# Patient Record
Sex: MALE | Employment: UNEMPLOYED | URBAN - METROPOLITAN AREA
[De-identification: names, ages, dates, MRNs, and addresses within clinical notes are randomized per-mention and may not be internally consistent; named-entity substitution may affect disease eponyms.]

---

## 2022-05-16 ENCOUNTER — OFFICE VISIT (OUTPATIENT)
Dept: WOUND CARE | Facility: CLINIC | Age: 67
End: 2022-05-16
Payer: COMMERCIAL

## 2022-05-16 VITALS
RESPIRATION RATE: 18 BRPM | HEART RATE: 132 BPM | DIASTOLIC BLOOD PRESSURE: 68 MMHG | SYSTOLIC BLOOD PRESSURE: 136 MMHG | TEMPERATURE: 99.7 F

## 2022-05-16 DIAGNOSIS — L89.114 PRESSURE INJURY OF RIGHT UPPER BACK, STAGE 4 (HCC): ICD-10-CM

## 2022-05-16 DIAGNOSIS — L89.154 PRESSURE INJURY OF SACRAL REGION, STAGE 4 (HCC): Primary | ICD-10-CM

## 2022-05-16 DIAGNOSIS — L89.133 PRESSURE ULCER OF RIGHT LOWER BACK, STAGE 3 (HCC): ICD-10-CM

## 2022-05-16 DIAGNOSIS — E46 PROTEIN MALNUTRITION (HCC): ICD-10-CM

## 2022-05-16 DIAGNOSIS — L89.314 PRESSURE ULCER OF RIGHT BUTTOCK, STAGE 4 (HCC): ICD-10-CM

## 2022-05-16 DIAGNOSIS — R26.2 AMBULATORY DYSFUNCTION: ICD-10-CM

## 2022-05-16 DIAGNOSIS — L89.892 PRESSURE INJURY OF LEFT FOOT, STAGE 2 (HCC): ICD-10-CM

## 2022-05-16 DIAGNOSIS — L89.893 PRESSURE ULCER OF LEFT FOOT, STAGE 3 (HCC): ICD-10-CM

## 2022-05-16 PROCEDURE — 99204 OFFICE O/P NEW MOD 45 MIN: CPT | Performed by: FAMILY MEDICINE

## 2022-05-16 PROCEDURE — 11045 DBRDMT SUBQ TISS EACH ADDL: CPT | Performed by: FAMILY MEDICINE

## 2022-05-16 PROCEDURE — 99215 OFFICE O/P EST HI 40 MIN: CPT | Performed by: FAMILY MEDICINE

## 2022-05-16 PROCEDURE — 11042 DBRDMT SUBQ TIS 1ST 20SQCM/<: CPT | Performed by: FAMILY MEDICINE

## 2022-05-16 RX ORDER — ERGOCALCIFEROL (VITAMIN D2) 1250 MCG
50000 CAPSULE ORAL WEEKLY
COMMUNITY

## 2022-05-16 RX ORDER — LIDOCAINE 40 MG/G
CREAM TOPICAL ONCE
Status: COMPLETED | OUTPATIENT
Start: 2022-05-16 | End: 2022-05-16

## 2022-05-16 RX ORDER — LATANOPROST 50 UG/ML
1 SOLUTION/ DROPS OPHTHALMIC
COMMUNITY

## 2022-05-16 RX ORDER — FLUTICASONE PROPIONATE 50 MCG
1 SPRAY, SUSPENSION (ML) NASAL 2 TIMES DAILY
COMMUNITY

## 2022-05-16 RX ORDER — LORATADINE 10 MG/1
10 TABLET ORAL DAILY
COMMUNITY

## 2022-05-16 RX ORDER — DENOSUMAB 60 MG/ML
60 INJECTION SUBCUTANEOUS ONCE
COMMUNITY

## 2022-05-16 RX ORDER — B-COMPLEX WITH VITAMIN C
1 TABLET ORAL
COMMUNITY

## 2022-05-16 RX ORDER — ECHINACEA PURPUREA EXTRACT 125 MG
1 TABLET ORAL AS NEEDED
COMMUNITY

## 2022-05-16 RX ORDER — DORZOLAMIDE HYDROCHLORIDE AND TIMOLOL MALEATE 20; 5 MG/ML; MG/ML
1 SOLUTION/ DROPS OPHTHALMIC 2 TIMES DAILY
COMMUNITY

## 2022-05-16 RX ORDER — HYDROMORPHONE HYDROCHLORIDE 1 MG/ML
5 SOLUTION ORAL
COMMUNITY

## 2022-05-16 RX ORDER — POLYETHYLENE GLYCOL 400 AND PROPYLENE GLYCOL 4; 3 MG/ML; MG/ML
0.25 GEL OPHTHALMIC
COMMUNITY

## 2022-05-16 RX ORDER — BISACODYL 10 MG
10 SUPPOSITORY, RECTAL RECTAL AS NEEDED
COMMUNITY

## 2022-05-16 RX ADMIN — LIDOCAINE: 40 CREAM TOPICAL at 15:03

## 2022-05-16 NOTE — PATIENT INSTRUCTIONS
Orders Placed This Encounter   Procedures    Wound cleansing and dressings     Wash your hands with soap and water  Remove old dressing, discard into plastic bag and place in trash  Cleanse the wound with normal saline prior to applying a clean dressing  Do not use tissue or cotton balls  Do not scrub the wound  Pat dry using gauze  Shower yes do not get dressing wet though  Plan for showers around the dressing changes      Back wounds    Wash your hands with soap and water  Remove old dressing, discard into plastic bag and place in trash  Cleanse the wound with normal saline prior to applying a clean dressing  Do not use tissue or cotton balls  Do not scrub the wound  Pat dry using gauze  Shower yes do not get dressing wet  Apply skin prep to skin surrounding wound  Apply santyl to the wound base to both back wounds, cover with 4x4 and secure with abd  IN the office use medihoney in place of santyl  Secure with paper tape  Change dressing daily    Continue to protect any at risk areas with bordered foam   Continue to turn and reposition like you have every hour  Continue with the offloading boots to both feet like you have been    Left Hip and Sacral Wounds    Negative Pressure Wound Therapy Instructions  Apply black granufoam to wound bed, Set negative pressure on continuous at 140 mmHG, VAC dressing to be changed three times per week as ordered  and VAC dressing bridged to relieve disc pressure  Today at the [address] [facility],[address] pack wounds with prophase soaked gauze, cover with foam and secure with med fix tape  This was done today  Left foot Wounds   Apply Dermagran to wound bed, cover with Allevyn gently foam  This was done today  Change dressing three   times per week      May substitute with hydrocolloid  If facility unable to obtain Pamela Pollard    Follow up at [address] [facility],[address] will call to schedule     Standing Status:   Future     Standing Expiration Date:   5/16/2023

## 2022-05-16 NOTE — PROGRESS NOTES
Patient ID: Leighton Zimmerman is a 61 y o  male Date of Birth 1955       Chief Complaint   Patient presents with   [facility] Patient Visit     New visit for numerous wounds  Staff reports that only four wounds are bad and that they feel need to be assessed  Acquired in the hospital about 6 weeks ago       Allergies: Albumen, egg - food allergy and Amoxicillin-pot clavulanate    Diagnosis:      Diagnosis ICD-10-CM Associated Orders   1  Pressure injury of sacral region, stage 4 ([facility])  L89 154 lidocaine (LMX) 4 % cream     Wound cleansing and dressings     Debridement   2  Pressure ulcer of right buttock, stage 4 ([facility])  L89 314 lidocaine (LMX) 4 % cream     Wound cleansing and dressings     Debridement   3  Pressure ulcer of right lower back, stage 3 ([facility])  L89 133 lidocaine (LMX) 4 % cream     Wound cleansing and dressings     Debridement   4  Pressure ulcer of left foot, stage 3 ([facility])  L89 893 lidocaine (LMX) 4 % cream     Wound cleansing and dressings     Debridement   5  Pressure injury of right upper back, stage 4 ([facility])  L89 114 Debridement   6  Pressure injury of left foot, stage 2 ([facility] [address] )  L89 892    7  Protein malnutrition ([facility] )  E46    8  Ambulatory dysfunction  R26 2            Assessment & Plan:  · Stage 4 pressure injury sacrum:   · Surgical debridement to remove devitalized tissue  · Continue with wound VAC  · Stage 4 pressure injury of R buttock:   · Surgical debridement to remove devitalized tissue  · Continue with wound VAC  · Stage 3 pressure injury of L foot lateral:   · Surgical debridement to remove slough  · Apply Dermagran to wound bed and change dressing three times weekly  · Stage 2 pressure injury of L foot medial:   · Apply Dermagran to wound bed and change dressing three times weekly  · Stage 4 pressure injury of R upper back:   · Surgical debridement to remove devitalized tissue  · Continue with Santyl and change daily     · Stage 3 pressure injury of R lower back: · Surgical debridement to remove devitalized tissue  · Continue with Santyl and change daily  · Continue to offload by turning patient consistently  · Continue with Prevalon type boots for offloading  · Continue to monitor for signs of acute infection such as hypotension, fever, chills  · Will f/u in 4 weeks for reassessment  Subjective:     5/16/22: Pt from skilled facility presents today with aids for evaluation of multiple pressure injuries obtained while in hospital  He was hospitalized twice recently  Per hospital admission note on 04/02/22; "Brenna Maurer is a 61 y o  -American male from [facility] with a PMHx significant for profound intellectual disability, renal colic with chronic indwelling Marrufo catheter and recent stent placement constipation, anemia of chronic disease, moderate protein calorie malnutrition, epilepsy, glaucoma  Who presents to [facility] with a chief complaint of fever and tachycardia  All information was obtained through emergency medical record and chart review as patient is unable to provide any information  The patient arrived via EMS from [facility] for fever and tachycardia    He does have a wound VAC to the right hip and sacrum  Staff that was at the bedside reported that he was just released from [facility]  The patient is nonverbal     Patient is a resident at [facility] and has a history of profound intellectual disabilities  Patient brought in severely malnourished and with multiple, extensive decubitus wounds  Patient with multiple pressure ulcers, sacral area, ear and coccyx  Sacral area with infection at admission  General surgery consulted and followed throughout admission with recommendations for an diverting ostomy to help with decreasing bowel function and healing of wounds  Suspected wounds were source of infection   During admission patient did go to the OR with general surgery where a diverting ostomy was completed  Brownish yellow soft stool present in ostomy bag  Wound VAC in place  Wound care and vac to continue upon d/c at [facility]  Furthermore, CT of chest during admission with moderate airspace opacity in the right base with air bronchograms likely secondary to pneumonia  Blood cultures on 4/7 negative and pro lizz 0 05  Two of the 13 wound cultures, the ones marked thigh, may have some depth and the CT of ab mentions possible myositis right buttock  Pt was treated with IV Meropenem for this issue as ordered by ID and course will be complete prior to discharge  PICC removed this am     Pt has colostomy and PEG tube  Resume tube feeds as prior to admission  [facility] doc to manage  Pt will be discharged with freire d/t wounds  Pt to follow up with surgery, pcp or urology for further determination of removal      Pt has not been in the [facility] since 3/13/22 (extensive hospital stays at [address] [place] [address] (3/14/22) for multi organ failure and sepsis then [facility] (4/2/22- current) for sepsis and wound treatment  Pt has received extensive IV antibiotic therapy and treatment at both facilities "    Per aids today, pt has been with the [facility] for over 40 years and was recently hospitalized and subsequently developed mutiple pressure injuries  Currently he is receiving feedings via PEG tube and receiving additional protein supplements; aids note he has gained about 5 lbs since returning from the hospital  In addition, he has a diverting colostomy and indwelling Freire catheter  He is being turned approximately hourly to relieve the pressure and has Prevalon -type boots in order to assist with offloading of the feet  They have been placing silvidene on the foot pressure injuries (stage 3 injury of L foot and stage 2 of R foot)  Wound VAC is currently being utilized on the pressure injuries of the R buttock and sacrum   Currently Santyl is being used on the two pressure injuries of the R back  The following portions of the patient's history were reviewed and updated as appropriate: There is no problem list on file for this patient  No past medical history on file  No past surgical history on file  No family history on file  Social History     Socioeconomic History    Marital status: Single     Spouse name: None    Number of children: None    Years of education: None    Highest education level: None   Occupational History    None   Tobacco Use    Smoking status: Never Smoker    Smokeless tobacco: Never Used   Vaping Use    Vaping Use: Never used   Substance and Sexual Activity    Alcohol use: Never    Drug use: Never    Sexual activity: Never   Other Topics Concern    None   Social History Narrative    None     Social Determinants of Health     Financial Resource Strain: Not on file   Food Insecurity: Not on file   Transportation Needs: Not on file   Physical Activity: Not on file   Stress: Not on file   Social Connections: Not on file   Intimate Partner Violence: Not on file   Housing Stability: Not on file        Current Outpatient Medications:     bisacodyl (Dulcolax) 10 mg suppository, Insert 10 mg into the rectum as needed in the morning for constipation  Every three days as needed  , Disp: , Rfl:     calcium carbonate-vitamin D (OSCAL-D) 500 mg-200 units per tablet, Take 1 tablet by mouth daily with breakfast, Disp: , Rfl:     denosumab (Prolia) 60 mg/mL, Inject 60 mg under the skin once Every 6 months, Disp: , Rfl:     dorzolamide-timolol (COSOPT) 22 3-6 8 MG/ML ophthalmic solution, Administer 1 drop to both eyes in the morning and 1 drop in the evening , Disp: , Rfl:     ergocalciferol (ERGOCALCIFEROL) 1 25 MG (01349 UT) capsule, Take 50,000 Units by mouth once a week, Disp: , Rfl:     fluticasone (FLONASE) 50 mcg/act nasal spray, 1 spray into each nostril 2 (two) times a day, Disp: , Rfl:     Hydromorphone HCl (Dilaudid) 1 MG/ML LIQD, Take 5 mg by mouth 6 (six) times a day, Disp: , Rfl:     latanoprost (XALATAN) 0 005 % ophthalmic solution, 1 drop daily at bedtime, Disp: , Rfl:     loratadine (CLARITIN) 10 mg tablet, Take 10 mg by mouth in the morning , Disp: , Rfl:     magnesium hydroxide (MILK OF MAGNESIA) 400 mg/5 mL oral suspension, Take by mouth daily as needed for constipation, Disp: , Rfl:     Polyethyl Glycol-Propyl Glycol (Systane) 0 4-0 3 % GEL, Apply 0 25 inches to eye Each eye, Disp: , Rfl:     sodium chloride (OCEAN) 0 65 % nasal spray, 1 spray into each nostril as needed in the morning for congestion  , Disp: , Rfl:   No current facility-administered medications for this visit  Review of Systems   Reason unable to perform ROS: pt is nonverbal        Objective:  /68   Pulse (!) 132   Temp 99 7 °F (37 6 °C)   Resp 18   Pain Score:  (unable to assess due to being non verbal)     Physical Exam  Vitals and nursing note reviewed  Constitutional:       Appearance: He is well-developed  HENT:      Head: Atraumatic  Right Ear: External ear normal       Left Ear: External ear normal    Eyes:      General: Lids are normal          Right eye: No discharge  Left eye: No discharge  Conjunctiva/sclera: Conjunctivae normal    Cardiovascular:      Rate and Rhythm: Tachycardia present  Pulmonary:      Effort: Pulmonary effort is normal  No respiratory distress  Breath sounds: Normal air entry  No stridor  Abdominal:      General: Abdomen is flat  Genitourinary:      Musculoskeletal:         General: Deformity (contracutres of RLE and LLE with significant muscle atrophy bilaterally) present  No swelling  Right lower leg: No edema  Left lower leg: No edema  Skin:     General: Skin is warm and dry  Findings: Wound present  Comments:  + Malodor  No significant periwound maceration visualized around any PI  No lymphangitic streaking from any PI  Multiple pressure injuries  See full wound description  Neurological:      Mental Status: He is alert  Gait: Gait abnormal (bed bound with contracutre)  Psychiatric:         Cognition and Memory: Cognition is impaired (profound intelectual disability)  Comments: Unable to assess as the pt is nonverbal with intellectual disability  Wound 05/16/22 Pressure Injury Back Right;Upper (Active)   Wound Image       05/16/22 1444   Wound Description Slough;Granulation tissue;Pink 05/16/22 1338   Pressure Injury Stage 3 05/16/22 1338   Sybil-wound Assessment Dry 05/16/22 1338   Wound Length (cm) 3 7 cm 05/16/22 1338   Wound Width (cm) 4 cm 05/16/22 1338   Wound Depth (cm) 0 2 cm 05/16/22 1338   Wound Surface Area (cm^2) 14 8 cm^2 05/16/22 1338   Wound Volume (cm^3) 2 96 cm^3 05/16/22 1338   Calculated Wound Volume (cm^3) 2 96 cm^3 05/16/22 1338   Drainage Amount Moderate 05/16/22 1338   Drainage Description Serous 05/16/22 1338   Non-staged Wound Description Full thickness 05/16/22 1338       Wound 05/16/22 Pressure Injury Back Lateral;Right; Lower (Active)   Wound Image       05/16/22 1444   Wound Description Granulation tissue;Slough; Epithelialization 05/16/22 1339   Pressure Injury Stage 3 05/16/22 1339   Sybil-wound Assessment Dry 05/16/22 1339   Wound Length (cm) 2 3 cm 05/16/22 1339   Wound Width (cm) 1 8 cm 05/16/22 1339   Wound Depth (cm) 0 9 cm 05/16/22 1339   Wound Surface Area (cm^2) 4 14 cm^2 05/16/22 1339   Wound Volume (cm^3) 3 726 cm^3 05/16/22 1339   Calculated Wound Volume (cm^3) 3 73 cm^3 05/16/22 1339   Undermining 1 05/16/22 1339   Undermining is depth extending from 12-3 05/16/22 1339   Drainage Amount Moderate 05/16/22 1339   Drainage Description Tan;Foul smelling 05/16/22 1339   Non-staged Wound Description Full thickness 05/16/22 1339   Treatments Cleansed 05/16/22 1339       Wound 05/16/22 Pressure Injury Sacrum (Active)   Wound Image           05/16/22 1443   Wound Description Granulation tissue;Slough;Eschar;Epithelialization; Necrotic;Probes to bone 05/16/22 1341   Pressure Injury Stage 4 05/16/22 1341   Sybil-wound Assessment Dry;Scar Tissue; Intact 05/16/22 1341   Wound Length (cm) 7 5 cm 05/16/22 1341   Wound Width (cm) 3 5 cm 05/16/22 1341   Wound Depth (cm) 2 6 cm 05/16/22 1341   Wound Surface Area (cm^2) 26 25 cm^2 05/16/22 1341   Wound Volume (cm^3) 68 25 cm^3 05/16/22 1341   Calculated Wound Volume (cm^3) 68 25 cm^3 05/16/22 1341   Undermining 1 2 05/16/22 1341   Undermining is depth extending from 9-3 05/16/22 1341   Drainage Amount Copious 05/16/22 1341   Drainage Description Foul smelling; Tan 05/16/22 1341   Non-staged Wound Description Full thickness 05/16/22 1341   Treatments Cleansed 05/16/22 1341       Wound 05/16/22 Pressure Injury Buttocks Right (Active)   Wound Image   05/16/22 1342   Wound Description Slough;Granulation tissue; Epithelialization 05/16/22 1345   Sybil-wound Assessment Scar Tissue;Dry; Intact 05/16/22 1345   Wound Length (cm) 7 2 cm 05/16/22 1345   Wound Width (cm) 7 cm 05/16/22 1345   Wound Depth (cm) 1 2 cm 05/16/22 1345   Wound Surface Area (cm^2) 50 4 cm^2 05/16/22 1345   Wound Volume (cm^3) 60 48 cm^3 05/16/22 1345   Calculated Wound Volume (cm^3) 60 48 cm^3 05/16/22 1345   Drainage Amount Copious 05/16/22 1345   Drainage Description Serosanguineous; Foul smelling; Tan 05/16/22 1345   Non-staged Wound Description Full thickness 05/16/22 1345   Treatments Cleansed 05/16/22 1345       Wound 05/16/22 Pressure Injury Foot Left;Medial (Active)   Wound Image   05/16/22 1353   Wound Description Granulation tissue;Slough 05/16/22 1345   Pressure Injury Stage 2 05/16/22 1345   Sybil-wound Assessment Scar Tissue 05/16/22 1345   Wound Length (cm) 1 cm 05/16/22 1345   Wound Width (cm) 1 cm 05/16/22 1345   Wound Depth (cm) 0 1 cm 05/16/22 1345   Wound Surface Area (cm^2) 1 cm^2 05/16/22 1345   Wound Volume (cm^3) 0 1 cm^3 05/16/22 1345   Calculated Wound Volume (cm^3) 0 1 cm^3 05/16/22 1345   Drainage Amount Small 05/16/22 1345   Drainage Description Serous 05/16/22 1345       Wound 05/16/22 Pressure Injury Foot Left;Lateral (Active)   Wound Image       05/16/22 1452   Wound Description Epithelialization;Granulation tissue;Slough 05/16/22 1352   Pressure Injury Stage 2 05/16/22 1352   Sybil-wound Assessment Dry; Intact;Scar Tissue 05/16/22 1352   Wound Length (cm) 1 1 cm 05/16/22 1352   Wound Width (cm) 1 3 cm 05/16/22 1352   Wound Depth (cm) 0 2 cm 05/16/22 1352   Wound Surface Area (cm^2) 1 43 cm^2 05/16/22 1352   Wound Volume (cm^3) 0 286 cm^3 05/16/22 1352   Calculated Wound Volume (cm^3) 0 29 cm^3 05/16/22 1352   Drainage Amount Small 05/16/22 1352   Drainage Description Serous 05/16/22 1352   Non-staged Wound Description Full thickness 05/16/22 1352                     Debridement   Wound 05/16/22 Pressure Injury Sacrum    Universal Protocol:  Consent: Verbal consent obtained  Written consent obtained  Consent given by: consent signed by   Time out: Immediately prior to procedure a "time out" was called to verify the correct patient, procedure, equipment, support staff and site/side marked as required  Patient understanding: patient states understanding of the procedure being performed  Patient identity confirmation method: pt is nonverbal and consent was signed by  from facility        Performed by: physician  Debridement type: surgical  Level of debridement: subcutaneous tissue  Pain control: lidocaine 4%  Post-debridement measurements  Length (cm): 7 5  Width (cm): 3 5  Depth (cm): 3  Percent debrided: 80%  Surface Area (cm^2): 26 25  Area debrided (cm^2): 21  Volume (cm^3): 78 75  Tissue and other material debrided: adipose, dermis and subcutaneous tissue  Devitalized tissue debrided: fibrin, necrotic debris and slough  Instrument(s) utilized: blade and forceps  Bleeding: medium  Hemostasis obtained with: pressure  Procedural pain (0-10): 0  Post-procedural pain: 0   Response to treatment: procedure was tolerated well  Debridement Comments: Large amount of necrotic subcutaneous tissue was removed  Debridement   Wound 05/16/22 Pressure Injury Buttocks Right    Universal Protocol:  Consent: Verbal consent obtained  Written consent obtained  Consent given by: consent signed by    Time out: Immediately prior to procedure a "time out" was called to verify the correct patient, procedure, equipment, support staff and site/side marked as required  Patient understanding: patient states understanding of the procedure being performed  Patient identity confirmation method: consent was signed from  from facility       Performed by: physician  Debridement type: surgical  Level of debridement: subcutaneous tissue  Pain control: lidocaine 4%  Post-debridement measurements  Length (cm): 7 2  Width (cm): 7  Depth (cm): 1 6  Percent debrided: 35%  Surface Area (cm^2): 50 4  Area debrided (cm^2): 17 64  Volume (cm^3): 80 64  Tissue and other material debrided: adipose, dermis and subcutaneous tissue  Devitalized tissue debrided: fibrin, necrotic debris and slough  Instrument(s) utilized: blade and forceps  Bleeding: medium  Hemostasis obtained with: pressure  Procedural pain (0-10): 0  Post-procedural pain: 0   Response to treatment: procedure was tolerated well  Debridement Comments: Significant amount of devitalized tissue removed from wound  Debridement   Wound 05/16/22 Pressure Injury Back Right;Upper    Universal Protocol:  Consent: Verbal consent obtained  Written consent obtained  Consent given by: consent signed by   Time out: Immediately prior to procedure a "time out" was called to verify the correct patient, procedure, equipment, support staff and site/side marked as required    Patient understanding: patient states understanding of the procedure being performed  Patient identity confirmation method: consent signed by        Performed by: physician  Debridement type: surgical  Level of debridement: subcutaneous tissue  Pain control: lidocaine 4%  Post-debridement measurements  Length (cm): 3 7  Width (cm): 4  Depth (cm): 0 4  Percent debrided: 90%  Surface Area (cm^2): 14 8  Area debrided (cm^2): 13 32  Volume (cm^3): 5 92  Tissue and other material debrided: adipose, dermis and subcutaneous tissue  Devitalized tissue debrided: fibrin, necrotic debris and slough  Instrument(s) utilized: curette  Bleeding: medium  Hemostasis obtained with: pressure  Procedural pain (0-10): 0  Post-procedural pain: 0   Response to treatment: procedure was tolerated well    Debridement   Wound 05/16/22 Pressure Injury Back Lateral;Right; Lower    Universal Protocol:  Consent: Verbal consent obtained  Written consent obtained  Consent given by: consent signed by    Time out: Immediately prior to procedure a "time out" was called to verify the correct patient, procedure, equipment, support staff and site/side marked as required  Patient understanding: patient states understanding of the procedure being performed  Patient identity confirmation method: consent signed by  of facility        Performed by: physician  Debridement type: surgical  Level of debridement: subcutaneous tissue  Pain control: lidocaine 4%  Post-debridement measurements  Length (cm): 2 3  Width (cm): 1 8  Depth (cm): 1 1  Percent debrided: 100%  Surface Area (cm^2): 4 14  Area debrided (cm^2): 4 14  Volume (cm^3): 4 55  Tissue and other material debrided: adipose, dermis and subcutaneous tissue  Devitalized tissue debrided: fibrin, necrotic debris and slough  Instrument(s) utilized: curette  Bleeding: small  Hemostasis obtained with: pressure  Procedural pain (0-10): 0  Post-procedural pain: 0   Response to treatment: procedure was tolerated well    Debridement   Wound 05/16/22 Pressure Injury Foot Left;Lateral    Universal Protocol:  Consent: Verbal consent obtained  Written consent obtained  Consent given by: consent signed by  of [facility]  Time out: Immediately prior to procedure a "time out" was called to verify the correct patient, procedure, equipment, support staff and site/side marked as required  Patient understanding: patient states understanding of the procedure being performed  Patient identity confirmation method: consent signed by   Performed by: physician  Debridement type: surgical  Level of debridement: subcutaneous tissue  Pain control: lidocaine 4%  Post-debridement measurements  Length (cm): 1 1  Width (cm): 1 3  Depth (cm): 0 3  Percent debrided: 100%  Surface Area (cm^2): 1 43  Area debrided (cm^2): 1 43  Volume (cm^3): 0 43  Tissue and other material debrided: dermis and subcutaneous tissue  Devitalized tissue debrided: fibrin and slough  Instrument(s) utilized: curette  Bleeding: medium  Hemostasis obtained with: pressure  Procedural pain (0-10): 0  Post-procedural pain: 0   Response to treatment: procedure was tolerated well                                 Wound Instructions:  Orders Placed This Encounter   Procedures    Wound cleansing and dressings     Wash your hands with soap and water  Remove old dressing, discard into plastic bag and place in trash  Cleanse the wound with normal saline prior to applying a clean dressing  Do not use tissue or cotton balls  Do not scrub the wound  Pat dry using gauze  Shower yes do not get dressing wet though  Plan for showers around the dressing changes      Back wounds    Wash your hands with soap and water  Remove old dressing, discard into plastic bag and place in trash  Cleanse the wound with normal saline prior to applying a clean dressing  Do not use tissue or cotton balls  Do not scrub the wound  Pat dry using gauze    Shower yes do not get dressing wet  Apply skin prep to skin surrounding wound  Apply santyl to the wound base to both back wounds, cover with 4x4 and secure with abd  IN the office use medihoney in place of santyl  Secure with paper tape  Change dressing daily    Continue to protect any at risk areas with bordered foam   Continue to turn and reposition like you have every hour  Continue with the offloading boots to both feet like you have been    Left Hip and Sacral Wounds    Negative Pressure Wound Therapy Instructions  Apply black granufoam to wound bed, Set negative pressure on continuous at 140 mmHG, VAC dressing to be changed three times per week as ordered  and VAC dressing bridged to relieve disc pressure  Today at the [facility] pack wounds with prophase soaked gauze, cover with foam and secure with med fix tape  This was done today  Left foot Wounds   Apply Dermagran to wound bed, cover with Allevyn gently foam  This was done today  Change dressing three   times per week  May substitute with hydrocolloid  If facility unable to obtain [address]    Follow up at [facility] will call to schedule     Standing Status:   Future     Standing Expiration Date:   5/16/2023    Debridement     This order was created via procedure documentation    Debridement     This order was created via procedure documentation    Debridement     This order was created via procedure documentation    Debridement     This order was created via procedure documentation    Debridement     This order was created via procedure documentation       Total time spent today:  50 minutes  This includes reviewing the patient's chart, pertinent physician records, Hospital discharge summary note from 04/02/22, CBC from 05/10/22  Landen Wong MD, CHT, CWS    Portions of the record may have been created with voice recognition software  Occasional wrong word or "sound alike" substitutions may have occurred due to the inherent limitations of voice recognition software   Read the chart carefully and recognize, using context, where substitutions have occurred

## 2022-06-13 ENCOUNTER — OFFICE VISIT (OUTPATIENT)
Dept: WOUND CARE | Facility: CLINIC | Age: 67
End: 2022-06-13
Payer: COMMERCIAL

## 2022-06-13 VITALS — RESPIRATION RATE: 22 BRPM | TEMPERATURE: 96.9 F

## 2022-06-13 DIAGNOSIS — L89.314 PRESSURE ULCER OF RIGHT BUTTOCK, STAGE 4 (HCC): ICD-10-CM

## 2022-06-13 DIAGNOSIS — E46 PROTEIN MALNUTRITION (HCC): ICD-10-CM

## 2022-06-13 DIAGNOSIS — L89.154 PRESSURE INJURY OF SACRAL REGION, STAGE 4 (HCC): Primary | ICD-10-CM

## 2022-06-13 DIAGNOSIS — L89.133 PRESSURE ULCER OF RIGHT LOWER BACK, STAGE 3 (HCC): ICD-10-CM

## 2022-06-13 DIAGNOSIS — L89.894 PRESSURE ULCER OF LEFT FOOT, STAGE 4 (HCC): ICD-10-CM

## 2022-06-13 DIAGNOSIS — L89.114 PRESSURE INJURY OF RIGHT UPPER BACK, STAGE 4 (HCC): ICD-10-CM

## 2022-06-13 PROCEDURE — 11045 DBRDMT SUBQ TISS EACH ADDL: CPT | Performed by: FAMILY MEDICINE

## 2022-06-13 PROCEDURE — 99214 OFFICE O/P EST MOD 30 MIN: CPT | Performed by: FAMILY MEDICINE

## 2022-06-13 PROCEDURE — 11043 DBRDMT MUSC&/FSCA 1ST 20/<: CPT | Performed by: FAMILY MEDICINE

## 2022-06-13 PROCEDURE — 87077 CULTURE AEROBIC IDENTIFY: CPT | Performed by: FAMILY MEDICINE

## 2022-06-13 PROCEDURE — 97597 DBRDMT OPN WND 1ST 20 CM/<: CPT | Performed by: FAMILY MEDICINE

## 2022-06-13 PROCEDURE — NC001 PR NO CHARGE: Performed by: STUDENT IN AN ORGANIZED HEALTH CARE EDUCATION/TRAINING PROGRAM

## 2022-06-13 PROCEDURE — 11042 DBRDMT SUBQ TIS 1ST 20SQCM/<: CPT | Performed by: FAMILY MEDICINE

## 2022-06-13 PROCEDURE — 99213 OFFICE O/P EST LOW 20 MIN: CPT | Performed by: FAMILY MEDICINE

## 2022-06-13 PROCEDURE — 87070 CULTURE OTHR SPECIMN AEROBIC: CPT | Performed by: FAMILY MEDICINE

## 2022-06-13 PROCEDURE — 87205 SMEAR GRAM STAIN: CPT | Performed by: FAMILY MEDICINE

## 2022-06-13 PROCEDURE — 87186 SC STD MICRODIL/AGAR DIL: CPT | Performed by: FAMILY MEDICINE

## 2022-06-13 RX ORDER — LIDOCAINE 40 MG/G
CREAM TOPICAL ONCE
Status: COMPLETED | OUTPATIENT
Start: 2022-06-13 | End: 2022-06-13

## 2022-06-13 RX ADMIN — LIDOCAINE: 40 CREAM TOPICAL at 13:48

## 2022-06-13 NOTE — PATIENT INSTRUCTIONS
Orders Placed This Encounter   Procedures    Wound cleansing and dressings     Wound cleansing and dressings      Wash your hands with soap and water  Remove old dressing, discard into plastic bag and place in trash  Cleanse the wound with normal saline prior to applying a clean dressing  Do not use tissue or cotton balls  Do not scrub the wound  Pat dry using gauze  Shower yes do not get dressing wet though  Plan for showers around the dressing changes     Back wounds     Wash your hands with soap and water  Remove old dressing, discard into plastic bag and place in trash  Cleanse the wound with normal saline prior to applying a clean dressing  Do not use tissue or cotton balls  Do not scrub the wound  Pat dry using gauze  Shower yes do not get dressing wet  Apply skin prep to skin surrounding wound  Apply medihoney to the wound base to both back wounds, cover with 4x4 and secure with abd or large border foam     Secure with paper tape  Change dressing daily        Left Hip and Sacral Wounds     Wash your hands with soap and water  Remove old dressing, discard into plastic bag and place in trash  Cleanse the wound with normal saline prior to applying a clean dressing  Do not use tissue or cotton balls  Do not scrub the wound  Pat dry using gauze  Negative Pressure Wound Therapy Instructions  Apply black granufoam to wound bed, Set negative pressure on continuous at 125 mmHG, VAC dressing to be changed three times per week as ordered  and VAC dressing bridged to relieve disc pressure  Today at the [address] [facility],[address] pack wounds with prophase soaked gauze, cover with foam and secure with med fix tape  This was done today  Left foot Wounds   Apply silver alginate to wound bed, cover with Allevyn gently foam  This was done today  Change dressing three   times per week          Continue with betadine to the left medial foot wound    Continue to protect any at risk areas with bordered foam     Continue to turn and reposition like you have every hour  Continue with the offloading boots to both feet like you have been  May continue with the left foot just being elevated on a pillow to prevent pressure    Check air mattress in the facility to ensure that it is set correctly for the patient  If it is weight based make sure the weight is correctly set  If it just has a range from soft to firm set it at medium         Follow up at [address] [facility],[address] in five weeks will call to schedule     Standing Status:   Future     Standing Expiration Date:   6/13/2023

## 2022-06-13 NOTE — PROGRESS NOTES
Debridement   Wound 05/16/22 Pressure Injury Foot Left;Lateral    Universal Protocol:  Procedure performed by: (Assisting provider DOLLY [email or web address])  Consent given by: facility  Patient identity confirmation method: with aid        Performed by: PA  Debridement type: selective    Post-debridement measurements  Length (cm): 1 5  Width (cm): 1 2  Depth (cm): 0 2  Percent debrided: 100%  Surface Area (cm^2): 1 8  Area debrided (cm^2): 1 8  Volume (cm^3): 0 36  Tissue and other material debrided: devitalized tissue  Devitalized tissue debrided: fibrin and devitalized tissue  Instrument(s) utilized: curette  Bleeding: small  Hemostasis obtained with: pressure

## 2022-06-13 NOTE — PROGRESS NOTES
Patient ID: Antony Orozco is a 61 y o  male Date of Birth 1955       Chief Complaint   Patient presents with    Follow Up Wound Care Visit     Numerous wounds to sacrum, hips and feet       Allergies: Albumen, egg - food allergy and Amoxicillin-pot clavulanate    Diagnosis:   Diagnosis ICD-10-CM Associated Orders   1  Pressure injury of sacral region, stage 4 ([facility])  L89 154 lidocaine (LMX) 4 % cream     Wound cleansing and dressings     Debridement   2  Pressure ulcer of right buttock, stage 4 ([facility])  L89 314 lidocaine (LMX) 4 % cream     Wound cleansing and dressings     Debridement   3  Pressure ulcer of right lower back, stage 3 ([facility])  L89 133 lidocaine (LMX) 4 % cream     Wound cleansing and dressings     Debridement   4  Pressure ulcer of left foot, stage 4 ([facility])  L89 894 lidocaine (LMX) 4 % cream     Wound cleansing and dressings     Debridement   5  Pressure injury of right upper back, stage 4 ([facility])  L89 114 Wound culture and Gram stain     Wound culture and Gram stain     Debridement   6  Protein malnutrition ([facility])  E46 lidocaine (LMX) 4 % cream     Wound cleansing and dressings        Assessment  & Plan:    · Stage 4 pressure injury sacrum: improved in size  Healthy granulation tissue present but also areas of slough and some necrotic tissue  ? Surgical debridement to remove devitalized tissue  ? Continue with wound VAC  ? Xray ordered to assess for underlying osteomyelitis  · Stage 4 pressure injury of R buttock: Improved in size  Healthy granulation tissue is present with some slough and necrotic tissue as well   ? Surgical debridement to remove devitalized tissue  ? Continue with wound VAC  ? Xray ordered to assess for underlying osteomyelitis  · Stage 4 pressure injury of L foot lateral: probe to bone at this visit  Maceration present  ? Selective debridement     ? D/c Dermagran and change to silver alginate and cover with foam    ? Xray ordered to assess for underlying osteomyelitis  · Stage 2 pressure injury of L foot medial: healed  ? Healed at today's visit  · Stage 4 pressure injury of R upper back: malodorous, increase in depth from previous visit  Not improved  Significant induration  ? Surgical debridement to remove devitalized tissue  ? D/c Santyl  Change to Medihoney  ? Xray of shoulder/scapula region to r/o osteomyelitis  ? Culture taken today post debridement  · Stage 3 pressure injury of R lower back: not improved from previous visit  Kady Hammonds remains in wound bed  ? Surgical debridement to remove devitalized tissue  ? D/c Santyl/ Change to Medihoney  · Continue to offload by turning patient consistently  Discussed ensuring air matress is set correctly for pt (based on weight or firmness), if based on firmness set to medium  · Continue to monitor for signs of acute infection such as hypotension, fever over 100 4 degrees, diaphoresis  If these develop, present to ED and can consider IV abx at that time  · Had discussion about palliative care with nurse at [facility]  She states that there have been discussions about pt being changed to DNR status, however, this decision must be made by a physician outside of the facility  Cannot make the decision about his status at this time, as I am only seeing pt for wound care  May consider course in terms of wound care in the future based on wound progression  · Will f/u in 5 weeks for reassessment              Subjective:     5/16/22: Pt from skilled facility presents today with aids for evaluation of multiple pressure injuries obtained while in hospital  He was hospitalized twice recently  Per hospital admission note on 04/02/22; "Leighton Zimmerman is a 61 y o   -American male from [facility] with a PMHx significant for profound intellectual disability, renal colic with chronic indwelling Marrufo catheter and recent stent placement constipation, anemia of chronic disease, moderate protein calorie malnutrition, epilepsy, glaucoma  Who presents to [facility] with a chief complaint of fever and tachycardia  All information was obtained through emergency medical record and chart review as patient is unable to provide any information  The patient arrived via EMS from [facility] for fever and tachycardia    He does have a wound VAC to the right hip and sacrum  Staff that was at the bedside reported that he was just released from [facility]  The patient is nonverbal     Patient is a resident at [facility] and has a history of profound intellectual disabilities  Patient brought in severely malnourished and with multiple, extensive decubitus wounds  Patient with multiple pressure ulcers, sacral area, ear and coccyx  Sacral area with infection at admission  General surgery consulted and followed throughout admission with recommendations for an diverting ostomy to help with decreasing bowel function and healing of wounds  Suspected wounds were source of infection  During admission patient did go to the OR with general surgery where a diverting ostomy was completed  Brownish yellow soft stool present in ostomy bag  Wound VAC in place  Wound care and vac to continue upon d/c at [facility]  Furthermore, CT of chest during admission with moderate airspace opacity in the right base with air bronchograms likely secondary to pneumonia  Blood cultures on 4/7 negative and pro lizz 0 05  Two of the 13 wound cultures, the ones marked thigh, may have some depth and the CT of ab mentions possible myositis right buttock  Pt was treated with IV Meropenem for this issue as ordered by ID and course will be complete prior to discharge  PICC removed this am     Pt has colostomy and PEG tube  Resume tube feeds as prior to admission  [facility] doc to manage  Pt will be discharged with freire d/t wounds   Pt to follow up with surgery, pcp or urology for further determination of removal      Pt has not been in the [facility] since 3/13/22 (extensive hospital stays at formerly [facility] (3/14/22) for multi organ failure and sepsis then [facility] (4/2/22- current) for sepsis and wound treatment  Pt has received extensive IV antibiotic therapy and treatment at both facilities "    Per aids today, pt has been with the [facility] for over 40 years and was recently hospitalized and subsequently developed mutiple pressure injuries  Currently he is receiving feedings via PEG tube and receiving additional protein supplements; aids note he has gained about 5 lbs since returning from the hospital  In addition, he has a diverting colostomy and indwelling Marrufo catheter  He is being turned approximately hourly to relieve the pressure and has Prevalon -type boots in order to assist with offloading of the feet  They have been placing silvidene on the foot pressure injuries (stage 3 injury of L foot and stage 2 of R foot)  Wound VAC is currently being utilized on the pressure injuries of the R buttock and sacrum  Currently Santyl is being used on the two pressure injuries of the R back  06/13/22: Pt presents for f/u for multiple pressure injuries  The stage 2 pressure injury of the L medial foot has healed since previous visit  Wound VAC is still being used on stage 4 wounds of sacrum and buttocks; there has been improvement of these wounds since previous visit  Santyl is currently being used to the stage 4 R upper back wound and stage 3 lower back wound  Per aid from [facility], they are concerned about the pressure injury on the R upper back in terms of infection  Since last visit, pt was placed on Augmentin which helped with the malodor of the wound, but after completing the course of abx, the odor has returned   The doctor at the facility is contemplating admission of pt for IV abx, however last time pt was hospitalized that is when he obtained extensive pressure injuries so there is some contemplation of what is the best for the pt at this time  The pressure injury of the L lateral foot is being treated with Dermagran  They have d/c the Prevlon boots, because with them on the pt was still unable to offload d/t his positioning and contractures  They have been positioning pillows in attempt to offload his foot  Has been having a low grade temperature at the facility  The following portions of the patient's history were reviewed and updated as appropriate: There is no problem list on file for this patient  No past medical history on file  No past surgical history on file  No family history on file  Social History     Socioeconomic History    Marital status: Single     Spouse name: None    Number of children: None    Years of education: None    Highest education level: None   Occupational History    None   Tobacco Use    Smoking status: Never Smoker    Smokeless tobacco: Never Used   Vaping Use    Vaping Use: Never used   Substance and Sexual Activity    Alcohol use: Never    Drug use: Never    Sexual activity: Never   Other Topics Concern    None   Social History Narrative    None     Social Determinants of Health     Financial Resource Strain: Not on file   Food Insecurity: Not on file   Transportation Needs: Not on file   Physical Activity: Not on file   Stress: Not on file   Social Connections: Not on file   Intimate Partner Violence: Not on file   Housing Stability: Not on file       Current Outpatient Medications:     bisacodyl (Dulcolax) 10 mg suppository, Insert 10 mg into the rectum as needed in the morning for constipation  Every three days as needed  , Disp: , Rfl:     calcium carbonate-vitamin D (OSCAL-D) 500 mg-200 units per tablet, Take 1 tablet by mouth daily with breakfast, Disp: , Rfl:     denosumab (Prolia) 60 mg/mL, Inject 60 mg under the skin once Every 6 months, Disp: , Rfl:     dorzolamide-timolol (COSOPT) 22 3-6 8 MG/ML ophthalmic solution, Administer 1 drop to both eyes in the morning and 1 drop in the evening , Disp: , Rfl:     ergocalciferol (ERGOCALCIFEROL) 1 25 MG (12944 UT) capsule, Take 50,000 Units by mouth once a week, Disp: , Rfl:     fluticasone (FLONASE) 50 mcg/act nasal spray, 1 spray into each nostril 2 (two) times a day, Disp: , Rfl:     Hydromorphone HCl (Dilaudid) 1 MG/ML LIQD, Take 5 mg by mouth 6 (six) times a day, Disp: , Rfl:     latanoprost (XALATAN) 0 005 % ophthalmic solution, 1 drop daily at bedtime, Disp: , Rfl:     loratadine (CLARITIN) 10 mg tablet, Take 10 mg by mouth in the morning , Disp: , Rfl:     magnesium hydroxide (MILK OF MAGNESIA) 400 mg/5 mL oral suspension, Take by mouth daily as needed for constipation, Disp: , Rfl:     Polyethyl Glycol-Propyl Glycol (Systane) 0 4-0 3 % GEL, Apply 0 25 inches to eye Each eye, Disp: , Rfl:     sodium chloride (OCEAN) 0 65 % nasal spray, 1 spray into each nostril as needed in the morning for congestion  , Disp: , Rfl:   No current facility-administered medications for this visit  Review of Systems   Reason unable to perform ROS: ROS cannot be assessed as pt is nonverbal d/t intellectual disability  Objective:  Temp (!) 96 9 °F (36 1 °C)   Resp 22   Pain Score:  (unable to assess due to nonverbal)     Physical Exam  Nursing note reviewed  Vitals reviewed: temperature normal  BP unable to be assessed at today's visit  Constitutional:       General: He is not in acute distress  Appearance: He is cachectic  Comments:      HENT:      Head: Normocephalic and atraumatic  Eyes:      General: Lids are normal          Right eye: No discharge  Left eye: No discharge  Conjunctiva/sclera: Conjunctivae normal    Cardiovascular:      Rate and Rhythm: Normal rate  Pulmonary:      Effort: Pulmonary effort is normal  No respiratory distress  Breath sounds: Normal air entry  No stridor  Abdominal:      General: Abdomen is flat  Genitourinary:      Musculoskeletal:         General: Deformity (contracutres of RLE and LLE with significant muscle atrophy bilaterally) present  No swelling  Skin:     General: Skin is warm and dry  Findings: Wound present  Comments:  Stage 2 pressure injury of L foot is healed at today's visit  Stage 4 pressure injury of L lateral foot with probe to bone and significant periwound maceration  Granulation tissue present in base  Undermining present  No surrounding erythema  No malodor  Stage 4 pressure injury of R buttock  Decreased in size from previous visit  Base mostly granulation tissue with portion with slough and necrotic tissue still present (20%)  Stage 4 pressure injury sacrum  Decreased in size from previous visit  Slough and necrotic tissue present along with granulation tissue  No periwound maceration  Stage 3 pressure injury of R lower back  No significant change from previous visit  Significant amount of slough and necrotic tissue present in wound bed  No periwound maceration  Stage 4 pressure injury of R upper back  Increased in depth from previous visit  Significant amount of necrotic tissue and slough present  +Malodor  No periwound maceration  No active drainage visualized on examination  +Significant surrounding induration  Neurological:      Mental Status: He is alert  Gait: Gait abnormal (bed bound with contracutre)  Psychiatric:         Cognition and Memory: Cognition is impaired (profound intelectual disability)  Comments: Unable to assess as the pt is nonverbal with intellectual disability                Wound 05/16/22 Pressure Injury Back Right;Upper (Active)   Wound Image       06/13/22 1344   Wound Description Slough;Granulation tissue;Pink 06/13/22 1306   Pressure Injury Stage 4 06/13/22 1306   Sybil-wound Assessment Clean;Dry;Scar Tissue 06/13/22 1306   Wound Length (cm) 4 cm 06/13/22 1306   Wound Width (cm) 4 cm 06/13/22 1306 Wound Depth (cm) 1 cm 06/13/22 1306   Wound Surface Area (cm^2) 16 cm^2 06/13/22 1306   Wound Volume (cm^3) 16 cm^3 06/13/22 1306   Calculated Wound Volume (cm^3) 16 cm^3 06/13/22 1306   Change in Wound Size % -440 54 06/13/22 1306   Undermining 1 5 06/13/22 1306   Undermining is depth extending from 12-3 06/13/22 1306   Drainage Amount Moderate 06/13/22 1306   Drainage Description Tan;Foul smelling 06/13/22 1306   Non-staged Wound Description Full thickness 06/13/22 1306   Treatments Cleansed 06/13/22 1306   Patient Tolerance Tolerated well 06/13/22 1306       Wound 05/16/22 Pressure Injury Back Lateral;Right; Lower (Active)   Wound Image       06/13/22 1344   Wound Description Granulation tissue;Slough; Epithelialization 06/13/22 1307   Pressure Injury Stage 3 06/13/22 1307   Sybil-wound Assessment Dry 05/16/22 1339   Wound Length (cm) 1 5 cm 06/13/22 1307   Wound Width (cm) 1 2 cm 06/13/22 1307   Wound Depth (cm) 0 7 cm 06/13/22 1307   Wound Surface Area (cm^2) 1 8 cm^2 06/13/22 1307   Wound Volume (cm^3) 1 26 cm^3 06/13/22 1307   Calculated Wound Volume (cm^3) 1 26 cm^3 06/13/22 1307   Change in Wound Size % 66 22 06/13/22 1307   Undermining 1 05/16/22 1339   Undermining is depth extending from 12-3 05/16/22 1339   Drainage Amount Moderate 06/13/22 1307   Drainage Description Serosanguineous 06/13/22 1307   Non-staged Wound Description Full thickness 06/13/22 1307   Treatments Cleansed 06/13/22 1307   Patient Tolerance Tolerated well 06/13/22 1307       Wound 05/16/22 Pressure Injury Sacrum (Active)   Wound Image       06/13/22 1344   Wound Description Granulation tissue;Slough;Eschar;Epithelialization; Necrotic;Probes to bone 06/13/22 1308   Pressure Injury Stage 4 06/13/22 1308   Sybil-wound Assessment Dry;Scar Tissue; Intact 06/13/22 1308   Wound Length (cm) 6 2 cm 06/13/22 1308   Wound Width (cm) 2 cm 06/13/22 1308   Wound Depth (cm) 3 cm 06/13/22 1308   Wound Surface Area (cm^2) 12 4 cm^2 06/13/22 1308   Wound Volume (cm^3) 37 2 cm^3 06/13/22 1308   Calculated Wound Volume (cm^3) 37 2 cm^3 06/13/22 1308   Change in Wound Size % 45 49 06/13/22 1308   Undermining 2 5 06/13/22 1308   Undermining is depth extending from 9-3 06/13/22 1308   Drainage Amount Moderate 06/13/22 1308   Drainage Description Tan 06/13/22 1308   Non-staged Wound Description Full thickness 06/13/22 1308   Treatments Cleansed;Irrigation with NSS 06/13/22 1308   Patient Tolerance Tolerated well 06/13/22 1308       Wound 05/16/22 Pressure Injury Buttocks Right (Active)   Wound Image       06/13/22 1344   Wound Description Slough;Granulation tissue 06/13/22 1304   Pressure Injury Stage 4 06/13/22 1304   Sybil-wound Assessment Scar Tissue;Dry; Intact 06/13/22 1304   Wound Length (cm) 5 cm 06/13/22 1304   Wound Width (cm) 3 cm 06/13/22 1304   Wound Depth (cm) 1 4 cm 06/13/22 1304   Wound Surface Area (cm^2) 15 cm^2 06/13/22 1304   Wound Volume (cm^3) 21 cm^3 06/13/22 1304   Calculated Wound Volume (cm^3) 21 cm^3 06/13/22 1304   Change in Wound Size % 65 28 06/13/22 1304   Drainage Amount Moderate 06/13/22 1304   Drainage Description Serosanguineous; Foul smelling; Tan 06/13/22 1304   Non-staged Wound Description Full thickness 06/13/22 1304   Treatments Cleansed 06/13/22 1304   Patient Tolerance Tolerated well 06/13/22 1304       Wound 05/16/22 Pressure Injury Foot Left;Medial (Active)   Wound Image   06/13/22 1254   Wound Description Eschar 06/13/22 1255   Pressure Injury Stage U 06/13/22 1255   Sybil-wound Assessment Scar Tissue 06/13/22 1255   Wound Length (cm) 0 1 cm 06/13/22 1255   Wound Width (cm) 0 1 cm 06/13/22 1255   Wound Depth (cm) 0 1 cm 06/13/22 1255   Wound Surface Area (cm^2) 0 01 cm^2 06/13/22 1255   Wound Volume (cm^3) 0 001 cm^3 06/13/22 1255   Calculated Wound Volume (cm^3) 0 cm^3 06/13/22 1255   Change in Wound Size % 100 06/13/22 1255   Drainage Amount None 06/13/22 1255   Drainage Description Serous 05/16/22 1345   Non-staged Wound Description Not applicable 61/79/90 2237   Treatments Cleansed 06/13/22 1255       Wound 05/16/22 Pressure Injury Foot Left;Lateral (Active)   Wound Image   06/13/22 1255   Wound Description Epithelialization;Granulation tissue;Slough; Exposed bone 06/13/22 1257   Pressure Injury Stage 4 06/13/22 1257   Sybil-wound Assessment Dry; Intact;Scar Tissue 06/13/22 1257   Wound Length (cm) 1 cm 06/13/22 1257   Wound Width (cm) 1 cm 06/13/22 1257   Wound Depth (cm) 0 2 cm 06/13/22 1257   Wound Surface Area (cm^2) 1 cm^2 06/13/22 1257   Wound Volume (cm^3) 0 2 cm^3 06/13/22 1257   Calculated Wound Volume (cm^3) 0 2 cm^3 06/13/22 1257   Change in Wound Size % 31 03 06/13/22 1257   Drainage Amount Small 06/13/22 1257   Drainage Description Serous 06/13/22 1257   Non-staged Wound Description Full thickness 06/13/22 1257   Treatments Cleansed 06/13/22 1257                       Debridement   Wound 05/16/22 Pressure Injury Back Right;Upper    Universal Protocol:  Procedure performed by: (Assisting provider Tarah Coulter PA-C)  Consent: Verbal consent obtained  Consent given by: consent given by facility  Patient identity confirmation method: confirmed with aid  Performed by: PA and physician  Debridement type: surgical  Level of debridement: subcutaneous tissue  Pain control: lidocaine 4%  Post-debridement measurements  Length (cm): 4  Width (cm): 4  Depth (cm): 2  Percent debrided: 100%  Surface Area (cm^2): 16  Area debrided (cm^2): 16  Volume (cm^3): 32  Tissue and other material debrided: subcutaneous tissue  Devitalized tissue debrided: fibrin, necrotic debris and slough  Instrument(s) utilized: curette, forceps and blade  Bleeding: small  Hemostasis obtained with: pressure  Procedural pain (0-10): 0  Post-procedural pain: 0     Debridement   Wound 05/16/22 Pressure Injury Back Lateral;Right; Lower    Universal Protocol:  Procedure performed by: (Assisting provider Tarah Coulter PA-C)  Consent: Verbal consent obtained  Consent given by: consent given by facility  Patient identity confirmation method: confirmed with aid  Performed by: PA  Debridement type: surgical  Level of debridement: subcutaneous tissue  Pain control: lidocaine 4%  Post-debridement measurements  Length (cm): 1 5  Width (cm): 1 2  Depth (cm): 0 9  Percent debrided: 100%  Surface Area (cm^2): 1 8  Area debrided (cm^2): 1 8  Volume (cm^3): 1 62  Tissue and other material debrided: dermis and subcutaneous tissue  Devitalized tissue debrided: necrotic debris and slough  Instrument(s) utilized: curette  Bleeding: small  Hemostasis obtained with: pressure  Procedural pain (0-10): 0  Post-procedural pain: 0     Debridement   Wound 05/16/22 Pressure Injury Sacrum    Universal Protocol:  Procedure performed by: (Assisting provider Wayne Mccray PA-C)  Consent: Verbal consent obtained  Consent given by: facility  Patient identity confirmation method: confirmed by aid  Performed by: PA  Debridement type: surgical  Level of debridement: muscle  Pain control: lidocaine 4%  Post-debridement measurements  Length (cm): 6 2  Width (cm): 2  Depth (cm): 3 1  Percent debrided: 20%  Surface Area (cm^2): 12 4  Area debrided (cm^2): 2 48  Volume (cm^3): 38 44  Tissue and other material debrided: dermis, muscle and subcutaneous tissue  Devitalized tissue debrided: necrotic debris and slough  Instrument(s) utilized: curette  Bleeding: small  Hemostasis obtained with: pressure  Procedural pain (0-10): 0  Post-procedural pain: 0     Debridement   Wound 05/16/22 Pressure Injury Buttocks Right    Universal Protocol:  Consent: Verbal consent obtained  Consent given by: given by aid  Patient identity confirmation method: confirmed by aid        Performed by: physician  Debridement type: surgical  Level of debridement: subcutaneous tissue  Pain control: lidocaine 4%  Post-debridement measurements  Length (cm): 5  Width (cm): 3  Depth (cm): 1 5  Percent debrided: 20%  Surface Area (cm^2): 15  Area debrided (cm^2): 3  Volume (cm^3): 22 5  Tissue and other material debrided: dermis and subcutaneous tissue  Devitalized tissue debrided: necrotic debris and slough  Instrument(s) utilized: curette  Bleeding: small  Hemostasis obtained with: pressure  Procedural pain (0-10): 0  Post-procedural pain: 0   Response to treatment: procedure was tolerated well                                     Wound Instructions:  Orders Placed This Encounter   Procedures    Wound cleansing and dressings     Wound cleansing and dressings      Wash your hands with soap and water  Remove old dressing, discard into plastic bag and place in trash  Cleanse the wound with normal saline prior to applying a clean dressing  Do not use tissue or cotton balls  Do not scrub the wound  Pat dry using gauze  Shower yes do not get dressing wet though  Plan for showers around the dressing changes     Back wounds     Wash your hands with soap and water  Remove old dressing, discard into plastic bag and place in trash  Cleanse the wound with normal saline prior to applying a clean dressing  Do not use tissue or cotton balls  Do not scrub the wound  Pat dry using gauze  Shower yes do not get dressing wet  Apply skin prep to skin surrounding wound  Apply medihoney to the wound base to both back wounds, cover with 4x4 and secure with abd or large border foam     Secure with paper tape  Change dressing daily        Left Hip and Sacral Wounds     Wash your hands with soap and water  Remove old dressing, discard into plastic bag and place in trash  Cleanse the wound with normal saline prior to applying a clean dressing  Do not use tissue or cotton balls  Do not scrub the wound  Pat dry using gauze  Negative Pressure Wound Therapy Instructions  Apply black granufoam to wound bed, Set negative pressure on continuous at 125 mmHG, VAC dressing to be changed three times per week as ordered    and VAC dressing bridged to relieve disc pressure      Today at the [address] [facility] pack wounds with prophase soaked gauze, cover with foam and secure with med fix tape  This was done today       Left foot Wounds   Apply silver alginate to wound bed, cover with Allevyn gently foam  This was done today  Change dressing three   times per week  Continue with betadine to the left medial foot wound    Continue to protect any at risk areas with bordered foam     Continue to turn and reposition like you have every hour  Continue with the offloading boots to both feet like you have been  May continue with the left foot just being elevated on a pillow to prevent pressure    Check air mattress in the facility to ensure that it is set correctly for the patient  If it is weight based make sure the weight is correctly set  If it just has a range from soft to firm set it at medium        Follow up at [address] [facility],[address] in five weeks will call to schedule     Standing Status:   Future     Standing Expiration Date:   6/13/2023    Debridement     This order was created via procedure documentation    Debridement     This order was created via procedure documentation    Debridement     This order was created via procedure documentation    Debridement     This order was created via procedure documentation    Debridement     This order was created via procedure documentation    Wound culture and Gram stain     Standing Status:   Future     Number of Occurrences:   1     Standing Expiration Date:   6/13/2023     Order Specific Question:   Release to patient through [address] [place] [address] [id number]     Answer:   Immediate       Jo Ann Calero MD    Total time spent today:  40 minutes  This includes time to assess and debride wounds as well as discussion of palliative care of pt  and workup of possible osteo at certain sites    Some discussion of possible scenarios depending on progression of wounds and physical condition         - I, Gisele De La Cruz PA-C, have acted as a scribe with the above documentation    -Yunier Hernandez MD, CWS have seen and evaluated the above patient and have reviewed and agree with the above documentation  Portions of the record may have been created with voice recognition software  Occasional wrong word or "sound alike" substitutions may have occurred due to the inherent limitations of voice recognition software  Read the chart carefully and recognize, using context, where substitutions have occurred

## 2022-06-16 LAB
BACTERIA WND AEROBE CULT: ABNORMAL
BACTERIA WND AEROBE CULT: ABNORMAL
GRAM STN SPEC: ABNORMAL

## 2022-06-20 NOTE — PROGRESS NOTES
Culture results showed 3+ Pseudomonas susceptible to Ciprofloxacin. Was unabel to get in touch with [facility] today, left a message to have this medication initiated 500 mg bid x10 days. Will call tomorrow to ensure the message was received.

## 2022-06-21 NOTE — PROGRESS NOTES
Was in touch with [facility]. Results of the culture have been communicated in order for their doctor to prescribe medication.

## 2022-07-18 ENCOUNTER — OFFICE VISIT (OUTPATIENT)
Dept: WOUND CARE | Facility: CLINIC | Age: 67
End: 2022-07-18
Payer: COMMERCIAL

## 2022-07-18 VITALS
SYSTOLIC BLOOD PRESSURE: 130 MMHG | HEART RATE: 114 BPM | RESPIRATION RATE: 22 BRPM | TEMPERATURE: 98.4 F | DIASTOLIC BLOOD PRESSURE: 70 MMHG

## 2022-07-18 DIAGNOSIS — L89.133 PRESSURE ULCER OF RIGHT LOWER BACK, STAGE 3 (HCC): ICD-10-CM

## 2022-07-18 DIAGNOSIS — E46 PROTEIN MALNUTRITION (HCC): ICD-10-CM

## 2022-07-18 DIAGNOSIS — L89.154 PRESSURE INJURY OF SACRAL REGION, STAGE 4 (HCC): ICD-10-CM

## 2022-07-18 DIAGNOSIS — L89.894 PRESSURE ULCER OF LEFT FOOT, STAGE 4 (HCC): ICD-10-CM

## 2022-07-18 DIAGNOSIS — L89.314 PRESSURE ULCER OF RIGHT BUTTOCK, STAGE 4 (HCC): ICD-10-CM

## 2022-07-18 DIAGNOSIS — L89.892 PRESSURE INJURY OF OTHER SITE, STAGE 2 (HCC): Primary | ICD-10-CM

## 2022-07-18 DIAGNOSIS — R26.2 AMBULATORY DYSFUNCTION: ICD-10-CM

## 2022-07-18 DIAGNOSIS — L89.114 PRESSURE INJURY OF RIGHT UPPER BACK, STAGE 4 (HCC): ICD-10-CM

## 2022-07-18 PROCEDURE — 99214 OFFICE O/P EST MOD 30 MIN: CPT | Performed by: FAMILY MEDICINE

## 2022-07-18 PROCEDURE — 11042 DBRDMT SUBQ TIS 1ST 20SQCM/<: CPT | Performed by: FAMILY MEDICINE

## 2022-07-18 PROCEDURE — 11042 DBRDMT SUBQ TIS 1ST 20SQCM/<: CPT | Performed by: STUDENT IN AN ORGANIZED HEALTH CARE EDUCATION/TRAINING PROGRAM

## 2022-07-18 PROCEDURE — 99213 OFFICE O/P EST LOW 20 MIN: CPT | Performed by: FAMILY MEDICINE

## 2022-07-18 RX ORDER — LIDOCAINE HYDROCHLORIDE 40 MG/ML
5 SOLUTION TOPICAL ONCE
Status: COMPLETED | OUTPATIENT
Start: 2022-07-18 | End: 2022-07-18

## 2022-07-18 RX ADMIN — LIDOCAINE HYDROCHLORIDE 5 ML: 40 SOLUTION TOPICAL at 13:35

## 2022-07-18 NOTE — PATIENT INSTRUCTIONS
Orders Placed This Encounter   Procedures    Wound cleansing and dressings     Wound cleansing and dressings       Wound cleansing and dressings                          Wash your hands with soap and water  Remove old dressing, discard into plastic bag and place in trash  Cleanse the wound with normal saline prior to applying a clean dressing  Do not use tissue or cotton balls  Do not scrub the wound  Pat dry using gauze  Shower yes do not get dressing wet though  Plan for showers around the dressing changes     Back wounds     Wash your hands with soap and water  Remove old dressing, discard into plastic bag and place in trash  Cleanse the wound with normal saline prior to applying a clean dressing  Do not use tissue or cotton balls  Do not scrub the wound  Pat dry using gauze  Shower yes do not get dressing wet  Apply skin prep to skin surrounding wound  Apply silver alginate rope packed into the wound base to both back wounds, please do not pack the back wounds too tightly cover with 4x4 and secure with abd or large border foam     Secure with paper tape  Change dressing three times a week or as needed for excessive        Left Hip and Sacral Wounds      Wash your hands with soap and water  Remove old dressing, discard into plastic bag and place in trash  Cleanse the wound with normal saline prior to applying a clean dressing  Do not use tissue or cotton balls  Do not scrub the wound  Pat dry using gauze  Negative Pressure Wound Therapy Instructions  Reapply vac in the facility when patient gets back  In the office today silver algiante was applied to the wound base and covered with bordered foam   Reapply wound vac in the facility   Apply black granufoam to wound bed, Set negative pressure on continuous at 125 mmHG, VAC dressing to be changed three times per week as ordered  and VAC dressing bridged to relieve disc pressure       Left foot Wounds   Apply silver alginate to wound bed, cover with Allevyn gently foam  This was done today  Change dressing three  times per week  Right ear  Cover right ear with plain ordered foam     May change three times a week or as needed for excessive drainage or dislodgement       Continue to protect any at risk areas with bordered foam     Continue to turn and reposition like you have every hour  Continue with the offloading boots to both feet like you have been  May continue with the left foot just being elevated on a pillow to prevent pressure     Check air mattress in the facility to ensure that it is set correctly for the patient  If it is weight based make sure the weight is correctly set  If it just has a range from soft to firm set it at medium          Follow up at [address] [facility],[address] in 6 weeks will call to schedule     Standing Status:   Future     Standing Expiration Date:   7/18/2023

## 2022-07-18 NOTE — PROGRESS NOTES
Patient ID: Morenita Celis is a 61 y o  male Date of Birth 1955       Chief Complaint   Patient presents with    Follow Up Wound Care Visit     Follow up for numerous wounds scattered        Allergies: Albumen, egg - food allergy and Amoxicillin-pot clavulanate    Diagnosis:   Diagnosis ICD-10-CM Associated Orders   1  Pressure injury of other site, stage 2 ([facility] )  L89 892    2  Pressure injury of sacral region, stage 4 ([facility])  L89 154 lidocaine (XYLOCAINE) 4 % topical solution 5 mL     Wound cleansing and dressings   3  Pressure ulcer of right buttock, stage 4 ([facility])  L89 314 lidocaine (XYLOCAINE) 4 % topical solution 5 mL     Wound cleansing and dressings     Debridement   4  Pressure ulcer of right lower back, stage 3 ([facility])  L89 133 lidocaine (XYLOCAINE) 4 % topical solution 5 mL     Wound cleansing and dressings     Debridement   5  Pressure ulcer of left foot, stage 4 ([facility])  L89 894 lidocaine (XYLOCAINE) 4 % topical solution 5 mL     Wound cleansing and dressings   6  Pressure injury of right upper back, stage 4 ([facility])  L89 114    7  Protein malnutrition ([facility] )  E46    8  Ambulatory dysfunction  R26 2         Assessment  & Plan:     F/u stage 4 pressure injury of R upper back  Base with mostly healthy granulation tissue  Undermining still present  o Pack with Aquacel ag rope   F/u stage 3 pressure injury of R lower back  Approximately the same size as previous visit  Wound bed with adherent slough    o Surgical debridement performed  o Pack with aquacel ag rope   New Stage 2 pressure injury of R ear  Cover with Allevyn bordered foam and attempt to offload as best as possible   Stage 4 pressure injury of L foot  Continues to probe to bone  Approximately same in size as previous visit    o Silver alginate and foam    o Imaging of L foot requested to be done at last visit, however it is unclear if images were taken  Will request X ray results at next visit      Stage 4 pressure injury of sacrum  Mostly healthy granulation tissue present in wound base    o Restart wound VAC  125 mmHg continuous setting   Stage 4 pressure injury of R buttocks  Wound bed mostly granular with small portion of slough    o Surgical debridement performed  o Restart wound VAC  125 mmHg continuous setting   See results of imaging from CT pelvis and CXR below, demonstrating no evidence of osseous abnormalities at this time   Will continue to see pt every 5-6 weeks  It may not be possible to heal current wounds d/t confounding factors including contractures/ambulatory dysfunction making it difficult to alleviate pressure, protein malnutrition  Instructed to monitor for signs of acute infection and call us with any concerns in the meantime  CT pelvis w/o contrast 06/19/22:   Left lower quadrant colostomy  Marrufo balloon in urinary bladder  Multiple   bladder stones  Right nephroureteral stent with distal pigtail in the bladder  Nonspecific bladder wall thickening  No pelvic adenopathy  Mildly enlarged   prostate gland  Myositis ossificans of both hips  Deep decubitus ulcer overlying the   sacrococcygeal junction  No drainable fluid collection  No evidence of osseous   destruction, or resorption of the distal sacrum, or coccyx  IMPRESSION:   IMPRESSION:     Deep decubitus ulcer overlying the sacrum, without evidence of associated   osteomyelitis  Multiple bladder stones  Right nephroureteral stent in place  Myositis ossificans of musculature of both hips  CT examination performed with dose lowering protocol in accordance with ALARA  CXR portable 06/18/22:   Persistent elevation of the right hemidiaphragm  Linear opacity right lung base,   likely representing atelectasis  Cannot exclude concomitant infiltrate at right   lung base as there is some increased hazy opacity  Mediastinum is stable  Heart   size is stable  Bones are unchanged      CXR portable 04/11/22:   Persistent elevation of the RIGHT hemidiaphragm  Mild increase in pulmonary   vasculature  RIGHT basilar airspace opacities  No pneumothorax or definitive   pleural effusion  No acute osseous abnormality  Subjective:     5/16/22: Pt from skilled facility presents today with aids for evaluation of multiple pressure injuries obtained while in hospital  He was hospitalized twice recently  Per hospital admission note on 04/02/22; "Cande Lewis is a 61 y o  -American male from [facility] with a PMHx significant for profound intellectual disability, renal colic with chronic indwelling Marrufo catheter and recent stent placement constipation, anemia of chronic disease, moderate protein calorie malnutrition, epilepsy, glaucoma  Who presents to [facility] with a chief complaint of fever and tachycardia  All information was obtained through emergency medical record and chart review as patient is unable to provide any information  The patient arrived via EMS from [facility] for fever and tachycardia    He does have a wound VAC to the right hip and sacrum  Staff that was at the bedside reported that he was just released from [facility]  The patient is nonverbal     Patient is a resident at [facility] and has a history of profound intellectual disabilities  Patient brought in severely malnourished and with multiple, extensive decubitus wounds  Patient with multiple pressure ulcers, sacral area, ear and coccyx  Sacral area with infection at admission  General surgery consulted and followed throughout admission with recommendations for an diverting ostomy to help with decreasing bowel function and healing of wounds  Suspected wounds were source of infection  During admission patient did go to the OR with general surgery where a diverting ostomy was completed  Brownish yellow soft stool present in ostomy bag  Wound VAC in place   Wound care and vac to continue upon d/c at [facility] [employer] [facility]  Furthermore, CT of chest during admission with moderate airspace opacity in the right base with air bronchograms likely secondary to pneumonia  Blood cultures on 4/7 negative and pro lizz 0 05  Two of the 13 wound cultures, the ones marked thigh, may have some depth and the CT of ab mentions possible myositis right buttock  Pt was treated with IV Meropenem for this issue as ordered by ID and course will be complete prior to discharge  PICC removed this am     Pt has colostomy and PEG tube  Resume tube feeds as prior to admission  [facility] doc to manage  Pt will be discharged with freire d/t wounds  Pt to follow up with surgery, pcp or urology for further determination of removal      Pt has not been in the [facility] since 3/13/22 (extensive hospital stays at [facility] (3/14/22) for multi organ failure and sepsis then [facility] (4/2/22- current) for sepsis and wound treatment  Pt has received extensive IV antibiotic therapy and treatment at both facilities "    Per aids today, pt has been with the [facility] for over 40 years and was recently hospitalized and subsequently developed mutiple pressure injuries  Currently he is receiving feedings via PEG tube and receiving additional protein supplements; aids note he has gained about 5 lbs since returning from the hospital  In addition, he has a diverting colostomy and indwelling Freire catheter  He is being turned approximately hourly to relieve the pressure and has Prevalon -type boots in order to assist with offloading of the feet  They have been placing silvidene on the foot pressure injuries (stage 3 injury of L foot and stage 2 of R foot)  Wound VAC is currently being utilized on the pressure injuries of the R buttock and sacrum  Currently Santyl is being used on the two pressure injuries of the R back  06/13/22: Pt presents for f/u for multiple pressure injuries   The stage 2 pressure injury of the L medial foot has healed since previous visit  Wound VAC is still being used on stage 4 wounds of sacrum and buttocks; there has been improvement of these wounds since previous visit  Santyl is currently being used to the stage 4 R upper back wound and stage 3 lower back wound  Per aid from [facility], they are concerned about the pressure injury on the R upper back in terms of infection  Since last visit, pt was placed on Augmentin which helped with the malodor of the wound, but after completing the course of abx, the odor has returned  The doctor at the facility is contemplating admission of pt for IV abx, however last time pt was hospitalized that is when he obtained extensive pressure injuries so there is some contemplation of what is the best for the pt at this time  The pressure injury of the L lateral foot is being treated with Dermagran  They have d/c the Prevlon boots, because with them on the pt was still unable to offload d/t his positioning and contractures  They have been positioning pillows in attempt to offload his foot  Has been having a low grade temperature at the facility  07/18/22: Pt presents for f/u of multiple pressure injuries; pt resident at [facility]  Pt was in hospital since previous visit d/t concerns of worsening state of wounds  Imaging was obtained while hospitalized which did not demonstrated osteomyelitis on CXR not CT of abdomen/pevlis  The culture taken at previous visit demonstrated growth of Pseudomonas, therefore pt was additionally treated with course of Ciprofloxacin since previous visit as well  Has been using Dakin wet to dry on the sacral and buttock wounds, as VAC was d/c during hospital stay  Upper and lower back pressure injuries are currently being packed and changed daily with copious amounts of drainage present on packing removal  Pressure injury of L foot remains largely unchanged  Has new pressure injury of R ear            The following portions of the patient's history were reviewed and updated as appropriate: There is no problem list on file for this patient  No past medical history on file  No past surgical history on file  No family history on file  Social History     Socioeconomic History    Marital status: Single     Spouse name: Not on file    Number of children: Not on file    Years of education: Not on file    Highest education level: Not on file   Occupational History    Not on file   Tobacco Use    Smoking status: Never Smoker    Smokeless tobacco: Never Used   Vaping Use    Vaping Use: Never used   Substance and Sexual Activity    Alcohol use: Never    Drug use: Never    Sexual activity: Never   Other Topics Concern    Not on file   Social History Narrative    Not on file     Social Determinants of Health     Financial Resource Strain: Not on file   Food Insecurity: Not on file   Transportation Needs: Not on file   Physical Activity: Not on file   Stress: Not on file   Social Connections: Not on file   Intimate Partner Violence: Not on file   Housing Stability: Not on file       Current Outpatient Medications:     bisacodyl (Dulcolax) 10 mg suppository, Insert 10 mg into the rectum as needed in the morning for constipation  Every three days as needed  , Disp: , Rfl:     calcium carbonate-vitamin D (OSCAL-D) 500 mg-200 units per tablet, Take 1 tablet by mouth daily with breakfast, Disp: , Rfl:     denosumab (Prolia) 60 mg/mL, Inject 60 mg under the skin once Every 6 months, Disp: , Rfl:     dorzolamide-timolol (COSOPT) 22 3-6 8 MG/ML ophthalmic solution, Administer 1 drop to both eyes in the morning and 1 drop in the evening , Disp: , Rfl:     ergocalciferol (ERGOCALCIFEROL) 1 25 MG (12034 UT) capsule, Take 50,000 Units by mouth once a week, Disp: , Rfl:     fluticasone (FLONASE) 50 mcg/act nasal spray, 1 spray into each nostril 2 (two) times a day, Disp: , Rfl:     Hydromorphone HCl (Dilaudid) 1 MG/ML LIQD, Take 5 mg by mouth 6 (six) times a day, Disp: , Rfl:     latanoprost (XALATAN) 0 005 % ophthalmic solution, 1 drop daily at bedtime, Disp: , Rfl:     loratadine (CLARITIN) 10 mg tablet, Take 10 mg by mouth in the morning , Disp: , Rfl:     magnesium hydroxide (MILK OF MAGNESIA) 400 mg/5 mL oral suspension, Take by mouth daily as needed for constipation, Disp: , Rfl:     Polyethyl Glycol-Propyl Glycol (Systane) 0 4-0 3 % GEL, Apply 0 25 inches to eye Each eye, Disp: , Rfl:     sodium chloride (OCEAN) 0 65 % nasal spray, 1 spray into each nostril as needed in the morning for congestion  , Disp: , Rfl:   No current facility-administered medications for this visit  Review of Systems   Reason unable to perform ROS: pt is nonverbal          Objective:  /70   Pulse (!) 114   Temp 98 4 °F (36 9 °C)   Resp 22   Pain Score:  (unable to assess due to nonverbal)     Physical Exam  Nursing note reviewed  Vitals reviewed: temperature normal  BP unable to be assessed at today's visit  Constitutional:       General: He is not in acute distress  Appearance: He is cachectic  He is not ill-appearing  Comments:      HENT:      Head: Normocephalic and atraumatic  Eyes:      General: Lids are normal          Right eye: No discharge  Left eye: No discharge  Conjunctiva/sclera: Conjunctivae normal    Cardiovascular:      Rate and Rhythm: Normal rate  Pulmonary:      Effort: Pulmonary effort is normal  No respiratory distress  Breath sounds: Normal air entry  No stridor  Abdominal:      General: Abdomen is flat  Genitourinary:      Musculoskeletal:         General: Deformity (contracutres of RLE and LLE with significant muscle atrophy bilaterally) present  No swelling  Skin:     General: Skin is warm and dry  Findings: Wound present  Comments: See full wound assessment  L foot pressure injury still probing to bone  No signs of acute infection  R upper back pressure injury   Undermining still present  Wound base is mostly granular with scattered slough  Surrounding induration remains  R lower back wound  Slough and fibrin remain in wound bed with surrounding induration present  No malodor, purulent drainage or erythema  R buttock wound  Smaller in size  Wound base mostly granular with exception of small portion of slough  No periwound maceration or signs of acute infection  Pressure injury of sacrum  Mostly granular with small amounts of scattered slough  No signs acute infection  New stage 2 pressure injury of R ear  Ear appears swollen  No malodor or purulent drainage visible  Neurological:      Mental Status: He is alert  Gait: Gait abnormal (bed bound with contracutre)  Psychiatric:         Cognition and Memory: Cognition is impaired (profound intelectual disability)  Comments: Unable to assess as the pt is nonverbal with intellectual disability  Wound 05/16/22 Pressure Injury Back Right;Upper (Active)   Wound Image   07/18/22 1320   Wound Description Slough;Granulation tissue;Pink 07/18/22 1325   Pressure Injury Stage 4 07/18/22 1325   Sybil-wound Assessment Clean;Dry;Scar Tissue 07/18/22 1325   Wound Length (cm) 4 3 cm 07/18/22 1325   Wound Width (cm) 3 8 cm 07/18/22 1325   Wound Depth (cm) 0 9 cm 07/18/22 1325   Wound Surface Area (cm^2) 16 34 cm^2 07/18/22 1325   Wound Volume (cm^3) 14 706 cm^3 07/18/22 1325   Calculated Wound Volume (cm^3) 14 71 cm^3 07/18/22 1325   Change in Wound Size % -396 96 07/18/22 1325   Undermining 1 7 07/18/22 1325   Undermining is depth extending from 11-5 07/18/22 1325   Drainage Amount Moderate 07/18/22 1325   Drainage Description Moore;Serous;Milky 07/18/22 1325   Non-staged Wound Description Full thickness 07/18/22 1325   Treatments Cleansed 07/18/22 1325   Patient Tolerance Tolerated well 06/13/22 1306       Wound 05/16/22 Pressure Injury Back Lateral;Right; Lower (Active)   Wound Image   07/18/22 1320   Wound Description Granulation tissue;Slough; Epithelialization 07/18/22 1324   Pressure Injury Stage 3 06/13/22 1307   Sybil-wound Assessment Dry 07/18/22 1324   Wound Length (cm) 1 1 cm 07/18/22 1324   Wound Width (cm) 1 cm 07/18/22 1324   Wound Depth (cm) 0 7 cm 07/18/22 1324   Wound Surface Area (cm^2) 1 1 cm^2 07/18/22 1324   Wound Volume (cm^3) 0 77 cm^3 07/18/22 1324   Calculated Wound Volume (cm^3) 0 77 cm^3 07/18/22 1324   Change in Wound Size % 79 36 07/18/22 1324   Undermining 0 6 07/18/22 1324   Undermining is depth extending from 12-3 07/18/22 1324   Drainage Amount Moderate 07/18/22 1324   Drainage Description Serosanguineous 07/18/22 1324   Non-staged Wound Description Full thickness 07/18/22 1324   Treatments Cleansed 07/18/22 1324   Patient Tolerance Tolerated well 06/13/22 1307       Wound 05/16/22 Pressure Injury Sacrum (Active)   Wound Image   07/18/22 1321   Wound Description Granulation tissue;Slough;Eschar;Epithelialization; Necrotic 07/18/22 1323   Pressure Injury Stage 4 07/18/22 1323   Sybil-wound Assessment Dry;Scar Tissue; Intact 07/18/22 1323   Wound Length (cm) 4 7 cm 07/18/22 1323   Wound Width (cm) 1 8 cm 07/18/22 1323   Wound Depth (cm) 2 cm 07/18/22 1323   Wound Surface Area (cm^2) 8 46 cm^2 07/18/22 1323   Wound Volume (cm^3) 16 92 cm^3 07/18/22 1323   Calculated Wound Volume (cm^3) 16 92 cm^3 07/18/22 1323   Change in Wound Size % 75 21 07/18/22 1323   Undermining 2 7 07/18/22 1323   Undermining is depth extending from 9-3 07/18/22 1323   Drainage Amount Moderate 07/18/22 1323   Drainage Description Moore;Milky 07/18/22 1323   Non-staged Wound Description Full thickness 07/18/22 1323   Treatments Cleansed 07/18/22 1323   Patient Tolerance Tolerated well 06/13/22 1308       Wound 05/16/22 Pressure Injury Buttocks Right (Active)   Wound Image   07/18/22 1314   Wound Description Slough;Granulation tissue 07/18/22 1315   Pressure Injury Stage 4 06/13/22 1304   Sybil-wound Assessment Scar Tissue;Dry; Intact 07/18/22 1315   Wound Length (cm) 4 cm 07/18/22 1315   Wound Width (cm) 2 cm 07/18/22 1315   Wound Depth (cm) 1 5 cm 07/18/22 1315   Wound Surface Area (cm^2) 8 cm^2 07/18/22 1315   Wound Volume (cm^3) 12 cm^3 07/18/22 1315   Calculated Wound Volume (cm^3) 12 cm^3 07/18/22 1315   Change in Wound Size % 80 16 07/18/22 1315   Undermining 1 3 07/18/22 1315   Undermining is depth extending from 1-3 07/18/22 1315   Drainage Amount Moderate 07/18/22 1315   Drainage Description Serosanguineous 07/18/22 1315   Non-staged Wound Description Full thickness 07/18/22 1315   Treatments Cleansed 07/18/22 1315   Patient Tolerance Tolerated well 06/13/22 1304       Wound 05/16/22 Pressure Injury Foot Left;Lateral (Active)   Wound Image   07/18/22 1321   Wound Description Epithelialization;Granulation tissue;Slough; Exposed bone 07/18/22 1327   Pressure Injury Stage 4 07/18/22 1327   Sybil-wound Assessment Dry; Intact;Scar Tissue 07/18/22 1327   Wound Length (cm) 0 9 cm 07/18/22 1327   Wound Width (cm) 0 9 cm 07/18/22 1327   Wound Depth (cm) 0 2 cm 07/18/22 1327   Wound Surface Area (cm^2) 0 81 cm^2 07/18/22 1327   Wound Volume (cm^3) 0 162 cm^3 07/18/22 1327   Calculated Wound Volume (cm^3) 0 16 cm^3 07/18/22 1327   Change in Wound Size % 44 83 07/18/22 1327   Drainage Amount Small 07/18/22 1327   Drainage Description Serous 07/18/22 1327   Non-staged Wound Description Full thickness 07/18/22 1327   Treatments Cleansed 06/13/22 1257       Wound 07/18/22 Pressure Injury Ear Right (Active)   Wound Image   07/18/22 1320   Wound Description Granulation tissue 07/18/22 1326   Pressure Injury Stage 3 07/18/22 1326   Sybil-wound Assessment Clean;Dry; Intact 07/18/22 1326   Wound Length (cm) 1 4 cm 07/18/22 1326   Wound Width (cm) 1 5 cm 07/18/22 1326   Wound Depth (cm) 0 1 cm 07/18/22 1326   Wound Surface Area (cm^2) 2 1 cm^2 07/18/22 1326   Wound Volume (cm^3) 0 21 cm^3 07/18/22 1326   Calculated Wound Volume (cm^3) 0 21 cm^3 07/18/22 1326   Drainage Amount Small 07/18/22 1326   Drainage Description Serous 07/18/22 1326   Non-staged Wound Description Full thickness 07/18/22 1326   Treatments Cleansed 07/18/22 1326                       Debridement   Wound 05/16/22 Pressure Injury Back Lateral;Right; Lower    Universal Protocol:  Procedure performed by: (Assisting provider Roberto Levy PA-C)  Consent: Verbal consent obtained  Written consent obtained  Consent given by: patient  Time out: Immediately prior to procedure a "time out" was called to verify the correct patient, procedure, equipment, support staff and site/side marked as required  Patient understanding: patient states understanding of the procedure being performed  Patient identity confirmed: verbally with patient      Performed by: PA  Debridement type: surgical  Level of debridement: subcutaneous tissue  Pain control: lidocaine 4%  Post-debridement measurements  Length (cm): 1 1  Width (cm): 1  Depth (cm): 0 8  Percent debrided: 100%  Surface Area (cm^2): 1 1  Area debrided (cm^2): 1 1  Volume (cm^3): 0 88  Tissue and other material debrided: dermis and subcutaneous tissue  Devitalized tissue debrided: fibrin and slough  Instrument(s) utilized: curette  Bleeding: medium  Hemostasis obtained with: pressure  Procedural pain (0-10): 0  Post-procedural pain: 0   Response to treatment: procedure was tolerated well    Debridement   Wound 05/16/22 Pressure Injury Buttocks Right    Universal Protocol:  Procedure performed by: (Assisting provider Roberto Levy PA-C)  Consent: Verbal consent obtained  Written consent obtained  Consent given by: patient  Time out: Immediately prior to procedure a "time out" was called to verify the correct patient, procedure, equipment, support staff and site/side marked as required    Patient understanding: patient states understanding of the procedure being performed  Patient identity confirmed: verbally with patient      Performed by: PA  Debridement type: surgical  Level of debridement: subcutaneous tissue  Pain control: lidocaine 4%  Post-debridement measurements  Length (cm): 4  Width (cm): 2  Depth (cm): 1 6  Percent debrided: 50%  Surface Area (cm^2): 8  Area debrided (cm^2): 4  Volume (cm^3): 12 8  Tissue and other material debrided: dermis and subcutaneous tissue  Devitalized tissue debrided: necrotic debris and slough  Instrument(s) utilized: curette  Bleeding: medium  Hemostasis obtained with: pressure  Procedural pain (0-10): 0  Post-procedural pain: 0   Response to treatment: procedure was tolerated well        No post debridement photos available at today's visit  Results from last 6 Months   Lab Units 06/13/22  1434   WOUND CULTURE  3+ Growth of Pseudomonas aeruginosa*  3+ Growth of            Wound Instructions:  Orders Placed This Encounter   Procedures    Wound cleansing and dressings     Wound cleansing and dressings       Wound cleansing and dressings                          Wash your hands with soap and water  Remove old dressing, discard into plastic bag and place in trash  Cleanse the wound with normal saline prior to applying a clean dressing  Do not use tissue or cotton balls  Do not scrub the wound  Pat dry using gauze  Shower yes do not get dressing wet though  Plan for showers around the dressing changes     Back wounds     Wash your hands with soap and water  Remove old dressing, discard into plastic bag and place in trash  Cleanse the wound with normal saline prior to applying a clean dressing  Do not use tissue or cotton balls  Do not scrub the wound  Pat dry using gauze    Shower yes do not get dressing wet  Apply skin prep to skin surrounding wound  Apply silver alginate rope packed into the wound base to both back wounds, please do not pack the back wounds too tightly cover with 4x4 and secure with abd or large border foam     Secure with paper tape  Change dressing three times a week or as needed for excessive        Left Hip and Sacral Wounds      Wash your hands with soap and water  Remove old dressing, discard into plastic bag and place in trash  Cleanse the wound with normal saline prior to applying a clean dressing  Do not use tissue or cotton balls  Do not scrub the wound  Pat dry using gauze  Negative Pressure Wound Therapy Instructions  Reapply vac in the facility when patient gets back  In the office today silver algiante was applied to the wound base and covered with bordered foam   Reapply wound vac in the facility   Apply black granufoam to wound bed, Set negative pressure on continuous at 125 mmHG, VAC dressing to be changed three times per week as ordered  and VAC dressing bridged to relieve disc pressure      Left foot Wounds   Apply silver alginate to wound bed, cover with Allevyn gently foam  This was done today  Change dressing three  times per week  Right ear  Cover right ear with plain ordered foam     May change three times a week or as needed for excessive drainage or dislodgement       Continue to protect any at risk areas with bordered foam     Continue to turn and reposition like you have every hour  Continue with the offloading boots to both feet like you have been  May continue with the left foot just being elevated on a pillow to prevent pressure     Check air mattress in the facility to ensure that it is set correctly for the patient  If it is weight based make sure the weight is correctly set    If it just has a range from soft to firm set it at medium         [facility] [facility] to schedule     Standing Status:   Future     Standing Expiration Date:   7/18/2023    Debridement     This order was created via procedure documentation    Debridement     This order was created via procedure documentation       MD Bialey Allen, Patsy Botello PA-C, have acted as a scribe with the above documentation    -Jose Hill, ILEANA FARRIS have seen and evaluated the above patient and have reviewed and agree with the above documentation  Portions of the record may have been created with voice recognition software  Occasional wrong word or "sound alike" substitutions may have occurred due to the inherent limitations of voice recognition software  Read the chart carefully and recognize, using context, where substitutions have occurred

## 2022-08-29 ENCOUNTER — OFFICE VISIT (OUTPATIENT)
Dept: WOUND CARE | Facility: CLINIC | Age: 67
End: 2022-08-29
Payer: COMMERCIAL

## 2022-08-29 VITALS — TEMPERATURE: 97.2 F | HEART RATE: 66 BPM | RESPIRATION RATE: 22 BRPM

## 2022-08-29 DIAGNOSIS — L89.892 PRESSURE INJURY OF OTHER SITE, STAGE 2 (HCC): ICD-10-CM

## 2022-08-29 DIAGNOSIS — L89.114 PRESSURE INJURY OF RIGHT UPPER BACK, STAGE 4 (HCC): ICD-10-CM

## 2022-08-29 DIAGNOSIS — L89.626 PRESSURE INJURY OF DEEP TISSUE OF LEFT HEEL: ICD-10-CM

## 2022-08-29 DIAGNOSIS — L89.314 PRESSURE ULCER OF RIGHT BUTTOCK, STAGE 4 (HCC): ICD-10-CM

## 2022-08-29 DIAGNOSIS — L89.894 PRESSURE ULCER OF LEFT FOOT, STAGE 4 (HCC): ICD-10-CM

## 2022-08-29 DIAGNOSIS — L89.154 PRESSURE INJURY OF SACRAL REGION, STAGE 4 (HCC): Primary | ICD-10-CM

## 2022-08-29 DIAGNOSIS — L89.133 PRESSURE ULCER OF RIGHT LOWER BACK, STAGE 3 (HCC): ICD-10-CM

## 2022-08-29 PROCEDURE — 97597 DBRDMT OPN WND 1ST 20 CM/<: CPT | Performed by: STUDENT IN AN ORGANIZED HEALTH CARE EDUCATION/TRAINING PROGRAM

## 2022-08-29 PROCEDURE — 11042 DBRDMT SUBQ TIS 1ST 20SQCM/<: CPT | Performed by: STUDENT IN AN ORGANIZED HEALTH CARE EDUCATION/TRAINING PROGRAM

## 2022-08-29 PROCEDURE — 99214 OFFICE O/P EST MOD 30 MIN: CPT | Performed by: STUDENT IN AN ORGANIZED HEALTH CARE EDUCATION/TRAINING PROGRAM

## 2022-08-29 PROCEDURE — 99215 OFFICE O/P EST HI 40 MIN: CPT | Performed by: STUDENT IN AN ORGANIZED HEALTH CARE EDUCATION/TRAINING PROGRAM

## 2022-08-29 PROCEDURE — 10140 I&D HMTMA SEROMA/FLUID COLLJ: CPT | Performed by: STUDENT IN AN ORGANIZED HEALTH CARE EDUCATION/TRAINING PROGRAM

## 2022-08-29 RX ORDER — LIDOCAINE 40 MG/G
CREAM TOPICAL ONCE
Status: COMPLETED | OUTPATIENT
Start: 2022-08-29 | End: 2022-08-29

## 2022-08-29 RX ADMIN — LIDOCAINE: 40 CREAM TOPICAL at 11:38

## 2022-08-29 NOTE — PROGRESS NOTES
Patient ID: Severo Fong is a 61 y o  male Date of Birth 1955       Chief Complaint   Patient presents with    Follow Up Wound Care Visit     New wound to left heel       Allergies: Albumen, egg - food allergy and Amoxicillin-pot clavulanate    Diagnosis:   Diagnosis ICD-10-CM Associated Orders   1  Pressure injury of sacral region, stage 4 ([facility])  L89 154 lidocaine (LMX) 4 % cream     Wound cleansing and dressings   2  Pressure ulcer of right buttock, stage 4 ([facility])  L89 314 lidocaine (LMX) 4 % cream     Wound cleansing and dressings     Debridement   3  Pressure ulcer of right lower back, stage 3 ([facility])  L89 133 lidocaine (LMX) 4 % cream     Wound cleansing and dressings     Debridement   4  Pressure ulcer of left foot, stage 4 ([facility])  L89 894 lidocaine (LMX) 4 % cream     Wound cleansing and dressings   5  Pressure injury of right upper back, stage 4 ([facility])  L89 114 lidocaine (LMX) 4 % cream     Wound cleansing and dressings     Debridement   6  Pressure injury of other site, stage 2 ([facility])  L89 892 lidocaine (LMX) 4 % cream     Wound cleansing and dressings   7  Pressure injury of deep tissue of left heel  L89 626         Assessment  & Plan:     F/u stage 2 pressure injury of R ear  Is healed at today's visit  Scab over previous wound is present   F/u stage 4 pressure injury of R upper back  There is significant undermining  Green drainage is present  Wound bed with necrotic debris and granulation tissue    o Surgical debridement performed  o Continue with Dakin wet to dry   F/u stage 3 pressure injury of R lower back  There is slough present in wound bed along with granulation tissue  Green drainage present    o Surgical debridement performed  o Continue with Dakin wet to dry   F/u stage 4 pressure injury of the R ischium  Slough in wound bed  Green drainage  o Surgical debridement performed  o Dakin wet to dry   F/u stage 4 pressure injury of sacrum  Green drainage   Wound bed is fibrogranular  Green drainage present    o Selective debridement  o Dakin wet to dry   F/u stage pressure injury of L dorsal foot  Bone at head of 5th metatarsal is exposed to air and easily palpable  No malodor or lymphangitic streaking  Pitting edema of the L forefoot on exam   o This wound will not heal with the bone exposed  Discussed case with podiatrist  Pt is poor surgical candidate  The goal is to keep the wound as clean as possible and monitor for clinical deterioration  o Continue with puracol ag     Initial evaluation of deep tissue injury of the L heel  Blister on L heel filled with serosanguineous fluid  o 11 blade used to drain blister of fluid leaving epithelium in place    o Keep area covered with foam    o Offload heels with Prevalon type boots   Continue to turn the patient and offload throughout the day   Overall pt is in poor health with recent hospitalization for aspiration PNA  His current condition in terms of pressure injuries is complicated by contractures and inability to turn himself to reposition/offload as well as inability to communicate subsequent to intellectual disability  Healing is additionally complicated by iron deficiency anemia and moderate protein malnutrition  Pt is on palliative wound care tract  Will f/u in 6 weeks  Subjective:     5/16/22: Pt from skilled facility presents today with aids for evaluation of multiple pressure injuries obtained while in hospital  He was hospitalized twice recently  Per hospital admission note on 04/02/22; "Alice Saldana is a 61 y o  -American male from [facility] with a PMHx significant for profound intellectual disability, renal colic with chronic indwelling Marrufo catheter and recent stent placement constipation, anemia of chronic disease, moderate protein calorie malnutrition, epilepsy, glaucoma  Who presents to [facility] with a chief complaint of fever and tachycardia   All information was obtained through emergency medical record and chart review as patient is unable to provide any information  The patient arrived via EMS from [facility] for fever and tachycardia    He does have a wound VAC to the right hip and sacrum  Staff that was at the bedside reported that he was just released from [facility]  The patient is nonverbal     Patient is a resident at [facility] and has a history of profound intellectual disabilities  Patient brought in severely malnourished and with multiple, extensive decubitus wounds  Patient with multiple pressure ulcers, sacral area, ear and coccyx  Sacral area with infection at admission  General surgery consulted and followed throughout admission with recommendations for an diverting ostomy to help with decreasing bowel function and healing of wounds  Suspected wounds were source of infection  During admission patient did go to the OR with general surgery where a diverting ostomy was completed  Brownish yellow soft stool present in ostomy bag  Wound VAC in place  Wound care and vac to continue upon d/c at [facility]  Furthermore, CT of chest during admission with moderate airspace opacity in the right base with air bronchograms likely secondary to pneumonia  Blood cultures on 4/7 negative and pro lizz 0 05  Two of the 13 wound cultures, the ones marked thigh, may have some depth and the CT of ab mentions possible myositis right buttock  Pt was treated with IV Meropenem for this issue as ordered by ID and course will be complete prior to discharge  PICC removed this am     Pt has colostomy and PEG tube  Resume tube feeds as prior to admission  [facility] doc to manage  Pt will be discharged with freire d/t wounds   Pt to follow up with surgery, pcp or urology for further determination of removal      Pt has not been in the [facility] since 3/13/22 (extensive hospital stays at [facility] (3/14/22) for multi organ failure and sepsis then [facility] (4/2/22- current) for sepsis and wound treatment  Pt has received extensive IV antibiotic therapy and treatment at both facilities "    Per aids today, pt has been with the [facility] for over 40 years and was recently hospitalized and subsequently developed mutiple pressure injuries  Currently he is receiving feedings via PEG tube and receiving additional protein supplements; aids note he has gained about 5 lbs since returning from the hospital  In addition, he has a diverting colostomy and indwelling Marrufo catheter  He is being turned approximately hourly to relieve the pressure and has Prevalon -type boots in order to assist with offloading of the feet  They have been placing silvidene on the foot pressure injuries (stage 3 injury of L foot and stage 2 of R foot)  Wound VAC is currently being utilized on the pressure injuries of the R buttock and sacrum  Currently Santyl is being used on the two pressure injuries of the R back  06/13/22: Pt presents for f/u for multiple pressure injuries  The stage 2 pressure injury of the L medial foot has healed since previous visit  Wound VAC is still being used on stage 4 wounds of sacrum and buttocks; there has been improvement of these wounds since previous visit  Santyl is currently being used to the stage 4 R upper back wound and stage 3 lower back wound  Per aid from [facility], they are concerned about the pressure injury on the R upper back in terms of infection  Since last visit, pt was placed on Augmentin which helped with the malodor of the wound, but after completing the course of abx, the odor has returned  The doctor at the facility is contemplating admission of pt for IV abx, however last time pt was hospitalized that is when he obtained extensive pressure injuries so there is some contemplation of what is the best for the pt at this time  The pressure injury of the L lateral foot is being treated with Dermagran   They have d/c the Prevlon boots, because with them on the pt was still unable to offload d/t his positioning and contractures  They have been positioning pillows in attempt to offload his foot  Has been having a low grade temperature at the facility  07/18/22: Pt presents for f/u of multiple pressure injuries; pt resident at [facility]  Pt was in hospital since previous visit d/t concerns of worsening state of wounds  Imaging was obtained while hospitalized which did not demonstrated osteomyelitis on CXR not CT of abdomen/pevlis  The culture taken at previous visit demonstrated growth of Pseudomonas, therefore pt was additionally treated with course of Ciprofloxacin since previous visit as well  Has been using Dakin wet to dry on the sacral and buttock wounds, as VAC was d/c during hospital stay  Upper and lower back pressure injuries are currently being packed and changed daily with copious amounts of drainage present on packing removal  Pressure injury of L foot remains largely unchanged  Has new pressure injury of R ear  08/29/22: Pt presents for f/u of multitude of pressure injuries  Pt had hospitalization since previous visit d/t aspiration PNA; was given supplemental oxygen, bronchodilators, cefepime, and vancomycin for sepsis/pneumonia, blood cultures with no growth  PEG tube became clogged and was replaced while in hospital and he was started on a beta blocker for tachycardia  Wound management was changed to Dakin wet to dry dressings to all wounds with the exception of L foot wound which is getting puracol ag  X-ray of L foot has not yet been done  Pressure injury of R ear is healed  Has new pressure injury of L heel  Pt is nonverbal and is a resident at [facility]  The following portions of the patient's history were reviewed and updated as appropriate: There is no problem list on file for this patient  No past medical history on file    No past surgical history on file   No family history on file  Social History     Socioeconomic History    Marital status: Single     Spouse name: None    Number of children: None    Years of education: None    Highest education level: None   Occupational History    None   Tobacco Use    Smoking status: Never Smoker    Smokeless tobacco: Never Used   Vaping Use    Vaping Use: Never used   Substance and Sexual Activity    Alcohol use: Never    Drug use: Never    Sexual activity: Never   Other Topics Concern    None   Social History Narrative    None     Social Determinants of Health     Financial Resource Strain: Not on file   Food Insecurity: Not on file   Transportation Needs: Not on file   Physical Activity: Not on file   Stress: Not on file   Social Connections: Not on file   Intimate Partner Violence: Not on file   Housing Stability: Not on file       Current Outpatient Medications:     bisacodyl (Dulcolax) 10 mg suppository, Insert 10 mg into the rectum as needed in the morning for constipation  Every three days as needed  , Disp: , Rfl:     calcium carbonate-vitamin D (OSCAL-D) 500 mg-200 units per tablet, Take 1 tablet by mouth daily with breakfast, Disp: , Rfl:     denosumab (Prolia) 60 mg/mL, Inject 60 mg under the skin once Every 6 months, Disp: , Rfl:     dorzolamide-timolol (COSOPT) 22 3-6 8 MG/ML ophthalmic solution, Administer 1 drop to both eyes in the morning and 1 drop in the evening , Disp: , Rfl:     ergocalciferol (ERGOCALCIFEROL) 1 25 MG (84061 UT) capsule, Take 50,000 Units by mouth once a week, Disp: , Rfl:     fluticasone (FLONASE) 50 mcg/act nasal spray, 1 spray into each nostril 2 (two) times a day, Disp: , Rfl:     Hydromorphone HCl (Dilaudid) 1 MG/ML LIQD, Take 5 mg by mouth 6 (six) times a day, Disp: , Rfl:     latanoprost (XALATAN) 0 005 % ophthalmic solution, 1 drop daily at bedtime, Disp: , Rfl:     loratadine (CLARITIN) 10 mg tablet, Take 10 mg by mouth in the morning , Disp: , Rfl:    magnesium hydroxide (MILK OF MAGNESIA) 400 mg/5 mL oral suspension, Take by mouth daily as needed for constipation, Disp: , Rfl:     Polyethyl Glycol-Propyl Glycol (Systane) 0 4-0 3 % GEL, Apply 0 25 inches to eye Each eye, Disp: , Rfl:     sodium chloride (OCEAN) 0 65 % nasal spray, 1 spray into each nostril as needed in the morning for congestion  , Disp: , Rfl:   No current facility-administered medications for this visit  Review of Systems   Reason unable to perform ROS: pt is nonverbal          Objective:  Pulse 66   Temp (!) 97 2 °F (36 2 °C)   Resp 22   Pain Score:  (Unknown)     Physical Exam  Vitals (temperature normal  BP unable to be assessed at today's visit) and nursing note reviewed  Constitutional:       General: He is not in acute distress  Appearance: He is cachectic  He is not ill-appearing  Comments:      HENT:      Head: Normocephalic and atraumatic  Eyes:      General: Lids are normal          Right eye: No discharge  Left eye: No discharge  Conjunctiva/sclera: Conjunctivae normal    Cardiovascular:      Rate and Rhythm: Normal rate  Pulses:           Dorsalis pedis pulses are 2+ on the left side  Comments: 3+ pitting edema of L forefoot  Pulmonary:      Effort: Pulmonary effort is normal  No respiratory distress  Breath sounds: Normal air entry  Abdominal:      General: Abdomen is flat  Genitourinary:      Musculoskeletal:         General: Deformity (contracutres of RLE and LLE with significant muscle atrophy bilaterally) present  No swelling  Left lower leg: 3+ Pitting Edema present  Skin:     Findings: Wound present  Comments: See full wound assessment  Stage 4 pressure injury of L dorsal foot  Bone is exposed to air at level of 5th metatarsal head  There is no purulent drainage, lymphangitic streaking, or malodor  See full wound assessment  R upper back pressure injury   Wound bed mostly granular with portion of necrotic tissue  Significant undermining present  No probe to bone currently  Green drainage present  R lower back wound  Slough in wound bed  No malodor  Green drainage present  R buttock wound  Smaller in size  Wound base mostly granular with exception of small portion of slough  No periwound maceration or signs of acute infection  Pressure injury of sacrum  Mostly granular with scattered fibrin  Green drainage present  New stage 2 pressure injury of R ear  Healed at today's visit  There is currently a dried scab over previous open area  Neurological:      Mental Status: He is alert  Gait: Gait abnormal (bed bound with contracutre)  Psychiatric:         Cognition and Memory: Cognition is impaired (profound intelectual disability)  Comments: Unable to assess as the pt is nonverbal with intellectual disability  Wound 05/16/22 Pressure Injury Back Right;Upper (Active)   Wound Image       08/29/22 1104   Wound Description Pink;Yellow 08/29/22 1036   Pressure Injury Stage 4 08/29/22 1036   Sybil-wound Assessment Clean;Dry;Scar Tissue 08/29/22 1036   Wound Length (cm) 3 5 cm 08/29/22 1036   Wound Width (cm) 3 8 cm 08/29/22 1036   Wound Depth (cm) 1 2 cm 08/29/22 1036   Wound Surface Area (cm^2) 13 3 cm^2 08/29/22 1036   Wound Volume (cm^3) 15 96 cm^3 08/29/22 1036   Calculated Wound Volume (cm^3) 15 96 cm^3 08/29/22 1036   Change in Wound Size % -439 19 08/29/22 1036   Undermining 3 3 08/29/22 1036   Undermining is depth extending from 12-12 08/29/22 1036   Drainage Amount Moderate 08/29/22 1036   Drainage Description Brown;Green;Yellow 08/29/22 1036   Non-staged Wound Description Full thickness 08/29/22 1036   Treatments Cleansed;Irrigation with NSS 08/29/22 1036   Patient Tolerance Tolerated well 08/29/22 1036       Wound 05/16/22 Pressure Injury Back Lateral;Right; Lower (Active)   Wound Image       08/29/22 1106   Wound Description Pink;Yellow 08/29/22 1037 Pressure Injury Stage 3 08/29/22 1037   Sybil-wound Assessment Clean;Dry 08/29/22 1037   Wound Length (cm) 0 8 cm 08/29/22 1037   Wound Width (cm) 0 8 cm 08/29/22 1037   Wound Depth (cm) 0 6 cm 08/29/22 1037   Wound Surface Area (cm^2) 0 64 cm^2 08/29/22 1037   Wound Volume (cm^3) 0 384 cm^3 08/29/22 1037   Calculated Wound Volume (cm^3) 0 38 cm^3 08/29/22 1037   Change in Wound Size % 89 81 08/29/22 1037   Undermining 0 6 08/29/22 1037   Undermining is depth extending from 11-4 08/29/22 1037   Drainage Amount Moderate 08/29/22 1037   Drainage Description Green;Brown;Yellow 08/29/22 1037   Non-staged Wound Description Full thickness 08/29/22 1037   Treatments Cleansed;Irrigation with NSS 08/29/22 1037   Patient Tolerance Tolerated well 08/29/22 1037       Wound 05/16/22 Pressure Injury Sacrum (Active)   Wound Image       08/29/22 1108   Wound Description Yellow;Pink;Pale 08/29/22 1108   Pressure Injury Stage 4 08/29/22 1108   Sybil-wound Assessment Dry;Scar Tissue; Intact 08/29/22 1108   Wound Length (cm) 4 cm 08/29/22 1108   Wound Width (cm) 1 8 cm 08/29/22 1108   Wound Depth (cm) 2 1 cm 08/29/22 1108   Wound Surface Area (cm^2) 7 2 cm^2 08/29/22 1108   Wound Volume (cm^3) 15 12 cm^3 08/29/22 1108   Calculated Wound Volume (cm^3) 15 12 cm^3 08/29/22 1108   Change in Wound Size % 77 85 08/29/22 1108   Undermining 2 6 08/29/22 1108   Undermining is depth extending from 11-4 08/29/22 1108   Drainage Amount Moderate 08/29/22 1108   Drainage Description Green;Yellow;Brown 08/29/22 1108   Non-staged Wound Description Full thickness 08/29/22 1108   Treatments Cleansed;Irrigation with NSS 08/29/22 1108   Patient Tolerance Tolerated well 08/29/22 1108       Wound 05/16/22 Pressure Injury Buttocks Right (Active)   Wound Image       08/29/22 1107   Wound Description Pink;Yellow 08/29/22 1037   Pressure Injury Stage 4 08/29/22 1037   Sybil-wound Assessment Scar Tissue;Dry; Intact 08/29/22 1037   Wound Length (cm) 2 1 cm 08/29/22 1037   Wound Width (cm) 2 cm 08/29/22 1037   Wound Depth (cm) 1 6 cm 08/29/22 1037   Wound Surface Area (cm^2) 4 2 cm^2 08/29/22 1037   Wound Volume (cm^3) 6 72 cm^3 08/29/22 1037   Calculated Wound Volume (cm^3) 6 72 cm^3 08/29/22 1037   Change in Wound Size % 88 89 08/29/22 1037   Undermining 2 6 08/29/22 1037   Undermining is depth extending from 11-4 08/29/22 1037   Drainage Amount Moderate 08/29/22 1037   Drainage Description Brown;Serous; Yellow;Green 08/29/22 1037   Non-staged Wound Description Full thickness 08/29/22 1037   Treatments Cleansed;Irrigation with NSS 08/29/22 1037   Patient Tolerance Tolerated well 08/29/22 1037       Wound 05/16/22 Pressure Injury Foot Left;Lateral (Active)   Wound Image   08/29/22 1120   Wound Description Epithelialization;Granulation tissue;Slough; Exposed bone 08/29/22 1120   Pressure Injury Stage 4 07/18/22 1327   Sybil-wound Assessment Dry; Intact;Scar Tissue 08/29/22 1120   Wound Length (cm) 2 cm 08/29/22 1120   Wound Width (cm) 5 cm 08/29/22 1120   Wound Depth (cm) 0 4 cm 08/29/22 1120   Wound Surface Area (cm^2) 10 cm^2 08/29/22 1120   Wound Volume (cm^3) 4 cm^3 08/29/22 1120   Calculated Wound Volume (cm^3) 4 cm^3 08/29/22 1120   Change in Wound Size % -1279 31 08/29/22 1120   Drainage Amount Moderate 08/29/22 1120   Drainage Description Serous 08/29/22 1120   Non-staged Wound Description Full thickness 08/29/22 1120   Treatments Cleansed;Irrigation with NSS 08/29/22 1120   Patient Tolerance Tolerated well 08/29/22 1120       Wound 07/18/22 Pressure Injury Ear Right (Active)   Wound Image   08/29/22 1057   Wound Description Epithelialization 08/29/22 1057   Pressure Injury Stage 3 08/29/22 1057   Sybil-wound Assessment Clean;Dry; Intact 08/29/22 1057   Wound Length (cm) 0 cm 08/29/22 1057   Wound Width (cm) 0 cm 08/29/22 1057   Wound Depth (cm) 0 cm 08/29/22 1057   Wound Surface Area (cm^2) 0 cm^2 08/29/22 1057   Wound Volume (cm^3) 0 cm^3 08/29/22 1057   Calculated Wound Volume (cm^3) 0 cm^3 08/29/22 1057   Change in Wound Size % 100 08/29/22 1057   Drainage Amount None 08/29/22 1057   Drainage Description Serous 07/18/22 1326   Non-staged Wound Description Full thickness 07/18/22 1326   Treatments Cleansed 08/29/22 1057   Patient Tolerance Tolerated well 08/29/22 1057       Wound 08/29/22 Heel Left;Posterior (Active)   Wound Image   08/29/22 1034   Pressure Injury Stage 2 08/29/22 1034   Sybil-wound Assessment Clean;Dry 08/29/22 1034   Wound Length (cm) 2 7 cm 08/29/22 1034   Wound Width (cm) 2 7 cm 08/29/22 1034   Wound Depth (cm) 0 1 cm 08/29/22 1034   Wound Surface Area (cm^2) 7 29 cm^2 08/29/22 1034   Wound Volume (cm^3) 0 729 cm^3 08/29/22 1034   Calculated Wound Volume (cm^3) 0 73 cm^3 08/29/22 1034   Drainage Amount None 08/29/22 1034   Treatments Cleansed 08/29/22 1034   Patient Tolerance Tolerated well 08/29/22 1034                  Debridement   Wound 05/16/22 Pressure Injury Back Right;Upper    Universal Protocol:  Consent: Verbal consent obtained  Consent given by: patient  Time out: Immediately prior to procedure a "time out" was called to verify the correct patient, procedure, equipment, support staff and site/side marked as required    Patient understanding: patient states understanding of the procedure being performed  Patient identity confirmed: verbally with patient      Performed by: PA  Debridement type: surgical  Level of debridement: subcutaneous tissue  Pain control: lidocaine 4%  Post-debridement measurements  Length (cm): 3 5  Width (cm): 3 8  Depth (cm): 1 3  Percent debrided: 40%  Surface Area (cm^2): 13 3  Area debrided (cm^2): 5 32  Volume (cm^3): 17 29  Tissue and other material debrided: dermis and subcutaneous tissue  Devitalized tissue debrided: necrotic debris  Instrument(s) utilized: curette  Bleeding: small  Hemostasis obtained with: pressure  Procedural pain (0-10): 0  Post-procedural pain: 0   Response to treatment: procedure was tolerated well    Debridement   Wound 05/16/22 Pressure Injury Back Lateral;Right; Lower    Universal Protocol:  Consent: Verbal consent obtained  Consent given by: patient  Time out: Immediately prior to procedure a "time out" was called to verify the correct patient, procedure, equipment, support staff and site/side marked as required  Patient understanding: patient states understanding of the procedure being performed  Patient identity confirmed: verbally with patient      Performed by: PA  Debridement type: surgical  Level of debridement: subcutaneous tissue  Pain control: lidocaine 4%  Post-debridement measurements  Length (cm): 0 8  Width (cm): 0 8  Depth (cm): 0 7  Percent debrided: 100%  Surface Area (cm^2): 0 64  Area debrided (cm^2): 0 64  Volume (cm^3): 0 45  Tissue and other material debrided: dermis and subcutaneous tissue  Devitalized tissue debrided: slough  Instrument(s) utilized: curette  Bleeding: small  Hemostasis obtained with: pressure  Procedural pain (0-10): 0  Post-procedural pain: 0   Response to treatment: procedure was tolerated well    Debridement   Wound 05/16/22 Pressure Injury Buttocks Right    Universal Protocol:  Consent: Verbal consent obtained  Consent given by: patient  Time out: Immediately prior to procedure a "time out" was called to verify the correct patient, procedure, equipment, support staff and site/side marked as required    Patient understanding: patient states understanding of the procedure being performed  Patient identity confirmed: verbally with patient      Performed by: PA  Debridement type: surgical  Level of debridement: subcutaneous tissue  Pain control: lidocaine 4%  Post-debridement measurements  Length (cm): 2 1  Width (cm): 2  Depth (cm): 1 7  Percent debrided: 100%  Surface Area (cm^2): 4 2  Area debrided (cm^2): 4 2  Volume (cm^3): 7 14  Tissue and other material debrided: dermis and subcutaneous tissue  Devitalized tissue debrided: slough  Instrument(s) utilized: curette  Bleeding: small  Hemostasis obtained with: pressure  Procedural pain (0-10): 0  Post-procedural pain: 0   Response to treatment: procedure was tolerated well                       Results from last 6 Months   Lab Units 06/13/22  1434   WOUND CULTURE  3+ Growth of Pseudomonas aeruginosa*  3+ Growth of            Wound Instructions:  Orders Placed This Encounter   Procedures    Wound cleansing and dressings     Wash your hands with soap and water  Remove old dressing, discard into plastic bag and place in trash  Cleanse the ALL wounds with normal saline prior to applying a clean dressing  Do not use tissue or cotton balls  Do not scrub the wound  Pat dry using gauze     Shower yes do not get dressing wet though   Plan for showers around the dressing changes       Pack right back, buttock and sacrum wounds with Dakin's moist to dry, then cover with foam border  Change dressing every day and as needed for excessive drainage or leakage  This was done today  Apply foam border to back of left heel daily and as needed for excessive drainage or leakage  This was done today  Apply roland/purachol ag to left lateral foot ulcer cover with foam border  Change dressing three times per week and as needed for excessive drainage or leakage  This was done today      Continue to protect any at risk areas with bordered foam        Continue to turn and reposition like you have every hour   Continue with the offloading boots to both feet like you have been   May continue with the left foot just being elevated on a pillow to prevent pressure       Check air mattress in the facility to ensure that it is set correctly for the patient   If it is weight based make sure the weight is correctly set   If it just has a range from soft to firm set it at medium           Follow up at [address] [facility],[address] in 6 weeks will call to schedule     Standing Status:   Future     Standing Expiration Date:   8/29/2023    Debridement     This order was created via procedure documentation    Debridement     This order was created via procedure documentation    Debridement     This order was created via procedure documentation       Cally De La Cruz, PA-C          Portions of the record may have been created with voice recognition software  Occasional wrong word or "sound alike" substitutions may have occurred due to the inherent limitations of voice recognition software  Read the chart carefully and recognize, using context, where substitutions have occurred

## 2022-08-29 NOTE — PATIENT INSTRUCTIONS
Orders Placed This Encounter   Procedures    Wound cleansing and dressings     Wash your hands with soap and water  Remove old dressing, discard into plastic bag and place in trash  Cleanse the ALL wounds with normal saline prior to applying a clean dressing  Do not use tissue or cotton balls  Do not scrub the wound  Pat dry using gauze     Shower yes do not get dressing wet though  Plan for showers around the dressing changes       Pack right back, buttock and sacrum wounds with Dakin's moist to dry, then cover with foam border  Change dressing every day and as needed for excessive drainage or leakage  This was done today  Apply foam border to back of left heel daily and as needed for excessive drainage or leakage  This was done today  Apply roland/purachol ag to left lateral foot ulcer cover with foam border  Change dressing three times per week and as needed for excessive drainage or leakage  This was done today  Continue to protect any at risk areas with bordered foam        Continue to turn and reposition like you have every hour  Continue with the offloading boots to both feet like you have been  May continue with the left foot just being elevated on a pillow to prevent pressure       Check air mattress in the facility to ensure that it is set correctly for the patient  If it is weight based make sure the weight is correctly set  If it just has a range from soft to firm set it at medium            Follow up at [address] [facility],[address] in 6 weeks will call to schedule     Standing Status:   Future     Standing Expiration Date:   8/29/2023

## 2022-08-30 NOTE — PROGRESS NOTES
Debridement   Wound 05/16/22 Pressure Injury Sacrum    Universal Protocol:  Consent: Verbal consent obtained  Consent given by: patient  Time out: Immediately prior to procedure a "time out" was called to verify the correct patient, procedure, equipment, support staff and site/side marked as required    Patient understanding: patient states understanding of the procedure being performed  Patient identity confirmed: verbally with patient      Performed by: PA  Debridement type: selective  Pain control: lidocaine 4%  Post-debridement measurements  Length (cm): 4  Width (cm): 1 8  Depth (cm): 2 1  Percent debrided: 80%  Surface Area (cm^2): 7 2  Area debrided (cm^2): 5 76  Volume (cm^3): 15 12  Devitalized tissue debrided: fibrin  Instrument(s) utilized: curette  Bleeding: small  Hemostasis obtained with: pressure  Procedural pain (0-10): 0  Post-procedural pain: 0   Response to treatment: procedure was tolerated well

## 2022-10-26 ENCOUNTER — OFFICE VISIT (OUTPATIENT)
Dept: WOUND CARE | Facility: CLINIC | Age: 67
End: 2022-10-26
Payer: COMMERCIAL

## 2022-10-26 VITALS
SYSTOLIC BLOOD PRESSURE: 106 MMHG | RESPIRATION RATE: 22 BRPM | DIASTOLIC BLOOD PRESSURE: 69 MMHG | HEART RATE: 99 BPM | TEMPERATURE: 97.6 F

## 2022-10-26 DIAGNOSIS — L89.620 PRESSURE ULCER OF LEFT HEEL, UNSTAGEABLE (HCC): ICD-10-CM

## 2022-10-26 DIAGNOSIS — L89.154 PRESSURE INJURY OF SACRAL REGION, STAGE 4 (HCC): Primary | ICD-10-CM

## 2022-10-26 DIAGNOSIS — L89.894 PRESSURE ULCER OF LEFT FOOT, STAGE 4 (HCC): ICD-10-CM

## 2022-10-26 DIAGNOSIS — S61.401A OPEN WOUND OF RIGHT HAND WITHOUT FOREIGN BODY, UNSPECIFIED WOUND TYPE, INITIAL ENCOUNTER: ICD-10-CM

## 2022-10-26 DIAGNOSIS — L89.314 PRESSURE ULCER OF RIGHT BUTTOCK, STAGE 4 (HCC): ICD-10-CM

## 2022-10-26 DIAGNOSIS — L89.114 PRESSURE INJURY OF RIGHT UPPER BACK, STAGE 4 (HCC): ICD-10-CM

## 2022-10-26 DIAGNOSIS — L89.133 PRESSURE ULCER OF RIGHT LOWER BACK, STAGE 3 (HCC): ICD-10-CM

## 2022-10-26 PROCEDURE — 97597 DBRDMT OPN WND 1ST 20 CM/<: CPT | Performed by: SPECIALIST

## 2022-10-26 PROCEDURE — 99213 OFFICE O/P EST LOW 20 MIN: CPT | Performed by: SPECIALIST

## 2022-10-26 PROCEDURE — 99215 OFFICE O/P EST HI 40 MIN: CPT | Performed by: SPECIALIST

## 2022-10-26 RX ORDER — LIDOCAINE 40 MG/G
CREAM TOPICAL ONCE
Status: COMPLETED | OUTPATIENT
Start: 2022-10-26 | End: 2022-10-26

## 2022-10-26 RX ORDER — LIDOCAINE HYDROCHLORIDE AND EPINEPHRINE 10; 10 MG/ML; UG/ML
1 INJECTION, SOLUTION INFILTRATION; PERINEURAL ONCE
Status: COMPLETED | OUTPATIENT
Start: 2022-10-26 | End: 2022-10-26

## 2022-10-26 RX ADMIN — LIDOCAINE HYDROCHLORIDE AND EPINEPHRINE 10 ML: 10; 10 INJECTION, SOLUTION INFILTRATION; PERINEURAL at 10:53

## 2022-10-26 RX ADMIN — LIDOCAINE: 40 CREAM TOPICAL at 10:52

## 2022-10-26 NOTE — PATIENT INSTRUCTIONS
Orders Placed This Encounter   Procedures    Wound cleansing and dressings     Wash your hands with soap and water  Remove old dressing, discard into plastic bag and place in trash  Cleanse the ALL wounds with normal saline prior to applying a clean dressing  Do not use tissue or cotton balls  Do not scrub the wound  Pat dry using gauze     Shower yes do not get dressing wet though  Plan for showers around the dressing changes       Apply Santyl nickel thick to right upper back ulcer, left hand wound and sacral ulcer followed by gauze, secure with tape (paper)  Change daily and as needed for excessive drainage or leakage  Right upper back and sacral ulcers packed today at [address] [facility],[address] with normal saline moist to dry  Silver Gel applied to left hand wound today at [address] [facility],[address]  Apply silver alginate to right mid back and left foot ulcers, then cover with dry dressing and change daily and as needed for excessive drainage or leakage  This was done today  Pack right buttock ulcer with silver alginate rope, cover with dry dressing, change daily and as needed for excessive drainage or leakage this was done today  Continue to protect any at risk areas with bordered foam        Continue to turn and reposition like you have every hour  Continue with the offloading boots to both feet like you have been  May continue with the left foot just being elevated on a pillow to prevent pressure       Check air mattress in the facility to ensure that it is set correctly for the patient  If it is weight based make sure the weight is correctly set  If it just has a range from soft to firm set it at medium         PLEASE ORDER A CLINITRON BED FOR PATIENT (AIR FLUIDIZED THERAPY BED)     Follow up at [address] [facility],[address] in 6 weeks     Standing Status:   Future     Standing Expiration Date:   10/26/2023

## 2022-10-26 NOTE — ASSESSMENT & PLAN NOTE
Sharp excisional debridement      Santyl to loosen additional debris  Palliative intent  F/U 6 weeks

## 2022-10-26 NOTE — ASSESSMENT & PLAN NOTE
Wound to the depth of palpable rib  Necrotic material debrided  Yudy to loosen additional debris  Palliative intent  Follow up 6 weeks

## 2022-10-26 NOTE — PROGRESS NOTES
Patient ID: Diana Ordonez is a 61 y o  male Date of Birth 1955     Chief Complaint   Patient presents with   • Follow Up Wound Care Visit     Right upper back, lower back, sacral, right buttock, right ear, left heel, foot and new wound to left hand  Allergies  Albumen, egg - food allergy and Amoxicillin-pot clavulanate    Assessment / Plan:    No problem-specific Assessment & Plan notes found for this encounter  Diagnoses and all orders for this visit:    Pressure injury of sacral region, stage 4 (HCC)  -     lidocaine-epinephrine (XYLOCAINE/EPINEPHRINE) 1 %-1:100,000 injection 1 mL  -     Wound cleansing and dressings; Future  -     lidocaine (LMX) 4 % cream    Pressure ulcer of right buttock, stage 4 (HCC)  -     lidocaine-epinephrine (XYLOCAINE/EPINEPHRINE) 1 %-1:100,000 injection 1 mL  -     Wound cleansing and dressings; Future    Pressure ulcer of right lower back, stage 3 (HCC)  -     lidocaine-epinephrine (XYLOCAINE/EPINEPHRINE) 1 %-1:100,000 injection 1 mL  -     Wound cleansing and dressings; Future    Pressure ulcer of left foot, stage 4 (HCC)  -     lidocaine-epinephrine (XYLOCAINE/EPINEPHRINE) 1 %-1:100,000 injection 1 mL  -     Wound cleansing and dressings; Future  -     lidocaine (LMX) 4 % cream    Pressure injury of right upper back, stage 4 (HCC)  -     lidocaine-epinephrine (XYLOCAINE/EPINEPHRINE) 1 %-1:100,000 injection 1 mL  -     Wound cleansing and dressings; Future    Open wound of right hand without foreign body, unspecified wound type, initial encounter  -     lidocaine-epinephrine (XYLOCAINE/EPINEPHRINE) 1 %-1:100,000 injection 1 mL  -     Wound cleansing and dressings; Future  -     lidocaine (LMX) 4 % cream    Pressure ulcer of left heel, unstageable (HCC)  -     lidocaine-epinephrine (XYLOCAINE/EPINEPHRINE) 1 %-1:100,000 injection 1 mL  -     Wound cleansing and dressings;  Future  -     lidocaine (LMX) 4 % cream              Debridement   Wound 05/16/22 Pressure Injury Back Right;Upper    Universal Protocol:  Consent: Written consent obtained  Consent given by: power of   Time out: Immediately prior to procedure a "time out" was called to verify the correct patient, procedure, equipment, support staff and site/side marked as required  Patient understanding: patient does not state understanding of the procedure being performed  Patient identity confirmed: provided demographic data      Performed by: physician  Debridement type: selective  Pain control: lidocaine 1%  Pre-debridement measurements  Length (cm): 7  Width (cm): 4 4  Depth (cm): 1 4  Surface Area (cm^2): 30 8  Volume (cm^3): 43 12    Post-debridement measurements  Length (cm): 7 2  Width (cm): 4 6  Depth (cm): 1 4  Percent debrided: 20%  Surface Area (cm^2): 33 12  Area debrided (cm^2): 6 62  Volume (cm^3): 46 37  Devitalized tissue debrided: necrotic debris  Instrument(s) utilized: scissors and forceps  Bleeding: none  Hemostasis obtained with: not applicable  Pain during procedure: no reaction to injection  Pain scale: No reaction to injection  Response to treatment: procedure was tolerated well  Debridement Comments: Pressure wound down to periosteum of the rib  Rim of necrotic material removed with scissor  Transition from [facility] to [facility] to loosen remaining debris  Debridement   Wound 05/16/22 Pressure Injury Sacrum    Universal Protocol:  Consent: Written consent obtained  Consent given by: power of   Time out: Immediately prior to procedure a "time out" was called to verify the correct patient, procedure, equipment, support staff and site/side marked as required    Patient understanding: patient does not state understanding of the procedure being performed  Patient identity confirmed: provided demographic data      Performed by: physician  Debridement type: surgical  Level of debridement: subcutaneous tissue  Pain control: lidocaine 1%  Pre-debridement measurements  Length (cm): 4 5  Width (cm): 1 8  Depth (cm): 1 8  Surface Area (cm^2): 8 1  Volume (cm^3): 14 58    Post-debridement measurements  Length (cm): 4 5  Width (cm): 1 8  Depth (cm): 2  Percent debrided: 20%  Surface Area (cm^2): 8 1  Area debrided (cm^2): 1 62  Volume (cm^3): 16 2  Tissue and other material debrided: subcutaneous tissue  Devitalized tissue debrided: necrotic debris and slough  Instrument(s) utilized: scissors and forceps  Bleeding: small  Hemostasis obtained with: pressure  Pain during procedure: No reaction to lidocaine injection  Pain scale: No reaction to lidocaine injection  Response to treatment: procedure was tolerated well  Debridement Comments: Further pressure necrosis at base  Sharply debrided  Santyl appropriate, use silver alginate in a pinch  Debridement   Wound 05/16/22 Pressure Injury Foot Left;Lateral    Universal Protocol:  Consent: Written consent obtained  Consent given by: power of   Time out: Immediately prior to procedure a "time out" was called to verify the correct patient, procedure, equipment, support staff and site/side marked as required    Patient understanding: patient does not state understanding of the procedure being performed  Patient identity confirmed: provided demographic data      Performed by: physician  Debridement type: surgical  Level of debridement: subcutaneous tissue  Pain control: lidocaine 1%  Pre-debridement measurements  Length (cm): 1  Width (cm): 1 4  Depth (cm): 0 1  Surface Area (cm^2): 1 4  Volume (cm^3): 0 14    Post-debridement measurements  Length (cm): 1  Width (cm): 1 8  Depth (cm): 0 3  Percent debrided: 65%  Surface Area (cm^2): 1 8  Area debrided (cm^2): 1 17  Volume (cm^3): 0 54  Tissue and other material debrided: subcutaneous tissue  Devitalized tissue debrided: necrotic debris, slough and eschar  Instrument(s) utilized: scissors and forceps  Bleeding: small  Hemostasis obtained with: pressure and silver nitrate  Pain during procedure: No reaction to lidocaine injection  Pain scale: No reaction to lidocaine injection  Response to treatment: procedure was tolerated well  Debridement Comments: Shearing injury as well as pressure injury  Majority of the wound is granulating in  Can use santyl, silver alginate in a pinch  Debridement   Wound 08/29/22 Heel Left;Posterior    Universal Protocol:  Consent: Written consent obtained  Consent given by: power of   Time out: Immediately prior to procedure a "time out" was called to verify the correct patient, procedure, equipment, support staff and site/side marked as required  Patient understanding: patient does not state understanding of the procedure being performed  Patient identity confirmed: provided demographic data      Performed by: physician  Debridement type: surgical  Level of debridement: subcutaneous tissue  Pain control: lidocaine 1%  Pre-debridement measurements  Length (cm): 2 2  Width (cm): 1 6  Depth (cm): 0 3  Surface Area (cm^2): 3 52  Volume (cm^3): 1 06    Post-debridement measurements  Length (cm): 2 2  Width (cm): 1 6  Depth (cm): 0 8  Percent debrided: 100%  Surface Area (cm^2): 3 52  Area debrided (cm^2): 3 52  Volume (cm^3): 2 82  Tissue and other material debrided: subcutaneous tissue  Devitalized tissue debrided: necrotic debris, slough and eschar  Instrument(s) utilized: scissors and forceps  Bleeding: small  Hemostasis obtained with: silver nitrate and pressure  Pain during procedure: No reaction to lidocaine  Pain scale: No reaction to lidocaine  Response to treatment: procedure was tolerated well  Debridement Comments: Necrotic Heel ulcer with loosening eschar  Santyl appropriate for further enzymatic debridement  Can use Silver alginate in a pinch  Debridement   Wound 10/26/22 Other (comment) Hand Posterior;Right    Universal Protocol:  Consent: Written consent obtained    Consent given by: power of   Time out: Immediately prior to procedure a "time out" was called to verify the correct patient, procedure, equipment, support staff and site/side marked as required  Patient understanding: patient does not state understanding of the procedure being performed  Patient identity confirmed: provided demographic data      Performed by: physician  Debridement type: surgical  Level of debridement: subcutaneous tissue  Pain control: lidocaine 1%  Pre-debridement measurements  Length (cm): 6  Width (cm): 2 4  Depth (cm): 0 1  Surface Area (cm^2): 14 4  Volume (cm^3): 1 44    Post-debridement measurements  Length (cm): 6  Width (cm): 2 4  Depth (cm): 0 7  Percent debrided: 95%  Surface Area (cm^2): 14 4  Area debrided (cm^2): 13 68  Volume (cm^3): 10 08  Tissue and other material debrided: subcutaneous tissue  Devitalized tissue debrided: necrotic debris and slough  Instrument(s) utilized: scissors and forceps  Bleeding: small  Hemostasis obtained with: silver nitrate and pressure  Pain during procedure: no reaction to lidocaine injection  Pain scale: No reaction to lidocaine  Response to treatment: procedure was tolerated well  Debridement Comments: Necrotic wound at prior IV site  With noted tissue loss             Wound 05/16/22 Pressure Injury Back Right;Upper (Active)   Wound Image Images linked 10/26/22 0917   Wound Description Pink;Yellow 10/26/22 0917   Pressure Injury Stage 4 10/26/22 0917   Sybil-wound Assessment Clean;Dry;Scar Tissue 10/26/22 0917   Wound Length (cm) 7 cm 10/26/22 0917   Wound Width (cm) 4 4 cm 10/26/22 0917   Wound Depth (cm) 1 4 cm 10/26/22 0917   Wound Surface Area (cm^2) 30 8 cm^2 10/26/22 0917   Wound Volume (cm^3) 43 12 cm^3 10/26/22 0917   Calculated Wound Volume (cm^3) 43 12 cm^3 10/26/22 0917   Change in Wound Size % -1356 76 10/26/22 0917   Undermining 3 10/26/22 0917   Undermining is depth extending from 10-6 10/26/22 0917   Drainage Amount Large 10/26/22 0917   Drainage Description Yellow;Brown 10/26/22 0252 Non-staged Wound Description Full thickness 10/26/22 0917   Treatments Cleansed;Irrigation with NSS 10/26/22 0917   Patient Tolerance Tolerated well 10/26/22 0917       Wound 05/16/22 Pressure Injury Back Lateral;Right; Lower (Active)   Wound Image Images linked 10/26/22 0917   Wound Description Pink;Yellow 10/26/22 0917   Pressure Injury Stage 3 10/26/22 0917   Sybil-wound Assessment Clean;Dry 10/26/22 0917   Wound Length (cm) 0 4 cm 10/26/22 0917   Wound Width (cm) 0 4 cm 10/26/22 0917   Wound Depth (cm) 0 2 cm 10/26/22 0917   Wound Surface Area (cm^2) 0 16 cm^2 10/26/22 0917   Wound Volume (cm^3) 0 032 cm^3 10/26/22 0917   Calculated Wound Volume (cm^3) 0 03 cm^3 10/26/22 0917   Change in Wound Size % 99 2 10/26/22 0917   Drainage Amount Moderate 10/26/22 0917   Drainage Description Brown;Yellow 10/26/22 0917   Non-staged Wound Description Full thickness 10/26/22 0917   Treatments Cleansed;Irrigation with NSS 10/26/22 0917   Patient Tolerance Tolerated well 10/26/22 0917       Wound 05/16/22 Pressure Injury Sacrum (Active)   Wound Image Images linked 10/26/22 1037   Wound Description Yellow;Pink;Pale;Exposed bone 10/26/22 0954   Pressure Injury Stage 4 10/26/22 0954   Sybil-wound Assessment Dry;Scar Tissue; Intact 10/26/22 0954   Wound Length (cm) 4 5 cm 10/26/22 0954   Wound Width (cm) 1 8 cm 10/26/22 0954   Wound Depth (cm) 1 8 cm 10/26/22 0954   Wound Surface Area (cm^2) 8 1 cm^2 10/26/22 0954   Wound Volume (cm^3) 14 58 cm^3 10/26/22 0954   Calculated Wound Volume (cm^3) 14 58 cm^3 10/26/22 0954   Change in Wound Size % 78 64 10/26/22 0954   Drainage Amount Moderate 10/26/22 0954   Drainage Description Brown;Yellow 10/26/22 0954   Non-staged Wound Description Full thickness 10/26/22 0954   Treatments Cleansed;Irrigation with NSS 10/26/22 0954   Patient Tolerance Tolerated well 10/26/22 0954       Wound 05/16/22 Pressure Injury Buttocks Right (Active)   Wound Image Images linked 10/26/22 0916   Wound Description Pink;Yellow 10/26/22 0916   Pressure Injury Stage 4 10/26/22 0916   Sybil-wound Assessment Scar Tissue;Dry; Intact 10/26/22 0916   Wound Length (cm) 1 8 cm 10/26/22 0916   Wound Width (cm) 1 6 cm 10/26/22 0916   Wound Depth (cm) 1 5 cm 10/26/22 0916   Wound Surface Area (cm^2) 2 88 cm^2 10/26/22 0916   Wound Volume (cm^3) 4 32 cm^3 10/26/22 0916   Calculated Wound Volume (cm^3) 4 32 cm^3 10/26/22 0916   Change in Wound Size % 92 86 10/26/22 0916   Drainage Amount Moderate 10/26/22 0916   Drainage Description Brown;Yellow 10/26/22 0916   Non-staged Wound Description Full thickness 10/26/22 0916   Treatments Cleansed;Irrigation with NSS 10/26/22 0916   Patient Tolerance Tolerated well 10/26/22 0916       Wound 05/16/22 Pressure Injury Foot Left;Lateral (Active)   Wound Image Images linked 10/26/22 1037   Wound Description Beefy red 10/26/22 0953   Wound Length (cm) 1 cm 10/26/22 0953   Wound Width (cm) 1 4 cm 10/26/22 0953   Wound Depth (cm) 0 1 cm 10/26/22 0953   Wound Surface Area (cm^2) 1 4 cm^2 10/26/22 0953   Wound Volume (cm^3) 0 14 cm^3 10/26/22 0953   Calculated Wound Volume (cm^3) 0 14 cm^3 10/26/22 0953   Change in Wound Size % 51 72 10/26/22 0953       Wound 08/29/22 Heel Left;Posterior (Active)   Wound Image Images linked 10/26/22 0955   Wound Description Necrotic;Pink;Yellow 10/26/22 0955   Pressure Injury Stage U 10/26/22 0955   Sybil-wound Assessment Clean;Dry 10/26/22 0955   Wound Length (cm) 2 2 cm 10/26/22 0955   Wound Width (cm) 1 6 cm 10/26/22 0955   Wound Depth (cm) 0 3 cm 10/26/22 0955   Wound Surface Area (cm^2) 3 52 cm^2 10/26/22 0955   Wound Volume (cm^3) 1  056 cm^3 10/26/22 0955   Calculated Wound Volume (cm^3) 1 06 cm^3 10/26/22 0955   Change in Wound Size % -45 21 10/26/22 0955   Drainage Amount Moderate 10/26/22 0955   Drainage Description Brown;Yellow 10/26/22 0955   Non-staged Wound Description Full thickness 10/26/22 0955   Treatments Cleansed 10/26/22 0955   Patient Tolerance Tolerated well 10/26/22 0955       Wound 10/26/22 Other (comment) Hand Posterior;Right (Active)   Wound Image Images linked 10/26/22 1043   Wound Description Dry;Eschar 10/26/22 0958   Sybil-wound Assessment Scar Tissue 10/26/22 0958   Wound Length (cm) 6 cm 10/26/22 0958   Wound Width (cm) 2 4 cm 10/26/22 0958   Wound Depth (cm) 0 1 cm 10/26/22 0958   Wound Surface Area (cm^2) 14 4 cm^2 10/26/22 0958   Wound Volume (cm^3) 1 44 cm^3 10/26/22 0958   Calculated Wound Volume (cm^3) 1 44 cm^3 10/26/22 0958   Drainage Amount None 10/26/22 0958   Non-staged Wound Description Full thickness 10/26/22 0958   Treatments Cleansed 10/26/22 0958   Patient Tolerance Tolerated well 10/26/22 0958       Subjective: Jae Bonds The patient is a 61-year-old black male with profound disabilities, bed ridden with contractures  Who has multiple pressure sores  He has a Marrufo catheter and diverting colostomy, he receives nutrition via indwelling gastrostomy tube  There chronic granulating decubitus ulcers of the right ischium, a healed decubitus ulcer of his right ear  He has a new necrotic ulcer overlying his left dorsal wrist and dorsal hand, apparently from infiltration of an IV while hospitalized for sepsis  Decubitus ulcers of the left heel and foot with evidence of ongoing pressure damage, as well as his mid back and sacrum  The patient is being treated with Priscilla Maha solution to the mid back ulcer and apparently to the right ischium and sacrum as well  Surgical debridement was performed for his sacral ulcer, left lateral foot and the dorsum of his left wrist   Essentially selective debridement was performed in the which sites  Dakin solution can be discontinued, and Santyl ointment utilized for the left wrist, midback left lateral foot, and silver alginate for the remaining sites  A Clinitron bed would be appropriate given the multiple decubitus ulcers      The patient will continue to receive palliative care at his skilled nursing facility in follow-up in 6 weeks  The following portions of the patient's history were reviewed and updated as appropriate: allergies, current medications, past family history, past medical history, past social history, past surgical history, and problem list     Review of Systems   Unable to perform ROS: Patient nonverbal         Objective:       Wound 05/16/22 Pressure Injury Back Right;Upper (Active)   Wound Image Images linked 10/26/22 0917   Wound Description Pink;Yellow 10/26/22 0917   Pressure Injury Stage 4 10/26/22 0917   Sybil-wound Assessment Clean;Dry;Scar Tissue 10/26/22 0917   Wound Length (cm) 7 cm 10/26/22 0917   Wound Width (cm) 4 4 cm 10/26/22 0917   Wound Depth (cm) 1 4 cm 10/26/22 0917   Wound Surface Area (cm^2) 30 8 cm^2 10/26/22 0917   Wound Volume (cm^3) 43 12 cm^3 10/26/22 0917   Calculated Wound Volume (cm^3) 43 12 cm^3 10/26/22 0917   Change in Wound Size % -1356 76 10/26/22 0917   Undermining 3 10/26/22 0917   Undermining is depth extending from 10-6 10/26/22 0917   Drainage Amount Large 10/26/22 0917   Drainage Description Yellow;Brown 10/26/22 0917   Non-staged Wound Description Full thickness 10/26/22 0917   Treatments Cleansed;Irrigation with NSS 10/26/22 0917   Patient Tolerance Tolerated well 10/26/22 0917       Wound 05/16/22 Pressure Injury Back Lateral;Right; Lower (Active)   Wound Image Images linked 10/26/22 0917   Wound Description Pink;Yellow 10/26/22 0917   Pressure Injury Stage 3 10/26/22 0917   Sybil-wound Assessment Clean;Dry 10/26/22 0917   Wound Length (cm) 0 4 cm 10/26/22 0917   Wound Width (cm) 0 4 cm 10/26/22 0917   Wound Depth (cm) 0 2 cm 10/26/22 0917   Wound Surface Area (cm^2) 0 16 cm^2 10/26/22 0917   Wound Volume (cm^3) 0 032 cm^3 10/26/22 0917   Calculated Wound Volume (cm^3) 0 03 cm^3 10/26/22 0917   Change in Wound Size % 99 2 10/26/22 0917   Drainage Amount Moderate 10/26/22 0917   Drainage Description Brown;Yellow 10/26/22 0917   Non-staged Wound Description Full thickness 10/26/22 0917   Treatments Cleansed;Irrigation with NSS 10/26/22 0917   Patient Tolerance Tolerated well 10/26/22 0917       Wound 05/16/22 Pressure Injury Sacrum (Active)   Wound Image Images linked 10/26/22 1037   Wound Description Yellow;Pink;Pale;Exposed bone 10/26/22 0954   Pressure Injury Stage 4 10/26/22 0954   Sybil-wound Assessment Dry;Scar Tissue; Intact 10/26/22 0954   Wound Length (cm) 4 5 cm 10/26/22 0954   Wound Width (cm) 1 8 cm 10/26/22 0954   Wound Depth (cm) 1 8 cm 10/26/22 0954   Wound Surface Area (cm^2) 8 1 cm^2 10/26/22 0954   Wound Volume (cm^3) 14 58 cm^3 10/26/22 0954   Calculated Wound Volume (cm^3) 14 58 cm^3 10/26/22 0954   Change in Wound Size % 78 64 10/26/22 0954   Drainage Amount Moderate 10/26/22 0954   Drainage Description Brown;Yellow 10/26/22 0954   Non-staged Wound Description Full thickness 10/26/22 0954   Treatments Cleansed;Irrigation with NSS 10/26/22 0954   Patient Tolerance Tolerated well 10/26/22 0954       Wound 05/16/22 Pressure Injury Buttocks Right (Active)   Wound Image Images linked 10/26/22 0916   Wound Description Pink;Yellow 10/26/22 0916   Pressure Injury Stage 4 10/26/22 0916   Sybil-wound Assessment Scar Tissue;Dry; Intact 10/26/22 0916   Wound Length (cm) 1 8 cm 10/26/22 0916   Wound Width (cm) 1 6 cm 10/26/22 0916   Wound Depth (cm) 1 5 cm 10/26/22 0916   Wound Surface Area (cm^2) 2 88 cm^2 10/26/22 0916   Wound Volume (cm^3) 4 32 cm^3 10/26/22 0916   Calculated Wound Volume (cm^3) 4 32 cm^3 10/26/22 0916   Change in Wound Size % 92 86 10/26/22 0916   Drainage Amount Moderate 10/26/22 0916   Drainage Description Brown;Yellow 10/26/22 0916   Non-staged Wound Description Full thickness 10/26/22 0916   Treatments Cleansed;Irrigation with NSS 10/26/22 0916   Patient Tolerance Tolerated well 10/26/22 0916       Wound 05/16/22 Pressure Injury Foot Left;Lateral (Active)   Wound Image Images linked 10/26/22 1037   Wound Description Beefy red 10/26/22 0953   Wound Length (cm) 1 cm 10/26/22 0953   Wound Width (cm) 1 4 cm 10/26/22 0953   Wound Depth (cm) 0 1 cm 10/26/22 0953   Wound Surface Area (cm^2) 1 4 cm^2 10/26/22 0953   Wound Volume (cm^3) 0 14 cm^3 10/26/22 0953   Calculated Wound Volume (cm^3) 0 14 cm^3 10/26/22 0953   Change in Wound Size % 51 72 10/26/22 0953       Wound 08/29/22 Heel Left;Posterior (Active)   Wound Image Images linked 10/26/22 0955   Wound Description Necrotic;Pink;Yellow 10/26/22 0955   Pressure Injury Stage U 10/26/22 0955   Sybil-wound Assessment Clean;Dry 10/26/22 0955   Wound Length (cm) 2 2 cm 10/26/22 0955   Wound Width (cm) 1 6 cm 10/26/22 0955   Wound Depth (cm) 0 3 cm 10/26/22 0955   Wound Surface Area (cm^2) 3 52 cm^2 10/26/22 0955   Wound Volume (cm^3) 1  056 cm^3 10/26/22 0955   Calculated Wound Volume (cm^3) 1 06 cm^3 10/26/22 0955   Change in Wound Size % -45 21 10/26/22 0955   Drainage Amount Moderate 10/26/22 0955   Drainage Description Brown;Yellow 10/26/22 0955   Non-staged Wound Description Full thickness 10/26/22 0955   Treatments Cleansed 10/26/22 0955   Patient Tolerance Tolerated well 10/26/22 0955       Wound 10/26/22 Other (comment) Hand Posterior;Right (Active)   Wound Image Images linked 10/26/22 1043   Wound Description Dry;Eschar 10/26/22 0958   Sybil-wound Assessment Scar Tissue 10/26/22 0958   Wound Length (cm) 6 cm 10/26/22 0958   Wound Width (cm) 2 4 cm 10/26/22 0958   Wound Depth (cm) 0 1 cm 10/26/22 0958   Wound Surface Area (cm^2) 14 4 cm^2 10/26/22 0958   Wound Volume (cm^3) 1 44 cm^3 10/26/22 0958   Calculated Wound Volume (cm^3) 1 44 cm^3 10/26/22 0958   Drainage Amount None 10/26/22 0958   Non-staged Wound Description Full thickness 10/26/22 0958   Treatments Cleansed 10/26/22 0958   Patient Tolerance Tolerated well 10/26/22 0958       /69   Pulse 99   Temp 97 6 °F (36 4 °C)   Resp 22     Physical Exam  Constitutional:       General: He is not in acute distress       Comments: Debilitated black male with severe contractures  Nonverbal with at times purposeful movements  Appears in no distress  Cardiovascular:      Rate and Rhythm: Normal rate  Pulmonary:      Effort: Pulmonary effort is normal    Skin:         Neurological:      Comments: Contractures, little purposeful movement   Psychiatric:      Comments: Apparently at baseline           Wound Instructions:  Orders Placed This Encounter   Procedures   • Wound cleansing and dressings     Wash your hands with soap and water  Remove old dressing, discard into plastic bag and place in trash  Cleanse the ALL wounds with normal saline prior to applying a clean dressing  Do not use tissue or cotton balls  Do not scrub the wound  Pat dry using gauze     Shower yes do not get dressing wet though   Plan for showers around the dressing changes       Apply Santyl nickel thick to right upper back ulcer, left hand wound and sacral ulcer followed by gauze, secure with tape (paper)  Change daily and as needed for excessive drainage or leakage  Right upper back and sacral ulcers packed today at [address] [facility],[address] with normal saline moist to dry  Silver Gel applied to left hand wound today at [address] [facility],[address]  Apply silver alginate to right mid back and left foot ulcers, then cover with dry dressing and change daily and as needed for excessive drainage or leakage  This was done today  Pack right buttock ulcer with silver alginate rope, cover with dry dressing, change daily and as needed for excessive drainage or leakage this was done today        Continue to protect any at risk areas with bordered foam        Continue to turn and reposition like you have every hour   Continue with the offloading boots to both feet like you have been   May continue with the left foot just being elevated on a pillow to prevent pressure       Check air mattress in the facility to ensure that it is set correctly for the patient   If it is weight based make sure the weight is correctly set   If it just has a range from soft to firm set it at medium        PLEASE ORDER A CLINITRON BED FOR PATIENT (AIR FLUIDIZED THERAPY BED)     Follow up at [address] [facility],[address] in 6 weeks     Standing Status:   Future     Standing Expiration Date:   10/26/2023

## 2022-10-26 NOTE — ASSESSMENT & PLAN NOTE
Sharp debridement of necrotic edges  Silver alginate dressings  Palliate treatment, follow up in 6 weeks

## 2022-11-25 ENCOUNTER — OFFICE VISIT (OUTPATIENT)
Dept: WOUND CARE | Facility: CLINIC | Age: 67
End: 2022-11-25

## 2022-11-25 ENCOUNTER — HOSPITAL ENCOUNTER (INPATIENT)
Facility: HOSPITAL | Age: 67
LOS: 25 days | Discharge: NURSING, REHAB OR LONG-TERM CARE | End: 2022-12-20
Attending: EMERGENCY MEDICINE | Admitting: INTERNAL MEDICINE

## 2022-11-25 ENCOUNTER — APPOINTMENT (EMERGENCY)
Dept: RADIOLOGY | Facility: HOSPITAL | Age: 67
End: 2022-11-25

## 2022-11-25 VITALS
DIASTOLIC BLOOD PRESSURE: 54 MMHG | HEART RATE: 84 BPM | TEMPERATURE: 98.7 F | RESPIRATION RATE: 22 BRPM | SYSTOLIC BLOOD PRESSURE: 104 MMHG

## 2022-11-25 DIAGNOSIS — F89 DEVELOPMENTAL DISABILITY: ICD-10-CM

## 2022-11-25 DIAGNOSIS — K92.0 GASTROINTESTINAL HEMORRHAGE WITH HEMATEMESIS: ICD-10-CM

## 2022-11-25 DIAGNOSIS — R00.0 TACHYCARDIA: ICD-10-CM

## 2022-11-25 DIAGNOSIS — J96.01 ACUTE RESPIRATORY FAILURE WITH HYPOXIA (HCC): ICD-10-CM

## 2022-11-25 DIAGNOSIS — K92.2 UPPER GI BLEED: ICD-10-CM

## 2022-11-25 DIAGNOSIS — J96.00 ACUTE RESPIRATORY FAILURE (HCC): ICD-10-CM

## 2022-11-25 DIAGNOSIS — K92.2 GI BLEED: ICD-10-CM

## 2022-11-25 DIAGNOSIS — R50.9 FEVER: Primary | ICD-10-CM

## 2022-11-25 DIAGNOSIS — R78.81 BACTEREMIA: ICD-10-CM

## 2022-11-25 DIAGNOSIS — N39.0 UTI (URINARY TRACT INFECTION): ICD-10-CM

## 2022-11-25 DIAGNOSIS — L03.115 CELLULITIS OF RIGHT FOOT: ICD-10-CM

## 2022-11-25 DIAGNOSIS — L89.154 PRESSURE INJURY OF SACRAL REGION, STAGE 4 (HCC): ICD-10-CM

## 2022-11-25 DIAGNOSIS — L89.106 PRESSURE INJURY OF DEEP TISSUE OF BACK: ICD-10-CM

## 2022-11-25 DIAGNOSIS — R00.0 SINUS TACHYCARDIA: ICD-10-CM

## 2022-11-25 DIAGNOSIS — K92.0 COFFEE GROUND EMESIS: ICD-10-CM

## 2022-11-25 DIAGNOSIS — L89.154 PRESSURE INJURY OF SACRAL REGION, STAGE 4 (HCC): Primary | ICD-10-CM

## 2022-11-25 DIAGNOSIS — L89.114 PRESSURE INJURY OF RIGHT UPPER BACK, STAGE 4 (HCC): ICD-10-CM

## 2022-11-25 DIAGNOSIS — E87.0 HYPERNATREMIA: ICD-10-CM

## 2022-11-25 DIAGNOSIS — L89.133 PRESSURE ULCER OF RIGHT LOWER BACK, STAGE 3 (HCC): ICD-10-CM

## 2022-11-25 DIAGNOSIS — L89.620 PRESSURE ULCER OF LEFT HEEL, UNSTAGEABLE (HCC): ICD-10-CM

## 2022-11-25 DIAGNOSIS — A41.9 SEPSIS WITHOUT ACUTE ORGAN DYSFUNCTION, DUE TO UNSPECIFIED ORGANISM (HCC): ICD-10-CM

## 2022-11-25 DIAGNOSIS — J69.0 ASPIRATION PNEUMONIA, UNSPECIFIED ASPIRATION PNEUMONIA TYPE, UNSPECIFIED LATERALITY, UNSPECIFIED PART OF LUNG (HCC): ICD-10-CM

## 2022-11-25 DIAGNOSIS — L89.894 PRESSURE ULCER OF LEFT FOOT, STAGE 4 (HCC): ICD-10-CM

## 2022-11-25 DIAGNOSIS — D64.9 ANEMIA: ICD-10-CM

## 2022-11-25 DIAGNOSIS — L89.624 PRESSURE INJURY OF LEFT HEEL, STAGE 4 (HCC): ICD-10-CM

## 2022-11-25 DIAGNOSIS — S61.401A OPEN WOUND OF RIGHT HAND WITHOUT FOREIGN BODY, UNSPECIFIED WOUND TYPE, INITIAL ENCOUNTER: ICD-10-CM

## 2022-11-25 DIAGNOSIS — L89.314 PRESSURE ULCER OF RIGHT BUTTOCK, STAGE 4 (HCC): ICD-10-CM

## 2022-11-25 DIAGNOSIS — R57.9 SHOCK (HCC): ICD-10-CM

## 2022-11-25 DIAGNOSIS — I82.621 ACUTE DEEP VEIN THROMBOSIS (DVT) OF RIGHT UPPER EXTREMITY, UNSPECIFIED VEIN (HCC): ICD-10-CM

## 2022-11-25 DIAGNOSIS — L89.154 DECUBITUS ULCER OF SACRAL REGION, STAGE 4 (HCC): ICD-10-CM

## 2022-11-25 LAB
ALBUMIN SERPL BCP-MCNC: 2.9 G/DL (ref 3.5–5)
ALP SERPL-CCNC: 135 U/L (ref 34–104)
ALT SERPL W P-5'-P-CCNC: 25 U/L (ref 7–52)
AMORPH PHOS CRY URNS QL MICRO: ABNORMAL /HPF
ANION GAP SERPL CALCULATED.3IONS-SCNC: 9 MMOL/L (ref 4–13)
APTT PPP: 36 SECONDS (ref 23–37)
AST SERPL W P-5'-P-CCNC: 26 U/L (ref 13–39)
ATRIAL RATE: 133 BPM
BACTERIA UR QL AUTO: ABNORMAL /HPF
BASOPHILS # BLD AUTO: 0.05 THOUSANDS/ÂΜL (ref 0–0.1)
BASOPHILS NFR BLD AUTO: 0 % (ref 0–1)
BILIRUB SERPL-MCNC: 0.26 MG/DL (ref 0.2–1)
BILIRUB UR QL STRIP: NEGATIVE
BUN SERPL-MCNC: 13 MG/DL (ref 5–25)
CALCIUM ALBUM COR SERPL-MCNC: 9.9 MG/DL (ref 8.3–10.1)
CALCIUM SERPL-MCNC: 9 MG/DL (ref 8.4–10.2)
CHLORIDE SERPL-SCNC: 103 MMOL/L (ref 96–108)
CLARITY UR: ABNORMAL
CO2 SERPL-SCNC: 31 MMOL/L (ref 21–32)
COLOR UR: YELLOW
CREAT SERPL-MCNC: 0.42 MG/DL (ref 0.6–1.3)
EOSINOPHIL # BLD AUTO: 0.24 THOUSAND/ÂΜL (ref 0–0.61)
EOSINOPHIL NFR BLD AUTO: 1 % (ref 0–6)
ERYTHROCYTE [DISTWIDTH] IN BLOOD BY AUTOMATED COUNT: 20.9 % (ref 11.6–15.1)
FLUAV RNA RESP QL NAA+PROBE: NEGATIVE
FLUBV RNA RESP QL NAA+PROBE: NEGATIVE
GFR SERPL CREATININE-BSD FRML MDRD: 123 ML/MIN/1.73SQ M
GLUCOSE SERPL-MCNC: 113 MG/DL (ref 65–140)
GLUCOSE UR STRIP-MCNC: NEGATIVE MG/DL
HCT VFR BLD AUTO: 32.8 % (ref 36.5–49.3)
HGB BLD-MCNC: 9.1 G/DL (ref 12–17)
HGB UR QL STRIP.AUTO: ABNORMAL
IMM GRANULOCYTES # BLD AUTO: 0.15 THOUSAND/UL (ref 0–0.2)
IMM GRANULOCYTES NFR BLD AUTO: 1 % (ref 0–2)
INR PPP: 1.18 (ref 0.84–1.19)
KETONES UR STRIP-MCNC: NEGATIVE MG/DL
LACTATE SERPL-SCNC: 1.9 MMOL/L (ref 0.5–2)
LEUKOCYTE ESTERASE UR QL STRIP: ABNORMAL
LYMPHOCYTES # BLD AUTO: 1.89 THOUSANDS/ÂΜL (ref 0.6–4.47)
LYMPHOCYTES NFR BLD AUTO: 9 % (ref 14–44)
MCH RBC QN AUTO: 22.4 PG (ref 26.8–34.3)
MCHC RBC AUTO-ENTMCNC: 27.7 G/DL (ref 31.4–37.4)
MCV RBC AUTO: 81 FL (ref 82–98)
MONOCYTES # BLD AUTO: 2.07 THOUSAND/ÂΜL (ref 0.17–1.22)
MONOCYTES NFR BLD AUTO: 9 % (ref 4–12)
NEUTROPHILS # BLD AUTO: 17.52 THOUSANDS/ÂΜL (ref 1.85–7.62)
NEUTS SEG NFR BLD AUTO: 80 % (ref 43–75)
NITRITE UR QL STRIP: POSITIVE
NON-SQ EPI CELLS URNS QL MICRO: ABNORMAL /HPF
NRBC BLD AUTO-RTO: 0 /100 WBCS
P AXIS: 77 DEGREES
PH UR STRIP.AUTO: 7.5 [PH]
PLATELET # BLD AUTO: 704 THOUSANDS/UL (ref 149–390)
PMV BLD AUTO: 9.8 FL (ref 8.9–12.7)
POTASSIUM SERPL-SCNC: 3.5 MMOL/L (ref 3.5–5.3)
PR INTERVAL: 94 MS
PROCALCITONIN SERPL-MCNC: 0.16 NG/ML
PROT SERPL-MCNC: 7.3 G/DL (ref 6.4–8.4)
PROT UR STRIP-MCNC: ABNORMAL MG/DL
PROTHROMBIN TIME: 15 SECONDS (ref 11.6–14.5)
QRS AXIS: 9 DEGREES
QRSD INTERVAL: 72 MS
QT INTERVAL: 350 MS
QTC INTERVAL: 520 MS
RBC # BLD AUTO: 4.07 MILLION/UL (ref 3.88–5.62)
RBC #/AREA URNS AUTO: ABNORMAL /HPF
RSV RNA RESP QL NAA+PROBE: NEGATIVE
SARS-COV-2 RNA RESP QL NAA+PROBE: NEGATIVE
SODIUM SERPL-SCNC: 143 MMOL/L (ref 135–147)
SP GR UR STRIP.AUTO: 1.01
T WAVE AXIS: 22 DEGREES
UROBILINOGEN UR QL STRIP.AUTO: 0.2 E.U./DL
VENTRICULAR RATE: 133 BPM
WBC # BLD AUTO: 21.92 THOUSAND/UL (ref 4.31–10.16)
WBC #/AREA URNS AUTO: ABNORMAL /HPF

## 2022-11-25 PROCEDURE — 0HDNXZZ EXTRACTION OF LEFT FOOT SKIN, EXTERNAL APPROACH: ICD-10-PCS | Performed by: SURGERY

## 2022-11-25 PROCEDURE — 0HD6XZZ EXTRACTION OF BACK SKIN, EXTERNAL APPROACH: ICD-10-PCS | Performed by: SURGERY

## 2022-11-25 RX ORDER — SODIUM CHLORIDE 9 MG/ML
75 INJECTION, SOLUTION INTRAVENOUS CONTINUOUS
Status: DISCONTINUED | OUTPATIENT
Start: 2022-11-25 | End: 2022-11-28

## 2022-11-25 RX ORDER — ACETAMINOPHEN 325 MG/1
650 TABLET ORAL EVERY 4 HOURS PRN
Status: DISCONTINUED | OUTPATIENT
Start: 2022-11-25 | End: 2022-12-14

## 2022-11-25 RX ORDER — CEFEPIME HYDROCHLORIDE 2 G/50ML
2000 INJECTION, SOLUTION INTRAVENOUS ONCE
Status: COMPLETED | OUTPATIENT
Start: 2022-11-25 | End: 2022-11-25

## 2022-11-25 RX ORDER — CEFEPIME HYDROCHLORIDE 2 G/50ML
2000 INJECTION, SOLUTION INTRAVENOUS EVERY 12 HOURS
Status: DISCONTINUED | OUTPATIENT
Start: 2022-11-26 | End: 2022-11-28

## 2022-11-25 RX ORDER — LIDOCAINE HYDROCHLORIDE 40 MG/ML
5 SOLUTION TOPICAL ONCE
Status: CANCELLED | OUTPATIENT
Start: 2022-11-25 | End: 2022-11-25

## 2022-11-25 RX ORDER — ONDANSETRON 2 MG/ML
4 INJECTION INTRAMUSCULAR; INTRAVENOUS EVERY 6 HOURS PRN
Status: DISCONTINUED | OUTPATIENT
Start: 2022-11-25 | End: 2022-12-20 | Stop reason: HOSPADM

## 2022-11-25 RX ORDER — ENOXAPARIN SODIUM 100 MG/ML
40 INJECTION SUBCUTANEOUS DAILY
Status: DISCONTINUED | OUTPATIENT
Start: 2022-11-26 | End: 2022-11-29

## 2022-11-25 RX ADMIN — VANCOMYCIN HYDROCHLORIDE 750 MG: 750 INJECTION, SOLUTION INTRAVENOUS at 18:25

## 2022-11-25 RX ADMIN — ACETAMINOPHEN 325 MG: 325 SUPPOSITORY RECTAL at 15:40

## 2022-11-25 RX ADMIN — SODIUM CHLORIDE 75 ML/HR: 0.9 INJECTION, SOLUTION INTRAVENOUS at 19:58

## 2022-11-25 RX ADMIN — CEFEPIME HYDROCHLORIDE 2000 MG: 2 INJECTION, SOLUTION INTRAVENOUS at 15:54

## 2022-11-25 RX ADMIN — SODIUM CHLORIDE 1000 ML: 0.9 INJECTION, SOLUTION INTRAVENOUS at 17:29

## 2022-11-25 RX ADMIN — SODIUM CHLORIDE 1000 ML: 0.9 INJECTION, SOLUTION INTRAVENOUS at 15:30

## 2022-11-25 NOTE — ED PROVIDER NOTES
History  Chief Complaint   Patient presents with   • Evaluation of Abnormal Diagnostic Test     Patient brought in for evaluation of wounds that possibly have developed into osteomyelitis of left foot, sacrum, and right ribs      HPI      This is a 22-year-old gentleman waves in the [facility] requiring 24 hour care presents the emergency department with a fever and to of the facilities nurses  According the chart is the patient is completely nonverbal there is a right upper back pressure sacral wound this increasing in depth as well as increasing necrotic tissue around the wound  There is also a left-sided pressure ulcer that no longer has bone exposed but there is erythema and edema extending from the wound  Patient's temperature today was 98 7 but upon arrival to the emergency department temperature was a 100 6°  According to the nursing note patient's urine output is diminished significantly is dark yellow and cloudy in color  Patient has a Marrufo catheter diverting colostomy and receives nutrition via a indwelling gastrostomy tube  There are chronic granulating decubitus ulcers of right ischium, a healed decubitus ulcer of right ear  Prior to Admission Medications   Prescriptions Last Dose Informant Patient Reported? Taking? Hydromorphone HCl (Dilaudid) 1 MG/ML LIQD   Yes No   Sig: Take 5 mg by mouth 6 (six) times a day   Polyethyl Glycol-Propyl Glycol (Systane) 0 4-0 3 % GEL   Yes No   Sig: Apply 0 25 inches to eye Each eye   bisacodyl (Dulcolax) 10 mg suppository   Yes No   Sig: Insert 10 mg into the rectum as needed in the morning for constipation  Every three days as needed     calcium carbonate-vitamin D (OSCAL-D) 500 mg-200 units per tablet   Yes No   Sig: Take 1 tablet by mouth daily with breakfast   denosumab (Prolia) 60 mg/mL   Yes No   Sig: Inject 60 mg under the skin once Every 6 months   dorzolamide-timolol (COSOPT) 22 3-6 8 MG/ML ophthalmic solution   Yes No   Sig: Administer 1 drop to both eyes in the morning and 1 drop in the evening    ergocalciferol (ERGOCALCIFEROL) 1 25 MG (99656 UT) capsule   Yes No   Sig: Take 50,000 Units by mouth once a week   fluticasone (FLONASE) 50 mcg/act nasal spray   Yes No   Si spray into each nostril 2 (two) times a day   latanoprost (XALATAN) 0 005 % ophthalmic solution   Yes No   Si drop daily at bedtime   loratadine (CLARITIN) 10 mg tablet   Yes No   Sig: Take 10 mg by mouth in the morning    magnesium hydroxide (MILK OF MAGNESIA) 400 mg/5 mL oral suspension   Yes No   Sig: Take by mouth daily as needed for constipation   sodium chloride (OCEAN) 0 65 % nasal spray   Yes No   Si spray into each nostril as needed in the morning for congestion  Facility-Administered Medications: None       History reviewed  No pertinent past medical history  History reviewed  No pertinent surgical history  History reviewed  No pertinent family history  I have reviewed and agree with the history as documented  E-Cigarette/Vaping   • E-Cigarette Use Never User      E-Cigarette/Vaping Substances     Social History     Tobacco Use   • Smoking status: Never   • Smokeless tobacco: Never   Vaping Use   • Vaping Use: Never used   Substance Use Topics   • Alcohol use: Never   • Drug use: Never       Review of Systems   Unable to perform ROS: Patient nonverbal   Gastrointestinal: Negative for abdominal pain  Physical Exam  Physical Exam  Vitals and nursing note reviewed  Exam conducted with a chaperone present  Constitutional:       General: He is in acute distress  Appearance: He is cachectic  He is ill-appearing, toxic-appearing and diaphoretic  Comments: Patient is nonverbal   HENT:      Head: Normocephalic and atraumatic  Right Ear: External ear normal       Left Ear: External ear normal       Nose: Nose normal       Mouth/Throat:      Mouth: Mucous membranes are dry  Pharynx: Oropharynx is clear     Eyes:      Extraocular Movements: Extraocular movements intact  Conjunctiva/sclera: Conjunctivae normal       Pupils: Pupils are equal, round, and reactive to light  Cardiovascular:      Rate and Rhythm: Regular rhythm  Tachycardia present  Pulses: Normal pulses  Pulmonary:      Effort: Pulmonary effort is normal    Abdominal:      General: Bowel sounds are normal    Musculoskeletal:         General: Tenderness and signs of injury present  Cervical back: Normal range of motion  Comments: See photos inserted to chart, see significant upper and lower extremity contractures  Skin:     General: Skin is warm  Capillary Refill: Capillary refill takes less than 2 seconds  Neurological:      General: No focal deficit present  Psychiatric:         Behavior: Behavior is uncooperative           R upper back                Vital Signs  ED Triage Vitals   Temperature Pulse Respirations Blood Pressure SpO2   11/25/22 1438 11/25/22 1438 11/25/22 1438 11/25/22 1438 11/25/22 1438   (!) 100 6 °F (38 1 °C) (!) 132 20 136/70 92 %      Temp Source Heart Rate Source Patient Position - Orthostatic VS BP Location FiO2 (%)   11/25/22 1438 11/25/22 1438 11/25/22 1438 11/25/22 1438 11/29/22 1930   Tympanic Monitor Lying Left arm 40      Pain Score       11/25/22 1540       Med Not Given for Pain - for MAR use only           Vitals:    12/03/22 0600 12/03/22 0615 12/03/22 0630 12/03/22 0645   BP: 125/59 130/61 115/56 93/51   Pulse: 75 75 72 72   Patient Position - Orthostatic VS: Lying            Visual Acuity  Visual Acuity    Flowsheet Row Most Recent Value   L Pupil Size (mm) 2   R Pupil Size (mm) 2   L Pupil Shape Round   R Pupil Shape Round          ED Medications  Medications   acetaminophen (TYLENOL) tablet 650 mg (650 mg Per G Tube Given 12/3/22 0232)   ondansetron (ZOFRAN) injection 4 mg (4 mg Intravenous Given 11/29/22 0749)   collagenase (SANTYL) ointment ( Topical Given 12/2/22 0800)   NOREPINEPHRINE 4 MG  ML NSS (CMPD ORDER) infusion (3 mcg/min Intravenous Rate/Dose Change 12/3/22 0601)   chlorhexidine (PERIDEX) 0 12 % oral rinse 15 mL (15 mL Mouth/Throat Given 12/2/22 2029)   HYDROmorphone (DILAUDID) 50 mg in sodium chloride 0 9% 50mL drip (1 5 mg/hr Intravenous Rate/Dose Verify 12/3/22 0600)   HYDROmorphone (DILAUDID) injection 0 5 mg (has no administration in time range)   insulin lispro (HumaLOG) 100 units/mL subcutaneous injection 1-5 Units (1 Units Subcutaneous Not Given 12/3/22 0539)   omeprazole (PRILOSEC) suspension 2 mg/mL (20 mg Oral Given 12/2/22 2029)   heparin (porcine) subcutaneous injection 5,000 Units (5,000 Units Subcutaneous Given 12/3/22 0522)   dexmedeTOMIDine (Precedex) 400 mcg in sodium chloride 0 9% 100 mL (0 6 mcg/kg/hr × 64 kg Intravenous Rate/Dose Verify 12/3/22 0600)   dextrose 5 % infusion (0 mL/hr Intravenous Stopped 12/2/22 1930)   ceFAZolin (ANCEF) IVPB (premix in dextrose) 2,000 mg 50 mL (0 mg Intravenous Stopped 12/3/22 0600)   potassium chloride 40 mEq IVPB (premix) (40 mEq Intravenous New Bag 12/3/22 0615)   vasopressin (PITRESSIN) 20 Units in sodium chloride 0 9 % 100 mL infusion (has no administration in time range)   sodium chloride 0 9 % bolus 1,000 mL (0 mL Intravenous Stopped 11/25/22 1729)     Followed by   sodium chloride 0 9 % bolus 1,000 mL (0 mL Intravenous Stopped 11/25/22 1832)   cefepime (MAXIPIME) IVPB (premix in dextrose) 2,000 mg 50 mL (0 mg Intravenous Stopped 11/25/22 1708)   vancomycin (VANCOCIN) IVPB (premix in dextrose) 750 mg 150 mL (0 mg Intravenous Stopped 11/25/22 1929)   acetaminophen (TYLENOL) rectal suppository 325 mg (325 mg Rectal Given 11/25/22 1540)   potassium chloride oral solution 40 mEq (40 mEq Oral Given 11/26/22 1517)   vancomycin (VANCOCIN) IVPB (premix in dextrose) 1,000 mg 200 mL (1,000 mg Intravenous New Bag 11/27/22 4619)   potassium chloride 20 mEq IVPB (premix) (0 mEq Intravenous Stopped 11/28/22 0782)   dextrose 5 % bolus 1,000 mL (0 mL Intravenous Stopped 11/29/22 0100)   multi-electrolyte (ISOLYTE-S PH 7 4) bolus 1,000 mL (0 mL Intravenous Stopped 11/29/22 1423)   ondansetron (ZOFRAN) injection 4 mg (4 mg Intravenous Given 11/29/22 0930)   sodium chloride 0 9 % bolus 1,000 mL (0 mL Intravenous Stopped 11/29/22 0950)   fentanyl citrate (PF) 100 MCG/2ML 50 mcg (50 mcg Intravenous Given 11/29/22 0830)   pantoprazole (PROTONIX) 80 mg in sodium chloride 0 9 % 100 mL IVPB (0 mg Intravenous Stopped 11/29/22 1215)   metoclopramide (REGLAN) injection 10 mg (10 mg Intravenous Given 11/29/22 1335)   multi-electrolyte (ISOLYTE-S PH 7 4) bolus 1,000 mL (0 mL Intravenous Stopped 11/29/22 1424)   magnesium sulfate 2 g/50 mL IVPB (premix) 2 g (0 g Intravenous Stopped 11/29/22 1545)   potassium chloride 20 mEq IVPB (premix) (0 mEq Intravenous Stopped 11/30/22 1434)   calcium gluconate 2 g in sodium chloride 0 9% 100 mL (premix) (0 g Intravenous Stopped 11/29/22 1400)   HYDROmorphone (DILAUDID) injection 1 mg (1 mg Intravenous Given 11/29/22 1154)   multi-electrolyte (ISOLYTE-S PH 7 4) bolus 1,000 mL (0 mL Intravenous Stopped 11/29/22 1423)   dextrose 5 % bolus 1,000 mL (0 mL Intravenous Stopped 11/29/22 1900)   sterile water injection **ADS Override Pull** (  Given 11/29/22 1708)   calcium gluconate 2 g in sodium chloride 0 9% 100 mL (premix) (0 g Intravenous Stopped 11/29/22 2048)   potassium chloride 20 mEq IVPB (premix) (0 mEq Intravenous Stopped 11/29/22 2324)   potassium phosphates 30 mmol in sodium chloride 0 9 % 250 mL infusion (0 mmol Intravenous Stopped 11/30/22 1435)   HYDROmorphone (DILAUDID) injection 1 mg (1 mg Intravenous Given 11/29/22 1929)   furosemide (LASIX) injection 40 mg (40 mg Intravenous Given 11/29/22 1929)   multi-electrolyte (ISOLYTE-S PH 7 4) bolus 1,500 mL (0 mL Intravenous Stopped 11/29/22 2998)   potassium chloride oral solution 20 mEq (20 mEq Per PEG Tube Given 11/30/22 1427)   calcium gluconate 3 g in sodium chloride 0 9 % 100 mL IVPB (0 g Intravenous Stopped 11/30/22 1330)   multi-electrolyte (ISOLYTE-S PH 7 4) bolus 500 mL (0 mL Intravenous Stopped 11/30/22 1435)   dextrose 50 % IV solution 25 mL (25 mL Intravenous Given 11/30/22 1152)   iohexol (OMNIPAQUE) 350 MG/ML injection (SINGLE-DOSE) 100 mL (100 mL Intravenous Given 11/30/22 1321)   albumin human (FLEXBUMIN) 5 % injection 12 5 g (0 g Intravenous Stopped 12/1/22 0430)   calcium gluconate 3 g in sodium chloride 0 9 % 100 mL IVPB (3 g Intravenous New Bag 12/1/22 0826)   magnesium sulfate 2 g/50 mL IVPB (premix) 2 g (2 g Intravenous New Bag 12/1/22 0746)   potassium chloride 40 mEq IVPB (premix) (40 mEq Intravenous New Bag 12/1/22 0827)     Followed by   potassium chloride 40 mEq IVPB (premix) ( Intravenous Rate/Dose Verify 12/1/22 1200)   potassium chloride 40 mEq IVPB (premix) (0 mEq Intravenous Stopped 12/2/22 0430)   cefepime (MAXIPIME) IVPB (premix in dextrose) 2,000 mg 50 mL (0 mg Intravenous Stopped 12/2/22 2100)   alteplase (CATHFLO) injection 2 mg (2 mg Intracatheter Given 12/3/22 0603)       Diagnostic Studies  Results Reviewed     Procedure Component Value Units Date/Time    Blood culture #2 [[id number]] Collected: 11/25/22 1536    Lab Status: Final result Specimen: Blood from Arm, Left Updated: 11/30/22 2101     Blood Culture No Growth After 5 Days  Blood Culture Identification Panel [[id number]]  (Abnormal) Collected: 11/25/22 1550    Lab Status: Final result Specimen: Blood from Central Venous Line Updated: 11/29/22 1145     Staphylococcus aureus Detected     Staphylococcus epidermidis Detected     mecA/C (Methicillin-resistance gene) Detected    Narrative:      Film Array panel tests for 11 gram positive organisms, 15 gram negative organisms, 7 yeast species and 10 resistance genes       Blood culture #1 [[id number]]  (Abnormal)  (Susceptibility) Collected: 11/25/22 1550    Lab Status: Final result Specimen: Blood from Central Venous Line Updated: 11/29/22 1145     Blood Culture Staphylococcus aureus      Staphylococcus coagulase negative     Gram Stain Result Gram positive cocci in clusters    Narrative:      Susceptibility testing will not be performed as this organism, when isolated from a single set of blood cultures, represents probable skin amie contamination  Please call the Microbiology Laboratory within 5 days at [phone] if further workup is required        Susceptibility     Staphylococcus aureus (1)     Antibiotic Interpretation Microscan   Method Status    Ampicillin ($$) Resistant <=2 00 ug/ml EMMIE Final    Cefazolin ($) Susceptible <=4 00 ug/ml EMMIE Final    Clindamycin ($) Susceptible <=0 25 ug/ml EMMIE Final    Erythromycin ($$$$) Susceptible <=0 25 ug/ml EMMIE Final    Gentamicin ($$) Susceptible <=1 ug/ml EMMIE Final    Oxacillin Susceptible <=0 25 ug/ml EMMIE Final    Tetracycline Susceptible <=2 ug/ml EMMIE Final    Trimethoprim + Sulfamethoxazole ($$$) Susceptible <=0 5/9 5 ug/ml EMMIE Final    Vancomycin ($) Susceptible 0 50 ug/ml EMMIE Final                   Urine culture [[id number]] Collected: 11/25/22 1530    Lab Status: Final result Specimen: Urine, Indwelling Marrufo Catheter Updated: 11/26/22 2204     Urine Culture >100,000 cfu/ml    Comprehensive metabolic panel [[id number]]  (Abnormal) Collected: 11/26/22 0432    Lab Status: Final result Specimen: Blood from Arm, Left Updated: 11/26/22 0500     Sodium 145 mmol/L      Potassium 3 2 mmol/L      Chloride 109 mmol/L      CO2 27 mmol/L      ANION GAP 9 mmol/L      BUN 8 mg/dL      Creatinine 0 29 mg/dL      Glucose 93 mg/dL      Calcium 9 0 mg/dL      Corrected Calcium 10 1 mg/dL      AST 18 U/L      ALT 19 U/L      Alkaline Phosphatase 119 U/L      Total Protein 6 8 g/dL      Albumin 2 6 g/dL      Total Bilirubin 0 25 mg/dL      eGFR 143 ml/min/1 73sq m     Narrative:      [place] guidelines for Chronic Kidney Disease (CKD):   •  Stage 1 with normal or high GFR (GFR > 90 mL/min/1 73 square meters)  •  Stage 2 Mild CKD (GFR = 60-89 mL/min/1 73 square meters)  •  Stage 3A Moderate CKD (GFR = 45-59 mL/min/1 73 square meters)  •  Stage 3B Moderate CKD (GFR = 30-44 mL/min/1 73 square meters)  •  Stage 4 Severe CKD (GFR = 15-29 mL/min/1 73 square meters)  •  Stage 5 End Stage CKD (GFR <15 mL/min/1 73 square meters)  Note: GFR calculation is accurate only with a steady state creatinine    Magnesium [[id number]]  (Normal) Collected: 11/26/22 0432    Lab Status: Final result Specimen: Blood from Arm, Left Updated: 11/26/22 0500     Magnesium 2 0 mg/dL     CBC (With Platelets) [[id number]]  (Abnormal) Collected: 11/26/22 0432    Lab Status: Final result Specimen: Blood from Arm, Left Updated: 11/26/22 0444     WBC 16 67 Thousand/uL      RBC 3 65 Million/uL      Hemoglobin 8 2 g/dL      Hematocrit 29 0 %      MCV 80 fL      MCH 22 5 pg      MCHC 28 3 g/dL      RDW 20 8 %      Platelets 431 Thousands/uL      MPV 9 4 fL     Procalcitonin [[id number]]  (Normal) Collected: 11/25/22 1550    Lab Status: Final result Specimen: Blood from Vein Updated: 11/25/22 1624     Procalcitonin 0 16 ng/ml     Comprehensive metabolic panel [[id number]]  (Abnormal) Collected: 11/25/22 1550    Lab Status: Final result Specimen: Blood from Vein Updated: 11/25/22 1615     Sodium 143 mmol/L      Potassium 3 5 mmol/L      Chloride 103 mmol/L      CO2 31 mmol/L      ANION GAP 9 mmol/L      BUN 13 mg/dL      Creatinine 0 42 mg/dL      Glucose 113 mg/dL      Calcium 9 0 mg/dL      Corrected Calcium 9 9 mg/dL      AST 26 U/L      ALT 25 U/L      Alkaline Phosphatase 135 U/L      Total Protein 7 3 g/dL      Albumin 2 9 g/dL      Total Bilirubin 0 26 mg/dL      eGFR 123 ml/min/1 73sq m     Narrative:      [place] guidelines for Chronic Kidney Disease (CKD):   •  Stage 1 with normal or high GFR (GFR > 90 mL/min/1 73 square meters)  •  Stage 2 Mild CKD (GFR = 60-89 mL/min/1 73 square meters)  •  Stage 3A Moderate CKD (GFR = 45-59 mL/min/1 73 square meters)  •  Stage 3B Moderate CKD (GFR = 30-44 mL/min/1 73 square meters)  •  Stage 4 Severe CKD (GFR = 15-29 mL/min/1 73 square meters)  •  Stage 5 End Stage CKD (GFR <15 mL/min/1 73 square meters)  Note: GFR calculation is accurate only with a steady state creatinine    Lactic acid [[id number]]  (Normal) Collected: 11/25/22 1550    Lab Status: Final result Specimen: Blood from Vein Updated: 11/25/22 1615     LACTIC ACID 1 9 mmol/L     Narrative:      Result may be elevated if tourniquet was used during collection  Protime-INR [[id number]]  (Abnormal) Collected: 11/25/22 1550    Lab Status: Final result Specimen: Blood from Vein Updated: 11/25/22 1615     Protime 15 0 seconds      INR 1 18    APTT [[id number]]  (Normal) Collected: 11/25/22 1550    Lab Status: Final result Specimen: Blood from Vein Updated: 11/25/22 1615     PTT 36 seconds     COVID19, Influenza A/B, RSV PCR, [facility] - 2 hour STAT [[id number]]  (Normal) Collected: 11/25/22 1510    Lab Status: Final result Specimen: Nares from Nose Updated: 11/25/22 1611     SARS-CoV-2 Negative     INFLUENZA A PCR Negative     INFLUENZA B PCR Negative     RSV PCR Negative    Narrative:      FOR PEDIATRIC PATIENTS - copy/paste COVID Guidelines URL to browser: [email or web address]  [email or web address]    SARS-CoV-2 assay is a Nucleic Acid Amplification assay intended for the  qualitative detection of nucleic acid from SARS-CoV-2 in nasopharyngeal  swabs  Results are for the presumptive identification of SARS-CoV-2 RNA  Positive results are indicative of infection with SARS-CoV-2, the virus  causing COVID-19, but do not rule out bacterial infection or co-infection  with other viruses  Laboratories within the [place] and its  territories are required to report all positive results to the appropriate  public health authorities   Negative results do not preclude SARS-CoV-2  infection and should not be used as the sole basis for treatment or other  patient management decisions  Negative results must be combined with  clinical observations, patient history, and epidemiological information  This test has not been FDA cleared or approved  This test has been authorized by FDA under an Emergency Use Authorization  (EUA)  This test is only authorized for the duration of time the  declaration that circumstances exist justifying the authorization of the  emergency use of an in vitro diagnostic tests for detection of SARS-CoV-2  virus and/or diagnosis of COVID-19 infection under section 564(b)(1) of  the Act, 21 U  S C  141GWW-9(J)(5), unless the authorization is terminated  or revoked sooner  The test has been validated but independent review by FDA  and CLIA is pending  Test performed using [employer] GeneXpert: This RT-PCR assay targets N2,  a region unique to SARS-CoV-2  A conserved region in the E-gene was chosen  for pan-Sarbecovirus detection which includes SARS-CoV-2  According to CMS-2020-01-R, this platform meets the definition of high-throughput technology      Urine Microscopic [[id number]]  (Abnormal) Collected: 11/25/22 1530    Lab Status: Final result Specimen: Urine, Indwelling Marrufo Catheter Updated: 11/25/22 1602     RBC, UA 4-10 /hpf      WBC, UA 30-50 /hpf      Epithelial Cells Occasional /hpf      Bacteria, UA Moderate /hpf      AMORPH PHOSPATES Occasional /hpf     UA w Reflex to Microscopic w Reflex to Culture [[id number]]  (Abnormal) Collected: 11/25/22 1530    Lab Status: Final result Specimen: Urine, Indwelling Marrufo Catheter Updated: 11/25/22 1546     Color, UA Yellow     Clarity, UA Cloudy     Specific Gravity, UA 1 015     pH, UA 7 5     Leukocytes, UA 3+     Nitrite, UA Positive     Protein, UA Trace mg/dl      Glucose, UA Negative mg/dl      Ketones, UA Negative mg/dl      Urobilinogen, UA 0 2 E U /dl      Bilirubin, UA Negative     Occult Blood, UA 2+    CBC and differential [[id number]] (Abnormal) Collected: 11/25/22 1510    Lab Status: Final result Specimen: Blood from Hand, Right Updated: 11/25/22 1518     WBC 21 92 Thousand/uL      RBC 4 07 Million/uL      Hemoglobin 9 1 g/dL      Hematocrit 32 8 %      MCV 81 fL      MCH 22 4 pg      MCHC 27 7 g/dL      RDW 20 9 %      MPV 9 8 fL      Platelets 646 Thousands/uL      nRBC 0 /100 WBCs      Neutrophils Relative 80 %      Immat GRANS % 1 %      Lymphocytes Relative 9 %      Monocytes Relative 9 %      Eosinophils Relative 1 %      Basophils Relative 0 %      Neutrophils Absolute 17 52 Thousands/µL      Immature Grans Absolute 0 15 Thousand/uL      Lymphocytes Absolute 1 89 Thousands/µL      Monocytes Absolute 2 07 Thousand/µL      Eosinophils Absolute 0 24 Thousand/µL      Basophils Absolute 0 05 Thousands/µL                  XR foot 3+ vw left   Final Result by Lisset Mills MD (12/02 2195)      1  Soft tissue ulcer overlying the posterior calcaneus without osseous destruction  2   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 head  This appears chronic and possibly postsurgical though there is no reported surgical history  Chronic osteomyelitis is likely in the absence of surgical history  The study was marked in [facility] for immediate notification  Workstation performed: [id number]         XR chest portable   Final Result by Torres Cutler MD (12/01 1653)      No change in patchy bilateral airspace opacities possibly representing multifocal pneumonia, with left greater than right pleural effusions causing partial atelectasis of both lungs  Workstation performed: [id number]         CT chest abdomen pelvis w contrast   Final Result by Armani Rodriguez MD (11/30 1106)      1  Multiple ulcerated wounds  No drainable fluid collection    - Wound in the right lower back with soft tissue defect extending to the posterior right 9th rib          - Wound overlying the sacrum and coccyx with the soft tissue defect reaching the bone  - Soft tissue defect within the subcutaneous fat of the right buttock /proximal thigh  Subjacent subcutaneous fat stranding and soft tissue thickening overlying the ischial tuberosity       - Soft tissue defect in the right proximal thigh extending to the underlying musculature  2   Small bilateral pleural effusions  3   Near complete collapse of the right lower lobe and complete collapse of the left lower lobe  Consolidative opacity in the right middle and lower lobes may be due to a combination of pneumonia and atelectasis  4   Right ureteral stent in place without hydronephrosis  Nonobstructing right renal calculi  The study was marked in [facility] for immediate notification  Workstation performed: [id number]         XR chest portable ICU   Final Result by Gisselle Schmid MD (11/30 1136)      Extensive right greater than left alveolar opacities keeping with multilobar pneumonia  Workstation performed: [id number]         XR chest portable ICU   Final Result by Alysha Quintanilla MD (11/29 1057)      Right IJ line in place  Right base infiltrate; question aspiration  The study was marked in [facility] for immediate notification  Workstation performed: [id number]         XR chest portable   Final Result by Jo-Ann Nath MD (11/29 1901)      Mild opacity in the right base, likely atelectasis  Pneumonia/aspiration not excluded in the appropriate setting  Workstation performed: [id number]         XR chest portable   ED Interpretation by Johan Garcia III, DO (11/25 1613)   Questionable right lower lobe pneumonia versus atelectasis      Final Result by David Connell MD (11/25 2050)      Very limited study with right basilar atelectasis versus less likely infiltrate  Final report is in agreement with preliminary reading by the ED staff              Workstation performed: [id number]                    Procedures  ECG 12 Lead Documentation Only    Date/Time: 11/25/2022 3:10 PM  Performed by: Aminta Prater DO  Authorized by: Renetta Pepper III, DO     Indications / Diagnosis:  FEVER, ELEVATED HEART RATE  ECG reviewed by me, the ED Provider: yes    Patient location:  ED  Comments:      I personally reviewed this EKG is performed the patient November 25, 2022, EKG was completed at 303 p m  and interpreted by me at 3:10 p m  sinus tachycardia with a shortened AK interval with the AK interval of 94 milliseconds, ventricular rate is 133 beats per minute, patient's QTC is 520 milliseconds with no acute ST abnormalities some baseline artifact noted  No diffuse elevations to indicate pericarditis  No coved ST elevations greater than 2mm with negative T waves in V1-3 to indicate concern for brugada  No biphasic T waves in V2, V3 to indicate Wellens (critical stenosis of LAD)  No elevation in aVR or deviation when compared to V1 (can be associated with ST depression in I,II, V4-6 when left main occlusion is present)  General Procedure    Date/Time: 11/25/2022 3:54 PM  Performed by: Aminta Prater DO  Authorized by: Aminta Prater DO     Patient location:  ED  Consent:     Consent obtained:  Emergent situation    Risks discussed:  Bleeding, infection, nerve damage and pain    Alternatives discussed:  No treatment  Indications:     Indications:  Emergent need to obtain blood sample  Pre-procedure details:     Skin preparation:  Antiseptic wash and Betadine  Anesthesia (see MAR for exact dosages):      Anesthesia method:  None  Procedure Detail:     Procedure note (site, laterality, method, findings):  18 g needle access R femoral artery for blood draw, second attempt  CriticalCare Time  Performed by: Aminta Prater DO  Authorized by: Aminta Prater DO     Critical care provider statement:     Critical care time (minutes):  36    Critical care start time:  11/25/2022 3:00 PM    Critical care end time:  11/25/2022 5:00 PM    Critical care was necessary to treat or prevent imminent or life-threatening deterioration of the following conditions:  Respiratory failure, CNS failure or compromise, dehydration, metabolic crisis and sepsis    Critical care was time spent personally by me on the following activities:  Examination of patient, evaluation of patient's response to treatment, discussions with primary provider, development of treatment plan with patient or surrogate, obtaining history from patient or surrogate, blood draw for specimens, discussions with consultants, review of old charts, re-evaluation of patient's condition, ordering and review of radiographic studies, ordering and review of laboratory studies and ordering and performing treatments and interventions             ED Course  ED Course as of 12/03/22 0719   Fri Nov 25, 2022   1444 Due to vital signs, sepsis alert initiated  1534 This note that multiple attempts were made at obtaining IV access through ultrasound means with nursing staff, IV obtained left arm    1554 Femoral artery accessed for blood work  1609 Informed by RN that IV infiltrated  1620 ICU contacted for central access  1640 RN making additional attempts at access  1734 Case reviewed with Dr Schuyler Rocha, [facility] attending, will admit  Initial Sepsis Screening     Row Name 11/29/22 0745                Is the patient's history suggestive of a new or worsening infection? Yes (Proceed)  -BA        Suspected source of infection pneumonia;wound infection  -BA        Are two or more of the following signs & symptoms of infection both present and new to the patient? Yes (Proceed)  -BA        Indicate SIRS criteria Leukocytosis (WBC > 58737 IJL); Tachycardia > 90 bpm;Tachypnea > 20 resp per min;WBC > 10% bands  -BA        If the answer is yes to both questions, suspicion of sepsis is present --        If severe sepsis is present AND tissue hypoperfusion perists in the hour after fluid resuscitation or lactate > 4, the patient meets criteria for SEPTIC SHOCK --        Are any of the following organ dysfunction criteria present within 6 hours of suspected infection and SIRS criteria that are NOT considered to be chronic conditions?  Yes  -BA        Organ dysfunction SBP decrease > 40 mmHg from baseline;SBP < 90 mmHg;MAP < 65 mmHg;Acute respiratory failure (new need for invasive or non-invasive mechanical ventilation)  -        Date of presentation of severe sepsis 11/29/22  -        Time of presentation of severe sepsis 0745  -        Tissue hypoperfusion persists in the hour after crystalloid fluid administration, evidenced, by either: --        Was hypotension present within one hour of the conclusion of crystalloid fluid administration? --        Date of presentation of septic shock 11/29/22  -        Time of presentation of septic shock 0745  -              User Key  (r) = Recorded By, (t) = Taken By, (c) = Cosigned By    Initials Name Provider Type    Jenae Kim Nurse Practitioner              Default Flowsheet Data (last 720 hours)     Sepsis Reassess     Row Name 11/29/22 0930                   Repeat Volume Status and Tissue Perfusion Assessment Performed    Repeat Volume Status and Tissue Perfusion Assessment Performed Yes  -BA           Volume Status and Tissue Perfusion Post Fluid Resuscitation * Must Document All *    Vital Signs Reviewed (HR, RR, BP, T) --        Shock Index Reviewed --        Arterial Oxygen Saturation Reviewed (POx, SaO2 or SpO2) --        Cardio --        Pulmonary --        Capillary Refill --        Peripheral Pulses --        Skin --        Urine output assessed --           *OR*   Intensive Monitoring- Must Document One of the Following Four *:    Vital Signs Reviewed --        * Central Venous Pressure (CVP or RAP) -- * Central Venous Oxygen (SVO2, ScvO2 or Oxygen saturation via central catheter) --        * Bedside Cardiovascular US in IVC diameter and % collapse --        * Passive Leg Raise OR Crystalloid Challenge --              User Key  (r) = Recorded By, (t) = Taken By, (c) = Cosigned By    Initials Name Provider Type    MATT Dill, [address] Baker Nurse Practitioner              SBIRT 22yo+    Flowsheet Row Most Recent Value   SBIRT (25 yo +)    In order to provide better care to our patients, we are screening all of our patients for alcohol and drug use  Would it be okay to ask you these screening questions? Yes Filed at: 11/25/2022 1504   Initial Alcohol Screen: US AUDIT-C     1  How often do you have a drink containing alcohol? 0 Filed at: 11/25/2022 1504   2  How many drinks containing alcohol do you have on a typical day you are drinking? 0 Filed at: 11/25/2022 1504   3a  Male UNDER 65: How often do you have five or more drinks on one occasion? 0 Filed at: 11/25/2022 1504   3b  FEMALE Any Age, or MALE 65+: How often do you have 4 or more drinks on one occassion? 0 Filed at: 11/25/2022 1504   Audit-C Score 0 Filed at: 11/25/2022 1504   ELLEN: How many times in the past year have you    Used an illegal drug or used a prescription medication for non-medical reasons? Never Filed at: 11/25/2022 1504                    MDM  Number of Diagnoses or Management Options  Anemia  Cellulitis of right foot  Decubitus ulcer of sacral region, stage 4 (HCC)  Fever  Pressure injury of deep tissue of back  Sinus tachycardia  UTI (urinary tract infection)  Diagnosis management comments:  This is an unfortunate 80-year-old male chronically ill in a 24 hour care facility [place], [place] presents the emergency department with cellulitis the right foot, fever, known several worsening decubitus ulcers it stage III stage IV, see photos inserted the chart, has indwelling Marrufo catheter, elevated white count, patient will be admitted to the hospital for IV fluid hydration and antibiotics  Patient admitted to the hospitalist service  Portions of the record may have been created with voice recognition software  Occasional wrong word or "sound a like" substitutions may have occurred due to the inherent limitations of voice recognition software  Read the chart carefully and recognize, using context, where substitutions have occurred  This patient was examined during the Covid-19 pandemic, and appropriate PPE was employed as defined by OSHA to minimize exposure to the patient and to avoid spread in the event that I am an asymptomatic carrier  All efforts were made to avoid direct contact with the patient per CDC guidelines ("social distancing") unless otherwise necessary to rule out a medical emergency and/or to provide life-saving interventions  Donning and doffing of PPE was performed per recommended guidelines, and personal PPE was employed if /when institutional PPE was not readily available or was deemed to be less than the recommended as defined by OSHA         Amount and/or Complexity of Data Reviewed  Clinical lab tests: ordered and reviewed  Tests in the radiology section of CPT®: ordered and reviewed  Tests in the medicine section of CPT®: ordered and reviewed  Discussion of test results with the performing providers: yes  Decide to obtain previous medical records or to obtain history from someone other than the patient: yes  Obtain history from someone other than the patient: yes (Caregivers at the bedside )  Discuss the patient with other providers: yes  Independent visualization of images, tracings, or specimens: yes        Disposition  Final diagnoses:   Fever   Pressure injury of deep tissue of back   Decubitus ulcer of sacral region, stage 4 (HCC)   Cellulitis of right foot   UTI (urinary tract infection)   Sinus tachycardia   Anemia     Time reflects when diagnosis was documented in both MDM as applicable and the Disposition within this note     Time User Action Codes Description Comment    11/25/2022  5:16 PM Pervis Boxer Add [R50 9] Fever     11/25/2022  5:17 PM Pervis Boxer Add [S72 658] Pressure injury of deep tissue of back     11/25/2022  5:17 PM Pervis Boxer Add [H29 816] Decubitus ulcer of sacral region, stage 4 ([facility] )     11/25/2022  5:18 PM Pervis Boxer Add [G79 604] Cellulitis of right foot     11/25/2022  5:18 PM Pervis Boxer Add [N39 0] UTI (urinary tract infection)     11/25/2022  5:18 PM Pervis Boxer Add [R00 0] Sinus tachycardia     11/25/2022  5:18 PM Guadlupe Osler [D64 9] Anemia     11/25/2022  7:15 PM Tyrese Patch Add [L89 154] Pressure injury of sacral region, stage 4 ([facility] )     11/25/2022  7:15 PM Tyrese Patch Add [A41 9] Sepsis without acute organ dysfunction, due to unspecified organism ([facility] )     11/25/2022  7:15 PM Tyrese Patch Add [F89] Developmental disability     11/27/2022  7:26 AM Phil Mc[place] Add [R78 81] Bacteremia     11/28/2022 10:38 AM Bula Marquez Add [E87 0] Hypernatremia     11/29/2022  7:49 AM Bula Marquez Add [K92 2] GI bleed     11/29/2022  8:16 AM Mauricio Fernandez Add [J64 591] Pressure injury of right upper back, stage 4 ([facility] )     11/29/2022 10:19 AM Irma Haskins Add [K92 2] Upper GI bleed     11/29/2022 10:19 AM Irma Haskins Add [K92 0] Coffee ground emesis     11/29/2022 10:53 AM Arian Caldera [R57 9] Shock ([facility] )     11/29/2022 11:36 AM Demetri Caldera Add [J96 00] Acute respiratory failure ([address] )     11/29/2022  8:35 PM Marley Cancer Add [J96 01] Acute respiratory failure with hypoxia ([facility] )     12/1/2022  3:30 PM Sindy Amanda Add [S36 886] Pressure injury of left heel, stage 4 [facility])       ED Disposition     ED Disposition   Admit    Condition   Stable    Date/Time   Fri Nov 25, 2022  5:35 PM    Comment   Case was discussed with Dr Schuyler Rocha and the patient's admission status was agreed to be Admission Status: inpatient status to the service of Dr Vira Ewing   Follow-up Information    None         Current Discharge Medication List      CONTINUE these medications which have NOT CHANGED    Details   bisacodyl (Dulcolax) 10 mg suppository Insert 10 mg into the rectum as needed in the morning for constipation  Every three days as needed  calcium carbonate-vitamin D (OSCAL-D) 500 mg-200 units per tablet Take 1 tablet by mouth daily with breakfast      denosumab (Prolia) 60 mg/mL Inject 60 mg under the skin once Every 6 months      dorzolamide-timolol (COSOPT) 22 3-6 8 MG/ML ophthalmic solution Administer 1 drop to both eyes in the morning and 1 drop in the evening       ergocalciferol (ERGOCALCIFEROL) 1 25 MG (10815 UT) capsule Take 50,000 Units by mouth once a week      fluticasone (FLONASE) 50 mcg/act nasal spray 1 spray into each nostril 2 (two) times a day      Hydromorphone HCl (Dilaudid) 1 MG/ML LIQD Take 5 mg by mouth 6 (six) times a day      latanoprost (XALATAN) 0 005 % ophthalmic solution 1 drop daily at bedtime      loratadine (CLARITIN) 10 mg tablet Take 10 mg by mouth in the morning       magnesium hydroxide (MILK OF MAGNESIA) 400 mg/5 mL oral suspension Take by mouth daily as needed for constipation      Polyethyl Glycol-Propyl Glycol (Systane) 0 4-0 3 % GEL Apply 0 25 inches to eye Each eye      sodium chloride (OCEAN) 0 65 % nasal spray 1 spray into each nostril as needed in the morning for congestion                   PDMP Review     None          ED Provider  Electronically Signed by           Shelton Bhat III, DO  11/25/22 2 [facility], DO  12/03/22 6312

## 2022-11-25 NOTE — PROGRESS NOTES
Patient ID: Peter Pina is a 61 y o  male Date of Birth 1955       Chief Complaint   Patient presents with   • Follow Up Wound Care Visit       Allergies: Albumen, egg - food allergy and Amoxicillin-pot clavulanate    Diagnosis:   Diagnosis ICD-10-CM Associated Orders   1  Pressure injury of sacral region, stage 4 ([facility])  L89 154 Wound cleansing and dressings      2  Pressure ulcer of right buttock, stage 4 ([facility])  L89 314 Wound cleansing and dressings      3  Pressure ulcer of right lower back, stage 3 ([facility])  L89 133 Wound cleansing and dressings      4  Pressure ulcer of left foot, stage 4 ([facility])  L89 894 Wound cleansing and dressings      5  Pressure injury of right upper back, stage 4 ([facility]CC)  L89 114 Wound cleansing and dressings      6  Open wound of right hand without foreign body, unspecified wound type, initial encounter  S61 401A Wound cleansing and dressings      7  Pressure ulcer of left heel, unstageable ([facility])  L89 620 Wound cleansing and dressings           Assessment  & Plan:    • F/u multiple pressure injuries  There has been significant deterioration in wounds from previous visit, particularly the R upper back pressure injury, the sacral wound  There is increase in wound depth and amount of undermining present as well as increased necrotic tissue and necrotic burden of wounds  L foot PI no longer with bone exposed, however there is erythema and edema extending from wound  Pt has temperature of 98 7, however feels very warm to touch  Urine output noted to be diminished and is dark yellow and cloudy in coloration on examination  o Given decline in wounds and warm temperature there are concerns of sepsis related to wounds as well as possible UTI and PAULETTE, pt has been referred to the emergency department for further evaluation  In addition, pt remains protein malnourished despite tube feedings and protein supplementation is not in well enough health to heal the wounds present   There are also concerns of osteomyelitis underlying wounds given depth and chronicity  o Healing is complicated by iron deficiency anemia, protein malnutrition, frequent infection, contractures and inability to offload even with frequent repositioning every hour and inability to communicate given intellectual disability  Pt is on palliative wound care tract, unfortunately wounds are not expected to heal given pt's overall health  Would benefit from palliative care/hospice  Subjective:   10/26/22: The patient is a 24-year-old black male with profound disabilities, bed ridden with contractures  Who has multiple pressure sores  He has a Marrufo catheter and diverting colostomy, he receives nutrition via indwelling gastrostomy tube  There chronic granulating decubitus ulcers of the right ischium, a healed decubitus ulcer of his right ear  He has a new necrotic ulcer overlying his left dorsal wrist and dorsal hand, apparently from infiltration of an IV while hospitalized for sepsis  Decubitus ulcers of the left heel and foot with evidence of ongoing pressure damage, as well as his mid back and sacrum  The patient is being treated with Lavaughn Blakes solution to the mid back ulcer and apparently to the right ischium and sacrum as well  Surgical debridement was performed for his sacral ulcer, left lateral foot and the dorsum of his left wrist   Essentially selective debridement was performed in the which sites  Dakin solution can be discontinued, and Santyl ointment utilized for the left wrist, midback left lateral foot, and silver alginate for the remaining sites  A Clinitron bed would be appropriate given the multiple decubitus ulcers  The patient will continue to receive palliative care at his skilled nursing facility in follow-up in 6 weeks  11/25/22: Pt presents for continued care of multiple pressure injuries on sacrum, R buttock, L foot, R buttock, sacrum, R upper back   Currently Dakin wet-to-dry are being used on his R upper back wound and R buttock wounds  Other pressure injuries now with dried eschars present  Nurses present with pt who note that he is continuing with tube feedings and protein supplementation, however his albumin levels remain low and his wounds seem to be getting significantly worse within past week  There was a culture taken of wound which began to smell and demonstrated growth of Pseudomonas and Staph; his urine output is noted to be diminished recently  The following portions of the patient's history were reviewed and updated as appropriate:   Patient Active Problem List   Diagnosis   • Pressure injury of sacral region, stage 4 ([address] )   • Pressure ulcer of right buttock, stage 4 (HCC)   • Pressure ulcer of right lower back, stage 3 ([address] )   • Pressure ulcer of left foot, stage 4 (HCC)   • Pressure injury of right upper back, stage 4 ([address] )   • Open wound of right hand without foreign body   • Pressure ulcer of left heel, unstageable ([address] )     No past medical history on file  No past surgical history on file  No family history on file    Social History     Socioeconomic History   • Marital status: Single     Spouse name: None   • Number of children: None   • Years of education: None   • Highest education level: None   Occupational History   • None   Tobacco Use   • Smoking status: Never   • Smokeless tobacco: Never   Vaping Use   • Vaping Use: Never used   Substance and Sexual Activity   • Alcohol use: Never   • Drug use: Never   • Sexual activity: Never   Other Topics Concern   • None   Social History Narrative   • None     Social Determinants of Health     Financial Resource Strain: Not on file   Food Insecurity: Not on file   Transportation Needs: Not on file   Physical Activity: Not on file   Stress: Not on file   Social Connections: Not on file   Intimate Partner Violence: Not on file   Housing Stability: Not on file     No current facility-administered medications for this visit  Current Outpatient Medications:   •  bisacodyl (Dulcolax) 10 mg suppository, Insert 10 mg into the rectum as needed in the morning for constipation  Every three days as needed  , Disp: , Rfl:   •  calcium carbonate-vitamin D (OSCAL-D) 500 mg-200 units per tablet, Take 1 tablet by mouth daily with breakfast, Disp: , Rfl:   •  denosumab (Prolia) 60 mg/mL, Inject 60 mg under the skin once Every 6 months, Disp: , Rfl:   •  dorzolamide-timolol (COSOPT) 22 3-6 8 MG/ML ophthalmic solution, Administer 1 drop to both eyes in the morning and 1 drop in the evening , Disp: , Rfl:   •  ergocalciferol (ERGOCALCIFEROL) 1 25 MG (02721 UT) capsule, Take 50,000 Units by mouth once a week, Disp: , Rfl:   •  fluticasone (FLONASE) 50 mcg/act nasal spray, 1 spray into each nostril 2 (two) times a day, Disp: , Rfl:   •  Hydromorphone HCl (Dilaudid) 1 MG/ML LIQD, Take 5 mg by mouth 6 (six) times a day, Disp: , Rfl:   •  latanoprost (XALATAN) 0 005 % ophthalmic solution, 1 drop daily at bedtime, Disp: , Rfl:   •  loratadine (CLARITIN) 10 mg tablet, Take 10 mg by mouth in the morning , Disp: , Rfl:   •  magnesium hydroxide (MILK OF MAGNESIA) 400 mg/5 mL oral suspension, Take by mouth daily as needed for constipation, Disp: , Rfl:   •  Polyethyl Glycol-Propyl Glycol (Systane) 0 4-0 3 % GEL, Apply 0 25 inches to eye Each eye, Disp: , Rfl:   •  sodium chloride (OCEAN) 0 65 % nasal spray, 1 spray into each nostril as needed in the morning for congestion  , Disp: , Rfl:     Facility-Administered Medications Ordered in Other Visits:   •  cefepime (MAXIPIME) IVPB (premix in dextrose) 2,000 mg 50 mL, 2,000 mg, Intravenous, Once, Rosealee Patch III, DO  •  sodium chloride 0 9 % bolus 1,000 mL, 1,000 mL, Intravenous, Once **FOLLOWED BY** sodium chloride 0 9 % bolus 1,000 mL, 1,000 mL, Intravenous, Once, Rosealee Patch III, DO  •  vancomycin (VANCOCIN) IVPB (premix in dextrose) 750 mg 150 mL, 15 mg/kg, Intravenous, Once, Keyanna Henriquez Salina Din III, DO    Review of Systems   Reason unable to perform ROS: pt is non verbal          Objective:  /54   Pulse 84   Temp 98 7 °F (37 1 °C)   Resp 22   Pain Score:  (Unknown)     Physical Exam  Vitals (Temperature 98 7  Feels very warm to touch  ) and nursing note reviewed  Constitutional:       Appearance: He is cachectic  He is ill-appearing  Comments:      HENT:      Head: Normocephalic and atraumatic  Eyes:      General: Lids are normal          Right eye: No discharge  Left eye: No discharge  Conjunctiva/sclera: Conjunctivae normal    Cardiovascular:      Rate and Rhythm: Normal rate  Pulses:           Dorsalis pedis pulses are 2+ on the left side  Comments: 3+ pitting edema of L forefoot  Pulmonary:      Breath sounds: Normal air entry  Abdominal:       Genitourinary:      Musculoskeletal:         General: Deformity (contracutres of RLE and LLE with significant muscle atrophy bilaterally) present  No swelling  Left lower leg: 3+ Pitting Edema present  Skin:     General: Skin is warm  Findings: Wound present  Comments: Stage 4 pressure injury of L dorsal foot  Bone is no longer exposed  There is dried eschar present over wound bed  There is no drainage from wound site  Surrounding erythema and edema are present  See full wound assessment  R upper back pressure injury  The wound has increased in size from previous visit with extensive undermining and heavy amount of necrotic tissue present  There is green drainage and malodor present  Significant undermining present  No probe to bone currently  Green drainage present  There is now a dry black eschar present over R ischial wound  No fluctuance palpated underneath  No drainage from site of wound  Sacral pressure injury with extensive amount of undermining and necrotic tissue present  Has deteriorated since previous visit       There is large area of darkened skin on buttocks/sacral consistent with deep tissue injury  Eschar present over L heel  Neurological:      Mental Status: He is alert  Motor: Weakness present  Gait: Gait abnormal    Psychiatric:         Cognition and Memory: Cognition is impaired (profound intelectual disability)  Comments: Unable to assess as the pt is nonverbal with intellectual disability  Results from last 6 Months   Lab Units 06/13/22  1434   WOUND CULTURE  3+ Growth of Pseudomonas aeruginosa*  3+ Growth of           Wound Instructions:  Orders Placed This Encounter   Procedures   • Wound cleansing and dressings     Standing Status:   Future     Standing Expiration Date:   11/25/2023     Scheduling Instructions:      Please take patient to hospital for admission  Patient and patient's wounds are getting worse  Increase of of number of wounds, necrotic areas  Possible osteomyelitis  Paint black areas with betadine then cover with gauze, secure with tape change daily and as needed for excessive drainage or leakage  This was done today  Pack all other wounds with dakins moist to dry dressing  Continue to turn and reposition like you have every hour   Continue with the offloading boots to both feet like you have been   May continue with the left foot just being elevated on a pillow to prevent pressure               Check air mattress in the facility to ensure that it is set correctly for the patient   If it is weight based make sure the weight is correctly set   If it just has a range from soft to firm set it at medium                PLEASE ORDER A CLINITRON BED FOR PATIENT (AIR FLUIDIZED THERAPY BED)        Gisele De La Cruz PA-C      Portions of the record may have been created with voice recognition software  Occasional wrong word or "sound alike" substitutions may have occurred due to the inherent limitations of voice recognition software   Read the chart carefully and recognize, using context, where substitutions have occurred

## 2022-11-25 NOTE — PATIENT INSTRUCTIONS
Orders Placed This Encounter   Procedures    Wound cleansing and dressings     Standing Status:   Future     Standing Expiration Date:   11/25/2023     Scheduling Instructions:      Please take patient to hospital for admission  Patient and patient's wounds are getting worse  Increase of of number of wounds, necrotic areas  Possible osteomyelitis  Paint black areas with betadine then cover with gauze, secure with tape change daily and as needed for excessive drainage or leakage  This was done today  Pack all other wounds with dakins moist to dry dressing  Continue to turn and reposition like you have every hour  Continue with the offloading boots to both feet like you have been  May continue with the left foot just being elevated on a pillow to prevent pressure               Check air mattress in the facility to ensure that it is set correctly for the patient  If it is weight based make sure the weight is correctly set  If it just has a range from soft to firm set it at medium                 PLEASE ORDER A CLINITRON BED FOR PATIENT (AIR FLUIDIZED THERAPY BED)

## 2022-11-25 NOTE — ED NOTES
Patient transferred into hospital bed  Patient repositioned and cleaned          Ehsan Milner RN  11/25/22 [id number]

## 2022-11-26 PROBLEM — L89.43 PRESSURE INJURY OF CONTIGUOUS REGION INVOLVING BACK AND RIGHT BUTTOCK, STAGE 3 (HCC): Status: ACTIVE | Noted: 2022-10-26

## 2022-11-26 PROBLEM — E87.6 HYPOKALEMIA: Status: ACTIVE | Noted: 2022-11-26

## 2022-11-26 PROBLEM — L89.313 PRESSURE ULCER OF ISCHIUM, RIGHT, STAGE III (HCC): Status: ACTIVE | Noted: 2022-11-26

## 2022-11-26 PROBLEM — L89.623 PRESSURE INJURY OF LEFT HEEL, STAGE 3 (HCC): Status: ACTIVE | Noted: 2022-10-26

## 2022-11-26 LAB
ALBUMIN SERPL BCP-MCNC: 2.6 G/DL (ref 3.5–5)
ALP SERPL-CCNC: 119 U/L (ref 34–104)
ALT SERPL W P-5'-P-CCNC: 19 U/L (ref 7–52)
ANION GAP SERPL CALCULATED.3IONS-SCNC: 9 MMOL/L (ref 4–13)
AST SERPL W P-5'-P-CCNC: 18 U/L (ref 13–39)
BACTERIA UR CULT: NORMAL
BILIRUB SERPL-MCNC: 0.25 MG/DL (ref 0.2–1)
BUN SERPL-MCNC: 8 MG/DL (ref 5–25)
CALCIUM ALBUM COR SERPL-MCNC: 10.1 MG/DL (ref 8.3–10.1)
CALCIUM SERPL-MCNC: 9 MG/DL (ref 8.4–10.2)
CHLORIDE SERPL-SCNC: 109 MMOL/L (ref 96–108)
CO2 SERPL-SCNC: 27 MMOL/L (ref 21–32)
CREAT SERPL-MCNC: 0.29 MG/DL (ref 0.6–1.3)
ERYTHROCYTE [DISTWIDTH] IN BLOOD BY AUTOMATED COUNT: 20.8 % (ref 11.6–15.1)
GFR SERPL CREATININE-BSD FRML MDRD: 143 ML/MIN/1.73SQ M
GLUCOSE SERPL-MCNC: 93 MG/DL (ref 65–140)
HCT VFR BLD AUTO: 29 % (ref 36.5–49.3)
HGB BLD-MCNC: 8.2 G/DL (ref 12–17)
MAGNESIUM SERPL-MCNC: 2 MG/DL (ref 1.9–2.7)
MCH RBC QN AUTO: 22.5 PG (ref 26.8–34.3)
MCHC RBC AUTO-ENTMCNC: 28.3 G/DL (ref 31.4–37.4)
MCV RBC AUTO: 80 FL (ref 82–98)
PLATELET # BLD AUTO: 498 THOUSANDS/UL (ref 149–390)
PMV BLD AUTO: 9.4 FL (ref 8.9–12.7)
POTASSIUM SERPL-SCNC: 3.2 MMOL/L (ref 3.5–5.3)
PROT SERPL-MCNC: 6.8 G/DL (ref 6.4–8.4)
RBC # BLD AUTO: 3.65 MILLION/UL (ref 3.88–5.62)
SODIUM SERPL-SCNC: 145 MMOL/L (ref 135–147)
WBC # BLD AUTO: 16.67 THOUSAND/UL (ref 4.31–10.16)

## 2022-11-26 PROCEDURE — 0HB6XZZ EXCISION OF BACK SKIN, EXTERNAL APPROACH: ICD-10-PCS | Performed by: SURGERY

## 2022-11-26 PROCEDURE — 0HBMXZZ EXCISION OF RIGHT FOOT SKIN, EXTERNAL APPROACH: ICD-10-PCS | Performed by: SURGERY

## 2022-11-26 RX ORDER — POTASSIUM CHLORIDE 20 MEQ/1
40 TABLET, EXTENDED RELEASE ORAL
Status: DISCONTINUED | OUTPATIENT
Start: 2022-11-26 | End: 2022-11-26

## 2022-11-26 RX ORDER — SODIUM HYPOCHLORITE 2.5 MG/ML
1 SOLUTION TOPICAL 2 TIMES DAILY
Status: DISCONTINUED | OUTPATIENT
Start: 2022-11-26 | End: 2022-12-01

## 2022-11-26 RX ORDER — POTASSIUM CHLORIDE 20MEQ/15ML
40 LIQUID (ML) ORAL
Status: COMPLETED | OUTPATIENT
Start: 2022-11-26 | End: 2022-11-26

## 2022-11-26 RX ADMIN — CEFEPIME HYDROCHLORIDE 2000 MG: 2 INJECTION, SOLUTION INTRAVENOUS at 04:18

## 2022-11-26 RX ADMIN — HYOSCYAMINE SULFATE 1 APPLICATION: 16 SOLUTION at 09:54

## 2022-11-26 RX ADMIN — POTASSIUM CHLORIDE 40 MEQ: 20 SOLUTION ORAL at 15:17

## 2022-11-26 RX ADMIN — ENOXAPARIN SODIUM 40 MG: 100 INJECTION SUBCUTANEOUS at 09:55

## 2022-11-26 RX ADMIN — HYOSCYAMINE SULFATE 1 APPLICATION: 16 SOLUTION at 18:34

## 2022-11-26 RX ADMIN — CEFEPIME HYDROCHLORIDE 2000 MG: 2 INJECTION, SOLUTION INTRAVENOUS at 15:17

## 2022-11-26 RX ADMIN — SODIUM CHLORIDE 75 ML/HR: 0.9 INJECTION, SOLUTION INTRAVENOUS at 11:57

## 2022-11-26 RX ADMIN — POTASSIUM CHLORIDE 40 MEQ: 20 SOLUTION ORAL at 09:54

## 2022-11-26 NOTE — NURSING NOTE
Pt admitted to room 211  Severe MR, nonverbal, bedridden and contracted  Guardians at bedside  Heart is regular, no edema noted  Lungs are clear and decreased on room air, no cough noted  Colostomy and freire present  Tube feed ordered to start at 0600  Several wounds, pics taken in ER, wound care and surgery consulted  Bilateral mitts in place for pt safety, per guardians 'pt chews on fingers, previously chewed half of pointer finger off'  Pt is from [facility], contact number is for Monse Toscano at [phone]  Guardians will be staying with pt, possible relief coming at 2300  Oriented to call bell  No further needs

## 2022-11-26 NOTE — PLAN OF CARE
Problem: Potential for Falls  Goal: Patient will remain free of falls  Description: INTERVENTIONS:  - Educate patient/family on patient safety including physical limitations  - Instruct patient to call for assistance with activity   - Consult OT/PT to assist with strengthening/mobility   - Keep Call bell within reach  - Keep bed low and locked with side rails adjusted as appropriate  - Keep care items and personal belongings within reach  - Initiate and maintain comfort rounds  - Make Fall Risk Sign visible to staff  - Offer Toileting every 2 Hours, in advance of need  - Initiate/Maintain bed alarm  - Obtain necessary fall risk management equipment:   - Apply yellow socks and bracelet for high fall risk patients  - Consider moving patient to room near nurses station  Outcome: Progressing     Problem: Prexisting or High Potential for Compromised Skin Integrity  Goal: Skin integrity is maintained or improved  Description: INTERVENTIONS:  - Identify patients at risk for skin breakdown  - Assess and monitor skin integrity  - Assess and monitor nutrition and hydration status  - Monitor labs   - Assess for incontinence   - Turn and reposition patient  - Assist with mobility/ambulation  - Relieve pressure over bony prominences  - Avoid friction and shearing  - Provide appropriate hygiene as needed including keeping skin clean and dry  - Evaluate need for skin moisturizer/barrier cream  - Collaborate with interdisciplinary team   - Patient/family teaching  - Consider wound care consult   Outcome: Progressing     Problem: MOBILITY - ADULT  Goal: Maintain or return to baseline ADL function  Description: INTERVENTIONS:  -  Assess patient's ability to carry out ADLs; assess patient's baseline for ADL function and identify physical deficits which impact ability to perform ADLs (bathing, care of mouth/teeth, toileting, grooming, dressing, etc )  - Assess/evaluate cause of self-care deficits   - Assess range of motion  - Assess patient's mobility; develop plan if impaired  - Assess patient's need for assistive devices and provide as appropriate  - Encourage maximum independence but intervene and supervise when necessary  - Involve family in performance of ADLs  - Assess for home care needs following discharge   - Consider OT consult to assist with ADL evaluation and planning for discharge  - Provide patient education as appropriate  Outcome: Progressing  Goal: Maintains/Returns to pre admission functional level  Description: INTERVENTIONS:  - Perform BMAT or MOVE assessment daily    - Set and communicate daily mobility goal to care team and patient/family/caregiver  - Collaborate with rehabilitation services on mobility goals if consulted  - Perform Range of Motion 2 times a day  - Reposition patient every 2 hours  - Dangle patient 2 times a day  - Stand patient 2 times a day  - Ambulate patient 2 times a day  - Out of bed to chair 2 times a day   - Out of bed for meals 2 times a day  - Out of bed for toileting  - Record patient progress and toleration of activity level   Outcome: Progressing     Problem: Nutrition/Hydration-ADULT  Goal: Nutrient/Hydration intake appropriate for improving, restoring or maintaining nutritional needs  Description: Monitor and assess patient's nutrition/hydration status for malnutrition  Collaborate with interdisciplinary team and initiate plan and interventions as ordered  Monitor patient's weight and dietary intake as ordered or per policy  Utilize nutrition screening tool and intervene as necessary  Determine patient's food preferences and provide high-protein, high-caloric foods as appropriate       INTERVENTIONS:  - Monitor oral intake, urinary output, labs, and treatment plans  - Assess nutrition and hydration status and recommend course of action  - Evaluate amount of meals eaten  - Assist patient with eating if necessary   - Allow adequate time for meals  - Recommend/ encourage appropriate diets, oral nutritional supplements, and vitamin/mineral supplements  - Order, calculate, and assess calorie counts as needed  - Recommend, monitor, and adjust tube feedings and TPN/PPN based on assessed needs  - Assess need for intravenous fluids  - Provide specific nutrition/hydration education as appropriate  - Include patient/family/caregiver in decisions related to nutrition  Outcome: Progressing     Problem: PAIN - ADULT  Goal: Verbalizes/displays adequate comfort level or baseline comfort level  Description: Interventions:  - Encourage patient to monitor pain and request assistance  - Assess pain using appropriate pain scale  - Administer analgesics based on type and severity of pain and evaluate response  - Implement non-pharmacological measures as appropriate and evaluate response  - Consider cultural and social influences on pain and pain management  - Notify physician/advanced practitioner if interventions unsuccessful or patient reports new pain  Outcome: Progressing     Problem: INFECTION - ADULT  Goal: Absence or prevention of progression during hospitalization  Description: INTERVENTIONS:  - Assess and monitor for signs and symptoms of infection  - Monitor lab/diagnostic results  - Monitor all insertion sites, i e  indwelling lines, tubes, and drains  - Monitor endotracheal if appropriate and nasal secretions for changes in amount and color  - [place] appropriate cooling/warming therapies per order  - Administer medications as ordered  - Instruct and encourage patient and family to use good hand hygiene technique  - Identify and instruct in appropriate isolation precautions for identified infection/condition  Outcome: Progressing  Goal: Absence of fever/infection during neutropenic period  Description: INTERVENTIONS:  - Monitor WBC    Outcome: Progressing     Problem: SAFETY ADULT  Goal: Patient will remain free of falls  Description: INTERVENTIONS:  - Educate patient/family on patient safety including physical limitations  - Instruct patient to call for assistance with activity   - Consult OT/PT to assist with strengthening/mobility   - Keep Call bell within reach  - Keep bed low and locked with side rails adjusted as appropriate  - Keep care items and personal belongings within reach  - Initiate and maintain comfort rounds  - Make Fall Risk Sign visible to staff  - Offer Toileting every 2 Hours, in advance of need  - Initiate/Maintain bed alarm  - Obtain necessary fall risk management equipment:   - Apply yellow socks and bracelet for high fall risk patients  - Consider moving patient to room near nurses station  Outcome: Progressing  Goal: Maintain or return to baseline ADL function  Description: INTERVENTIONS:  -  Assess patient's ability to carry out ADLs; assess patient's baseline for ADL function and identify physical deficits which impact ability to perform ADLs (bathing, care of mouth/teeth, toileting, grooming, dressing, etc )  - Assess/evaluate cause of self-care deficits   - Assess range of motion  - Assess patient's mobility; develop plan if impaired  - Assess patient's need for assistive devices and provide as appropriate  - Encourage maximum independence but intervene and supervise when necessary  - Involve family in performance of ADLs  - Assess for home care needs following discharge   - Consider OT consult to assist with ADL evaluation and planning for discharge  - Provide patient education as appropriate  Outcome: Progressing  Goal: Maintains/Returns to pre admission functional level  Description: INTERVENTIONS:  - Perform BMAT or MOVE assessment daily    - Set and communicate daily mobility goal to care team and patient/family/caregiver  - Collaborate with rehabilitation services on mobility goals if consulted  - Perform Range of Motion 2 times a day  - Reposition patient every 2 hours    - Dangle patient 2 times a day  - Stand patient 2 times a day  - Ambulate patient 2 times a day  - Out of bed to chair 2 times a day   - Out of bed for meals 2 times a day  - Out of bed for toileting  - Record patient progress and toleration of activity level   Outcome: Progressing     Problem: DISCHARGE PLANNING  Goal: Discharge to home or other facility with appropriate resources  Description: INTERVENTIONS:  - Identify barriers to discharge w/patient and caregiver  - Arrange for needed discharge resources and transportation as appropriate  - Identify discharge learning needs (meds, wound care, etc )  - Arrange for interpretive services to assist at discharge as needed  - Refer to Case Management Department for coordinating discharge planning if the patient needs post-hospital services based on physician/advanced practitioner order or complex needs related to functional status, cognitive ability, or social support system  Outcome: Progressing     Problem: Knowledge Deficit  Goal: Patient/family/caregiver demonstrates understanding of disease process, treatment plan, medications, and discharge instructions  Description: Complete learning assessment and assess knowledge base    Interventions:  - Provide teaching at level of understanding  - Provide teaching via preferred learning methods  Outcome: Progressing

## 2022-11-26 NOTE — PROCEDURES
Procedure Note - General Surgery   Artist Chencho 61 y o  male MRN: [id number]  Unit/Bed#: -01 Encounter: [id number]    Preoperative diagnosis: stage IV ulcer posterior chest (infected), stage IV ulcer sacrum (infected), unstageable ulcer right SI joint, unstageable ulcer right ischium, unstageable ulcer right heel  Post operative diagnosis: stage IV ulcer posterior chest (infected), stage IV ulcer sacrum (infected), stage III ulcer right SI joint (infected), stage III ulcer right ischium (infected), stage III ulcer right heel  Procedure:  Sharp excisional debridement of necrotic skin using iris scissors x 3 sites  Surgeon:  David Perry PA-C    Assistant:  Vin JACKMAN    Anesthesia:  none    Estimated blood loss:  minimal    Complications:  none    Specimens:  Wound culture from upper back and sacrum  Indications for procedure: disabled nonverbal 61 M admitted with sepsis to multiple infected decubitus ulcers    Operative findings:  unstageable ulcers -> stage III    Operative technique: consent obtained by Dr Robert Cooney from his guardian listed in [address] [place] [address]  Each area prepped with betadine  Sharp excisional debridement of necrotic skin completed from upper back, right SI joint, right ischium ulcers  Tolerated well without complication  Minimal bleeding controlled with pressure  Wounds then cultured, irrigated, packed, bandaged      Signature:  Nahun Perry PA-C  Date: 11/26/2022 Time: 3:34 PM

## 2022-11-26 NOTE — ASSESSMENT & PLAN NOTE
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

## 2022-11-26 NOTE — ASSESSMENT & PLAN NOTE
Assessment:  Right ischial decubitus ulcer unstageable -> stage II post-debridement, mild infection  Plan:  Debrided bedside  Dakin moistened kerlix to wound BID  Bulky dry gauze for draiange  Silicone cream to periwound  Offload pressure, frequent turning  Probably transition to maxorb Ag in day or 2

## 2022-11-26 NOTE — ASSESSMENT & PLAN NOTE
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

## 2022-11-26 NOTE — ASSESSMENT & PLAN NOTE
· POA as evidenced by tachycardia, leukocytosis, and fever  · In the setting of multiple wounds and possible urinary tract infection  · Out-patient WClx obtained which grew Pseudomonas and Stap  · BClx (11/25): NGTD  · Repeat wound cultures pending  · Continue Cefepime  · General surgery and wound care nurse consulted

## 2022-11-26 NOTE — CASE MANAGEMENT
Case Management Assessment & Discharge Planning Note    Patient name Sandy Covarrubias  Location [address] [id number] MRN [id number]  : 1955 Date 2022       Current Admission Date: 2022  Current Admission Diagnosis:Sepsis [facility])   Patient Active Problem List    Diagnosis Date Noted   • Sepsis ([address] ) 2022   • Developmental disability 2022   • Pressure injury of sacral region, stage 4 ([address] ) 10/26/2022   • Pressure ulcer of right buttock, stage 4 ([address] ) 10/26/2022   • Pressure ulcer of right lower back, stage 3 ([address] ) 10/26/2022   • Pressure ulcer of left foot, stage 4 ([address] ) 10/26/2022   • Pressure injury of right upper back, stage 4 ([address] ) 10/26/2022   • Open wound of right hand without foreign body 10/26/2022   • Pressure ulcer of left heel, unstageable ([address] ) 10/26/2022      LOS (days): 1  Geometric Mean LOS (GMLOS) (days):   Days to GMLOS:     OBJECTIVE:    Risk of Unplanned Readmission Score: 10 79         Current admission status: Inpatient  Referral Reason: Other (Discharge planning)    Preferred Pharmacy: No Pharmacies Listed  Primary Care Provider: Atnwan Pretty MD    Primary Insurance: [place] [employer]  Secondary Insurance:     ASSESSMENT:  [place] Proxies    There are no active Health Care Proxies on file  Advance Directives  Does patient have a Health Care POA?: Yes  Does patient have Advance Directives?: Yes  Advance Directives: Power of  for health care  Primary Contact: Lalo Lopez - guardian         Readmission Root Cause  30 Day Readmission: No    Patient Information  Admitted from[de-identified] Facility  Mental Status: Alert  During Assessment patient was accompanied by: Other-Comment [facility] staff)  Assessment information provided by[de-identified] Other - please comment (SNF staff)  Primary Caregiver: Other (Comment)  [facility] staff  Support Systems: Private Caregivers  Home entry access options   Select all that apply : No steps to enter home  Type of Current Residence: Facility [facility]  Upon entering residence, is there a bedroom on the main floor (no further steps)?: Yes  Upon entering residence, is there a bathroom on the main floor (no further steps)?: Yes  In the last 12 months, was there a time when you were not able to pay the mortgage or rent on time?: No  In the last 12 months, how many places have you lived?: 1  In the last 12 months, was there a time when you did not have a steady place to sleep or slept in a shelter (including now)?: No  Homeless/housing insecurity resource given?: N/A  Living Arrangements: Other (Comment) (Long term care resident at [facility])  Is patient a ?: No    Activities of Daily Living Prior to Admission  Functional Status: Total dependent  Completes ADLs independently?: No  Level of ADL dependence: Total Dependent  Ambulates independently?: No  Level of ambulatory dependence:  Total Dependent (bed bound at baseline)  Does patient use assisted devices?: Yes  Does patient currently own DME?: Yes  Does patient have a history of Outpatient Therapy (PT/OT)?: No  Does the patient have a history of Short-Term Rehab?: Yes [facility])  Does patient have a history of HHC?: No  Does patient currently have [address]?: No         Patient Information Continued  Does patient have prescription coverage?: Yes  Within the past 12 months, you worried that your food would run out before you got the money to buy more : Never true  Within the past 12 months, the food you bought just didn't last and you didn't have money to get more : Never true  Food insecurity resource given?: N/A  Does patient receive dialysis treatments?: No  Does patient have a history of substance abuse?: No  Does patient have a history of Mental Health Diagnosis?: No         Means of Transportation  Means of Transport to Appts[de-identified] Other (Comment) (requires medical transport)  In the past 12 months, has lack of transportation kept you from medical appointments or from getting medications?: No  In the past 12 months, has lack of transportation kept you from meetings, work, or from getting things needed for daily living?: No  Was application for public transport provided?: N/A        DISCHARGE DETAILS:    Discharge planning discussed with[de-identified] [facility] Staff  Freedom of Choice: Yes  Comments - Freedom of Choice: Pt is a long term care resident at [facility]  He is bed bound at baseline and requires total care with ADLs  Pt guardian would like him to return when stable  [facility] able to accept pt back when cleared for discharge  CM to follow    CM contacted family/caregiver?: Yes  Were Treatment Team discharge recommendations reviewed with patient/caregiver?: Yes  Did patient/caregiver verbalize understanding of patient care needs?: Yes  Were patient/caregiver advised of the risks associated with not following Treatment Team discharge recommendations?: Yes    Contacts  Patient Contacts: Coty Escalonaper - guardian  Relationship to Patient[de-identified] Family  Contact Method: Phone  Phone Number: [phone]  Reason/Outcome: Discharge [address]         Is the patient interested in [place] [address] at discharge?: No    DME Referral Provided  Referral made for DME?: No         Would you like to participate in our [address] service program?  : No - Declined    Treatment Team Recommendation: SNF  Discharge Destination Plan[de-identified] SNF  Transport at Discharge : [facility]

## 2022-11-26 NOTE — ASSESSMENT & PLAN NOTE
· Patient with profound development disability, nonverbal, bed bound  · Patient on tube feeds only- NPO  · I placed tube feed order per nurse at [facility]  · Consult nutrition for tube feed recommendations   · Patient has guardian Vandana Ruano- patient is full code per Vandana Ruano  · Patti Dueñas is Vandana Ruano' sister who also shares guardianship

## 2022-11-26 NOTE — CONSULTS
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, grossly infected, with necrotic skin  Plan:  Cultured  Sharp debridement of residual necrotic skin at edge  Irrigated then packed with 0 25% Dakin moistened Kerlix  Bulky 4x4/ABD/tape for drainage  Silicone cream to periwound  Offload pressure points, frequent turning  Please change twice daily  Pressure injury of sacral region, stage 4 [facility])  Assessment & Plan          Assessment:  Stage IV sacral decubitus ulcer grossly infected with purulent discharge, no necrotic tissue, undermining from 12-6 o'clock  Plan:  Cultured, irrigated, packed with Dakin moistened Kerlix  Large heavy drainage ABD pad to cover/keep dry  Silicone cream to shanon-wound  Offload pressure points, frequent turning  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, stage 3 (HCC)  Assessment & Plan          Assessment:  Sacral/right buttock decubitus ulcer unstageable -> stage III post debridement, mild infection  Plan:  Debrided at bedside  Dakin solution moistened kerlix to wound  Dry bulky gauze for drainage  Silicone cream to periwound  Offload pressure points, frequent turning  Probably transition to Maxorb Ag in day or 2  Pressure ulcer of ischium, right, stage III [facility])  Assessment & Plan      Assessment:  Right ischial decubitus ulcer unstageable -> stage II post-debridement, mild infection  Plan:  Debrided bedside    Dakin moistened kerlix to wound BID  Bulky dry gauze for draiange  Silicone cream to periwound  Offload pressure, frequent turning  Probably transition to maxorb Ag in day or 2  Pressure injury of left heel, stage 3 (HCC)  Assessment & Plan      Assessment:  Pressure ulcer to left heel, unstageable -> stage III, no active infection  Plan:  Debrided partially  Cleansed  Betadine gel on 2x2 gauze to wound  ABD/cling wrap for drainage  Change daily  Offload heals  History of Present Illness   HPI:  Winston Saleem is a 61 y o  male who is nonverbal and cognitively impaired  Unable to obtain history  Is presenting from long term care facility  Review of Systems   Unable to perform ROS: Patient nonverbal       Historical Information   History reviewed  No pertinent past medical history  History reviewed  No pertinent surgical history  Social History   Social History     Substance and Sexual Activity   Alcohol Use Never     Social History     Substance and Sexual Activity   Drug Use Never     Social History     Tobacco Use   Smoking Status Never   Smokeless Tobacco Never     E-Cigarette/Vaping   • E-Cigarette Use Never User      E-Cigarette/Vaping Substances     Family History: History reviewed  No pertinent family history  Meds/Allergies   PTA meds:   Prior to Admission Medications   Prescriptions Last Dose Informant Patient Reported? Taking? Hydromorphone HCl (Dilaudid) 1 MG/ML LIQD   Yes No   Sig: Take 5 mg by mouth 6 (six) times a day   Polyethyl Glycol-Propyl Glycol (Systane) 0 4-0 3 % GEL   Yes No   Sig: Apply 0 25 inches to eye Each eye   bisacodyl (Dulcolax) 10 mg suppository   Yes No   Sig: Insert 10 mg into the rectum as needed in the morning for constipation  Every three days as needed     calcium carbonate-vitamin D (OSCAL-D) 500 mg-200 units per tablet   Yes No   Sig: Take 1 tablet by mouth daily with breakfast   denosumab (Prolia) 60 mg/mL   Yes No   Sig: Inject 60 mg under the skin once Every 6 months   dorzolamide-timolol (COSOPT) 22 3-6 8 MG/ML ophthalmic solution   Yes No   Sig: Administer 1 drop to both eyes in the morning and 1 drop in the evening    ergocalciferol (ERGOCALCIFEROL) 1 25 MG (94228 UT) capsule   Yes No   Sig: Take 50,000 Units by mouth once a week   fluticasone (FLONASE) 50 mcg/act nasal spray   Yes No   Si spray into each nostril 2 (two) times a day   latanoprost (XALATAN) 0 005 % ophthalmic solution   Yes No   Si drop daily at bedtime   loratadine (CLARITIN) 10 mg tablet   Yes No   Sig: Take 10 mg by mouth in the morning    magnesium hydroxide (MILK OF MAGNESIA) 400 mg/5 mL oral suspension   Yes No   Sig: Take by mouth daily as needed for constipation   sodium chloride (OCEAN) 0 65 % nasal spray   Yes No   Si spray into each nostril as needed in the morning for congestion        Facility-Administered Medications: None     Allergies   Allergen Reactions   • Albumen, Egg - Food Allergy Other (See Comments)   • Amoxicillin-Pot Clavulanate Other (See Comments)       Objective   First Vitals:   Blood Pressure: 136/70 (22 143)  Pulse: (!) 132 (22)  Temperature: (!) 100 6 °F (38 1 °C) (22)  Temp Source: Tympanic (22)  Respirations: 20 (22)  Height: 5' 5" (165 1 cm) (22)  Weight - Scale: 54 4 kg (120 lb) (22)  SpO2: 92 % (22)    Current Vitals:   Blood Pressure: 121/75 (22 1510)  Pulse: (!) 127 (22 151)  Temperature: 98 9 °F (37 2 °C) (22)  Temp Source: Axillary (22)  Respirations: 19 (22)  Height: 5' 5" (165 1 cm) (22)  Weight - Scale: 55 kg (121 lb 4 1 oz) (22)  SpO2: 97 % (22 1510)      Intake/Output Summary (Last 24 hours) at 2022 1524  Last data filed at 2022 1511  Gross per 24 hour   Intake 2250 ml   Output 1275 ml   Net 975 ml       Invasive Devices     Peripheral Intravenous Line  Duration Peripheral IV 11/25/22 Right Arm <1 day          Drain  Duration           Urethral Catheter -- days                Physical Exam  Constitutional:       Appearance: He is ill-appearing  Comments: Awake, not alert, not able to assess orientation to self or situation  Poor eye contact  Responds to physical stimuli including pain/palpation of wounds  HENT:      Mouth/Throat:      Mouth: Mucous membranes are moist    Eyes:      Conjunctiva/sclera: Conjunctivae normal    Musculoskeletal:      Comments: Contracted  Skin:     Capillary Refill: Capillary refill takes less than 2 seconds  Comments: Multiple decubitus ulcers including of posterior chest, sacrum, right buttock, and heel  Grossly infected  Lab Results:   I have personally reviewed pertinent lab results  , CBC:   Lab Results   Component Value Date    WBC 16 67 (H) 11/26/2022    HGB 8 2 (L) 11/26/2022    HCT 29 0 (L) 11/26/2022    MCV 80 (L) 11/26/2022     (H) 11/26/2022    MCH 22 5 (L) 11/26/2022    MCHC 28 3 (L) 11/26/2022    RDW 20 8 (H) 11/26/2022    MPV 9 4 11/26/2022   , CMP:   Lab Results   Component Value Date    SODIUM 145 11/26/2022    K 3 2 (L) 11/26/2022     (H) 11/26/2022    CO2 27 11/26/2022    BUN 8 11/26/2022    CREATININE 0 29 (L) 11/26/2022    CALCIUM 9 0 11/26/2022    AST 18 11/26/2022    ALT 19 11/26/2022    ALKPHOS 119 (H) 11/26/2022    EGFR 143 11/26/2022     Imaging: I have personally reviewed pertinent reports  EKG, Pathology, and Other Studies: I have personally reviewed pertinent reports  Code Status: Level 1 - Full Code  Advance Directive and Living Will:      Power of :    POLST:      Counseling / Coordination of Care  Total floor / unit time spent today 15 minutes  Greater than 50% of total time was spent with the patient and / or family counseling and / or coordination of care  A description of the counseling / coordination of care: treatment plannign

## 2022-11-26 NOTE — ASSESSMENT & PLAN NOTE
Assessment:  Pressure ulcer to left heel, unstageable -> stage III, no active infection  Plan:  Debrided partially  Cleansed  Betadine gel on 2x2 gauze to wound  ABD/cling wrap for drainage  Change daily  Offload heals

## 2022-11-26 NOTE — H&P
[address] [facility] 1955, 61 y o  male MRN: [id number]  Unit/Bed#: ED 19 Encounter: [id number]  Primary Care Provider: Veronika Lemus MD   Date and time admitted to hospital: 11/25/2022  2:30 PM    * Sepsis [facility])  Assessment & Plan  · Present on admission with tachycardia, leukocytosis, and fever in the setting of multiple wounds and possible urinary tract infection  · Per [address] notes patient had wound culture obtained from 1 of his wounds which grew Pseudomonas and Staph  · Patient received IV cefepime in the emergency department, will continue for now  · Patient received sepsis fluid bolus in the ED  · Blood cultures x 2 pending  · Urine culture pending  · Consult general surgery for possible debridement of wounds  · Consult wound care nurse  · IV fluids  · Labs in am    Pressure ulcer of right buttock, stage 4 ([address] )  Assessment & Plan  · Please see above plan for sepsis    Pressure ulcer of left heel, unstageable ([address] )  Assessment & Plan  · Please see above plan for sepsis    Developmental disability  Assessment & Plan  · Patient with profound development disability, nonverbal, bed bound  · Patient on tube feeds only- NPO  · I placed tube feed order per nurse at [facility]  · Consult nutrition for tube feed recommendations   · Patient has guardian Jewels Fuentes- patient is full code per Jewels Fuentes  · Cristhian Calixto is Jewels Fuentes' sister who also shares guardianship       VTE Pharmacologic Prophylaxis: VTE Score: 6 High Risk (Score >/= 5) - Pharmacological DVT Prophylaxis Ordered: enoxaparin (Lovenox)  Sequential Compression Devices Ordered  Code Status: Level 1 - Full Code per Jewels Fuentes  Discussion with family: Updated  Alvin Angel- guardian ) via phone      Anticipated Length of Stay: Patient will be admitted on an inpatient basis with an anticipated length of stay of greater than 2 midnights secondary to treatment of sepsis, multiple wounds with IV antibiotics, general surgery consult  Total Time for Visit, including Counseling / Coordination of Care: 60 minutes Greater than 50% of this total time spent on direct patient counseling and coordination of care  Chief Complaint: worsening wounds    History of Present Illness:  Lm Wilkins is a 61 y o  male with a PMH of development disability, multiple wounds who was seen at [address] today and sent to the ED due to worsening of multiple wounds and overall decline in patient's health  Patient is nonverbal at baseline, history obtained from [facility] via telephone ([phone]) who is a nurse at [facility]   She states he's had the wounds for months which has been worsening despite treatment  In the ED the patient is noted to have a fever of 100 6, tachycardic with HR of 132, and leukocytosis of 21,000  Patient has a guardian Court Figueroa who makes all medical decisions  Patient is a full code per Court Figueroa  Review of Systems:  Review of Systems   Unable to perform ROS: Patient nonverbal       Past Medical and Surgical History:   History reviewed  No pertinent past medical history  History reviewed  No pertinent surgical history  Meds/Allergies:  Prior to Admission medications    Medication Sig Start Date End Date Taking? Authorizing Provider   bisacodyl (Dulcolax) 10 mg suppository Insert 10 mg into the rectum as needed in the morning for constipation  Every three days as needed  Historical Provider, MD   calcium carbonate-vitamin D (OSCAL-D) 500 mg-200 units per tablet Take 1 tablet by mouth daily with breakfast    Historical Provider, MD   denosumab (Prolia) 60 mg/mL Inject 60 mg under the skin once Every 6 months    Historical Provider, MD   dorzolamide-timolol (COSOPT) 22 3-6 8 MG/ML ophthalmic solution Administer 1 drop to both eyes in the morning and 1 drop in the evening      Historical Provider, MD   ergocalciferol (ERGOCALCIFEROL) 1 25 MG (49005 UT) capsule Take 50,000 Units by mouth once a week    Historical Provider, MD   fluticasone (FLONASE) 50 mcg/act nasal spray 1 spray into each nostril 2 (two) times a day    Historical Provider, MD   Hydromorphone HCl (Dilaudid) 1 MG/ML LIQD Take 5 mg by mouth 6 (six) times a day    Historical Provider, MD   latanoprost (XALATAN) 0 005 % ophthalmic solution 1 drop daily at bedtime    Historical Provider, MD   loratadine (CLARITIN) 10 mg tablet Take 10 mg by mouth in the morning  Historical Provider, MD   magnesium hydroxide (MILK OF MAGNESIA) 400 mg/5 mL oral suspension Take by mouth daily as needed for constipation    Historical Provider, MD   Polyethyl Glycol-Propyl Glycol (Systane) 0 4-0 3 % GEL Apply 0 25 inches to eye Each eye    Historical Provider, MD   sodium chloride (OCEAN) 0 65 % nasal spray 1 spray into each nostril as needed in the morning for congestion  Historical Provider, MD     I have reviewed home medications using recent Epic encounter  Allergies: Allergies   Allergen Reactions   • Albumen, Egg - Food Allergy Other (See Comments)   • Amoxicillin-Pot Clavulanate Other (See Comments)       Social History:  Marital Status: Single   Occupation: disabled   Patient Pre-hospital Living Situation: [facility]  Patient Pre-hospital Level of Mobility: non-ambulatory/bed bound  Patient Pre-hospital Diet Restrictions: tube feeds only  Substance Use History:   Social History     Substance and Sexual Activity   Alcohol Use Never     Social History     Tobacco Use   Smoking Status Never   Smokeless Tobacco Never     Social History     Substance and Sexual Activity   Drug Use Never       Family History:  History reviewed  No pertinent family history      Physical Exam:     Vitals:   Blood Pressure: 157/73 (11/25/22 1830)  Pulse: (!) 129 (11/25/22 1830)  Temperature: 100 2 °F (37 9 °C) (11/25/22 1630)  Temp Source: Tympanic (11/25/22 1630)  Respirations: 20 (11/25/22 1830)  Height: 5' 5" (165 1 cm) (11/25/22 1438)  Weight - Scale: 54 4 kg (120 lb) (11/25/22 1438)  SpO2: 97 % (11/25/22 1830)    Physical Exam  Vitals and nursing note reviewed  Constitutional:       Appearance: He is cachectic  He is ill-appearing  HENT:      Head: Normocephalic  Ears:      Comments: Healing decubitus ulcer right hear     Mouth/Throat:      Mouth: Mucous membranes are dry  Cardiovascular:      Rate and Rhythm: Regular rhythm  Tachycardia present  Pulmonary:      Effort: Pulmonary effort is normal       Breath sounds: Normal breath sounds  Abdominal:      Palpations: Abdomen is soft  Tenderness: There is no abdominal tenderness  Comments: Feeding tube in place  Colostomy bag   Skin:     General: Skin is warm and dry  Comments: Patient with multiple decubitus ulcers on sacrum, back, heal     Neurological:      Mental Status: Mental status is at baseline  Comments: Patient nonverbal    Contractures of upper and lower extremities           Additional Data:     Lab Results:  Results from last 7 days   Lab Units 11/25/22  1510   WBC Thousand/uL 21 92*   HEMOGLOBIN g/dL 9 1*   HEMATOCRIT % 32 8*   PLATELETS Thousands/uL 704*   NEUTROS PCT % 80*   LYMPHS PCT % 9*   MONOS PCT % 9   EOS PCT % 1     Results from last 7 days   Lab Units 11/25/22  1550   SODIUM mmol/L 143   POTASSIUM mmol/L 3 5   CHLORIDE mmol/L 103   CO2 mmol/L 31   BUN mg/dL 13   CREATININE mg/dL 0 42*   ANION GAP mmol/L 9   CALCIUM mg/dL 9 0   ALBUMIN g/dL 2 9*   TOTAL BILIRUBIN mg/dL 0 26   ALK PHOS U/L 135*   ALT U/L 25   AST U/L 26   GLUCOSE RANDOM mg/dL 113     Results from last 7 days   Lab Units 11/25/22  1550   INR  1 18             Results from last 7 days   Lab Units 11/25/22  1550   LACTIC ACID mmol/L 1 9   PROCALCITONIN ng/ml 0 16       Lines/Drains:  Invasive Devices     Peripheral Intravenous Line  Duration           Peripheral IV 11/25/22 Right Arm <1 day                    Imaging: No pertinent imaging reviewed    XR chest portable   ED Interpretation by German Chi III, DO (11/25 8113)   Questionable right lower lobe pneumonia versus atelectasis          EKG and Other Studies Reviewed on Admission:   · EKG: Sinus Tachycardia    ** Please Note: This note has been constructed using a voice recognition system   **

## 2022-11-26 NOTE — ASSESSMENT & PLAN NOTE
Assessment:  Sacral/right buttock decubitus ulcer unstageable -> stage III post debridement, mild infection  Plan:  Debrided at bedside  Dakin solution moistened kerlix to wound  Dry bulky gauze for drainage  Silicone cream to periwound  Offload pressure points, frequent turning  Probably transition to Maxorb Ag in day or 2

## 2022-11-26 NOTE — ASSESSMENT & PLAN NOTE
Assessment:  Stage IV sacral decubitus ulcer grossly infected with purulent discharge, no necrotic tissue, undermining from 12-6 o'clock  Plan:  Cultured, irrigated, packed with Dakin moistened Kerlix  Large heavy drainage ABD pad to cover/keep dry  Silicone cream to shanon-wound  Offload pressure points, frequent turning

## 2022-11-26 NOTE — NURSING NOTE
Pt is assist of 2 to roll pt was washed and mouth care was done pt is q2 repo pt was repoed RN aware

## 2022-11-26 NOTE — UTILIZATION REVIEW
Initial Clinical Review    Admission: Date/Time/Statement:   Admission Orders (From admission, onward)     Ordered        11/25/22 1736  INPATIENT ADMISSION  Once                      Orders Placed This Encounter   Procedures   • INPATIENT ADMISSION     Standing Status:   Standing     Number of Occurrences:   1     Order Specific Question:   Level of Care     Answer:   Med Surg [16]     Order Specific Question:   Estimated length of stay     Answer:   More than 2 Midnights     Order Specific Question:   Certification     Answer:   I certify that inpatient services are medically necessary for this patient for a duration of greater than two midnights  See H&P and MD Progress Notes for additional information about the patient's course of treatment  ED Arrival Information     Expected   -    Arrival   11/25/2022 14:13    Acuity   Emergent            Means of arrival   Wheelchair    Escorted by   [place]    Service   Hospitalist    Admission type   Emergency            Arrival complaint   Septic            Chief Complaint   Patient presents with   • Evaluation of Abnormal Diagnostic Test     Patient brought in for evaluation of wounds that possibly have developed into osteomyelitis of left foot, sacrum, and right ribs        Initial Presentation: 61 y o  male to the Ed from nursing home with complaints of wounds, fever  Admitted to inpatient for sepsis, stage 4 pressure ulcer right buttock  H/O developmental disability, nonverbal at baseline  ON arrival, he is tachypneic, febrile, tachycardic, appearing ill, feeding tube and colostomy in place  Upper and lower extremity contractures  WBCs 21 92  Blood cultures pending  GIven I V fluid bolus in the ED  IV fluids continue  IV abx started  Urine culture pending             Date: 11/26   Day 2:       ED Triage Vitals   Temperature Pulse Respirations Blood Pressure SpO2   11/25/22 1438 11/25/22 1438 11/25/22 1438 11/25/22 1438 11/25/22 1438   (!) 100 6 °F (38 1 °C) (!) 132 20 136/70 92 %      Temp Source Heart Rate Source Patient Position - Orthostatic VS BP Location FiO2 (%)   11/25/22 1438 11/25/22 1438 11/25/22 1438 11/25/22 1438 --   Tympanic Monitor Lying Left arm       Pain Score       11/25/22 1540       Med Not Given for Pain - for MAR use only          Wt Readings from Last 1 Encounters:   11/25/22 55 kg (121 lb 4 1 oz)     Additional Vital Signs:   Date/Time Temp Pulse Resp BP MAP (mmHg) SpO2 O2 Device Patient Position - Orthostatic VS   11/26/22 07:47:50 98 9 °F (37 2 °C) 119 Abnormal  21 111/67 82 99 % -- --   11/26/22 02:16:22 100 °F (37 8 °C) 113 Abnormal  18 129/70 90 96 % -- --   11/25/22 22:40:33 98 8 °F (37 1 °C) 127 Abnormal  18 113/64 80 96 % None (Room air) Lying   11/25/22 19:57:13 -- 125 Abnormal  18 105/58 74 96 % -- --   11/25/22 1929 -- 123 Abnormal  -- -- -- 98 % -- --   11/25/22 1928 -- 122 Abnormal  -- -- -- 99 % -- --   11/25/22 1927 -- 123 Abnormal  -- -- -- 99 % -- --   11/25/22 1926 -- 122 Abnormal  -- -- -- 100 % -- --   11/25/22 1925 -- 122 Abnormal  -- -- -- 98 % -- --   11/25/22 1924 -- 122 Abnormal  --            Pertinent Labs/Diagnostic Test Results:   11/25 EKG: Sinus tachycardia with short [place]  Otherwise normal ECG  No previous ECGs available  XR chest portable   ED Interpretation by Schyuler Jurado III, DO (11/25 1613)   Questionable right lower lobe pneumonia versus atelectasis      Final Result by Rey Cristobal MD (11/25 2050)      Very limited study with right basilar atelectasis versus less likely infiltrate  Final report is in agreement with preliminary reading by the ED staff              Workstation performed: [id number]           Results from last 7 days   Lab Units 11/25/22  1510   SARS-COV-2  Negative     Results from last 7 days   Lab Units 11/26/22  0432 11/25/22  1510   WBC Thousand/uL 16 67* 21 92*   HEMOGLOBIN g/dL 8 2* 9 1*   HEMATOCRIT % 29 0* 32 8*   PLATELETS Thousands/uL 498* 704*   NEUTROS ABS Thousands/µL  --  17 52*         Results from last 7 days   Lab Units 11/26/22  0432 11/25/22  1550   SODIUM mmol/L 145 143   POTASSIUM mmol/L 3 2* 3 5   CHLORIDE mmol/L 109* 103   CO2 mmol/L 27 31   ANION GAP mmol/L 9 9   BUN mg/dL 8 13   CREATININE mg/dL 0 29* 0 42*   EGFR ml/min/1 73sq m 143 123   CALCIUM mg/dL 9 0 9 0   MAGNESIUM mg/dL 2 0  --      Results from last 7 days   Lab Units 11/26/22  0432 11/25/22  1550   AST U/L 18 26   ALT U/L 19 25   ALK PHOS U/L 119* 135*   TOTAL PROTEIN g/dL 6 8 7 3   ALBUMIN g/dL 2 6* 2 9*   TOTAL BILIRUBIN mg/dL 0 25 0 26         Results from last 7 days   Lab Units 11/26/22  0432 11/25/22  1550   GLUCOSE RANDOM mg/dL 93 113             Results from last 7 days   Lab Units 11/25/22  1550   PROTIME seconds 15 0*   INR  1 18   PTT seconds 36         Results from last 7 days   Lab Units 11/25/22  1550   PROCALCITONIN ng/ml 0 16     Results from last 7 days   Lab Units 11/25/22  1550   LACTIC ACID mmol/L 1 9         Results from last 7 days   Lab Units 11/25/22  1530   CLARITY UA  Cloudy*   COLOR UA  Yellow   SPEC GRAV UA  1 015   PH UA  7 5   GLUCOSE UA mg/dl Negative   KETONES UA mg/dl Negative   BLOOD UA  2+*   PROTEIN UA mg/dl Trace*   NITRITE UA  Positive*   BILIRUBIN UA  Negative   UROBILINOGEN UA E U /dl 0 2   LEUKOCYTES UA  3+*   WBC UA /hpf 30-50*   RBC UA /hpf 4-10*   BACTERIA UA /hpf Moderate*   EPITHELIAL CELLS WET PREP /hpf Occasional     Results from last 7 days   Lab Units 11/25/22  1510   INFLUENZA A PCR  Negative   INFLUENZA B PCR  Negative   RSV PCR  Negative       Results from last 7 days   Lab Units 11/25/22  1550 11/25/22  1536   BLOOD CULTURE  Received in Microbiology Lab  Culture in Progress  Received in Microbiology Lab  Culture in Progress         ED Treatment:   Medication Administration from 11/25/2022 1413 to 11/25/2022 1940       Date/Time Order Dose Route Action     11/25/2022 1530 EST sodium chloride 0 9 % bolus 1,000 mL 1,000 mL Intravenous New Bag 11/25/2022 1729 EST sodium chloride 0 9 % bolus 1,000 mL 1,000 mL Intravenous New Bag     11/25/2022 1554 EST cefepime (MAXIPIME) IVPB (premix in dextrose) 2,000 mg 50 mL 2,000 mg Intravenous New Bag     11/25/2022 1825 EST vancomycin (VANCOCIN) IVPB (premix in dextrose) 750 mg 150 mL 750 mg Intravenous New Bag     11/25/2022 1540 EST acetaminophen (TYLENOL) rectal suppository 325 mg 325 mg Rectal Given          Admitting Diagnosis: Sinus tachycardia [R00 0]  UTI (urinary tract infection) [N39 0]  Anemia [D64 9]  Developmental disability [F89]  Abnormal laboratory test [R89 9]  Fever [R50 9]  Cellulitis of right foot [L03 115]  Decubitus ulcer of sacral region, stage 4 (HCC) [L89 154]  Pressure injury of sacral region, stage 4 (HCC) [L89 154]  Pressure injury of deep tissue of back [L89 106]  Sepsis without acute organ dysfunction, due to unspecified organism ([facility] ) [A41 9]  Age/Sex: 61 y o  male  Admission Orders:  Scheduled Medications:  cefepime, 2,000 mg, Intravenous, Q12H  enoxaparin, 40 mg, Subcutaneous, Daily  potassium chloride, 40 mEq, Oral, Q3H  sodium hypochlorite, 1 application, Irrigation, BID      Continuous IV Infusions:  sodium chloride, 75 mL/hr, Intravenous, Continuous      PRN Meds:  acetaminophen, 650 mg, Per G Tube, Q4H PRN  ondansetron, 4 mg, Intravenous, Q6H PRN        IP CONSULT TO CASE MANAGEMENT  IP CONSULT TO ACUTE CARE SURGERY  IP CONSULT TO NUTRITION SERVICES    Network Utilization Review Department  ATTENTION: Please call with any questions or concerns to [phone] and carefully listen to the prompts so that you are directed to the right person  All voicemails are confidential   Douglas Coffey all requests for admission clinical reviews, approved or denied determinations and any other requests to dedicated fax number below belonging to the campus where the patient is receiving treatment   List of dedicated fax numbers for the Facilities:  FACILITY NAME UR FAX NUMBER   ADMISSION DENIALS (Administrative/Medical Necessity) [phone]   [address] (Maternity/NICU/Pediatrics) Berta Welch [address] Princess Hernandez  [phone]   [address] [place] [id number] [place] [address] [address] [facility] [place] 134 815 [facility] [phone]

## 2022-11-26 NOTE — ASSESSMENT & PLAN NOTE
· Present on admission with tachycardia, leukocytosis, and fever in the setting of multiple wounds and possible urinary tract infection  · Per [address] notes patient had wound culture obtained from 1 of his wounds which grew Pseudomonas and Staph    · Patient received IV cefepime in the emergency department, will continue for now  · Patient received sepsis fluid bolus in the ED  · Blood cultures x 2 pending  · Urine culture pending  · Consult general surgery for possible debridement of wounds  · Consult wound care nurse  · IV fluids  · Labs in am

## 2022-11-26 NOTE — PROGRESS NOTES
[address] [address]  Progress Note Jonah Saleem 1955, 61 y o  male MRN: [id number]  Unit/Bed#: -01 Encounter: [id number]  Primary Care Provider: Chaz Duarte MD   Date and time admitted to hospital: 11/25/2022  2:30 PM    * Sepsis [facility])  Assessment & Plan  · POA as evidenced by tachycardia, leukocytosis, and fever  · In the setting of multiple wounds and possible urinary tract infection  · Out-patient WClx obtained which grew Pseudomonas and Stap  · BClx (11/25): NGTD  · Repeat wound cultures pending  · Continue Cefepime  · General surgery and wound care nurse consulted    Hypokalemia  Assessment & Plan  · K: 3 2  · Replete and recheck AM labs    Pressure ulcer of left heel, unstageable ([address] )  Assessment & Plan  · Please see above plan for sepsis    Pressure ulcer of right buttock, stage 4 ([address] )  Assessment & Plan  · Please see above plan for sepsis    Developmental disability  Assessment & Plan  · Patient with profound development disability, nonverbal, bed bound  · Patient on tube feeds only  · Also has 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  · Patient has guardian Bard Rodriges- patient is full code per Bard Rodriges  · Corbin Colon is Bard Rodriges' sister who also shares guardianship       VTE Pharmacologic Prophylaxis: VTE Score: 6 High Risk (Score >/= 5) - Pharmacological DVT Prophylaxis Ordered: enoxaparin (Lovenox)  Sequential Compression Devices Ordered  Patient Centered Rounds: I performed bedside rounds with nursing staff today  Discussions with Specialists or Other Care Team Provider: Nursing    Education and Discussions with Family / Patient: Attempted to update  (guardian-  Zahira) via phone  Left voicemail  Time Spent for Care: 30 minutes  More than 50% of total time spent on counseling and coordination of care as described above      Current Length of Stay: 1 day(s)  Current Patient Status: Inpatient   Certification Statement: The patient will continue to require additional inpatient hospital stay due to sepsis possibly due to wounds; general surgery evaluation pending and on IV abx  Discharge Plan: TBD based on clinical improvement  Code Status: Level 1 - Full Code    Subjective:   Patient is contracted and non-verbal at baseline  He remains in soft restraints  No significant over night events reported by nursing  Objective:     Vitals:   Temp (24hrs), Av 7 °F (37 6 °C), Min:98 8 °F (37 1 °C), Max:100 6 °F (38 1 °C)    Temp:  [98 8 °F (37 1 °C)-100 6 °F (38 1 °C)] 98 9 °F (37 2 °C)  HR:  [113-144] 119  Resp:  [16-22] 21  BP: (103-171)/(58-91) 111/67  SpO2:  [73 %-100 %] 99 %  Body mass index is 20 18 kg/m²  Input and Output Summary (last 24 hours): Intake/Output Summary (Last 24 hours) at 2022 1328  Last data filed at 2022 2493  Gross per 24 hour   Intake 2250 ml   Output 825 ml   Net 1425 ml       Physical Exam:   Physical Exam  Vitals and nursing note reviewed  Constitutional:       Appearance: He is cachectic  He is ill-appearing  HENT:      Head: Normocephalic and atraumatic  Mouth/Throat:      Mouth: Mucous membranes are dry  Pharynx: Oropharynx is clear  Cardiovascular:      Rate and Rhythm: Regular rhythm  Tachycardia present  Pulses: Normal pulses  Heart sounds: Normal heart sounds  Pulmonary:      Effort: Pulmonary effort is normal  No respiratory distress  Breath sounds: Normal breath sounds  No wheezing  Abdominal:      General: Bowel sounds are normal  There is no distension  Palpations: Abdomen is soft  Tenderness: There is no abdominal tenderness  Comments: Feeding tub in place, colostomy   Genitourinary:     Comments: Marrufo in place  Musculoskeletal:      Comments: Chronically contracted    Skin:     Comments: Multiple wounds on sacrum, back, and heel  Neurological:      Mental Status: He is alert  Mental status is at baseline        Comments: Non-verbal Labs:  Results from last 7 days   Lab Units 11/26/22  0432 11/25/22  1510   WBC Thousand/uL 16 67* 21 92*   HEMOGLOBIN g/dL 8 2* 9 1*   HEMATOCRIT % 29 0* 32 8*   PLATELETS Thousands/uL 498* 704*   NEUTROS PCT %  --  80*   LYMPHS PCT %  --  9*   MONOS PCT %  --  9   EOS PCT %  --  1     Results from last 7 days   Lab Units 11/26/22  0432   SODIUM mmol/L 145   POTASSIUM mmol/L 3 2*   CHLORIDE mmol/L 109*   CO2 mmol/L 27   BUN mg/dL 8   CREATININE mg/dL 0 29*   ANION GAP mmol/L 9   CALCIUM mg/dL 9 0   ALBUMIN g/dL 2 6*   TOTAL BILIRUBIN mg/dL 0 25   ALK PHOS U/L 119*   ALT U/L 19   AST U/L 18   GLUCOSE RANDOM mg/dL 93     Results from last 7 days   Lab Units 11/25/22  1550   INR  1 18             Results from last 7 days   Lab Units 11/25/22  1550   LACTIC ACID mmol/L 1 9   PROCALCITONIN ng/ml 0 16       Lines/Drains:  Invasive Devices     Peripheral Intravenous Line  Duration           Peripheral IV 11/25/22 Right Arm <1 day          Drain  Duration           Urethral Catheter -- days              Urinary Catheter:  Goal for removal: N/A - Chronic Marrufo      Imaging: Reviewed radiology reports from this admission including: chest xray    Recent Cultures (last 7 days):   Results from last 7 days   Lab Units 11/25/22  1550 11/25/22  1536   BLOOD CULTURE  Received in Microbiology Lab  Culture in Progress  Received in Microbiology Lab  Culture in Progress         Last 24 Hours Medication List:   Current Facility-Administered Medications   Medication Dose Route Frequency Provider Last Rate   • acetaminophen  650 mg Per G Tube Q4H PRN Aleksey Echeverria PA-C     • cefepime  2,000 mg Intravenous Q12H Aleksey Echeverria PA-C 2,000 mg (11/26/22 0418)   • enoxaparin  40 mg Subcutaneous Daily Aleksey Echeverria PA-C     • ondansetron  4 mg Intravenous Q6H PRN Aleksey Echeverria PA-C     • potassium chloride  40 mEq Oral Q3H Mannie Mcgill, DO     • sodium chloride  75 mL/hr Intravenous Continuous Aleksey Echeverria PA-C 75 mL/hr (11/26/22 3242)   • sodium hypochlorite  1 application Irrigation BID Nahun Perry PA-C          Today, Patient Was Seen By: Florentina Bamberger, DO    **Please Note: This note may have been constructed using a voice recognition system  **

## 2022-11-27 PROBLEM — L89.44: Status: ACTIVE | Noted: 2022-10-26

## 2022-11-27 PROBLEM — R78.81 POSITIVE BLOOD CULTURE: Status: ACTIVE | Noted: 2022-11-27

## 2022-11-27 PROBLEM — L89.624 PRESSURE INJURY OF LEFT HEEL, STAGE 4 (HCC): Status: ACTIVE | Noted: 2022-10-26

## 2022-11-27 RX ORDER — VANCOMYCIN HYDROCHLORIDE 1 G/200ML
20 INJECTION, SOLUTION INTRAVENOUS ONCE
Status: COMPLETED | OUTPATIENT
Start: 2022-11-27 | End: 2022-11-27

## 2022-11-27 RX ADMIN — CEFEPIME HYDROCHLORIDE 2000 MG: 2 INJECTION, SOLUTION INTRAVENOUS at 03:53

## 2022-11-27 RX ADMIN — VANCOMYCIN HYDROCHLORIDE 1000 MG: 1 INJECTION, SOLUTION INTRAVENOUS at 09:19

## 2022-11-27 RX ADMIN — SODIUM CHLORIDE 75 ML/HR: 0.9 INJECTION, SOLUTION INTRAVENOUS at 17:05

## 2022-11-27 RX ADMIN — CEFEPIME HYDROCHLORIDE 2000 MG: 2 INJECTION, SOLUTION INTRAVENOUS at 17:04

## 2022-11-27 RX ADMIN — ACETAMINOPHEN 650 MG: 325 TABLET ORAL at 21:53

## 2022-11-27 RX ADMIN — ENOXAPARIN SODIUM 40 MG: 100 INJECTION SUBCUTANEOUS at 09:17

## 2022-11-27 RX ADMIN — HYOSCYAMINE SULFATE 1 APPLICATION: 16 SOLUTION at 19:51

## 2022-11-27 RX ADMIN — VANCOMYCIN HYDROCHLORIDE 750 MG: 750 INJECTION, SOLUTION INTRAVENOUS at 18:42

## 2022-11-27 RX ADMIN — HYOSCYAMINE SULFATE 1 APPLICATION: 16 SOLUTION at 12:00

## 2022-11-27 RX ADMIN — SODIUM CHLORIDE 75 ML/HR: 0.9 INJECTION, SOLUTION INTRAVENOUS at 01:52

## 2022-11-27 NOTE — ASSESSMENT & PLAN NOTE
· Patient with profound development disability, nonverbal, bed bound  · Patient on tube feeds only with 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  · Patient has guardian Delicia Haskins- patient is full code per Delicia Haskins

## 2022-11-27 NOTE — PROGRESS NOTES
[address] [address]  Progress Note Cintia Hardwick 1955, 61 y o  male MRN: [id number]  Unit/Bed#: -01 Encounter: [id number]  Primary Care Provider: Radha Bailey MD   Date and time admitted to hospital: 11/25/2022  2:30 PM    * Sepsis [facility])  Assessment & Plan  · POA as evidenced by tachycardia, leukocytosis, and fever  · In the setting of multiple wounds and bacteremia  · Out-patient WClx grew Pseudomonas and Staph  · BClx (11/25): Staph aureus (likely MSSA) and MRSE  · WClx (11/26): Gram positive cocci and gram negative rods  · Continue Cefepime and begin Vancomycin   · ID consultation requested  · General surgery following    Positive blood culture  Assessment & Plan  · BClx (11/25) as above  · Repeat cultures tomorrow  · ABx and ID consultation as above    Pressure injury of left heel, stage 4 ([facility])  Assessment & Plan  · S/p bedside debridement with surgery 11/26  · Please see above plan for sepsis    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, stage 4 ([facility] [address] )  Assessment & Plan  · S/p bedside debridement with surgery 11/26  · Please see above plan for sepsis    Developmental disability  Assessment & Plan  · Patient with profound development disability, nonverbal, bed bound  · Patient on tube feeds only with 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  · Patient has guardian Vandana Ruano- patient is full code per Vandana Ruano      VTE Pharmacologic Prophylaxis: VTE Score: 6 High Risk (Score >/= 5) - Pharmacological DVT Prophylaxis Ordered: enoxaparin (Lovenox)  Sequential Compression Devices Ordered  Patient Centered Rounds: I performed bedside rounds with nursing staff today  Discussions with Specialists or Other Care Team Provider: Nursing    Education and Discussions with Family / Patient: Updated  (care-giver) at bedside  Time Spent for Care: 30 minutes   More than 50% of total time spent on counseling and coordination of care as described above     Current Length of Stay: 2 day(s)  Current Patient Status: Inpatient   Certification Statement: The patient will continue to require additional inpatient hospital stay due to sepsis due to bacteremia from multiple wounds requiring IV abx  Discharge Plan: TBD based on clinical improvement  Back to group home at discharge  Code Status: Level 1 - Full Code    Subjective:   Patient appears to be resting comfortably  He is non-verbal and bed bound at baseline  Care-giver is at the bedside  No over night events reported by nursing  Objective:     Vitals:   Temp (24hrs), Av 6 °F (36 4 °C), Min:95 7 °F (35 4 °C), Max:98 9 °F (37 2 °C)    Temp:  [95 7 °F (35 4 °C)-98 9 °F (37 2 °C)] 98 3 °F (36 8 °C)  HR:  [127-130] 130  Resp:  [19-21] 21  BP: (102-164)/(58-91) 164/91  SpO2:  [97 %-98 %] 98 %  Body mass index is 20 18 kg/m²  Input and Output Summary (last 24 hours): Intake/Output Summary (Last 24 hours) at 2022 1007  Last data filed at 2022 0152  Gross per 24 hour   Intake 1000 ml   Output 1100 ml   Net -100 ml       Physical Exam:   Physical Exam  Constitutional:       Appearance: He is cachectic  He is ill-appearing  HENT:      Head: Normocephalic  Mouth/Throat:      Mouth: Mucous membranes are dry  Pharynx: Oropharynx is clear  Eyes:      Extraocular Movements: Extraocular movements intact  Conjunctiva/sclera: Conjunctivae normal    Cardiovascular:      Rate and Rhythm: Regular rhythm  Tachycardia present  Pulses: Normal pulses  Heart sounds: Normal heart sounds  Pulmonary:      Effort: Pulmonary effort is normal  No respiratory distress  Breath sounds: Normal breath sounds  No wheezing  Abdominal:      General: Bowel sounds are normal  There is no distension  Palpations: Abdomen is soft  Tenderness: There is no abdominal tenderness        Comments: PEG, colonstomy   Genitourinary:     Comments: Marrufo  Musculoskeletal:      Cervical back: Normal range of motion and neck supple  Comments: Contracted   Skin:     Comments: Multiple stage 4 wounds present on sacrum, wound additionally present on back and hels   Neurological:      Mental Status: He is alert  Mental status is at baseline  Comments: Non-verbal          Labs:  Results from last 7 days   Lab Units 11/26/22  0432 11/25/22  1510   WBC Thousand/uL 16 67* 21 92*   HEMOGLOBIN g/dL 8 2* 9 1*   HEMATOCRIT % 29 0* 32 8*   PLATELETS Thousands/uL 498* 704*   NEUTROS PCT %  --  80*   LYMPHS PCT %  --  9*   MONOS PCT %  --  9   EOS PCT %  --  1     Results from last 7 days   Lab Units 11/26/22  0432   SODIUM mmol/L 145   POTASSIUM mmol/L 3 2*   CHLORIDE mmol/L 109*   CO2 mmol/L 27   BUN mg/dL 8   CREATININE mg/dL 0 29*   ANION GAP mmol/L 9   CALCIUM mg/dL 9 0   ALBUMIN g/dL 2 6*   TOTAL BILIRUBIN mg/dL 0 25   ALK PHOS U/L 119*   ALT U/L 19   AST U/L 18   GLUCOSE RANDOM mg/dL 93     Results from last 7 days   Lab Units 11/25/22  1550   INR  1 18             Results from last 7 days   Lab Units 11/25/22  1550   LACTIC ACID mmol/L 1 9   PROCALCITONIN ng/ml 0 16       Lines/Drains:  Invasive Devices     Peripheral Intravenous Line  Duration           Peripheral IV 11/25/22 Right Arm 1 day          Drain  Duration           Urethral Catheter -- days              Urinary Catheter:  Goal for removal: N/A - Chronic Marrufo      Imaging: No new imaging       Recent Cultures (last 7 days):   Results from last 7 days   Lab Units 11/26/22  0913 11/25/22  1550 11/25/22  1536 11/25/22  1530   BLOOD CULTURE   --   --  No Growth at 24 hrs   --    GRAM STAIN RESULT  2+ Gram positive cocci in pairs and chains*  2+ Gram negative rods*  No polys seen*  4+ Gram negative rods*  2+ Gram positive cocci in pairs and chains*  No polys seen* Gram positive cocci in clusters*  --   --    URINE CULTURE   --   --   --  >100,000 cfu/ml       Last 24 Hours Medication List:   Current Facility-Administered Medications Medication Dose Route Frequency Provider Last Rate   • acetaminophen  650 mg Per G Tube Q4H PRN Liat  PA-C     • cefepime  2,000 mg Intravenous Q12H Liat , PA-C 2,000 mg (11/27/22 0353)   • enoxaparin  40 mg Subcutaneous Daily Liat  PA-C     • ondansetron  4 mg Intravenous Q6H PRN Liat , PA-C     • sodium chloride  75 mL/hr Intravenous Continuous Liat , PA-C 75 mL/hr (11/27/22 0152)   • sodium hypochlorite  1 application Irrigation BID Nahun Perry PA-C     • vancomycin  20 mg/kg Intravenous Once [address] [facility] [facility], DO 1,000 mg (11/27/22 0919)   • vancomycin  750 mg Intravenous Q8H [address] [facility], DO          Today, Patient Was Seen By: [facility] [facility], DO    **Please Note: This note may have been constructed using a voice recognition system  **

## 2022-11-27 NOTE — NURSING NOTE
Colostomy supplies arrived, bag changed with no issues  Pt easily agitated during cares and repositioning, appears to hold his breath at times  Guardian at bedside stating 'this is how he is'  Repositioned q 2 hours, pillows and wedge utilized for comfort  No further needs for pt or guardian at this time

## 2022-11-27 NOTE — ASSESSMENT & PLAN NOTE
60 yo M admitted with sepsis 2/2 infected decubitus ulcers  Nonverbal    Wounds open with foul odor and mild purulence  Tachycardia/fever noted  Blood culture gram positive  Wound cultures polymicrobial     Assessment:  Sepsis 2/2 infected decubitus ulcers  Plan:  Twice daily dressing changes with dakins moistened kerlix and bulky dressings  IV vanco/cefepime per SLIM pending ID consult

## 2022-11-27 NOTE — PLAN OF CARE
Problem: Potential for Falls  Goal: Patient will remain free of falls  Description: INTERVENTIONS:  - Educate patient/family on patient safety including physical limitations  - Instruct patient to call for assistance with activity   - Consult OT/PT to assist with strengthening/mobility   - Keep Call bell within reach  - Keep bed low and locked with side rails adjusted as appropriate  - Keep care items and personal belongings within reach  - Initiate and maintain comfort rounds  - Make Fall Risk Sign visible to staff  - Offer Toileting every 2 Hours, in advance of need  - Initiate/Maintain bed alarm  - Obtain necessary fall risk management equipment:   - Apply yellow socks and bracelet for high fall risk patients  - Consider moving patient to room near nurses station  Outcome: Progressing     Problem: Prexisting or High Potential for Compromised Skin Integrity  Goal: Skin integrity is maintained or improved  Description: INTERVENTIONS:  - Identify patients at risk for skin breakdown  - Assess and monitor skin integrity  - Assess and monitor nutrition and hydration status  - Monitor labs   - Assess for incontinence   - Turn and reposition patient  - Assist with mobility/ambulation  - Relieve pressure over bony prominences  - Avoid friction and shearing  - Provide appropriate hygiene as needed including keeping skin clean and dry  - Evaluate need for skin moisturizer/barrier cream  - Collaborate with interdisciplinary team   - Patient/family teaching  - Consider wound care consult   Outcome: Progressing     Problem: MOBILITY - ADULT  Goal: Maintain or return to baseline ADL function  Description: INTERVENTIONS:  -  Assess patient's ability to carry out ADLs; assess patient's baseline for ADL function and identify physical deficits which impact ability to perform ADLs (bathing, care of mouth/teeth, toileting, grooming, dressing, etc )  - Assess/evaluate cause of self-care deficits   - Assess range of motion  - Assess patient's mobility; develop plan if impaired  - Assess patient's need for assistive devices and provide as appropriate  - Encourage maximum independence but intervene and supervise when necessary  - Involve family in performance of ADLs  - Assess for home care needs following discharge   - Consider OT consult to assist with ADL evaluation and planning for discharge  - Provide patient education as appropriate  Outcome: Progressing  Goal: Maintains/Returns to pre admission functional level  Description: INTERVENTIONS:  - Perform BMAT or MOVE assessment daily    - Set and communicate daily mobility goal to care team and patient/family/caregiver  - Collaborate with rehabilitation services on mobility goals if consulted  - Perform Range of Motion 2 times a day  - Reposition patient every 2 hours  - Dangle patient 2 times a day  - Stand patient 2 times a day  - Ambulate patient 2 times a day  - Out of bed to chair 2 times a day   - Out of bed for meals 2 times a day  - Out of bed for toileting  - Record patient progress and toleration of activity level   Outcome: Progressing     Problem: Nutrition/Hydration-ADULT  Goal: Nutrient/Hydration intake appropriate for improving, restoring or maintaining nutritional needs  Description: Monitor and assess patient's nutrition/hydration status for malnutrition  Collaborate with interdisciplinary team and initiate plan and interventions as ordered  Monitor patient's weight and dietary intake as ordered or per policy  Utilize nutrition screening tool and intervene as necessary  Determine patient's food preferences and provide high-protein, high-caloric foods as appropriate       INTERVENTIONS:  - Monitor oral intake, urinary output, labs, and treatment plans  - Assess nutrition and hydration status and recommend course of action  - Evaluate amount of meals eaten  - Assist patient with eating if necessary   - Allow adequate time for meals  - Recommend/ encourage appropriate diets, oral nutritional supplements, and vitamin/mineral supplements  - Order, calculate, and assess calorie counts as needed  - Recommend, monitor, and adjust tube feedings and TPN/PPN based on assessed needs  - Assess need for intravenous fluids  - Provide specific nutrition/hydration education as appropriate  - Include patient/family/caregiver in decisions related to nutrition  Outcome: Progressing     Problem: PAIN - ADULT  Goal: Verbalizes/displays adequate comfort level or baseline comfort level  Description: Interventions:  - Encourage patient to monitor pain and request assistance  - Assess pain using appropriate pain scale  - Administer analgesics based on type and severity of pain and evaluate response  - Implement non-pharmacological measures as appropriate and evaluate response  - Consider cultural and social influences on pain and pain management  - Notify physician/advanced practitioner if interventions unsuccessful or patient reports new pain  Outcome: Progressing     Problem: INFECTION - ADULT  Goal: Absence or prevention of progression during hospitalization  Description: INTERVENTIONS:  - Assess and monitor for signs and symptoms of infection  - Monitor lab/diagnostic results  - Monitor all insertion sites, i e  indwelling lines, tubes, and drains  - Monitor endotracheal if appropriate and nasal secretions for changes in amount and color  - [place] appropriate cooling/warming therapies per order  - Administer medications as ordered  - Instruct and encourage patient and family to use good hand hygiene technique  - Identify and instruct in appropriate isolation precautions for identified infection/condition  Outcome: Progressing  Goal: Absence of fever/infection during neutropenic period  Description: INTERVENTIONS:  - Monitor WBC    Outcome: Progressing     Problem: SAFETY ADULT  Goal: Patient will remain free of falls  Description: INTERVENTIONS:  - Educate patient/family on patient safety including physical limitations  - Instruct patient to call for assistance with activity   - Consult OT/PT to assist with strengthening/mobility   - Keep Call bell within reach  - Keep bed low and locked with side rails adjusted as appropriate  - Keep care items and personal belongings within reach  - Initiate and maintain comfort rounds  - Make Fall Risk Sign visible to staff  - Offer Toileting every 2 Hours, in advance of need  - Initiate/Maintain bed alarm  - Obtain necessary fall risk management equipment:   - Apply yellow socks and bracelet for high fall risk patients  - Consider moving patient to room near nurses station  Outcome: Progressing  Goal: Maintain or return to baseline ADL function  Description: INTERVENTIONS:  -  Assess patient's ability to carry out ADLs; assess patient's baseline for ADL function and identify physical deficits which impact ability to perform ADLs (bathing, care of mouth/teeth, toileting, grooming, dressing, etc )  - Assess/evaluate cause of self-care deficits   - Assess range of motion  - Assess patient's mobility; develop plan if impaired  - Assess patient's need for assistive devices and provide as appropriate  - Encourage maximum independence but intervene and supervise when necessary  - Involve family in performance of ADLs  - Assess for home care needs following discharge   - Consider OT consult to assist with ADL evaluation and planning for discharge  - Provide patient education as appropriate  Outcome: Progressing  Goal: Maintains/Returns to pre admission functional level  Description: INTERVENTIONS:  - Perform BMAT or MOVE assessment daily    - Set and communicate daily mobility goal to care team and patient/family/caregiver  - Collaborate with rehabilitation services on mobility goals if consulted  - Perform Range of Motion 2 times a day  - Reposition patient every 2 hours    - Dangle patient 2 times a day  - Stand patient 2 times a day  - Ambulate patient 2 times a day  - Out of bed to chair 2 times a day   - Out of bed for meals 2 times a day  - Out of bed for toileting  - Record patient progress and toleration of activity level   Outcome: Progressing     Problem: DISCHARGE PLANNING  Goal: Discharge to home or other facility with appropriate resources  Description: INTERVENTIONS:  - Identify barriers to discharge w/patient and caregiver  - Arrange for needed discharge resources and transportation as appropriate  - Identify discharge learning needs (meds, wound care, etc )  - Arrange for interpretive services to assist at discharge as needed  - Refer to Case Management Department for coordinating discharge planning if the patient needs post-hospital services based on physician/advanced practitioner order or complex needs related to functional status, cognitive ability, or social support system  Outcome: Progressing     Problem: Knowledge Deficit  Goal: Patient/family/caregiver demonstrates understanding of disease process, treatment plan, medications, and discharge instructions  Description: Complete learning assessment and assess knowledge base    Interventions:  - Provide teaching at level of understanding  - Provide teaching via preferred learning methods  Outcome: Progressing     Problem: SAFETY,RESTRAINT: NV/NON-SELF DESTRUCTIVE BEHAVIOR  Goal: Remains free of harm/injury (restraint for non violent/non self-detsructive behavior)  Description: INTERVENTIONS:  - Instruct patient/family regarding restraint use   - Assess and monitor physiologic and psychological status   - Provide interventions and comfort measures to meet assessed patient needs   - Identify and implement measures to help patient regain control  - Assess readiness for release of restraint   Outcome: Progressing  Goal: Returns to optimal restraint-free functioning  Description: INTERVENTIONS:  - Assess the patient's behavior and symptoms that indicate continued need for restraint  - Identify and implement measures to help patient regain control  - Assess readiness for release of restraint   Outcome: Progressing

## 2022-11-27 NOTE — PROGRESS NOTES
[address] [address]  Progress Note Marisol Bowman 1955, 61 y o  male MRN: [id number]  Unit/Bed#: -01 Encounter: [id number]  Primary Care Provider: Roxana Thacker MD   Date and time admitted to hospital: 11/25/2022  2:30 PM    * Sepsis [facility])  Assessment & Plan  62 yo M admitted with sepsis 2/2 infected decubitus ulcers  Nonverbal    Wounds open with foul odor and mild purulence  Tachycardia/fever noted  Blood culture gram positive  Wound cultures polymicrobial     Assessment:  Sepsis 2/2 infected decubitus ulcers  Plan:  Twice daily dressing changes with dakins moistened kerlix and bulky dressings  IV vanco/cefepime per SLIM pending ID consult  Subjective/Objective   Chief Complaint: nonverbal    Subjective: nonverbal    Objective: RN reports dressings changed yesterday afternoon    Blood pressure 121/66, pulse (!) 128, temperature 100 1 °F (37 8 °C), resp  rate 21, height 5' 5" (1 651 m), weight 55 kg (121 lb 4 1 oz), SpO2 98 %  ,Body mass index is 20 18 kg/m²  Intake/Output Summary (Last 24 hours) at 11/27/2022 1807  Last data filed at 11/27/2022 1431  Gross per 24 hour   Intake 1000 ml   Output 900 ml   Net 100 ml       Invasive Devices     Peripheral Intravenous Line  Duration           Peripheral IV 11/25/22 Right Arm 2 days          Drain  Duration           Urethral Catheter -- days                Physical Exam  Vitals and nursing note reviewed  Constitutional:       General: He is not in acute distress  Appearance: He is well-developed and well-nourished  He is not diaphoretic  HENT:      Head: Normocephalic and atraumatic  Eyes:      Extraocular Movements: EOM normal       Conjunctiva/sclera: Conjunctivae normal       Pupils: Pupils are equal, round, and reactive to light  Pulmonary:      Effort: No respiratory distress  Skin:     General: Skin is warm and dry  Capillary Refill: Capillary refill takes less than 2 seconds  Psychiatric:         Mood and Affect: Mood and affect normal            Lab, Imaging and other studies:I have personally reviewed pertinent lab results      VTE Pharmacologic Prophylaxis: Enoxaparin (Lovenox)  VTE Mechanical Prophylaxis: sequential compression device

## 2022-11-27 NOTE — NURSING NOTE
Tube feed started at 0630 when kitchen brought bottles  Unable to obtain morning labs, will make day shift aware

## 2022-11-27 NOTE — ASSESSMENT & PLAN NOTE
· POA as evidenced by tachycardia, leukocytosis, and fever  · In the setting of multiple wounds and bacteremia  · Out-patient WClx grew Pseudomonas and Staph  · BClx (11/25):  Staph aureus (likely MSSA) and MRSE  · WClx (11/26): Gram positive cocci and gram negative rods  · Continue Cefepime and begin Vancomycin   · ID consultation requested  · General surgery following

## 2022-11-28 ENCOUNTER — APPOINTMENT (INPATIENT)
Dept: NON INVASIVE DIAGNOSTICS | Facility: HOSPITAL | Age: 67
End: 2022-11-28

## 2022-11-28 PROBLEM — E87.0 HYPERNATREMIA: Status: ACTIVE | Noted: 2022-11-28

## 2022-11-28 LAB
ANION GAP SERPL CALCULATED.3IONS-SCNC: 9 MMOL/L (ref 4–13)
ANION GAP SERPL CALCULATED.3IONS-SCNC: 9 MMOL/L (ref 4–13)
AORTIC ROOT: 3.1 CM
AORTIC VALVE MEAN VELOCITY: 13.6 M/S
APICAL FOUR CHAMBER EJECTION FRACTION: 68 %
AV LVOT MEAN GRADIENT: 5 MMHG
AV LVOT PEAK GRADIENT: 9 MMHG
AV MEAN GRADIENT: 8 MMHG
AV PEAK GRADIENT: 14 MMHG
AV VELOCITY RATIO: 0.82
BUN SERPL-MCNC: 8 MG/DL (ref 5–25)
BUN SERPL-MCNC: 9 MG/DL (ref 5–25)
CALCIUM SERPL-MCNC: 8.9 MG/DL (ref 8.4–10.2)
CALCIUM SERPL-MCNC: 9.2 MG/DL (ref 8.4–10.2)
CHLORIDE SERPL-SCNC: 115 MMOL/L (ref 96–108)
CHLORIDE SERPL-SCNC: 118 MMOL/L (ref 96–108)
CO2 SERPL-SCNC: 23 MMOL/L (ref 21–32)
CO2 SERPL-SCNC: 23 MMOL/L (ref 21–32)
CREAT SERPL-MCNC: 0.29 MG/DL (ref 0.6–1.3)
CREAT SERPL-MCNC: 0.3 MG/DL (ref 0.6–1.3)
DOP CALC AO PEAK VEL: 1.85 M/S
DOP CALC AO VTI: 29.83 CM
DOP CALC LVOT PEAK VEL VTI: 26.23 CM
DOP CALC LVOT PEAK VEL: 1.51 M/S
E WAVE DECELERATION TIME: 125 MS
ERYTHROCYTE [DISTWIDTH] IN BLOOD BY AUTOMATED COUNT: 20.9 % (ref 11.6–15.1)
FRACTIONAL SHORTENING: 40 % (ref 28–44)
GFR SERPL CREATININE-BSD FRML MDRD: 141 ML/MIN/1.73SQ M
GFR SERPL CREATININE-BSD FRML MDRD: 143 ML/MIN/1.73SQ M
GLUCOSE SERPL-MCNC: 109 MG/DL (ref 65–140)
GLUCOSE SERPL-MCNC: 122 MG/DL (ref 65–140)
HCT VFR BLD AUTO: 27.7 % (ref 36.5–49.3)
HGB BLD-MCNC: 7.8 G/DL (ref 12–17)
INTERVENTRICULAR SEPTUM IN DIASTOLE (PARASTERNAL SHORT AXIS VIEW): 1 CM
INTERVENTRICULAR SEPTUM: 1 CM (ref 0.6–1.1)
LAAS-AP2: 13.7 CM2
LAAS-AP4: 12.5 CM2
LEFT ATRIUM SIZE: 3.8 CM
LEFT INTERNAL DIMENSION IN SYSTOLE: 2.5 CM (ref 2.1–4)
LEFT VENTRICULAR INTERNAL DIMENSION IN DIASTOLE: 4.2 CM (ref 3.5–6)
LEFT VENTRICULAR POSTERIOR WALL IN END DIASTOLE: 0.9 CM
LEFT VENTRICULAR STROKE VOLUME: 56 ML
LVSV (TEICH): 56 ML
MCH RBC QN AUTO: 22.8 PG (ref 26.8–34.3)
MCHC RBC AUTO-ENTMCNC: 28.2 G/DL (ref 31.4–37.4)
MCV RBC AUTO: 81 FL (ref 82–98)
MV PEAK A VEL: 1.2 M/S
MV PEAK E VEL: 75 CM/S
MV STENOSIS PRESSURE HALF TIME: 36 MS
MV VALVE AREA P 1/2 METHOD: 6.11 CM2
PLATELET # BLD AUTO: 731 THOUSANDS/UL (ref 149–390)
PMV BLD AUTO: 8.8 FL (ref 8.9–12.7)
POTASSIUM SERPL-SCNC: 3.2 MMOL/L (ref 3.5–5.3)
POTASSIUM SERPL-SCNC: 3.7 MMOL/L (ref 3.5–5.3)
RBC # BLD AUTO: 3.42 MILLION/UL (ref 3.88–5.62)
RIGHT VENTRICLE ID DIMENSION: 2.3 CM
SL CV LEFT ATRIUM LENGTH A2C: 5 CM
SL CV LV EF: 80
SL CV PED ECHO LEFT VENTRICLE DIASTOLIC VOLUME (MOD BIPLANE) 2D: 78 ML
SL CV PED ECHO LEFT VENTRICLE SYSTOLIC VOLUME (MOD BIPLANE) 2D: 22 ML
SODIUM SERPL-SCNC: 147 MMOL/L (ref 135–147)
SODIUM SERPL-SCNC: 150 MMOL/L (ref 135–147)
VANCOMYCIN TROUGH SERPL-MCNC: 27.4 UG/ML (ref 10–20)
WBC # BLD AUTO: 17.52 THOUSAND/UL (ref 4.31–10.16)

## 2022-11-28 RX ORDER — POTASSIUM CHLORIDE 29.8 MG/ML
40 INJECTION INTRAVENOUS ONCE
Status: DISCONTINUED | OUTPATIENT
Start: 2022-11-28 | End: 2022-11-28

## 2022-11-28 RX ORDER — DEXTROSE AND SODIUM CHLORIDE 5; .45 G/100ML; G/100ML
75 INJECTION, SOLUTION INTRAVENOUS CONTINUOUS
Status: DISCONTINUED | OUTPATIENT
Start: 2022-11-28 | End: 2022-11-28

## 2022-11-28 RX ORDER — VANCOMYCIN HYDROCHLORIDE 1 G/200ML
1000 INJECTION, SOLUTION INTRAVENOUS EVERY 12 HOURS
Status: DISCONTINUED | OUTPATIENT
Start: 2022-11-28 | End: 2022-11-28

## 2022-11-28 RX ORDER — CEFEPIME HYDROCHLORIDE 2 G/50ML
2000 INJECTION, SOLUTION INTRAVENOUS EVERY 8 HOURS
Status: DISCONTINUED | OUTPATIENT
Start: 2022-11-28 | End: 2022-12-02

## 2022-11-28 RX ORDER — POTASSIUM CHLORIDE 14.9 MG/ML
20 INJECTION INTRAVENOUS
Status: COMPLETED | OUTPATIENT
Start: 2022-11-28 | End: 2022-11-28

## 2022-11-28 RX ADMIN — DEXTROSE 1000 ML: 5 SOLUTION INTRAVENOUS at 16:35

## 2022-11-28 RX ADMIN — POTASSIUM CHLORIDE 20 MEQ: 14.9 INJECTION, SOLUTION INTRAVENOUS at 13:48

## 2022-11-28 RX ADMIN — CEFEPIME HYDROCHLORIDE 2000 MG: 2 INJECTION, SOLUTION INTRAVENOUS at 03:37

## 2022-11-28 RX ADMIN — VANCOMYCIN HYDROCHLORIDE 750 MG: 750 INJECTION, SOLUTION INTRAVENOUS at 01:05

## 2022-11-28 RX ADMIN — CEFEPIME HYDROCHLORIDE 2000 MG: 2 INJECTION, SOLUTION INTRAVENOUS at 13:45

## 2022-11-28 RX ADMIN — CEFEPIME HYDROCHLORIDE 2000 MG: 2 INJECTION, SOLUTION INTRAVENOUS at 21:12

## 2022-11-28 RX ADMIN — HYOSCYAMINE SULFATE 1 APPLICATION: 16 SOLUTION at 17:13

## 2022-11-28 RX ADMIN — VANCOMYCIN HYDROCHLORIDE 1000 MG: 1 INJECTION, SOLUTION INTRAVENOUS at 08:57

## 2022-11-28 RX ADMIN — HYOSCYAMINE SULFATE 1 APPLICATION: 16 SOLUTION at 10:59

## 2022-11-28 RX ADMIN — COLLAGENASE SANTYL: 250 OINTMENT TOPICAL at 16:43

## 2022-11-28 RX ADMIN — DEXTROSE AND SODIUM CHLORIDE 75 ML/HR: 5; .45 INJECTION, SOLUTION INTRAVENOUS at 12:29

## 2022-11-28 RX ADMIN — POTASSIUM CHLORIDE 20 MEQ: 14.9 INJECTION, SOLUTION INTRAVENOUS at 12:28

## 2022-11-28 RX ADMIN — SODIUM CHLORIDE 75 ML/HR: 0.9 INJECTION, SOLUTION INTRAVENOUS at 09:28

## 2022-11-28 RX ADMIN — ENOXAPARIN SODIUM 40 MG: 100 INJECTION SUBCUTANEOUS at 08:55

## 2022-11-28 NOTE — CONSULTS
Consultation - Nephrology   Crow Dempsey 61 y o  male MRN: [id number]  Unit/Bed#: -01 Encounter: [id number]      Assessment and Plan    1  Hypernatremia/dehydration  Patient is NPO with PEG tube  Occurred in the setting of isotonic volume expansion since 11/25/2022  Has been transition to D5 W half normal saline 75 mL/hr (not yet started) in addition to normal saline 75 mL/hr  Patient receives 55 mL free water flushes to PEG tube every 4 hours  Will give D5W 1 L over 8 hours and increase free water flushes to 250 mL every 6 hours  2  Sepsis, present on admission  On cefepime per hospitalist     3  At risk for acute kidney injury  Suspect that renal function is overestimated given low muscle mass  If more accurate assessment of renal function is require, consider Cystatin C in outpatient setting  For the prevention of acute kidney injury, Trend renal function, optimize hemodynamics, hold ACE/ARB, avoid nephrotoxins, renally dose medications, monitor volume status, monitor for urinary retention  4  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, pressure ulcer of left heel unstageable  Wound care  5  Developmental disability  Thank you for allowing us to participate in the care of your patient  Please feel free to contact us regarding the care of this patient, or any other questions/concerns that may be applicable      Patient Active Problem List   Diagnosis   • Pressure injury of sacral region, stage 4 (HCC)   • 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, stage 4 (HCC)   • Pressure ulcer of right lower back, stage 3 (HCC)   • Pressure ulcer of left foot, stage 4 (HCC)   • Pressure injury of right upper back, stage 4 (HCC)   • Open wound of right hand without foreign body   • Pressure injury of left heel, stage 4 (HCC)   • Sepsis ([address] )   • Developmental disability   • Hypokalemia   • Pressure ulcer of ischium, right, stage III ([address] )   • Positive blood culture   • Hypernatremia       History of Present Illness     Physician Requesting Consult: Ariana Castillo MD    Reason for Consult / Principal Problem:  Hypernatremia    Hx and PE limited by:  Nonverbal    HPI: Familia Dash is a 61y o  year old male with PMH of developmental disability who was nonverbal, bedbound , NPO on tube feeds only, resident of [facility] who presented to [facility] on [facility]/25/2022 and has been treated for sepsis  Nephrology consultation is now requested for evaluation management of hypernatremia with serum sodium worsening from 143 millimole per L on 11/25/2022 -> 150 millimole per L on 11/28/2022  Labs are also notable for hypokalemia potassium 3 2 millimole per L  From a renal standpoint, renal function is excellent with a creatinine of 0 3 mg/dL estimated  mL/min  Further history is not obtainable due to patient being nonverbal     History obtained from chart review and unobtainable from patient due to mental status    Review of Systems    Interval history and review of systems is unobtainable due to patient's mental status  Historical Information   History reviewed  No pertinent past medical history  History reviewed  No pertinent surgical history  Social History   Social History     Substance and Sexual Activity   Alcohol Use Never     Social History     Substance and Sexual Activity   Drug Use Never     Social History     Tobacco Use   Smoking Status Never   Smokeless Tobacco Never     History reviewed  No pertinent family history      Meds/Allergies   all current active meds have been reviewed, current meds:   Current Facility-Administered Medications   Medication Dose Route Frequency   • acetaminophen (TYLENOL) tablet 650 mg  650 mg Per G Tube Q4H PRN   • cefepime (MAXIPIME) IVPB (premix in dextrose) 2,000 mg 50 mL  2,000 mg Intravenous Q8H   • collagenase (SANTYL) ointment   Topical Daily   • dextrose 5 % and sodium chloride 0 45 % infusion  75 mL/hr Intravenous Continuous   • enoxaparin (LOVENOX) subcutaneous injection 40 mg  40 mg Subcutaneous Daily   • ondansetron (ZOFRAN) injection 4 mg  4 mg Intravenous Q6H PRN   • potassium chloride 20 mEq IVPB (premix)  20 mEq Intravenous Q2H   • sodium chloride 0 9 % infusion  75 mL/hr Intravenous Continuous   • sodium hypochlorite (DAKIN'S HALF-STRENGTH) 0 25 percent topical solution 1 application  1 application Irrigation BID    and PTA meds:    Medications Prior to Admission   Medication   • bisacodyl (Dulcolax) 10 mg suppository   • calcium carbonate-vitamin D (OSCAL-D) 500 mg-200 units per tablet   • denosumab (Prolia) 60 mg/mL   • dorzolamide-timolol (COSOPT) 22 3-6 8 MG/ML ophthalmic solution   • ergocalciferol (ERGOCALCIFEROL) 1 25 MG (36749 UT) capsule   • fluticasone (FLONASE) 50 mcg/act nasal spray   • Hydromorphone HCl (Dilaudid) 1 MG/ML LIQD   • latanoprost (XALATAN) 0 005 % ophthalmic solution   • loratadine (CLARITIN) 10 mg tablet   • magnesium hydroxide (MILK OF MAGNESIA) 400 mg/5 mL oral suspension   • Polyethyl Glycol-Propyl Glycol (Systane) 0 4-0 3 % GEL   • sodium chloride (OCEAN) 0 65 % nasal spray         Allergies   Allergen Reactions   • Albumen, Egg - Food Allergy Other (See Comments)   • Amoxicillin-Pot Clavulanate Other (See Comments)       Objective     Intake/Output Summary (Last 24 hours) at 11/28/2022 1503  Last data filed at 11/28/2022 1300  Gross per 24 hour   Intake --   Output 700 ml   Net -700 ml       Invasive Devices:   Urethral Catheter (Active)   Reasons to continue Urinary Catheter  Chronic urinary catheter 11/27/22 1431   Goal for Removal N/A- chronic freire 11/27/22 1431   Site Assessment Clean;Skin intact 11/27/22 1431   Freire Care Done 11/28/22 0803   Collection Container Standard drainage bag 11/27/22 1431   Securement Method Securing device (Describe) 11/27/22 1431   Output (mL) 575 mL 11/28/22 1300       Physical Exam  Vitals and nursing note reviewed  Constitutional:       General: He is awake  He is not in acute distress  Appearance: Normal appearance  He is well-developed and normal weight  He is not ill-appearing, toxic-appearing or diaphoretic  Interventions: He is restrained  Comments: contractures   HENT:      Head: Normocephalic and atraumatic  Jaw: There is normal jaw occlusion  Nose: Nose normal       Mouth/Throat:      Mouth: Mucous membranes are moist       Pharynx: Oropharynx is clear  No oropharyngeal exudate or posterior oropharyngeal erythema  Eyes:      General: Lids are normal  Vision grossly intact  Gaze aligned appropriately  No scleral icterus  Right eye: No discharge  Left eye: No discharge  Extraocular Movements: Extraocular movements intact  Conjunctiva/sclera: Conjunctivae normal       Pupils: Pupils are equal, round, and reactive to light  Neck:      Thyroid: No thyroid mass or thyromegaly  Trachea: Trachea and phonation normal    Cardiovascular:      Rate and Rhythm: Normal rate and regular rhythm  Heart sounds: Normal heart sounds, S1 normal and S2 normal  No murmur heard  No friction rub  No gallop  Pulmonary:      Effort: Pulmonary effort is normal  No respiratory distress  Breath sounds: Normal breath sounds  No stridor  No wheezing, rhonchi or rales  Abdominal:      General: Abdomen is flat  Bowel sounds are normal  There is no distension  Palpations: Abdomen is soft  There is no mass  Tenderness: There is no abdominal tenderness  There is no guarding  Hernia: No hernia is present  Musculoskeletal:         General: Normal range of motion  Cervical back: Normal range of motion and neck supple  No rigidity or tenderness  Right lower leg: No edema  Left lower leg: No edema  Lymphadenopathy:      Cervical: No cervical adenopathy  Skin:     General: Skin is warm and dry  Coloration: Skin is not jaundiced        Findings: No bruising  Nails: There is no clubbing  Neurological:      General: No focal deficit present  Mental Status: He is alert and oriented to person, place, and time  Mental status is at baseline  Comments: Nonverbal at baseline   Psychiatric:         Behavior: Behavior is cooperative  I/O last 3 completed shifts: In: 1000 [I V :1000]  Out: 1025 [Urine:1025]    Vitals:    11/28/22 1426   BP: 115/73   Pulse: (!) 118   Resp:    Temp:    SpO2:          Current Weight: Weight - Scale: 54 9 kg (121 lb)  First Weight: Weight - Scale: 54 4 kg (120 lb)    Lab Results:  I have personally reviewed pertinent labs      CBC:   Lab Results   Component Value Date    WBC 17 52 (H) 11/28/2022    HGB 7 8 (L) 11/28/2022    HCT 27 7 (L) 11/28/2022    MCV 81 (L) 11/28/2022     (H) 11/28/2022    MCH 22 8 (L) 11/28/2022    MCHC 28 2 (L) 11/28/2022    RDW 20 9 (H) 11/28/2022    MPV 8 8 (L) 11/28/2022     CMP:   Lab Results   Component Value Date    K 3 2 (L) 11/28/2022     (H) 11/28/2022    CO2 23 11/28/2022    BUN 9 11/28/2022    CREATININE 0 30 (L) 11/28/2022    CALCIUM 9 2 11/28/2022    EGFR 141 11/28/2022     Phosphorus: No results found for: PHOS  Magnesium: No results found for: MG  Urinalysis: No results found for: COLORU, CLARITYU, SPECGRAV, PHUR, LEUKOCYTESUR, NITRITE, PROTEINUA, GLUCOSEU, KETONESU, BILIRUBINUR, BLOODU  Ionized Calcium: No results found for: CAION  Coagulation: No results found for: PT, INR, APTT  Troponin: No results found for: TROPONINI  ABG: No results found for: PHART, IZG1VXM, PO2ART, GGM4JKG, A2VLBPHF, BEART, SOURCE    Results from last 7 days   Lab Units 11/28/22  0439 11/26/22  0432 11/25/22  1550   POTASSIUM mmol/L 3 2* 3 2* 3 5   CHLORIDE mmol/L 118* 109* 103   CO2 mmol/L 23 27 31   BUN mg/dL 9 8 13   CREATININE mg/dL 0 30* 0 29* 0 42*   CALCIUM mg/dL 9 2 9 0 9 0   ALK PHOS U/L  --  119* 135*   ALT U/L  --  19 25   AST U/L  --  18 26       Radiology review:  Procedure: XR chest portable    Result Date: 11/25/2022  Narrative: CHEST INDICATION:   elevated temp  COMPARISON:  None EXAM PERFORMED/VIEWS:  XR CHEST PORTABLE FINDINGS: Study is very limited due to patient's condition and lack of cooperation  Cardiomediastinal silhouette appears unremarkable  Right basilar opacity is seen most likely atelectasis  An infiltrate is less likely  No pneumothorax or pleural effusion  Right humerus fixation is partially visualized  Impression: Very limited study with right basilar atelectasis versus less likely infiltrate  Final report is in agreement with preliminary reading by the ED staff  Workstation performed: [id number]         Priya Lane PA-C      Portions of the record may have been created with voice recognition software  Occasional wrong word or "sound a like" substitutions may have occurred due to the inherent limitations of voice recognition software  Read the chart carefully and recognize, using context, where substitutions have occurred

## 2022-11-28 NOTE — NUTRITION
11/28/22 1446   Nutrition-Focused Physical Exam   Nutrition-Focused Physical Exam Findings Wound at right posterior hand, wound at left posterior heel, stage 4 PI at right upper back, PI at right lower back, PI at sacrum, PI at right buttocks, PI at left lateral foot, colostomy, PEG, freire catheter   Feeding Route   Tube Feeding PEG tube   Formula Other (Comment)  (Two Zeyad HN)   Formula rate Two Zeyad HN at 50 mL/hr  (TF newly initiated here in hospital, unable to assess TF received at this time )   Cyclic/ Intermittent 15 hours   Current PO Intake   Current Diet Order NPO; EN   Recommendations/Interventions   Malnutrition/BMI Present   (1 criteria met at this time; weight loss)   Summary Presents with multiple wounds  Pt has a freire catheter and diverting colostomy, receiving nutrition via indwelling PEG tube  Pt with food allergy to eggs  Per flow sheets, TF runs from 6AM - 9PM, currently running at goal rate of Two Zeyad at 50 mL/hr - TF specifications from RN at [facility]  Debridement of 3 wound sites preformed on 11/26  Weight history reviewed, noted significant 13# weight decrease in 3 months (9 7% body weight)  Pt receiving care at time of visit  NFPE deferred to follow up  Estimated needs increased in setting of multiple wounds  Current TF regimen not meeting estimated needs  Estimated needs: 1620 - 1890 kcals, 108 - 135 g protein daily  Recommend TwoCal HN at 50 mL/hr for 15 hours with Prosource NoCarb Liquid Protein TID to provide 1680 kcals, 107 g protein in 750 mL total TF volume  Prosource NoCarb Liquid Protein does not list eggs on ingredient list    Interventions/Recommendations Adjust EN/ PN;Supplement initiate; Tube feeding recs provided   Recommendations to Provider Recommend TwoCal HN at 50 mL/hr for 15 hours with Prosource NoCarb Liquid Protein TID to provide 1680 kcals, 107 g protein in 750 mL total TF volume   Pt with allergy to eggs - Prosource NoCarb Liquid Protein does not list eggs on ingredient list    Patient Nutrition Goals   Goal Adequate hydration;Meet EN/PN needs   Goal Status Initiated

## 2022-11-28 NOTE — CONSULTS
Consultation - [facility]   Padminikonrad Schwab 61 y o  male MRN: [id number]  Unit/Bed#: -01 Encounter: [id number]      IMPRESSION & RECOMMENDATIONS:   1  Sepsis, present on admission, evidenced by tachycardia and leukocytosis  Patient febrile  Lactic acid 1 9  PCT normal 0 16  1 of 2 admission bl cx positive  Suspect sepsis secondary to #2, #3  Chest x-ray limited though negative  UA with mixed contaminants   -continue antibiotics as below  -monitor temperature and hemodynamics  -recheck CBC and BMP in a m      2  Staph aureus bacteremia  One of 2 admission blood cultures positive for Gram-positive cocci in clusters  Per [facility] report, likely Staph aureus  Documented that it was drawn from patients peripheral line  Set drawn peripherally is negative at 48 hours  Consider possibility of contamination although in the setting of infected decubitus ulcers and sepsis, high probability of true bacteremia  Repeat blood cultures x2 sets today are pending    -antibiotics as below  -follow-up blood cultures and adjust antibiotics as needed  -repeat blood cultures x2 sets today  -recommend TTE     3  Chronic, now infected, stage IV decubitus ulcers  Found to have significant deterioration in wounds over the last few months, particularly the right upper back and sacral wound with increasing wound depth and necrotic tissue burden with evidence of purulence and malodor on admission  Patient also with unstageable sacral/right buttock ulcer, unstageable right ischial decubitus ulcer, and unstageable left heel pressure ulcer  Status post bedside debridement on 11/26  Wound cultures from upper back and sacrum were obtained and preliminarily positive for Proteus and Staph aureus  Recent cultures positive for MSSA and MDR PSA   Unfortunately wounds are unlikely to heal and are at risk for reinfection due to iron deficiency anemia, protein calorie malnutrition, contractures and inability to offload pressure    -discontinue IV vancomycin    -continue IV cefepime, but increase dose to 2g q8h  -follow-up wound cultures and adjust antibiotics as needed   -recommend CT chest abdomen pelvis with contrast if able to tolerate   -consult wound care nurse  -close surgical follow-up   -ongoing goals of care discussion per primary team     4  Functional quadriplegia, developmental disability  Patient nonverbal and bed-bound at baseline on tube feeds, with chronic indwelling Marrufo catheter and colostomy  Unfortunately due to contractures, very difficult to reposition patient and offload pressure   -supportive measures per primary team    5  Antibiotic Allergy  Noted to have amoxicillin and Augmentin allergy without documented reaction  Per Care everywhere, has tolerated cefepime and ceftriaxone during prior admissions  -monitor for adverse drug reactions    Antibiotics:  D3 IV Cefepime/Vanco     I have discussed the above management plan in detail with patient  I have discussed the above management plan in detail with patient's RN, and the primary service, [facility]  Extensive review of the medical records in epic including review of the notes, radiographs, and laboratory results     HISTORY OF PRESENT ILLNESS:  Reason for Consult: sepsis, bacteremia, wounds     HPI: Eddsukhdeep Paiz is a 61y o  year old male with past medical history significant for developmental disability with contractures, feeding tube, chronic Marrufo, diverting colostomy, multiple chronic nonhealing decubitus wounds (right upper back, lower back, sacrum, right buttock, right ear, left heel, left foot, left hand) who presents from nursing home to [place] [id number] on 11/25 with a fever  Patient was recently seen by outpatient wound care provider on 11/25 found to have worsening decubitus wounds of sacrum and right upper back  Patient is nonverbal and unable to provide history      Appears to have a wound culture of "entire back/unknown" obtained on 11/22/22 that had growth of MSSA and MDR pseudomonas aeruginosa  He was most recently admitted to [facility] for aspiration pneumonia 8/12-8/19/22  He was treated with IV cefepime and vanco  Blood cultures were negative and he had right IJ PICC placed during hospital stay  Per extensive chart review, patient has resided at [facility] for over 40 years  Was hospitalized in March 2022 at [facility] for multiorgan failure and sepsis and another hospital stay and [facility] and in April for sepsis and wound treatment  He has had wound vacs in the past for pressure injuries of the right buttock and sacrum  He was initially referred to outpatient wound care clinic in May 2022 for management of numerous wounds that were required during the above-stated hospital stays  Patient was being followed in wound care on a monthly basis for debridement  Was treated with a course of p o  Augmentin in June 2022 followed by a course of Cipro for Pseudomonas growth in the wound bed  Imaging during hospital stay is as stated above for negative for osteomyelitis  Appears to have hardware in right upper extremity from prior fixation  REVIEW OF SYSTEMS:  A complete review of systems is negative other than that noted in the HPI  PAST MEDICAL HISTORY:  History reviewed  No pertinent past medical history  History reviewed  No pertinent surgical history  FAMILY HISTORY:  Non-contributory    SOCIAL HISTORY:  Social History   Social History     Substance and Sexual Activity   Alcohol Use Never     Social History     Substance and Sexual Activity   Drug Use Never     Social History     Tobacco Use   Smoking Status Never   Smokeless Tobacco Never       ALLERGIES:  Allergies   Allergen Reactions   • Albumen, Egg - Food Allergy Other (See Comments)   • Amoxicillin-Pot Clavulanate Other (See Comments)       MEDICATIONS:  All current active medications have been reviewed      PHYSICAL EXAM:  Temp:  [98 8 °F (37 1 °C)-101 °F (38 3 °C)] 98 8 °F (37 1 °C)  HR:  [118-132] 118  Resp:  [20-21] 20  BP: (106-121)/(63-73) 115/73  SpO2:  [98 %-99 %] 99 %  Temp (24hrs), Av 7 °F (37 6 °C), Min:98 8 °F (37 1 °C), Max:101 °F (38 3 °C)  Current: Temperature: 98 8 °F (37 1 °C)    Intake/Output Summary (Last 24 hours) at 2022 9556  Last data filed at 2022 1839  Gross per 24 hour   Intake --   Output 375 ml   Net -375 ml       General Appearance:  Appearing chronically ill and malnourished, nontoxic, and in no distress  Nonverbal  Profound developmental disability  HEENT: Normocephalic, without obvious abnormality, atraumatic  Conjunctiva pink and sclera anicteric  Oropharynx moist without lesions  Edentulous  Lungs:   Clear to auscultation bilaterally, respirations unlabored, poor inspiratory effort    Heart:  RRR; no murmur, rub or gallop   Abdomen:   Soft, non-tender, non-distended, positive bowel sounds, PEG in place  Colostomy in place  Extremities: No cyanosis, clubbing or edema  Diffuse muscle wasting  Extensive contractures of all 4 extremities  : No CVA or suprapubic tenderness  Marrufo in place with sediment in tubing, straw colored urine  Skin: No rashes or lesions  Multiple decubitus ulcers of back, sacrum with varying degree of severity/stage, purulent drainage and malodor  Ulcer noted to right ear and left lateral foot without cellulitis  RUE PIV appears infiltrated with RUE swelling and clear fluid filled bullous eruption distal to IV and cling  LABS, IMAGING, & OTHER STUDIES:  Lab Results:  I have personally reviewed pertinent labs    Results from last 7 days   Lab Units 22  04322  1510   WBC Thousand/uL 17 52* 16 67* 21 92*   HEMOGLOBIN g/dL 7 8* 8 2* 9 1*   PLATELETS Thousands/uL 731* 498* 704*     Results from last 7 days   Lab Units 22  04322  04322  1550   SODIUM mmol/L 150* 145 143   POTASSIUM mmol/L 3 2* 3 2* 3 5   CHLORIDE mmol/L 118* 109* 103   CO2 mmol/L 23 27 31   BUN mg/dL 9 8 13   CREATININE mg/dL 0 30* 0 29* 0 42*   EGFR ml/min/1 73sq m 141 143 123   CALCIUM mg/dL 9 2 9 0 9 0   AST U/L  --  18 26   ALT U/L  --  19 25   ALK PHOS U/L  --  119* 135*     Results from last 7 days   Lab Units 11/26/22  0913 11/25/22  1550 11/25/22  1536 11/25/22  1530   BLOOD CULTURE   --   --  No Growth at 48 hrs   --    GRAM STAIN RESULT  4+ Gram negative rods*  2+ Gram positive cocci in pairs and chains*  No polys seen*  2+ Gram positive cocci in pairs and chains*  2+ Gram negative rods*  No polys seen* Gram positive cocci in clusters*  --   --    URINE CULTURE   --   --   --  >100,000 cfu/ml   WOUND CULTURE  Culture results to follow  Culture results to follow  --   --   --      Results from last 7 days   Lab Units 11/25/22  1550   PROCALCITONIN ng/ml 0 16                   Imaging Studies:   I have personally reviewed pertinent imaging study reports and images in PACS  Other Studies:   I have personally reviewed pertinent reports

## 2022-11-28 NOTE — PROGRESS NOTES
[place] 47  Progress Note Noé Ilene 1955, 61 y o  male MRN: [id number]  Unit/Bed#: -01 Encounter: [id number]  Primary Care Provider: Adilia Wilson MD   Date and time admitted to hospital: 11/25/2022  2:30 PM    * Sepsis [facility])  Assessment & Plan  · Sepsis was present on admission as evidenced by tachycardia, leukocytosis, and fever  · Currently on IV vancomycin, and IV cefepime day 4  · 1/2 blood cultures from 11/25/2022 revealed Staph aureus -Staph epidermidis, however a methicillin resistance gene was positive  · Repeat blood cultures-11/28/2022-pending  · Wound cultures-positive for Proteus and Staph aureus also  · Await further antibiotic optimization as per Infectious Disease consultation    Positive blood culture  Assessment & Plan  · Management as outlined above    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, stage 4 ([address] )  Assessment & Plan  · S/p bedside debridement with surgery 11/26  · Continue antibiotics as outlined above  · Follow-up on final wound cultures  · Will review with surgery in regards to disposition/discharge planning    Pressure injury of left heel, stage 4 ([address] )  Assessment & Plan  · S/p bedside debridement with surgery 11/26  · Continue management as outlined above    Developmental disability  Assessment & Plan  · Patient with profound development disability, nonverbal, bed bound  · Patient on tube feeds only with 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  · Patient has guardian David Rader- patient is full code per David Rader    Hypernatremia  Assessment & Plan  · Sodium 150  · Unspecified exact etiology, question the possibility of dehydration from high output from his colostomy  · Will consult nephrology  · Treat with IV fluids-D5 half-normal saline at 75 cc an hour  · Repeat BMP testing in the a m  or sooner if deemed necessary by Nephrology        VTE Prophylaxis:  Enoxaparin (Lovenox)    Patient Centered Rounds:  I have performed bedside rounds with nursing staff today  Discussions with Specialists or Other Care Team Provider:  Nephrology, case management, nursing  Education and Discussions with Family / Patient:  Patient's guardian Birgit Lundy was brought up to par at 12:00 p m  Current Length of Stay: 3 day(s)    Current Patient Status: Inpatient   Certification Statement: The patient will continue to require additional inpatient hospital stay due to The need for IV fluids, and IV antibiotics    Discharge Plan:  Hopeful discharge planning in 48-72 hours    Code Status: Level 1 - Full Code    Subjective:   Patient seen and examined, lying in bed, is alert, however no purposeful interactions    Objective:     Vitals:   Temp (24hrs), Av 7 °F (37 6 °C), Min:98 8 °F (37 1 °C), Max:101 °F (38 3 °C)    Temp:  [98 8 °F (37 1 °C)-101 °F (38 3 °C)] 98 8 °F (37 1 °C)  HR:  [118-132] 118  Resp:  [20-21] 20  BP: (106-121)/(63-73) 115/73  SpO2:  [98 %-99 %] 99 %  Body mass index is 20 18 kg/m²  Input and Output Summary (last 24 hours): Intake/Output Summary (Last 24 hours) at 2022 1201  Last data filed at 2022 1839  Gross per 24 hour   Intake --   Output 375 ml   Net -375 ml       Physical Exam:   Physical Exam  Vitals and nursing note reviewed  Constitutional:       General: He is not in acute distress  Appearance: Normal appearance  He is not ill-appearing  HENT:      Head: Normocephalic and atraumatic  Nose: Nose normal    Eyes:      Extraocular Movements: Extraocular movements intact  Pupils: Pupils are equal, round, and reactive to light  Cardiovascular:      Rate and Rhythm: Normal rate and regular rhythm  Pulses: Normal pulses  Heart sounds: Normal heart sounds  No murmur heard  No friction rub  No gallop  Pulmonary:      Effort: Pulmonary effort is normal       Breath sounds: Normal breath sounds  Abdominal:      General: There is no distension        Palpations: Abdomen is soft  There is no mass  Tenderness: There is no abdominal tenderness  There is no guarding or rebound  Comments: Colostomy, and feeding tube are in place   Musculoskeletal:         General: No swelling or tenderness  Normal range of motion  Cervical back: Normal range of motion and neck supple  No rigidity  No muscular tenderness  Right lower leg: No edema  Left lower leg: No edema  Skin:     General: Skin is warm  Capillary Refill: Capillary refill takes less than 2 seconds  Findings: No erythema or rash  Comments: Multiple wounds, wound dressings in place, blisters noted on the right upper extremity, right upper extremity is also swollen   Neurological:      Mental Status: He is alert  Mental status is at baseline  Comments: Alert, and awake, does not follow simple 1 step commands in view of his developmental disability, is at baseline, does not talk         Additional Data:     Labs:    Results from last 7 days   Lab Units 11/28/22  0439 11/26/22  0432 11/25/22  1510   WBC Thousand/uL 17 52*   < > 21 92*   HEMOGLOBIN g/dL 7 8*   < > 9 1*   HEMATOCRIT % 27 7*   < > 32 8*   PLATELETS Thousands/uL 731*   < > 704*   NEUTROS PCT %  --   --  80*   LYMPHS PCT %  --   --  9*   MONOS PCT %  --   --  9   EOS PCT %  --   --  1    < > = values in this interval not displayed  Results from last 7 days   Lab Units 11/28/22  0439 11/26/22  0432   SODIUM mmol/L 150* 145   POTASSIUM mmol/L 3 2* 3 2*   CHLORIDE mmol/L 118* 109*   CO2 mmol/L 23 27   BUN mg/dL 9 8   CREATININE mg/dL 0 30* 0 29*   CALCIUM mg/dL 9 2 9 0   ALK PHOS U/L  --  119*   ALT U/L  --  19   AST U/L  --  18     Results from last 7 days   Lab Units 11/25/22  1550   INR  1 18               * I Have Reviewed All Lab Data Listed Above  * Additional Pertinent Lab Tests Reviewed:  All Labs For Current Hospital Admission  Reviewed    Imaging:  Imaging Reports Reviewed Today Include:  None    Recent Cultures (last 7 days):     Results from last 7 days   Lab Units 11/28/22  0440 11/26/22  0913 11/25/22  1550 11/25/22  1536 11/25/22  1530   BLOOD CULTURE  Received in Microbiology Lab  Culture in Progress  --  Staphylococcus aureus* No Growth at 48 hrs   --    GRAM STAIN RESULT   --  4+ Gram negative rods*  2+ Gram positive cocci in pairs and chains*  No polys seen*  2+ Gram positive cocci in pairs and chains*  2+ Gram negative rods*  No polys seen* Gram positive cocci in clusters*  --   --    URINE CULTURE   --   --   --   --  >100,000 cfu/ml   WOUND CULTURE   --  2+ Growth of Proteus species*  2+ Growth of Staphylococcus aureus*  3+ Growth of  2+ Growth of Proteus species*  2+ Growth of  --   --   --        Last 24 Hours Medication List:   Current Facility-Administered Medications   Medication Dose Route Frequency Provider Last Rate   • acetaminophen  650 mg Per G Tube Q4H PRN Darlene Bell PA-C     • cefepime  2,000 mg Intravenous Q12H Darlene Bell PA-C 2,000 mg (11/28/22 1019)   • dextrose 5 % and sodium chloride 0 45 %  75 mL/hr Intravenous Continuous Michael Wang MD     • enoxaparin  40 mg Subcutaneous Daily Darlene Bell PA-C     • ondansetron  4 mg Intravenous Q6H PRN Darlene Bell PA-C     • potassium chloride  20 mEq Intravenous Q2H Michael Wang MD     • sodium chloride  75 mL/hr Intravenous Continuous IAVNA [facility] 75 mL/hr (11/28/22 4860)   • sodium hypochlorite  1 application Irrigation BID Nahun Perry PA-C     • vancomycin  1,000 mg Intravenous Q12H Cherri Mcgill, DO 1,000 mg (11/28/22 6019)        Today, Patient Was Seen By: Michael Wang MD    ** Please Note: Dictation voice to text software may have been used in the creation of this document   **

## 2022-11-28 NOTE — ASSESSMENT & PLAN NOTE
· Patient with profound development disability, nonverbal, bed bound  · Patient on tube feeds only with 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  · Patient has guardian Grey Cease- patient is full code per Grey Cease

## 2022-11-28 NOTE — ASSESSMENT & PLAN NOTE
· Sodium 150  · Unspecified exact etiology, question the possibility of dehydration from high output from his colostomy  · Will consult nephrology  · Treat with IV fluids-D5 half-normal saline at 75 cc an hour  · Repeat BMP testing in the a m  or sooner if deemed necessary by Nephrology

## 2022-11-28 NOTE — PROGRESS NOTES
Progress Note - General Surgery   Loulou Bowden 61 y o  male MRN: [id number]  Unit/Bed#: -01 Encounter: [id number]    Assessment:  61year old nonverbal male presenting with sepsis secondary to infected decubitus ulcers  -Tmax 101, tachycardia 110s to 130s, other vital signs stable on room air   - cc recorded  -leukocytosis, WBC 17 52  -Hgb 7 8  -Cr 0 30   -hypokalemia, 3 2  -hypernatremia, 150  -wound cultures positive for citrobacter koseri and proteus mirabilis, pseudomonas, and staph aureus     Plan:  Dressing/ostomy change performed at bedside with attending   Will discontinue dakin's solution, add santyl   Medical management per SLIM, appreciate recommendations   ID following, appreciate recommendations   Nephrology following, appreciate recommendations     Subjective/Objective   Subjective: Nonverbal     Objective:   Blood pressure 115/73, pulse (!) 118, temperature 98 8 °F (37 1 °C), temperature source Temporal, resp  rate 20, height 5' 5" (1 651 m), weight 55 kg (121 lb 4 1 oz), SpO2 99 %  ,Body mass index is 20 18 kg/m²  Intake/Output Summary (Last 24 hours) at 11/28/2022 1137  Last data filed at 11/27/2022 1839  Gross per 24 hour   Intake --   Output 375 ml   Net -375 ml       Invasive Devices     Peripheral Intravenous Line  Duration           Peripheral IV 11/25/22 Right Arm 2 days          Drain  Duration           Urethral Catheter -- days              Physical Exam  Vitals and nursing note reviewed  Exam conducted with a chaperone present  Constitutional:       Comments: Awake, but unable to assess alertness/orientation; responsive to pain with repositioning during examination   Cardiovascular:      Rate and Rhythm: Regular rhythm  Tachycardia present  Pulses: Normal pulses  Heart sounds: Normal heart sounds  No murmur heard  No friction rub  No gallop  Pulmonary:      Effort: Pulmonary effort is normal  No respiratory distress        Breath sounds: Normal breath sounds  No wheezing or rhonchi  Abdominal:      General: The ostomy site is clean  Bowel sounds are normal  There is no distension  Palpations: Abdomen is soft  Tenderness: There is no abdominal tenderness  There is no guarding or rebound  Comments: PEG in place   Musculoskeletal:         General: Normal range of motion  Right lower leg: No edema  Left lower leg: No edema  Comments: Contracted   Skin:     General: Skin is warm and dry  Neurological:      Mental Status: Mental status is at baseline  Lab, Imaging and other studies:  I have personally reviewed pertinent lab results      CBC:   Lab Results   Component Value Date    WBC 17 52 (H) 11/28/2022    HGB 7 8 (L) 11/28/2022    HCT 27 7 (L) 11/28/2022    MCV 81 (L) 11/28/2022     (H) 11/28/2022    MCH 22 8 (L) 11/28/2022    MCHC 28 2 (L) 11/28/2022    RDW 20 9 (H) 11/28/2022    MPV 8 8 (L) 11/28/2022     CMP:   Lab Results   Component Value Date    SODIUM 150 (H) 11/28/2022    K 3 2 (L) 11/28/2022     (H) 11/28/2022    CO2 23 11/28/2022    BUN 9 11/28/2022    CREATININE 0 30 (L) 11/28/2022    CALCIUM 9 2 11/28/2022    EGFR 141 11/28/2022     VTE Pharmacologic Prophylaxis: Enoxaparin (Lovenox)  VTE Mechanical Prophylaxis: sequential compression device    Elliott Corcoran PA-C

## 2022-11-28 NOTE — ASSESSMENT & PLAN NOTE
· S/p bedside debridement with surgery 11/26  · Continue antibiotics as outlined above  · Follow-up on final wound cultures  · Will review with surgery in regards to disposition/discharge planning

## 2022-11-28 NOTE — PROGRESS NOTES
Crow Dempsey is a 61 y o  male who is currently ordered Vancomycin IV with management by the Pharmacy Consult service  Relevant clinical data and objective / subjective history reviewed  Vancomycin Assessment:  Indication and Goal AUC/Trough: Bacteremia (goal -600, trough >10), -600, trough >10  Clinical Status: stable  Micro:   Prelim results from wound culture/BC - GPC and GNR  Renal Function:  SCr: 0 3 mg/dL  CrCl: 193 5 mL/min  Renal replacement: Not on dialysis  Days of Therapy: 2  Current Dose: 750 mg q8  Vancomycin Plan:  New Dosing q12  Estimated AUC: 543 mcg*hr/mL  Estimated Trough: 14 6 mcg/mL  Next Level:  @ 1500  Renal Function Monitoring: Daily BMP and [place] will continue to follow closely for s/sx of nephrotoxicity, infusion reactions and appropriateness of therapy  BMP and CBC will be ordered per protocol  We will continue to follow the patient’s culture results and clinical progress daily      Mary Ann Ferrari, Pharmacist

## 2022-11-28 NOTE — PLAN OF CARE
Problem: Potential for Falls  Goal: Patient will remain free of falls  Description: INTERVENTIONS:  - Educate patient/family on patient safety including physical limitations  - Instruct patient to call for assistance with activity   - Consult OT/PT to assist with strengthening/mobility   - Keep Call bell within reach  - Keep bed low and locked with side rails adjusted as appropriate  - Keep care items and personal belongings within reach  - Initiate and maintain comfort rounds  - Make Fall Risk Sign visible to staff  - Initiate/Maintain bed alarm  - Obtain necessary fall risk management equipment  - Apply yellow socks and bracelet for high fall risk patients  - Consider moving patient to room near nurses station  Outcome: Progressing     Problem: Prexisting or High Potential for Compromised Skin Integrity  Goal: Skin integrity is maintained or improved  Description: INTERVENTIONS:  - Identify patients at risk for skin breakdown  - Assess and monitor skin integrity  - Assess and monitor nutrition and hydration status  - Monitor labs   - Assess for incontinence   - Turn and reposition patient  - Assist with mobility/ambulation  - Relieve pressure over bony prominences  - Avoid friction and shearing  - Provide appropriate hygiene as needed including keeping skin clean and dry  - Evaluate need for skin moisturizer/barrier cream  - Collaborate with interdisciplinary team   - Patient/family teaching  - Consider wound care consult   Outcome: Progressing     Problem: MOBILITY - ADULT  Goal: Maintain or return to baseline ADL function  Description: INTERVENTIONS:  -  Assess patient's ability to carry out ADLs; assess patient's baseline for ADL function and identify physical deficits which impact ability to perform ADLs (bathing, care of mouth/teeth, toileting, grooming, dressing, etc )  - Assess/evaluate cause of self-care deficits   - Assess range of motion  - Assess patient's mobility; develop plan if impaired  - Assess patient's need for assistive devices and provide as appropriate  - Encourage maximum independence but intervene and supervise when necessary  - Involve family in performance of ADLs  - Assess for home care needs following discharge   - Consider OT consult to assist with ADL evaluation and planning for discharge  - Provide patient education as appropriate  Outcome: Progressing  Goal: Maintains/Returns to pre admission functional level  Description: INTERVENTIONS:  - Perform BMAT or MOVE assessment daily    - Set and communicate daily mobility goal to care team and patient/family/caregiver  - Collaborate with rehabilitation services on mobility goals if consulted  - Out of bed for toileting  - Record patient progress and toleration of activity level   Outcome: Progressing     Problem: Nutrition/Hydration-ADULT  Goal: Nutrient/Hydration intake appropriate for improving, restoring or maintaining nutritional needs  Description: Monitor and assess patient's nutrition/hydration status for malnutrition  Collaborate with interdisciplinary team and initiate plan and interventions as ordered  Monitor patient's weight and dietary intake as ordered or per policy  Utilize nutrition screening tool and intervene as necessary  Determine patient's food preferences and provide high-protein, high-caloric foods as appropriate       INTERVENTIONS:  - Monitor oral intake, urinary output, labs, and treatment plans  - Assess nutrition and hydration status and recommend course of action  - Evaluate amount of meals eaten  - Assist patient with eating if necessary   - Allow adequate time for meals  - Recommend/ encourage appropriate diets, oral nutritional supplements, and vitamin/mineral supplements  - Order, calculate, and assess calorie counts as needed  - Recommend, monitor, and adjust tube feedings and TPN/PPN based on assessed needs  - Assess need for intravenous fluids  - Provide specific nutrition/hydration education as appropriate  - Include patient/family/caregiver in decisions related to nutrition  Outcome: Progressing     Problem: PAIN - ADULT  Goal: Verbalizes/displays adequate comfort level or baseline comfort level  Description: Interventions:  - Encourage patient to monitor pain and request assistance  - Assess pain using appropriate pain scale  - Administer analgesics based on type and severity of pain and evaluate response  - Implement non-pharmacological measures as appropriate and evaluate response  - Consider cultural and social influences on pain and pain management  - Notify physician/advanced practitioner if interventions unsuccessful or patient reports new pain  Outcome: Progressing     Problem: INFECTION - ADULT  Goal: Absence or prevention of progression during hospitalization  Description: INTERVENTIONS:  - Assess and monitor for signs and symptoms of infection  - Monitor lab/diagnostic results  - Monitor all insertion sites, i e  indwelling lines, tubes, and drains  - Monitor endotracheal if appropriate and nasal secretions for changes in amount and color  - [place] appropriate cooling/warming therapies per order  - Administer medications as ordered  - Instruct and encourage patient and family to use good hand hygiene technique  - Identify and instruct in appropriate isolation precautions for identified infection/condition  Outcome: Progressing  Goal: Absence of fever/infection during neutropenic period  Description: INTERVENTIONS:  - Monitor WBC    Outcome: Progressing     Problem: SAFETY ADULT  Goal: Patient will remain free of falls  Description: INTERVENTIONS:  - Educate patient/family on patient safety including physical limitations  - Instruct patient to call for assistance with activity   - Consult OT/PT to assist with strengthening/mobility   - Keep Call bell within reach  - Keep bed low and locked with side rails adjusted as appropriate  - Keep care items and personal belongings within reach  - Initiate and maintain comfort rounds  - Make Fall Risk Sign visible to staff  - Apply yellow socks and bracelet for high fall risk patients  - Consider moving patient to room near nurses station  Outcome: Progressing  Goal: Maintain or return to baseline ADL function  Description: INTERVENTIONS:  -  Assess patient's ability to carry out ADLs; assess patient's baseline for ADL function and identify physical deficits which impact ability to perform ADLs (bathing, care of mouth/teeth, toileting, grooming, dressing, etc )  - Assess/evaluate cause of self-care deficits   - Assess range of motion  - Assess patient's mobility; develop plan if impaired  - Assess patient's need for assistive devices and provide as appropriate  - Encourage maximum independence but intervene and supervise when necessary  - Involve family in performance of ADLs  - Assess for home care needs following discharge   - Consider OT consult to assist with ADL evaluation and planning for discharge  - Provide patient education as appropriate  Outcome: Progressing  Goal: Maintains/Returns to pre admission functional level  Description: INTERVENTIONS:  - Perform BMAT or MOVE assessment daily    - Set and communicate daily mobility goal to care team and patient/family/caregiver     - Collaborate with rehabilitation services on mobility goals if consulted  - Out of bed for toileting  - Record patient progress and toleration of activity level   Outcome: Progressing     Problem: DISCHARGE PLANNING  Goal: Discharge to home or other facility with appropriate resources  Description: INTERVENTIONS:  - Identify barriers to discharge w/patient and caregiver  - Arrange for needed discharge resources and transportation as appropriate  - Identify discharge learning needs (meds, wound care, etc )  - Arrange for interpretive services to assist at discharge as needed  - Refer to Case Management Department for coordinating discharge planning if the patient needs post-hospital services based on physician/advanced practitioner order or complex needs related to functional status, cognitive ability, or social support system  Outcome: Progressing     Problem: Knowledge Deficit  Goal: Patient/family/caregiver demonstrates understanding of disease process, treatment plan, medications, and discharge instructions  Description: Complete learning assessment and assess knowledge base    Interventions:  - Provide teaching at level of understanding  - Provide teaching via preferred learning methods  Outcome: Progressing     Problem: SAFETY,RESTRAINT: NV/NON-SELF DESTRUCTIVE BEHAVIOR  Goal: Remains free of harm/injury (restraint for non violent/non self-detsructive behavior)  Description: INTERVENTIONS:  - Instruct patient/family regarding restraint use   - Assess and monitor physiologic and psychological status   - Provide interventions and comfort measures to meet assessed patient needs   - Identify and implement measures to help patient regain control  - Assess readiness for release of restraint   Outcome: Progressing  Goal: Returns to optimal restraint-free functioning  Description: INTERVENTIONS:  - Assess the patient's behavior and symptoms that indicate continued need for restraint  - Identify and implement measures to help patient regain control  - Assess readiness for release of restraint   Outcome: Progressing

## 2022-11-28 NOTE — CONSULTS
The patient’s Vancomycin therapy has been completed / discontinued  Thank you for this consult; Pharmacy will sign-off now      Mone Sweeney PharmD

## 2022-11-28 NOTE — ASSESSMENT & PLAN NOTE
· Sepsis was present on admission as evidenced by tachycardia, leukocytosis, and fever  · Currently on IV vancomycin, and IV cefepime day 4  · 1/2 blood cultures from 11/25/2022 revealed Staph aureus -Staph epidermidis, however a methicillin resistance gene was positive  · Repeat blood cultures-11/28/2022-pending  · Wound cultures-positive for Proteus and Staph aureus also  · Await further antibiotic optimization as per Infectious Disease consultation

## 2022-11-29 ENCOUNTER — APPOINTMENT (INPATIENT)
Dept: RADIOLOGY | Facility: HOSPITAL | Age: 67
End: 2022-11-29

## 2022-11-29 ENCOUNTER — APPOINTMENT (INPATIENT)
Dept: GASTROENTEROLOGY | Facility: HOSPITAL | Age: 67
End: 2022-11-29

## 2022-11-29 PROBLEM — K92.2 UPPER GI BLEED: Status: ACTIVE | Noted: 2022-11-29

## 2022-11-29 PROBLEM — E87.8 ELECTROLYTE DISTURBANCE: Status: ACTIVE | Noted: 2022-11-29

## 2022-11-29 PROBLEM — Z93.3 S/P COLOSTOMY (HCC): Status: ACTIVE | Noted: 2022-11-29

## 2022-11-29 PROBLEM — R57.9 SHOCK (HCC): Status: ACTIVE | Noted: 2022-11-25

## 2022-11-29 PROBLEM — J96.01 ACUTE RESPIRATORY FAILURE WITH HYPOXIA (HCC): Status: ACTIVE | Noted: 2022-11-29

## 2022-11-29 PROBLEM — Z97.8 CHRONIC INDWELLING FOLEY CATHETER: Status: ACTIVE | Noted: 2022-11-29

## 2022-11-29 PROBLEM — D62 ACUTE BLOOD LOSS ANEMIA (ABLA): Status: ACTIVE | Noted: 2022-11-29

## 2022-11-29 PROBLEM — J69.0 ASPIRATION PNEUMONIA (HCC): Status: ACTIVE | Noted: 2022-11-29

## 2022-11-29 PROBLEM — B95.61 MSSA BACTEREMIA: Status: ACTIVE | Noted: 2022-11-27

## 2022-11-29 PROBLEM — R65.21 SEPTIC SHOCK (HCC): Status: ACTIVE | Noted: 2022-11-25

## 2022-11-29 LAB
2HR DELTA HS TROPONIN: 0 NG/L
ABO GROUP BLD: NORMAL
ABO GROUP BLD: NORMAL
ALBUMIN SERPL BCP-MCNC: 1.9 G/DL (ref 3.5–5)
ALBUMIN SERPL BCP-MCNC: 2 G/DL (ref 3.5–5)
ALP SERPL-CCNC: 85 U/L (ref 34–104)
ALP SERPL-CCNC: 87 U/L (ref 34–104)
ALT SERPL W P-5'-P-CCNC: 22 U/L (ref 7–52)
ALT SERPL W P-5'-P-CCNC: 27 U/L (ref 7–52)
ANION GAP SERPL CALCULATED.3IONS-SCNC: 6 MMOL/L (ref 4–13)
ANION GAP SERPL CALCULATED.3IONS-SCNC: 7 MMOL/L (ref 4–13)
ANISOCYTOSIS BLD QL SMEAR: PRESENT
AST SERPL W P-5'-P-CCNC: 24 U/L (ref 13–39)
AST SERPL W P-5'-P-CCNC: 32 U/L (ref 13–39)
BACTERIA BLD CULT: ABNORMAL
BACTERIA BLD CULT: ABNORMAL
BACTERIA WND AEROBE CULT: ABNORMAL
BASE EXCESS BLDA CALC-SCNC: 0.9 MMOL/L
BASE EXCESS BLDA CALC-SCNC: 2.8 MMOL/L
BASOPHILS # BLD MANUAL: 0 THOUSAND/UL (ref 0–0.1)
BASOPHILS NFR MAR MANUAL: 0 % (ref 0–1)
BILIRUB SERPL-MCNC: 0.18 MG/DL (ref 0.2–1)
BILIRUB SERPL-MCNC: 1.13 MG/DL (ref 0.2–1)
BLD GP AB SCN SERPL QL: NEGATIVE
BUN SERPL-MCNC: 10 MG/DL (ref 5–25)
BUN SERPL-MCNC: 17 MG/DL (ref 5–25)
CA-I BLD-SCNC: 1.02 MMOL/L (ref 1.12–1.32)
CA-I BLD-SCNC: 1.09 MMOL/L (ref 1.12–1.32)
CALCIUM ALBUM COR SERPL-MCNC: 8.8 MG/DL (ref 8.3–10.1)
CALCIUM ALBUM COR SERPL-MCNC: 9.1 MG/DL (ref 8.3–10.1)
CALCIUM SERPL-MCNC: 7.1 MG/DL (ref 8.4–10.2)
CALCIUM SERPL-MCNC: 7.5 MG/DL (ref 8.4–10.2)
CARDIAC TROPONIN I PNL SERPL HS: 13 NG/L
CARDIAC TROPONIN I PNL SERPL HS: 13 NG/L
CHLORIDE SERPL-SCNC: 112 MMOL/L (ref 96–108)
CHLORIDE SERPL-SCNC: 115 MMOL/L (ref 96–108)
CO2 SERPL-SCNC: 25 MMOL/L (ref 21–32)
CO2 SERPL-SCNC: 28 MMOL/L (ref 21–32)
CREAT SERPL-MCNC: 0.24 MG/DL (ref 0.6–1.3)
CREAT SERPL-MCNC: 0.28 MG/DL (ref 0.6–1.3)
EOSINOPHIL # BLD MANUAL: 0 THOUSAND/UL (ref 0–0.4)
EOSINOPHIL NFR BLD MANUAL: 0 % (ref 0–6)
ERYTHROCYTE [DISTWIDTH] IN BLOOD BY AUTOMATED COUNT: 20.8 % (ref 11.6–15.1)
GFR SERPL CREATININE-BSD FRML MDRD: 145 ML/MIN/1.73SQ M
GFR SERPL CREATININE-BSD FRML MDRD: 154 ML/MIN/1.73SQ M
GLUCOSE SERPL-MCNC: 130 MG/DL (ref 65–140)
GLUCOSE SERPL-MCNC: 152 MG/DL (ref 65–140)
GLUCOSE SERPL-MCNC: 162 MG/DL (ref 65–140)
GLUCOSE SERPL-MCNC: 95 MG/DL (ref 65–140)
GLUCOSE SERPL-MCNC: 97 MG/DL (ref 65–140)
GRAM STN SPEC: ABNORMAL
HCO3 BLDA-SCNC: 25.5 MMOL/L (ref 22–28)
HCO3 BLDA-SCNC: 27.6 MMOL/L (ref 22–28)
HCT VFR BLD AUTO: 19 % (ref 36.5–49.3)
HGB BLD-MCNC: 5.3 G/DL (ref 12–17)
HGB BLD-MCNC: 7.4 G/DL (ref 12–17)
HGB BLD-MCNC: 7.4 G/DL (ref 12–17)
HGB BLD-MCNC: 7.9 G/DL (ref 12–17)
HOROWITZ INDEX BLDA+IHG-RTO: 70 MM[HG]
HYPERCHROMIA BLD QL SMEAR: PRESENT
INR PPP: 1.46 (ref 0.84–1.19)
LACTATE SERPL-SCNC: 1.2 MMOL/L (ref 0.5–2)
LG PLATELETS BLD QL SMEAR: PRESENT
LYMPHOCYTES # BLD AUTO: 0.55 THOUSAND/UL (ref 0.6–4.47)
LYMPHOCYTES # BLD AUTO: 2 % (ref 14–44)
MAGNESIUM SERPL-MCNC: 1.8 MG/DL (ref 1.9–2.7)
MAGNESIUM SERPL-MCNC: 2.4 MG/DL (ref 1.9–2.7)
MCH RBC QN AUTO: 22.6 PG (ref 26.8–34.3)
MCHC RBC AUTO-ENTMCNC: 27.9 G/DL (ref 31.4–37.4)
MCV RBC AUTO: 81 FL (ref 82–98)
MECA+MECC ISLT/SPM QL: DETECTED
MICROCYTES BLD QL AUTO: PRESENT
MONOCYTES # BLD AUTO: 0.55 THOUSAND/UL (ref 0–1.22)
MONOCYTES NFR BLD: 2 % (ref 4–12)
MRSA NOSE QL CULT: ABNORMAL
MRSA NOSE QL CULT: ABNORMAL
NEUTROPHILS # BLD MANUAL: 26.3 THOUSAND/UL (ref 1.85–7.62)
NEUTS BAND NFR BLD MANUAL: 9 % (ref 0–8)
NEUTS SEG NFR BLD AUTO: 87 % (ref 43–75)
O2 CT BLDA-SCNC: 11 ML/DL (ref 16–23)
O2 CT BLDA-SCNC: 8.4 ML/DL (ref 16–23)
OXYHGB MFR BLDA: 94.2 % (ref 94–97)
OXYHGB MFR BLDA: 95.8 % (ref 94–97)
PCO2 BLDA: 40.8 MM HG (ref 36–44)
PCO2 BLDA: 44.1 MM HG (ref 36–44)
PEEP RESPIRATORY: 6 CM[H2O]
PH BLDA: 7.41 [PH] (ref 7.35–7.45)
PH BLDA: 7.42 [PH] (ref 7.35–7.45)
PHOSPHATE SERPL-MCNC: 2.5 MG/DL (ref 2.3–4.1)
PHOSPHATE SERPL-MCNC: 3.5 MG/DL (ref 2.3–4.1)
PLATELET # BLD AUTO: 568 THOUSANDS/UL (ref 149–390)
PLATELET BLD QL SMEAR: ABNORMAL
PMV BLD AUTO: 9 FL (ref 8.9–12.7)
PO2 BLDA: 100.2 MM HG (ref 75–129)
PO2 BLDA: 83 MM HG (ref 75–129)
POLYCHROMASIA BLD QL SMEAR: PRESENT
POTASSIUM SERPL-SCNC: 3.4 MMOL/L (ref 3.5–5.3)
POTASSIUM SERPL-SCNC: 3.6 MMOL/L (ref 3.5–5.3)
PROCALCITONIN SERPL-MCNC: 3.3 NG/ML
PROT SERPL-MCNC: 4.8 G/DL (ref 6.4–8.4)
PROT SERPL-MCNC: 4.9 G/DL (ref 6.4–8.4)
PROTHROMBIN TIME: 17.7 SECONDS (ref 11.6–14.5)
RBC # BLD AUTO: 2.34 MILLION/UL (ref 3.88–5.62)
RBC MORPH BLD: PRESENT
RH BLD: POSITIVE
RH BLD: POSITIVE
S AUREUS DNA BLD POS QL NAA+NON-PROBE: DETECTED
S EPIDERMIDIS DNA BLD POS QL NAA+NON-PRB: DETECTED
SODIUM SERPL-SCNC: 146 MMOL/L (ref 135–147)
SODIUM SERPL-SCNC: 147 MMOL/L (ref 135–147)
SPECIMEN EXPIRATION DATE: NORMAL
SPECIMEN SOURCE: ABNORMAL
VT SETTING VENT: 400 ML
WBC # BLD AUTO: 27.4 THOUSAND/UL (ref 4.31–10.16)

## 2022-11-29 PROCEDURE — 0BH18EZ INSERTION OF ENDOTRACHEAL AIRWAY INTO TRACHEA, VIA NATURAL OR ARTIFICIAL OPENING ENDOSCOPIC: ICD-10-PCS | Performed by: INTERNAL MEDICINE

## 2022-11-29 PROCEDURE — 02HV33Z INSERTION OF INFUSION DEVICE INTO SUPERIOR VENA CAVA, PERCUTANEOUS APPROACH: ICD-10-PCS | Performed by: INTERNAL MEDICINE

## 2022-11-29 PROCEDURE — 30233K1 TRANSFUSION OF NONAUTOLOGOUS FROZEN PLASMA INTO PERIPHERAL VEIN, PERCUTANEOUS APPROACH: ICD-10-PCS | Performed by: INTERNAL MEDICINE

## 2022-11-29 PROCEDURE — 4A133J1 MONITORING OF ARTERIAL PULSE, PERIPHERAL, PERCUTANEOUS APPROACH: ICD-10-PCS | Performed by: INTERNAL MEDICINE

## 2022-11-29 PROCEDURE — 03HY32Z INSERTION OF MONITORING DEVICE INTO UPPER ARTERY, PERCUTANEOUS APPROACH: ICD-10-PCS | Performed by: INTERNAL MEDICINE

## 2022-11-29 PROCEDURE — 5A1955Z RESPIRATORY VENTILATION, GREATER THAN 96 CONSECUTIVE HOURS: ICD-10-PCS | Performed by: INTERNAL MEDICINE

## 2022-11-29 PROCEDURE — 4A133B1 MONITORING OF ARTERIAL PRESSURE, PERIPHERAL, PERCUTANEOUS APPROACH: ICD-10-PCS | Performed by: INTERNAL MEDICINE

## 2022-11-29 PROCEDURE — 04HY32Z INSERTION OF MONITORING DEVICE INTO LOWER ARTERY, PERCUTANEOUS APPROACH: ICD-10-PCS | Performed by: INTERNAL MEDICINE

## 2022-11-29 PROCEDURE — 30233N1 TRANSFUSION OF NONAUTOLOGOUS RED BLOOD CELLS INTO PERIPHERAL VEIN, PERCUTANEOUS APPROACH: ICD-10-PCS | Performed by: INTERNAL MEDICINE

## 2022-11-29 PROCEDURE — 0W3P8ZZ CONTROL BLEEDING IN GASTROINTESTINAL TRACT, VIA NATURAL OR ARTIFICIAL OPENING ENDOSCOPIC: ICD-10-PCS | Performed by: STUDENT IN AN ORGANIZED HEALTH CARE EDUCATION/TRAINING PROGRAM

## 2022-11-29 RX ORDER — HYDROMORPHONE HCL/PF 1 MG/ML
0.5 SYRINGE (ML) INJECTION EVERY 2 HOUR PRN
Status: DISCONTINUED | OUTPATIENT
Start: 2022-11-29 | End: 2022-12-04

## 2022-11-29 RX ORDER — HYDROMORPHONE HCL/PF 1 MG/ML
1 SYRINGE (ML) INJECTION ONCE
Status: COMPLETED | OUTPATIENT
Start: 2022-11-29 | End: 2022-11-29

## 2022-11-29 RX ORDER — POTASSIUM CHLORIDE 14.9 MG/ML
20 INJECTION INTRAVENOUS
Status: COMPLETED | OUTPATIENT
Start: 2022-11-29 | End: 2022-11-30

## 2022-11-29 RX ORDER — POTASSIUM CHLORIDE 14.9 MG/ML
20 INJECTION INTRAVENOUS
Status: COMPLETED | OUTPATIENT
Start: 2022-11-29 | End: 2022-11-29

## 2022-11-29 RX ORDER — PANTOPRAZOLE SODIUM 40 MG/10ML
40 INJECTION, POWDER, LYOPHILIZED, FOR SOLUTION INTRAVENOUS EVERY 12 HOURS SCHEDULED
Status: DISCONTINUED | OUTPATIENT
Start: 2022-11-29 | End: 2022-12-01

## 2022-11-29 RX ORDER — FENTANYL CITRATE-0.9 % NACL/PF 10 MCG/ML
50 PLASTIC BAG, INJECTION (ML) INTRAVENOUS CONTINUOUS
Status: DISCONTINUED | OUTPATIENT
Start: 2022-11-29 | End: 2022-11-29

## 2022-11-29 RX ORDER — FENTANYL CITRATE-0.9 % NACL/PF 10 MCG/ML
100 PLASTIC BAG, INJECTION (ML) INTRAVENOUS CONTINUOUS
Status: DISCONTINUED | OUTPATIENT
Start: 2022-11-29 | End: 2022-11-29

## 2022-11-29 RX ORDER — SODIUM CHLORIDE, SODIUM GLUCONATE, SODIUM ACETATE, POTASSIUM CHLORIDE, MAGNESIUM CHLORIDE, SODIUM PHOSPHATE, DIBASIC, AND POTASSIUM PHOSPHATE .53; .5; .37; .037; .03; .012; .00082 G/100ML; G/100ML; G/100ML; G/100ML; G/100ML; G/100ML; G/100ML
1000 INJECTION, SOLUTION INTRAVENOUS ONCE
Status: COMPLETED | OUTPATIENT
Start: 2022-11-29 | End: 2022-11-29

## 2022-11-29 RX ORDER — CHLORHEXIDINE GLUCONATE 0.12 MG/ML
15 RINSE ORAL EVERY 12 HOURS SCHEDULED
Status: DISCONTINUED | OUTPATIENT
Start: 2022-11-29 | End: 2022-12-20 | Stop reason: HOSPADM

## 2022-11-29 RX ORDER — ONDANSETRON 2 MG/ML
4 INJECTION INTRAMUSCULAR; INTRAVENOUS ONCE
Status: COMPLETED | OUTPATIENT
Start: 2022-11-29 | End: 2022-11-29

## 2022-11-29 RX ORDER — SODIUM CHLORIDE, SODIUM GLUCONATE, SODIUM ACETATE, POTASSIUM CHLORIDE, MAGNESIUM CHLORIDE, SODIUM PHOSPHATE, DIBASIC, AND POTASSIUM PHOSPHATE .53; .5; .37; .037; .03; .012; .00082 G/100ML; G/100ML; G/100ML; G/100ML; G/100ML; G/100ML; G/100ML
125 INJECTION, SOLUTION INTRAVENOUS CONTINUOUS
Status: DISCONTINUED | OUTPATIENT
Start: 2022-11-29 | End: 2022-11-29

## 2022-11-29 RX ORDER — METOCLOPRAMIDE HYDROCHLORIDE 5 MG/ML
10 INJECTION INTRAMUSCULAR; INTRAVENOUS ONCE
Status: COMPLETED | OUTPATIENT
Start: 2022-11-29 | End: 2022-11-29

## 2022-11-29 RX ORDER — CALCIUM GLUCONATE 20 MG/ML
2 INJECTION, SOLUTION INTRAVENOUS ONCE
Status: COMPLETED | OUTPATIENT
Start: 2022-11-29 | End: 2022-11-29

## 2022-11-29 RX ORDER — FENTANYL CITRATE 50 UG/ML
50 INJECTION, SOLUTION INTRAMUSCULAR; INTRAVENOUS ONCE
Status: COMPLETED | OUTPATIENT
Start: 2022-11-29 | End: 2022-11-29

## 2022-11-29 RX ORDER — SODIUM CHLORIDE, SODIUM GLUCONATE, SODIUM ACETATE, POTASSIUM CHLORIDE, MAGNESIUM CHLORIDE, SODIUM PHOSPHATE, DIBASIC, AND POTASSIUM PHOSPHATE .53; .5; .37; .037; .03; .012; .00082 G/100ML; G/100ML; G/100ML; G/100ML; G/100ML; G/100ML; G/100ML
200 INJECTION, SOLUTION INTRAVENOUS CONTINUOUS
Status: DISCONTINUED | OUTPATIENT
Start: 2022-11-29 | End: 2022-11-30

## 2022-11-29 RX ORDER — FUROSEMIDE 10 MG/ML
40 INJECTION INTRAMUSCULAR; INTRAVENOUS ONCE
Status: COMPLETED | OUTPATIENT
Start: 2022-11-29 | End: 2022-11-29

## 2022-11-29 RX ORDER — FENTANYL CITRATE 50 UG/ML
50 INJECTION, SOLUTION INTRAMUSCULAR; INTRAVENOUS
Status: DISCONTINUED | OUTPATIENT
Start: 2022-11-29 | End: 2022-11-29

## 2022-11-29 RX ORDER — WATER 1000 ML/1000ML
INJECTION, SOLUTION INTRAVENOUS
Status: COMPLETED
Start: 2022-11-29 | End: 2022-11-29

## 2022-11-29 RX ORDER — PROPOFOL 10 MG/ML
5-50 INJECTION, EMULSION INTRAVENOUS
Status: DISCONTINUED | OUTPATIENT
Start: 2022-11-29 | End: 2022-12-01

## 2022-11-29 RX ORDER — SODIUM CHLORIDE, SODIUM GLUCONATE, SODIUM ACETATE, POTASSIUM CHLORIDE, MAGNESIUM CHLORIDE, SODIUM PHOSPHATE, DIBASIC, AND POTASSIUM PHOSPHATE .53; .5; .37; .037; .03; .012; .00082 G/100ML; G/100ML; G/100ML; G/100ML; G/100ML; G/100ML; G/100ML
1500 INJECTION, SOLUTION INTRAVENOUS ONCE
Status: COMPLETED | OUTPATIENT
Start: 2022-11-29 | End: 2022-11-29

## 2022-11-29 RX ORDER — POTASSIUM CHLORIDE 14.9 MG/ML
20 INJECTION INTRAVENOUS
Status: DISCONTINUED | OUTPATIENT
Start: 2022-11-29 | End: 2022-11-29

## 2022-11-29 RX ORDER — SODIUM CHLORIDE, SODIUM GLUCONATE, SODIUM ACETATE, POTASSIUM CHLORIDE, MAGNESIUM CHLORIDE, SODIUM PHOSPHATE, DIBASIC, AND POTASSIUM PHOSPHATE .53; .5; .37; .037; .03; .012; .00082 G/100ML; G/100ML; G/100ML; G/100ML; G/100ML; G/100ML; G/100ML
2000 INJECTION, SOLUTION INTRAVENOUS ONCE
Status: DISCONTINUED | OUTPATIENT
Start: 2022-11-29 | End: 2022-11-30

## 2022-11-29 RX ORDER — MAGNESIUM SULFATE HEPTAHYDRATE 40 MG/ML
2 INJECTION, SOLUTION INTRAVENOUS ONCE
Status: COMPLETED | OUTPATIENT
Start: 2022-11-29 | End: 2022-11-29

## 2022-11-29 RX ORDER — PANTOPRAZOLE SODIUM 40 MG/10ML
40 INJECTION, POWDER, LYOPHILIZED, FOR SOLUTION INTRAVENOUS EVERY 12 HOURS SCHEDULED
Status: DISCONTINUED | OUTPATIENT
Start: 2022-11-29 | End: 2022-11-29

## 2022-11-29 RX ADMIN — PROPOFOL 50 MCG/KG/MIN: 10 INJECTION, EMULSION INTRAVENOUS at 23:12

## 2022-11-29 RX ADMIN — SODIUM CHLORIDE, SODIUM GLUCONATE, SODIUM ACETATE, POTASSIUM CHLORIDE AND MAGNESIUM CHLORIDE 1000 ML: 526; 502; 368; 37; 30 INJECTION, SOLUTION INTRAVENOUS at 13:05

## 2022-11-29 RX ADMIN — PANTOPRAZOLE SODIUM 40 MG: 40 INJECTION, POWDER, FOR SOLUTION INTRAVENOUS at 20:57

## 2022-11-29 RX ADMIN — CHLORHEXIDINE GLUCONATE 0.12% ORAL RINSE 15 ML: 1.2 LIQUID ORAL at 21:07

## 2022-11-29 RX ADMIN — PROPOFOL 50 MCG/KG/MIN: 10 INJECTION, EMULSION INTRAVENOUS at 17:24

## 2022-11-29 RX ADMIN — CEFEPIME HYDROCHLORIDE 2000 MG: 2 INJECTION, SOLUTION INTRAVENOUS at 14:17

## 2022-11-29 RX ADMIN — ONDANSETRON 4 MG: 2 INJECTION INTRAMUSCULAR; INTRAVENOUS at 09:30

## 2022-11-29 RX ADMIN — MAGNESIUM SULFATE HEPTAHYDRATE 2 G: 40 INJECTION, SOLUTION INTRAVENOUS at 13:23

## 2022-11-29 RX ADMIN — CALCIUM GLUCONATE 2 G: 20 INJECTION, SOLUTION INTRAVENOUS at 19:28

## 2022-11-29 RX ADMIN — HYDROMORPHONE HYDROCHLORIDE 1.5 MG/HR: 10 INJECTION, SOLUTION INTRAMUSCULAR; INTRAVENOUS; SUBCUTANEOUS at 13:05

## 2022-11-29 RX ADMIN — CEFEPIME HYDROCHLORIDE 2000 MG: 2 INJECTION, SOLUTION INTRAVENOUS at 05:47

## 2022-11-29 RX ADMIN — NOREPINEPHRINE BITARTRATE 10 MCG/MIN: 1 INJECTION, SOLUTION INTRAVENOUS at 17:25

## 2022-11-29 RX ADMIN — SODIUM CHLORIDE 8 MG/HR: 9 INJECTION, SOLUTION INTRAVENOUS at 13:49

## 2022-11-29 RX ADMIN — CHLORHEXIDINE GLUCONATE 0.12% ORAL RINSE 15 ML: 1.2 LIQUID ORAL at 13:49

## 2022-11-29 RX ADMIN — CALCIUM GLUCONATE 2 G: 20 INJECTION, SOLUTION INTRAVENOUS at 12:25

## 2022-11-29 RX ADMIN — POTASSIUM CHLORIDE 20 MEQ: 200 INJECTION, SOLUTION INTRAVENOUS at 13:35

## 2022-11-29 RX ADMIN — SODIUM CHLORIDE, SODIUM ACETATE ANHYDROUS, SODIUM GLUCONATE, POTASSIUM CHLORIDE, AND MAGNESIUM CHLORIDE 200 ML/HR: 526; 222; 502; 37; 30 INJECTION, SOLUTION INTRAVENOUS at 23:38

## 2022-11-29 RX ADMIN — Medication: at 17:08

## 2022-11-29 RX ADMIN — DEXTROSE 1000 ML: 5 SOLUTION INTRAVENOUS at 17:17

## 2022-11-29 RX ADMIN — VASOPRESSIN 0.04 UNITS/MIN: 20 INJECTION INTRAVENOUS at 11:24

## 2022-11-29 RX ADMIN — PROPOFOL 50 MCG/KG/MIN: 10 INJECTION, EMULSION INTRAVENOUS at 11:59

## 2022-11-29 RX ADMIN — HYDROMORPHONE HYDROCHLORIDE 1 MG: 1 INJECTION, SOLUTION INTRAMUSCULAR; INTRAVENOUS; SUBCUTANEOUS at 11:54

## 2022-11-29 RX ADMIN — METOCLOPRAMIDE 10 MG: 5 INJECTION, SOLUTION INTRAMUSCULAR; INTRAVENOUS at 13:35

## 2022-11-29 RX ADMIN — ONDANSETRON 4 MG: 2 INJECTION INTRAMUSCULAR; INTRAVENOUS at 07:49

## 2022-11-29 RX ADMIN — CEFEPIME HYDROCHLORIDE 2000 MG: 2 INJECTION, SOLUTION INTRAVENOUS at 21:06

## 2022-11-29 RX ADMIN — SODIUM CHLORIDE 80 MG: 9 INJECTION, SOLUTION INTRAVENOUS at 11:52

## 2022-11-29 RX ADMIN — POTASSIUM CHLORIDE 20 MEQ: 14.9 INJECTION, SOLUTION INTRAVENOUS at 20:33

## 2022-11-29 RX ADMIN — NOREPINEPHRINE BITARTRATE 4 MCG/MIN: 1 INJECTION, SOLUTION INTRAVENOUS at 09:56

## 2022-11-29 RX ADMIN — SODIUM CHLORIDE, SODIUM GLUCONATE, SODIUM ACETATE, POTASSIUM CHLORIDE, MAGNESIUM CHLORIDE, SODIUM PHOSPHATE, DIBASIC, AND POTASSIUM PHOSPHATE 1500 ML: .53; .5; .37; .037; .03; .012; .00082 INJECTION, SOLUTION INTRAVENOUS at 21:26

## 2022-11-29 RX ADMIN — SODIUM CHLORIDE 1000 ML: 0.9 INJECTION, SOLUTION INTRAVENOUS at 08:50

## 2022-11-29 RX ADMIN — FENTANYL CITRATE 50 MCG/HR: 0.05 INJECTION, SOLUTION INTRAMUSCULAR; INTRAVENOUS at 10:02

## 2022-11-29 RX ADMIN — PROPOFOL 50 MCG/KG/MIN: 10 INJECTION, EMULSION INTRAVENOUS at 09:55

## 2022-11-29 RX ADMIN — SODIUM CHLORIDE, SODIUM GLUCONATE, SODIUM ACETATE, POTASSIUM CHLORIDE AND MAGNESIUM CHLORIDE 125 ML/HR: 526; 502; 368; 37; 30 INJECTION, SOLUTION INTRAVENOUS at 21:03

## 2022-11-29 RX ADMIN — POTASSIUM PHOSPHATE, MONOBASIC AND POTASSIUM PHOSPHATE, DIBASIC 30 MMOL: 224; 236 INJECTION, SOLUTION, CONCENTRATE INTRAVENOUS at 23:35

## 2022-11-29 RX ADMIN — SODIUM CHLORIDE, SODIUM GLUCONATE, SODIUM ACETATE, POTASSIUM CHLORIDE, MAGNESIUM CHLORIDE, SODIUM PHOSPHATE, DIBASIC, AND POTASSIUM PHOSPHATE 1000 ML: .53; .5; .37; .037; .03; .012; .00082 INJECTION, SOLUTION INTRAVENOUS at 11:10

## 2022-11-29 RX ADMIN — NOREPINEPHRINE BITARTRATE 12 MCG/MIN: 1 INJECTION, SOLUTION INTRAVENOUS at 22:39

## 2022-11-29 RX ADMIN — FENTANYL CITRATE 50 MCG: 50 INJECTION, SOLUTION INTRAMUSCULAR; INTRAVENOUS at 08:30

## 2022-11-29 RX ADMIN — SODIUM CHLORIDE, SODIUM GLUCONATE, SODIUM ACETATE, POTASSIUM CHLORIDE AND MAGNESIUM CHLORIDE 125 ML/HR: 526; 502; 368; 37; 30 INJECTION, SOLUTION INTRAVENOUS at 11:49

## 2022-11-29 RX ADMIN — SODIUM CHLORIDE, SODIUM GLUCONATE, SODIUM ACETATE, POTASSIUM CHLORIDE AND MAGNESIUM CHLORIDE 1000 ML: 526; 502; 368; 37; 30 INJECTION, SOLUTION INTRAVENOUS at 09:56

## 2022-11-29 RX ADMIN — HYDROMORPHONE HYDROCHLORIDE 1 MG: 1 INJECTION, SOLUTION INTRAMUSCULAR; INTRAVENOUS; SUBCUTANEOUS at 19:29

## 2022-11-29 RX ADMIN — FUROSEMIDE 40 MG: 10 INJECTION, SOLUTION INTRAVENOUS at 19:29

## 2022-11-29 RX ADMIN — POTASSIUM CHLORIDE 20 MEQ: 200 INJECTION, SOLUTION INTRAVENOUS at 13:15

## 2022-11-29 NOTE — PROGRESS NOTES
I personally called the guardian Milagro Tello for protein update  I answered all his questions and informed him about patient's current status and the results of the EGD  He gave us permission to speak to the nursing facility staff who accompanies in the patient in the hospital for updates as well

## 2022-11-29 NOTE — ASSESSMENT & PLAN NOTE
· Notified by nursing that the patient was tachypneic, tachycardic, and hypotensive  · Upon immediate evaluation, patient was noted to be in significant respiratory distress - suspect that the respiratory distress was secondary to an aspiration event  · Tube feeds were discontinued  · Immediate ICU consultation was obtained  · Patient witnessed to have a significant upper GI bleed on attempts at suctioning, large amounts of coffee-ground emesis no  · Patient made NPO, given IV fluids, was started on IV Protonix drip, stat GI consult requested  · ABG and portable chest x-ray ordered stat  · After being evaluated by the critical care team, a decision was made to intubate the patient for airway protection  · Patient will be upgraded to the critical care service  · Patient's guardian Ronak Gallagher was brought up to par at 8:00 a m  - at this time, the patient is to remain a full code

## 2022-11-29 NOTE — DISCHARGE INSTR - OTHER ORDERS
Skin, Ostomy and Wound Care Plan:  1  Change colostomy pouch every 3 days or PRN with signs of leakage  2  Apply skin nourishing cream to the skin daily  3  Turn patient Q2 hours to offload pressure points  4  Posterior chest/upper back, sacrum and right SI joint, right ischium  Remove dressings and packing  Clean skin with chlorhexidine wipes  Irrigate wounds with wound cleanser  Pack with WTD dressing  Silicone cream to shanon-wound  Cover with bulky dressing for drainage (4x4, abd, heavy drainage pad)  Secure with tape or with mesh underwear  Change dressings daily and PRN with saturation  Apply santyl to sacral ulcer daily  (Disregard these directions if patient discharged with VAC in place)  5  Left heel and lateral foot ulcer: please apply santyl to the heel and lateral foot ulcers cover with dry sterile dressings  6  Place Allevyn life silicone bordered dressing to the right and left outer ear wounds and left anterior hand, momo with T, date, change every three days and PRN  7  Right upper arm wounds, cleanse with NSS, place Dermagran on wound beds cut to size of wounds, top with 4X4 and wrap with gauze, change daily and PRN  8  Prevalon boots-left and right foot  9  P-500 low air loss therapy surface  10  Place Maxorb between 4th and 5th toes of the left foot    Continue to follow up with Wound Center on discharge      Wound Vac Settings for Discharge    Pressure:  125  Suction:   Continuous  Sponge Color:  Black    VAC dressing change/application instructions  1  Patient tube feeds discontinued prior, layed flat supine then rolled onto left side  2  Existing dressings from upper back, sacrum, right ischium taken down  3  Wounds inspected, measured, then irrigated with wound cleanser and patted dry  4  Barrier film applied to periwound areas and all areas where bridging will occur  5  XL granufoam kit used, intra-wound foam cut to size x 3 (upper back, sacrum, ischium)    6  Foam placed into wound then drape applied over foam x 3   7  Under drape (sheet cut in half length wise) applied to bridge upper back to the sacrum and ischium to the sacrum, then from sacrum to the right hip (lateral/anterior)  8   At each wound a 3 cm wide holes cut over top to allow air flow  9  Bridge strips of foam (3 cm wide) cut to connect upper back to just lateral to sacrum (#1), right ischium to just lateral to sacrum (#2) and from sacrum to left hip overlapping with first two (#3)  10  Bridging foam placed to overlap underdrape holes at the wounds and to connect lateral to the sacrum and secured with multiple pieces of drape to cover (full sheets cut in half long wise)  11  Cut opening for track pad where foam ends on right hip  12  Connect tubing, start vac therapy, ensure good seal/no leak, maintain suction at -125 mmHG  13  Change dressings three times weekly (either M/W/F or T/Th/Sat)  14  Can call Dr Leal [facility] office ([phone]) with questions or follow-up with wound care center for follow-up every 1-2 weeks  Supplies required: VAC with canisters to be changed every other change (2 per week), XL granufoam kit (one kit per change (3 per week)), 3 M Barrier film to protect skin 3 mL (3 pack each change), wound irrigation/cleanser solution, scissors to cut drapes/foam, CHG wipes

## 2022-11-29 NOTE — PLAN OF CARE
Problem: Prexisting or High Potential for Compromised Skin Integrity  Goal: Skin integrity is maintained or improved  Description: INTERVENTIONS:  - Identify patients at risk for skin breakdown  - Assess and monitor skin integrity  - Assess and monitor nutrition and hydration status  - Monitor labs   - Assess for incontinence   - Turn and reposition patient  - Assist with mobility/ambulation  - Relieve pressure over bony prominences  - Avoid friction and shearing  - Provide appropriate hygiene as needed including keeping skin clean and dry  - Evaluate need for skin moisturizer/barrier cream  - Collaborate with interdisciplinary team   - Patient/family teaching  - Consider wound care consult   Outcome: Progressing     Problem: Nutrition/Hydration-ADULT  Goal: Nutrient/Hydration intake appropriate for improving, restoring or maintaining nutritional needs  Description: Monitor and assess patient's nutrition/hydration status for malnutrition  Collaborate with interdisciplinary team and initiate plan and interventions as ordered  Monitor patient's weight and dietary intake as ordered or per policy  Utilize nutrition screening tool and intervene as necessary  Determine patient's food preferences and provide high-protein, high-caloric foods as appropriate       INTERVENTIONS:  - Monitor oral intake, urinary output, labs, and treatment plans  - Assess nutrition and hydration status and recommend course of action  - Evaluate amount of meals eaten  - Assist patient with eating if necessary   - Allow adequate time for meals  - Recommend/ encourage appropriate diets, oral nutritional supplements, and vitamin/mineral supplements  - Order, calculate, and assess calorie counts as needed  - Recommend, monitor, and adjust tube feedings and TPN/PPN based on assessed needs  - Assess need for intravenous fluids  - Provide specific nutrition/hydration education as appropriate  - Include patient/family/caregiver in decisions related to nutrition  Outcome: Progressing     Problem: PAIN - ADULT  Goal: Verbalizes/displays adequate comfort level or baseline comfort level  Description: Interventions:  - Encourage patient to monitor pain and request assistance  - Assess pain using appropriate pain scale  - Administer analgesics based on type and severity of pain and evaluate response  - Implement non-pharmacological measures as appropriate and evaluate response  - Consider cultural and social influences on pain and pain management  - Notify physician/advanced practitioner if interventions unsuccessful or patient reports new pain  Outcome: Progressing     Problem: INFECTION - ADULT  Goal: Absence or prevention of progression during hospitalization  Description: INTERVENTIONS:  - Assess and monitor for signs and symptoms of infection  - Monitor lab/diagnostic results  - Monitor all insertion sites, i e  indwelling lines, tubes, and drains  - Monitor endotracheal if appropriate and nasal secretions for changes in amount and color  - Panora appropriate cooling/warming therapies per order  - Administer medications as ordered  - Instruct and encourage patient and family to use good hand hygiene technique  - Identify and instruct in appropriate isolation precautions for identified infection/condition  Outcome: Progressing  Goal: Absence of fever/infection during neutropenic period  Description: INTERVENTIONS:  - Monitor WBC    Outcome: Progressing     Problem: DISCHARGE PLANNING  Goal: Discharge to home or other facility with appropriate resources  Description: INTERVENTIONS:  - Identify barriers to discharge w/patient and caregiver  - Arrange for needed discharge resources and transportation as appropriate  - Identify discharge learning needs (meds, wound care, etc )  - Arrange for interpretive services to assist at discharge as needed  - Refer to Case Management Department for coordinating discharge planning if the patient needs post-hospital services based on physician/advanced practitioner order or complex needs related to functional status, cognitive ability, or social support system  Outcome: Progressing     Problem: Knowledge Deficit  Goal: Patient/family/caregiver demonstrates understanding of disease process, treatment plan, medications, and discharge instructions  Description: Complete learning assessment and assess knowledge base    Interventions:  - Provide teaching at level of understanding  - Provide teaching via preferred learning methods  Outcome: Progressing     Problem: SAFETY,RESTRAINT: NV/NON-SELF DESTRUCTIVE BEHAVIOR  Goal: Remains free of harm/injury (restraint for non violent/non self-detsructive behavior)  Description: INTERVENTIONS:  - Instruct patient/family regarding restraint use   - Assess and monitor physiologic and psychological status   - Provide interventions and comfort measures to meet assessed patient needs   - Identify and implement measures to help patient regain control  - Assess readiness for release of restraint   Outcome: Progressing  Goal: Returns to optimal restraint-free functioning  Description: INTERVENTIONS:  - Assess the patient's behavior and symptoms that indicate continued need for restraint  - Identify and implement measures to help patient regain control  - Assess readiness for release of restraint   Outcome: Progressing     Problem: SAFETY ADULT  Goal: Patient will remain free of falls  Description: INTERVENTIONS:  - Educate patient/family on patient safety including physical limitations  - Instruct patient to call for assistance with activity   - Consult OT/PT to assist with strengthening/mobility   - Keep Call bell within reach  - Keep bed low and locked with side rails adjusted as appropriate  - Keep care items and personal belongings within reach  - Initiate and maintain comfort rounds  - Make Fall Risk Sign visible to staff  - Offer Toileting in advance of need  - Initiate/Maintain bed alarm  - Obtain necessary fall risk management equipment:   - Apply yellow socks and bracelet for high fall risk patients  - Consider moving patient to room near nurses station  Outcome: Progressing  Goal: Maintain or return to baseline ADL function  Description: INTERVENTIONS:  -  Assess patient's ability to carry out ADLs; assess patient's baseline for ADL function and identify physical deficits which impact ability to perform ADLs (bathing, care of mouth/teeth, toileting, grooming, dressing, etc )  - Assess/evaluate cause of self-care deficits   - Assess range of motion  - Assess patient's mobility; develop plan if impaired  - Assess patient's need for assistive devices and provide as appropriate  - Encourage maximum independence but intervene and supervise when necessary  - Involve family in performance of ADLs  - Assess for home care needs following discharge   - Consider OT consult to assist with ADL evaluation and planning for discharge  - Provide patient education as appropriate  Outcome: Progressing  Goal: Maintains/Returns to pre admission functional level  Description: INTERVENTIONS:  - Perform BMAT or MOVE assessment daily    - Set and communicate daily mobility goal to care team and patient/family/caregiver     - Collaborate with rehabilitation services on mobility goals if consulted  - Record patient progress and toleration of activity level   Outcome: Progressing

## 2022-11-29 NOTE — PROCEDURES
Intubation    Date/Time: 11/29/2022 7:45 AM  Performed by: AVANI Manley  Authorized by: AVANI Manley     Patient location:  Bedside  Consent:     Consent obtained:  Emergent situation  Universal protocol:     Radiology Images displayed and confirmed  If images not available, report reviewed: yes      Required blood products, implants, devices, and special equipment available: yes      Site/side marked: yes      Immediately prior to procedure, a time out was called: yes      Patient identity confirmed:  Hospital-assigned identification number and arm band  Pre-procedure details:     Patient status: MR at baseline  Pretreatment medications:  Etomidate    Paralytics:  Succinylcholine  Indications:     Indications for intubation: respiratory distress, respiratory failure, airway protection and hypercapnia    Procedure details:     Preoxygenation:  Bag valve mask    Intubation method:  Oral    Oral intubation technique:  Direct and glidescope (First Coverage)    Laryngoscope blade: Mac 3    Tube size (mm):  7 5    Tube type:  Cuffed and hi-lo    Number of attempts:  2    Ventilation between attempts: yes      Cricoid pressure: yes      Tube visualized through cords: yes    Placement assessment:     ETT to teeth:  22    Tube secured with:  ETT guillaume    Breath sounds:  Equal    Placement verification: chest rise, condensation, colorimetric ETCO2 device, CXR verification, direct visualization, equal breath sounds, ETCO2 detector and tube exhalation      CXR findings:  ETT in proper place  Post-procedure details:     Patient tolerance of procedure:   Tolerated well, no immediate complications  Comments:      Emergently intubated in setting of respiratory distress 2/2 acute GIB/shock state

## 2022-11-29 NOTE — QUICK NOTE
Patient's guardian Court Figueroa was called and updated on EGD findings  All questions were answered

## 2022-11-29 NOTE — ASSESSMENT & PLAN NOTE
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

## 2022-11-29 NOTE — PROCEDURES
Arterial Line Insertion    Date/Time: 11/29/2022 8:46 AM  Performed by: AVANI Arana  Authorized by: AVANI Arana     Patient location:  Bedside  Consent:     Consent obtained:  Emergent situation  Universal protocol:     Radiology Images displayed and confirmed  If images not available, report reviewed: yes      Required blood products, implants, devices, and special equipment available: yes      Site/side marked: yes      Immediately prior to procedure a time out was called: yes      Patient identity confirmed:  Hospital-assigned identification number, arm band and anonymous protocol, patient vented/unresponsive  Indications:     Indications: hemodynamic monitoring, multiple ABGs, continuous blood pressure monitoring and frequent labs / infusion    Pre-procedure details:     Skin preparation:  Chlorhexidine    Preparation: Patient was prepped and draped in sterile fashion    Anesthesia (see MAR for exact dosages): Anesthesia method:  Local infiltration    Local anesthetic:  Lidocaine 1% w/o epi  Procedure details:     Location / Tip of Catheter:  Other (comment) (Brachial)    Laterality:  Right    Needle gauge:  20 G    Placement technique:  Percutaneous, Seldinger and ultrasound guided    Sterile ultrasound techniques: Sterile gel and sterile probe covers were used      Number of attempts:  1    Successful placement: yes      Transducer: waveform confirmed    Post-procedure details:     Post-procedure:  Secured with tape, sterile dressing applied, sutured and wrist guard applied    CMS:  Unchanged    Patient tolerance of procedure:   Tolerated well, no immediate complications

## 2022-11-29 NOTE — NURSING NOTE
Pt arrived from the [facility] unitto the ICU intubated, vented, and notably a GI bleed  Very large amount of coffee ground secretions from J tube and oral  VSS and unable to validate A&O status  From report the patient is from a facility in [facility]  Pt has a guardian and the caregiver from [facility] was present today  Pt also presented to the [facility] floor with multiple pressure ulcers, and fluid filled blisters  Propofol was started

## 2022-11-29 NOTE — ASSESSMENT & PLAN NOTE
· S/p bedside debridement with surgery 11/26  · Continue antibiotics as outlined above  · Follow-up on final wound cultures

## 2022-11-29 NOTE — WOUND OSTOMY CARE
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, left lateral foot, right upper back and stage 3 pressure injuries to the right ischium and right lower back and developmental disability, Patient is non-verbal and 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  Patient seen with primary RN  Assessment Findings:   Patient assessed in bed with primary RN  Patient was seen by surgical PA who had debrided wounds on 11/26/2022, wound care orders placed at that time by surgery  Patient is awake and alert, he has soft mitt restraints in place bilaterally, has a freire catheter, left lower quadrant colostomy, PEG tube with tube feedings infusing  Colostomy pouch intact at time of assessment  Plan for change of pouch later today, patient has supplies sent from nursing home  Patient is bed-bound, contractures to b/l legs and arms  He responds to pain with dressings changes and cleansing of wounds  All wounds are present on admission  1  POA-stage 4 PI to the left heel, wound bed mix of yellow, brown and black slough with approx 10% pink tissue at the proximal edge, small amount of brown and yellow drainage  2  POA-left posterior hand wound, dried wound bed with beefy red base and dried brown scab to the outer edge, no drainage  3  POA-Right buttock unstageable pressure injury, black eschar to the wound bed with slightly rolled epithelial edge, no drainage  4  POA-Right ischium unstageable pressure injury, mix of yellow slough to the outer edge with brown eschar and dry beefy red to the wound base, no drainage  5   POA-Sacral wound, stage 4, undermining present, no tunneling, deepest depth is at 12:00, approx 30% yellow slough to the proximal wound edge and proximal wound, granulation tissue to the wound bed, moderate amount of yellow drainage on dressing when removed  6  POA-Stage 3 pressure injury to the right lower buttocks-ischium, wound bed located within area of heavily scarred tissue, yellow slough in the wound base at the center of the scar tissue  7  POA-resolving stage 3 pressure injury to the right lower back, scar tissue with scant yellow scaly skin to the center of the scar tissue, no drainage  Hypopigmented blanchable areas  In the periwound with white in the centers  8  POA-right upper back stage 4 pressure injury  Wound bed is mix of yellow and brown slough, granulation tissue to the distal area of the wound bed,  undermining noted from 12-4 and 6-12 o'clock, no tunneling noted  Mix of pink and yellow slough to the outer edge, edge unattached from wound  Heavy drainage from wound, mix of green and yellow drainage    Skin and Wound Care Plan:   1  Wound care treatment per surgery orders; Left heel  Remove dressings  Clean with soap/water  Apply betadine gel to 2x2 and cover wound  Wrap with ABD/4 inch cling wrap  Offload pressure points  Repeat daily  Posterior chest/upper back, sacrum and right SI joint, right ischium  Remove dressings and packing  Clean skin with chlorhexidine wipes  Irrigate wounds with wound cleanser  Pack with WTD dressing  Silicone cream to shanon-wound  Cover with bulky dressing for draiange (4x4, abd, heavy drainage pad)  Secure with tape or with mesh underwear  Apply santyl to sacral ulcer daily  2  Apply hydraguard to right heel and keep  both heels offloaded  3  Apply skin nourishing cream to the skin daily  4  Turn patient Q2 hours to offload pressure points   5  Allevyn life silicone bordered dressing to the left lateral foot wound, change daily  6   Apply 3M No Sting to the left posterior hand wound  7  Prevalon boot to the left foot  8  P-500 low air loss therapy surface  9  Change colostomy pouch every 3 days and PRN with signs of leakage, empty when 1/3-1/2 full  Patient has own supplies provided by nursing home    Wounds:  Wound 05/16/22 Pressure Injury Back Right;Upper (Active)   Wound Image   11/28/22 1055   Wound Description BRIDGETTE 11/28/22 2114   Pressure Injury Stage 4 11/28/22 2114   Sybil-wound Assessment Dry; Intact 11/28/22 2114   Wound Length (cm) 7 6 cm 11/28/22 1055   Wound Width (cm) 4 7 cm 11/28/22 1055   Wound Depth (cm) 2 cm 11/28/22 1055   Wound Surface Area (cm^2) 35 72 cm^2 11/28/22 1055   Wound Volume (cm^3) 71 44 cm^3 11/28/22 1055   Calculated Wound Volume (cm^3) 71 44 cm^3 11/28/22 1055   Change in Wound Size % -2313 51 11/28/22 1055   Tunneling 0 cm 11/28/22 1055   Undermining 1 6 11/28/22 1055   Undermining is depth extending from 12-12 11/28/22 1055   Drainage Amount Moderate 11/28/22 1055   Drainage Description Green; Foul smelling;Yellow 11/28/22 1055   Treatments Cleansed 11/28/22 1055   Dressing ABD;Dry dressing; Other (Comment) 11/28/22 2114   Wound packed? Yes 11/28/22 1055   Packing- # removed 1 11/28/22 1055   Packing- # inserted 1 11/28/22 1055   Dressing Changed Changed 11/28/22 1055   Patient Tolerance Tolerated well 11/28/22 1055   Dressing Status Dry; Intact 11/28/22 2114       Wound 05/16/22 Pressure Injury Back Lateral;Right; Lower (Active)   Wound Image   11/28/22 1101   Wound Description BRIDGETTE 11/28/22 2114   Pressure Injury Stage 3 11/28/22 1101   Sybil-wound Assessment Dry;Scar Tissue 11/28/22 1101   Tunneling 0 cm 11/28/22 1101   Undermining 0 11/28/22 1101   Drainage Amount None 11/28/22 1101   Treatments Cleansed 11/28/22 1101   Dressing ABD;Gauze; Other (Comment) 11/28/22 2114   Wound packed? No 11/28/22 1101   Dressing Changed Changed 11/28/22 1101   Patient Tolerance Tolerated well 11/28/22 1101   Dressing Status Dry; Intact 11/27/22 2006       Wound 05/16/22 Pressure Injury Sacrum (Active)   Wound Image   11/28/22 1115   Wound Description BRIDGETTE 11/28/22 2114   Pressure Injury Stage 4 11/28/22 2114   Sybil-wound Assessment Scar Tissue; Hyperpigmented 11/28/22 1115   Wound Length (cm) 5 5 cm 11/28/22 1115   Wound Width (cm) 3 cm 11/28/22 1115   Wound Depth (cm) 2 cm 11/28/22 1115   Wound Surface Area (cm^2) 16 5 cm^2 11/28/22 1115   Wound Volume (cm^3) 33 cm^3 11/28/22 1115   Calculated Wound Volume (cm^3) 33 cm^3 11/28/22 1115   Change in Wound Size % 51 65 11/28/22 1115   Tunneling 0 cm 11/28/22 1115   Undermining 2 5 11/28/22 1115   Drainage Amount Moderate 11/28/22 1115   Drainage Description Yellow 11/28/22 1115   Treatments Cleansed 11/28/22 1115   Dressing Other (Comment) 11/28/22 2114   Wound packed? Yes 11/28/22 1115   Packing- # removed 1 11/28/22 1115   Packing- # inserted 1 11/28/22 1115   Dressing Changed Changed 11/28/22 1115   Patient Tolerance Tolerated well 11/28/22 1115   Dressing Status Dry; Intact 11/28/22 2114       Wound 05/16/22 Pressure Injury Buttocks Right (Active)   Wound Image   11/28/22 1119   Wound Description BRIDGETTE 11/28/22 2114   Pressure Injury Stage 4 11/28/22 2114   Sybil-wound Assessment Dry 11/28/22 1119   Wound Length (cm) 5 1 cm 11/28/22 1119   Wound Width (cm) 3 6 cm 11/28/22 1119   Wound Depth (cm) 1 1 cm 11/28/22 1119   Wound Surface Area (cm^2) 18 36 cm^2 11/28/22 1119   Wound Volume (cm^3) 20 196 cm^3 11/28/22 1119   Calculated Wound Volume (cm^3) 20 2 cm^3 11/28/22 1119   Change in Wound Size % 66 6 11/28/22 1119   Tunneling 0 cm 11/28/22 1119   Undermining 0 11/28/22 1119   Treatments Cleansed 11/28/22 1119   Dressing ABD;Dry dressing; Other (Comment) 11/28/22 2114   Wound packed? No 11/28/22 1119   Dressing Changed Changed 11/28/22 1119   Dressing Status Clean;Dry; Intact 11/28/22 2114       Wound 05/16/22 Pressure Injury Foot Left;Lateral (Active)   Wound Description BRIDGETTE 11/28/22 2114   Pressure Injury Stage 4 11/28/22 1119 Sybil-wound Assessment Dry 11/28/22 1119   Tunneling 0 cm 11/28/22 1119   Undermining 0 11/28/22 1119   Drainage Amount Small 11/28/22 1119   Drainage Description Brown;Yellow 11/28/22 1119   Treatments Cleansed 11/28/22 1119   Dressing Foam, Silicon (eg  Allevyn, etc) 11/28/22 2114   Dressing Changed Changed 11/28/22 1119   Patient Tolerance Tolerated well 11/28/22 1119   Dressing Status Clean;Dry; Intact 11/28/22 2114       Wound 08/29/22 Heel Left;Posterior (Active)   Wound Image   11/28/22 1134   Wound Description BRIDGETTE 11/28/22 2114   Pressure Injury Stage U 11/28/22 1134   Sybil-wound Assessment Scar Tissue 11/28/22 1134   Wound Length (cm) 1 6 cm 11/28/22 1134   Wound Width (cm) 2 cm 11/28/22 1134   Wound Depth (cm) 0 5 cm 11/28/22 1134   Wound Surface Area (cm^2) 3 2 cm^2 11/28/22 1134   Wound Volume (cm^3) 1 6 cm^3 11/28/22 1134   Calculated Wound Volume (cm^3) 1 6 cm^3 11/28/22 1134   Change in Wound Size % -119 18 11/28/22 1134   Tunneling 0 cm 11/28/22 1134   Undermining 0 11/28/22 1134   Drainage Amount Small 11/28/22 1134   Drainage Description Serosanguineous 11/28/22 1134   Treatments Cleansed; Other (Comment) 11/28/22 1134   Dressing Other (Comment) 11/28/22 2114   Wound packed? No 11/28/22 1134   Dressing Changed Changed 11/28/22 1134   Patient Tolerance Tolerated well 11/28/22 1134       Wound 10/26/22 Other (comment) Hand Posterior;Right (Active)   Wound Description Other (Comment) 11/28/22 2114   Dressing Open to air; Other (Comment) 11/28/22 2114       Wound 11/28/22 Buttocks Right (Active)   Wound Image   11/28/22 1121   Wound Description BRIDGETTE 11/28/22 2114   Pressure Injury Stage U 11/28/22 1121   Sybil-wound Assessment Hyperpigmented 11/28/22 1121   Wound Length (cm) 3 8 cm 11/28/22 1121   Wound Width (cm) 2 cm 11/28/22 1121   Wound Surface Area (cm^2) 7 6 cm^2 11/28/22 1121   Drainage Amount None 11/28/22 1121   Treatments Cleansed 11/28/22 1121   Dressing Other (Comment) 11/28/22 2114   Wound packed? No 11/28/22 1121   Dressing Changed Changed 11/28/22 1121       Wound 11/28/22 Hand Left;Posterior (Active)   Wound Image   11/28/22 1141   Wound Description Beefy red;Pink;Dry 11/28/22 2114   Sybil-wound Assessment Scar Tissue 11/28/22 1141   Drainage Amount None 11/28/22 1141   Treatments Cleansed 11/28/22 1141   Dressing Dry dressing;Gauze 11/28/22 2114   Patient Tolerance Tolerated well 11/28/22 1141   Dressing Status Clean;Dry; Intact 11/28/22 2114     Reviewed plan of care with primary RN [facility]  Recommendations written as orders  Wound care team to follow weekly while admitted  Questions or concerns [address] [facility] Nurse    Misa LOOMISN, RN, [facility]

## 2022-11-29 NOTE — PROCEDURES
Intubation    Date/Time: 11/29/2022 9:02 AM  Performed by: AVANI Jin  Authorized by: AVANI Jin     Patient location:  Bedside  Other Assisting Provider: Yes (comment) (RRT at bedside)    Consent:     Consent obtained:  Emergent situation  Universal protocol:     Radiology Images displayed and confirmed  If images not available, report reviewed: yes      Required blood products, implants, devices, and special equipment available: yes      Site/side marked: yes      Immediately prior to procedure, a time out was called: yes      Patient identity confirmed:  Hospital-assigned identification number, arm band and anonymous protocol, patient vented/unresponsive  Pre-procedure details:     Patient status: sedated  Pretreatment medications:  Propofol    Paralytics:  Succinylcholine  Indications:     Indications for intubation: other (comment)      Indications for intubation comment:  Tube exchange 2/2 cuff losing air  Procedure details:     Preoxygenation: vent  Intubation method:  Oral    Oral intubation technique: Bougie  Tube size (mm):  7 5    Tube type:  Cuffed and hi-lo    Number of attempts:  1    Tube visualized through cords: yes    Placement assessment:     ETT to teeth:  22    Tube secured with:  ETT guillaume    Breath sounds:  Equal    Placement verification: chest rise, condensation, colorimetric ETCO2 device, CXR verification, direct visualization, equal breath sounds, ETCO2 detector and tube exhalation      CXR findings:  ETT in proper place  Post-procedure details:     Patient tolerance of procedure: Tolerated well, no immediate complications  Comments:      7 5 ETT exchanged over bougie successfully  Old 7 5 ETT removed

## 2022-11-29 NOTE — ASSESSMENT & PLAN NOTE
· Sepsis was present on admission as evidenced by tachycardia, leukocytosis, and fever  · Status post initial treatment with IV vancomycin  · Continue IV cefepime day 5  · 1/2 blood cultures from 11/25/2022 revealed Staph aureus -Staph epidermidis, however a methicillin resistance gene was positive  · Repeat blood cultures-11/28/2022-pending  · Wound cultures-positive for Proteus and Staph aureus also  · Appreciate ID input

## 2022-11-29 NOTE — PROCEDURES
Central Line Insertion    Date/Time: 11/29/2022 9:42 AM  Performed by: AVANI Madison  Authorized by: AVANI Madison     Patient location:  Bedside  Consent:     Consent obtained:  Emergent situation  Universal protocol:     Radiology Images displayed and confirmed  If images not available, report reviewed: yes      Required blood products, implants, devices, and special equipment available: yes      Site/side marked: yes      Immediately prior to procedure, a time out was called: yes      Patient identity confirmed:  Arm band, hospital-assigned identification number and anonymous protocol, patient vented/unresponsive  Pre-procedure details:     Hand hygiene: Hand hygiene performed prior to insertion      Sterile barrier technique: All elements of maximal sterile technique followed      Skin preparation:  ChloraPrep    Skin preparation agent: Skin preparation agent completely dried prior to procedure    Indications:     Central line indications: medications requiring central line    Anesthesia (see MAR for exact dosages):      Anesthesia method:  Local infiltration    Local anesthetic:  Lidocaine 1% w/o epi  Procedure details:     Location:  Right internal jugular    Vessel type: vein      Laterality:  Right    Approach: percutaneous technique used      Patient position:  Trendelenburg    Catheter type:  Triple lumen 16cm    Catheter size:  7 Fr    Landmarks identified: yes      Ultrasound guidance: yes      Ultrasound image availability:  Images available in PACS    Sterile ultrasound techniques: Sterile gel and sterile probe covers were used      Manometry confirmation: yes      Number of attempts:  1    Successful placement: yes      Vessel of catheter tip end:  SVC  Post-procedure details:     Post-procedure:  Dressing applied and line sutured    Assessment:  Blood return through all ports, no pneumothorax on x-ray, placement verified by x-ray and free fluid flow    Post-procedure complications: none      Patient tolerance of procedure:   Tolerated well, no immediate complications

## 2022-11-29 NOTE — PROCEDURES
Arterial Line Insertion    Date/Time: 11/29/2022 8:30 AM  Performed by: AVANI Casper  Authorized by: AVANI Casper     Patient location:  Bedside  Consent:     Consent obtained:  Emergent situation  Universal protocol:     Radiology Images displayed and confirmed  If images not available, report reviewed: yes      Required blood products, implants, devices, and special equipment available: yes      Site/side marked: yes      Immediately prior to procedure a time out was called: yes      Patient identity confirmed:  Hospital-assigned identification number, arm band and anonymous protocol, patient vented/unresponsive  Indications:     Indications: hemodynamic monitoring, multiple ABGs, continuous blood pressure monitoring and frequent labs / infusion    Pre-procedure details:     Skin preparation:  Chlorhexidine    Preparation: Patient was prepped and draped in sterile fashion    Anesthesia (see MAR for exact dosages):      Anesthesia method:  Local infiltration    Local anesthetic:  Lidocaine 1% w/o epi  Procedure details:     Location / Tip of Catheter:  Radial    Laterality:  Right    Needle gauge:  20 G    Placement technique:  Percutaneous, Seldinger and ultrasound guided    Sterile ultrasound techniques: Sterile gel and sterile probe covers were used      Number of attempts:  2    Successful placement: no    Comments:      Patient w/bilateral contractures and radial A-Line attempts unsuccessful

## 2022-11-29 NOTE — PROGRESS NOTES
Progress Note - [facility] Infectious Disease   Brandon Fernando 61 y o  male MRN: [id number]  Unit/Bed#: -01 Encounter: [id number]      IMPRESSION & RECOMMENDATIONS:   1  Sepsis, present on admission, evidenced by tachycardia and leukocytosis  Patient febrile  Lactic acid and PCT normal  1 of 2 admission bl cx positive  Suspect sepsis initially secondary to #2, #3  Chest x-ray limited though negative  UA with mixed contaminants  This morning, remains tachycardic and now hypotensive with coffee-ground emesis and being transferred to the ICU and intubated  -continue antibiotics as below  -monitor temperature and hemodynamics  -management per primary team   -recheck CBC and BMP in a m      2  Polymicrobial bacteremia  One of 2 admission blood cultures positive for Gram-positive cocci in clusters, culture positive for MSSA and CoNS noted on [facility] report  Documented that it was drawn from patients peripheral line and pt known to be difficult stick with hx of extensive contractures  Set drawn peripherally is negative at 72 hours  Suspect CoNs is contaminant, however MSSA likely due to #3  2D echo is normal  No known intravascular devices  Repeat blood cultures x2 sets are pending    -antibiotics as below  -follow-up blood cultures and adjust antibiotics as needed  -if repeat blood cultures are negative, anticipate 2 week course of IV abx for transient bacteremia from SSTI source     3  Chronic, now infected, stage IV decubitus ulcers  Despite outpatient wound carem found to have significant deterioration in wounds over the last few months, particularly the right upper back and sacral wound with increasing wound depth and necrotic tissue burden with evidence of purulence and malodor on admission  Patient also with unstageable sacral/right buttock ulcer, unstageable right ischial decubitus ulcer, and unstageable left heel pressure ulcer  Status post bedside debridement on 11/26    Consider possibility of abscesses tracking under the skin from these wounds in the setting of persistent leukocytosis  Wound cultures from upper back and sacrum are polymicrobial, preliminarily positive for Citrobacter koseri, Proteus mirabilis, Pseudomonas aeruginosa, and Staph aureus  Recent cultures positive for MSSA and MDR PSA  Unfortunately wounds are unlikely to heal and are at risk for reinfection due to iron deficiency anemia, protein calorie malnutrition, contractures and inability to offload pressure      -continue IV cefepime 2g q8h  -follow-up wound cultures and adjust antibiotics as needed   -recommend CT chest abdomen pelvis with contrast   -close surgical follow-up  and daily wound care  -ongoing goals of care discussion per primary team     4  Functional quadriplegia, developmental disability  Patient nonverbal and bed-bound at baseline on tube feeds, with chronic indwelling Marrufo catheter and colostomy  Unfortunately due to contractures, very difficult to reposition patient and offload pressure   -supportive measures per primary team     5  Antibiotic Allergy  Noted to have amoxicillin and Augmentin allergy without documented reaction  Per Care everywhere, has tolerated cefepime and ceftriaxone during prior admissions  -monitor for adverse drug reactions    Antibiotics:  Day for IV cefepime    I have discussed the above management plan in detail with patient  I have discussed the above management plan in detail with patient's RN, and the primary service, SL[facility]  Subjective:  Patient is nonverbal   Patient was being suctioned by RN when he had dark brown emesis  ICU AP at  bedside preparing for intubation      Objective:  Vitals:  Temp:  [98 1 °F (36 7 °C)-99 9 °F (37 7 °C)] 98 1 °F (36 7 °C)  HR:  [118-148] 148  Resp:  [19-20] 20  BP: ()/(53-75) 86/53  SpO2:  [96 %-98 %] 96 %  Temp (24hrs), Av °F (37 2 °C), Min:98 1 °F (36 7 °C), Max:99 9 °F (37 7 °C)  Current: Temperature: 98 1 °F (36 7 °C)    PHYSICAL EXAM:  General Appearance:  Appearing chronically ill and malnourished, toxic and in distress  Nonverbal due to profound developmental disability  HEENT: Normocephalic, without obvious abnormality, atraumatic  Conjunctiva pink and sclera anicteric  Oropharynx moist without lesions  Edentulous  Being suctioned with subsequent dark brown emesis   Lungs:   Respirations labored, patient tachypneic   Heart:  Tachycardic; no murmur, rub or gallop   Abdomen:   Soft, non-tender, non-distended, positive bowel sounds, peg in place, colostomy in place    Extremities: Extensive contractures of all 4 extremities, diffuse muscle wasting   : No CVA or suprapubic tenderness  Marrufo in place with sediment in tubing a straw-colored urine   Skin: No rashes or lesions  No draining wounds noted  Multiple decubitus ulcers of back, sacrum, varying degree of severity end-stage, purulent drainage and malodor  Ulcer also noted on right ear and left lateral foot without cellulitis  Right upper extremity peripheral IV without evidence of erythema, warmth, or exudate  LABS, IMAGING, & OTHER STUDIES:  Lab Results:  I have personally reviewed pertinent labs    Results from last 7 days   Lab Units 11/28/22  0439 11/26/22  0432 11/25/22  1510   WBC Thousand/uL 17 52* 16 67* 21 92*   HEMOGLOBIN g/dL 7 8* 8 2* 9 1*   PLATELETS Thousands/uL 731* 498* 704*     Results from last 7 days   Lab Units 11/28/22  1821 11/28/22  0439 11/26/22  0432 11/25/22  1550   SODIUM mmol/L 147 150* 145 143   POTASSIUM mmol/L 3 7 3 2* 3 2* 3 5   CHLORIDE mmol/L 115* 118* 109* 103   CO2 mmol/L 23 23 27 31   BUN mg/dL 8 9 8 13   CREATININE mg/dL 0 29* 0 30* 0 29* 0 42*   EGFR ml/min/1 73sq m 143 141 143 123   CALCIUM mg/dL 8 9 9 2 9 0 9 0   AST U/L  --   --  18 26   ALT U/L  --   --  19 25   ALK PHOS U/L  --   --  119* 135*     Results from last 7 days   Lab Units 11/28/22  1132 11/28/22  0440 11/26/22  0913 11/25/22  1550 11/25/22  1532 11/25/22  1530   BLOOD CULTURE  Received in Microbiology Lab  Culture in Progress  Received in Microbiology Lab  Culture in Progress    --  Staphylococcus aureus* No Growth at 72 hrs   --    GRAM STAIN RESULT   --   --  4+ Gram negative rods*  2+ Gram positive cocci in pairs and chains*  No polys seen*  2+ Gram positive cocci in pairs and chains*  2+ Gram negative rods*  No polys seen* Gram positive cocci in clusters*  --   --    URINE CULTURE   --   --   --   --   --  >100,000 cfu/ml   WOUND CULTURE   --   --  2+ Growth of Pseudomonas aeruginosa*  2+ Growth of Proteus mirabilis*  2+ Growth of Staphylococcus aureus*  3+ Growth of  2+ Growth of Citrobacter koseri*  2+ Growth of Proteus mirabilis*  2+ Growth of  --   --   --      Results from last 7 days   Lab Units 11/25/22  1550   PROCALCITONIN ng/ml 0 16

## 2022-11-29 NOTE — PROGRESS NOTES
[address]  Progress Note Ana Keller 1955, 61 y o  male MRN: [id number]  Unit/Bed#: -01 Encounter: [id number]  Primary Care Provider: Alanna Wahl MD   Date and time admitted to hospital: 11/25/2022  2:30 PM    * Upper GI bleed  Assessment & Plan  · Notified by nursing that the patient was tachypneic, tachycardic, and hypotensive  · Upon immediate evaluation, patient was noted to be in significant respiratory distress - suspect that the respiratory distress was secondary to an aspiration event  · Tube feeds were discontinued  · Immediate ICU consultation was obtained  · Patient witnessed to have a significant upper GI bleed on attempts at suctioning, large amounts of coffee-ground emesis no  · Patient made NPO, given IV fluids, was started on IV Protonix drip, stat GI consult requested  · ABG and portable chest x-ray ordered stat  · After being evaluated by the critical care team, a decision was made to intubate the patient for airway protection  · Patient will be upgraded to the critical care service  · Patient's guardian Pershing Fothergill was brought up to par at 8:00 a m  - at this time, the patient is to remain a full code    Positive blood culture  Assessment & Plan  · Management as outlined above    Sepsis [facility])  Assessment & Plan  · Sepsis was present on admission as evidenced by tachycardia, leukocytosis, and fever  · Status post initial treatment with IV vancomycin  · Continue IV cefepime day 5  · 1/2 blood cultures from 11/25/2022 revealed Staph aureus -Staph epidermidis, however a methicillin resistance gene was positive  · Repeat blood cultures-11/28/2022-pending  · Wound cultures-positive for Proteus and Staph aureus also  · Appreciate ID input    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, stage 4 (HCC)  Assessment & Plan  · S/p bedside debridement with surgery 11/26  · Continue antibiotics as outlined above  · Follow-up on final wound cultures    Pressure injury of left heel, stage 4 (HCC)  Assessment & Plan  · S/p bedside debridement with surgery   · Continue management as outlined above    Developmental disability  Assessment & Plan  · Patient with profound development disability, nonverbal, bed bound  · Patient on tube feeds only with 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  · Tube feeds have been put on hold in view of GI bleed      Hypernatremia  Assessment & Plan  · Sodium 150 > 147  · Unspecified exact etiology, question the possibility of dehydration from high output from his colostomy versus isotonic volume expansion since arrival  · Appreciate Nephrology input  · Continue sodium monitoring        VTE Prophylaxis:  Pharmacologic VTE Prophylaxis contraindicated due to GI bleeding, patient had been on Lovenox    Patient Centered Rounds: I have performed bedside rounds with nursing staff today  Discussions with Specialists or Other Care Team Provider:  Critical Care, GI, Infectious Disease, Nephrology, nursing, case management  Education and Discussions with Family / Patient:  Patient's guardian was brought up to par    Current Length of Stay: 4 day(s)    Current Patient Status: Inpatient   Certification Statement: The patient will continue to require additional inpatient hospital stay due to Management of an upper GI bleed    Discharge Plan:  As per clinical course    Code Status: Level 1 - Full Code    Subjective:   Notified by nursing that the patient was in respiratory failure, was tachypneic, tachycardic, and hypotensive  Patient does not contribute to the history portion in view of his developmental challenges    Patient noted to be in respiratory distress at time of my arrival     Objective:     Vitals:   Temp (24hrs), Av °F (37 2 °C), Min:98 1 °F (36 7 °C), Max:99 9 °F (37 7 °C)    Temp:  [98 1 °F (36 7 °C)-99 9 °F (37 7 °C)] 98 1 °F (36 7 °C)  HR:  [118-148] 148  Resp:  [19-20] 20  BP: ()/(53-75) 86/53  SpO2:  [96 %-98 %] 96 %  Body mass index is 20 14 kg/m²  Input and Output Summary (last 24 hours): Intake/Output Summary (Last 24 hours) at 11/29/2022 0816  Last data filed at 11/29/2022 0547  Gross per 24 hour   Intake --   Output 1350 ml   Net -1350 ml       Physical Exam:   Physical Exam  Vitals and nursing note reviewed  Constitutional:       General: He is in acute distress  Appearance: He is toxic-appearing and diaphoretic  He is not ill-appearing  HENT:      Head: Normocephalic and atraumatic  Nose: Nose normal    Eyes:      Extraocular Movements: Extraocular movements intact  Pupils: Pupils are equal, round, and reactive to light  Cardiovascular:      Rate and Rhythm: Tachycardia present  Pulses: Normal pulses  Heart sounds: Normal heart sounds  No murmur heard  No friction rub  No gallop  Comments: S1 plus S2, tachycardic  Pulmonary:      Effort: Respiratory distress present  Breath sounds: Stridor present  Rhonchi and rales present  Abdominal:      General: There is no distension  Palpations: Abdomen is soft  There is no mass  Tenderness: There is no abdominal tenderness  There is no guarding or rebound  Comments: Flat, peg tube in place, colostomy in place   Musculoskeletal:         General: No swelling or tenderness  Normal range of motion  Cervical back: Normal range of motion and neck supple  No rigidity  No muscular tenderness  Right lower leg: No edema  Left lower leg: No edema  Skin:     General: Skin is warm  Capillary Refill: Capillary refill takes less than 2 seconds  Findings: No erythema or rash  Comments: Wound dressings are in place, blisters noted on the right upper extremity   Neurological:      Mental Status: He is alert  Mental status is at baseline           Additional Data:     Labs:    Results from last 7 days   Lab Units 11/28/22  0439 11/26/22  0432 11/25/22  1510   WBC Thousand/uL 17 52*   < > 21 92*   HEMOGLOBIN g/dL 7 8*   < > 9 1*   HEMATOCRIT % 27 7*   < > 32 8*   PLATELETS Thousands/uL 731*   < > 704*   NEUTROS PCT %  --   --  80*   LYMPHS PCT %  --   --  9*   MONOS PCT %  --   --  9   EOS PCT %  --   --  1    < > = values in this interval not displayed  Results from last 7 days   Lab Units 11/28/22  1821 11/28/22  0439 11/26/22  0432   SODIUM mmol/L 147   < > 145   POTASSIUM mmol/L 3 7   < > 3 2*   CHLORIDE mmol/L 115*   < > 109*   CO2 mmol/L 23   < > 27   BUN mg/dL 8   < > 8   CREATININE mg/dL 0 29*   < > 0 29*   CALCIUM mg/dL 8 9   < > 9 0   ALK PHOS U/L  --   --  119*   ALT U/L  --   --  19   AST U/L  --   --  18    < > = values in this interval not displayed  Results from last 7 days   Lab Units 11/25/22  1550   INR  1 18               * I Have Reviewed All Lab Data Listed Above  * Additional Pertinent Lab Tests Reviewed: [address] Admission  Reviewed    Imaging:  Imaging Reports Reviewed Today Include:  None    Recent Cultures (last 7 days):     Results from last 7 days   Lab Units 11/28/22  1132 11/28/22  0440 11/26/22  0913 11/25/22  1550 11/25/22  1536 11/25/22  1530   BLOOD CULTURE  Received in Microbiology Lab  Culture in Progress  Received in Microbiology Lab  Culture in Progress    --  Staphylococcus aureus* No Growth at 72 hrs   --    GRAM STAIN RESULT   --   --  4+ Gram negative rods*  2+ Gram positive cocci in pairs and chains*  No polys seen*  2+ Gram positive cocci in pairs and chains*  2+ Gram negative rods*  No polys seen* Gram positive cocci in clusters*  --   --    URINE CULTURE   --   --   --   --   --  >100,000 cfu/ml   WOUND CULTURE   --   --  2+ Growth of Pseudomonas aeruginosa*  2+ Growth of Proteus mirabilis*  2+ Growth of Staphylococcus aureus*  3+ Growth of  2+ Growth of Citrobacter koseri*  2+ Growth of Proteus mirabilis*  2+ Growth of  --   --   --        Last 24 Hours Medication List:   Current Facility-Administered Medications   Medication Dose Route Frequency Provider Last Rate   • acetaminophen  650 mg Per G Tube Q4H PRN Nicol Juárez PA-C     • cefepime  2,000 mg Intravenous Q8H Eva Stark MD 2,000 mg (11/29/22 0547)   • collagenase   Topical Daily Bell Saha PA-C     • enoxaparin  40 mg Subcutaneous Daily Nicol Juárez PA-C     • multi-electrolyte  1,000 mL Intravenous Once Kierra Larson MD     • ondansetron  4 mg Intravenous Q6H PRN Nicol Juárez PA-C     • ondansetron  4 mg Intravenous Once AVANI Wilson     • pantoprozole (PROTONIX) infusion (Continuous)  8 mg/hr Intravenous Continuous Kierra Larson MD     • sodium hypochlorite  1 application Irrigation BID Nahun Perry PA-C          Today, Patient Was Seen By: Kierra Larson MD    ** Please Note: Dictation voice to text software may have been used in the creation of this document   **

## 2022-11-29 NOTE — RESPIRATORY THERAPY NOTE
11/29/22 0825   Respiratory Assessment   Assessment Type Assess only   General Appearance Sedated   Respiratory Pattern Assisted   Chest Assessment Chest expansion symmetrical   Suction ET Tube;Oral   Resp Comments Pt intubated in 211 for airway protections as pt was vomitting, aspirated and was hypotensive  Good color change on CO2 device, and BS observed bilat  Pt transported to ICU via BVM  Sats stable throughout  Pt has a slow cuff leak requiring frequent air to be added to ETT cuff  Plan is for tube exchange     Vent Information   Vent ID [id number]   Vent type Henao C3   Henao C3/G5 Vent Mode (S)CMV   $ Vent Charge-INITIAL Yes   Ventilator Start Yes   $ Pulse Oximetry Spot Check Charge Completed   SpO2 100 %   (S)CMV Settings   Resp Rate (BPM) 14 BPM   VT (mL) 400 mL   FIO2 (%) 100 %   PEEP (cmH2O) 6 cmH2O   I:E Ratio 1:3 3   Insp Time (%) 1 %   Flow Trigger (LPM) 5   Humidification Heater   Heater Temperature (Set) 95 °F (35 °C)   (S)CMV Actuals   Resp Rate (BPM) 32 BPM   VT (mL) 347   MV 7 6   MAP (cmH2O) 10 cmH2O   Peak Pressure (cmH2O) 29 cmH2O   I:E Ratio (Obs) 1:1 1   Heater Temperature (Obs) 95 °F (35 °C)   (S)CMV Alarms   High Peak Pressure (cmH2O) 40   Low Pressure (cmH2O) 5 cm H2O   High Resp Rate (BPM) 40 BPM   Low Resp Rate (BPM) 8 BPM   High MV (L/min) 18 L/min   Low MV (L/min) 3 5 L/min   High VT (mL) 1000 mL   Low VT (mL) 300 mL   Apnea Time (s) 20 S   Maintenance   Alarm (pink) cable attached No   Resuscitation bag with peep valve at bedside Yes   Water bag changed No   Circuit changed No

## 2022-11-29 NOTE — PROGRESS NOTES
Progress Note - Nephrology   Torrey Howe 61 y o  male MRN: [id number]  Unit/Bed#: ICU 05-01 Encounter: [id number]    Assessment and Plan    1  Hypernatremia/dehydration  Improved to serum sodium 147 millimole per L  Free water deficit improved from over 2 L down to 1 65 L  Patient now currently totally NPO, receiving nothing even via PEG tube  Currently on Plasmalyte 125 mL/hr  Spoke with Critical Care who reports updated labs are pending  Will re-evaluate based upon updated labs  If serum sodium has again not improved, can add D5W      2  Volume depletion  Continue isotonic volume expansion with Plasmalyte 125 mL/hr  Add hypotonic fluids if needed for hypernatremia dehydration as above  3  GI bleed  Management per Critical Care colleagues  4  Sepsis, present on admission  On cefepime per hospitalist      3  At risk for acute kidney injury  Suspect that renal function is overestimated given low muscle mass  If more accurate assessment of renal function is require, consider Cystatin C in outpatient setting  For the prevention of acute kidney injury, Trend renal function, optimize hemodynamics, hold ACE/ARB, avoid nephrotoxins, renally dose medications, monitor volume status, monitor for urinary retention      4  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, pressure ulcer of left heel unstageable  Wound care      5  Developmental disability       Follow up reason for today's visit:     Upper GI bleed    Patient Active Problem List   Diagnosis   • Pressure injury of sacral region, stage 4 (HCC)   • 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, stage 4 (HCC)   • Pressure ulcer of right lower back, stage 3 (HCC)   • Pressure ulcer of left foot, stage 4 (HCC)   • Pressure injury of right upper back, stage 4 ([address] )   • Open wound of right hand without foreign body   • Pressure injury of left heel, stage 4 (HCC)   • Sepsis ([address] )   • Developmental disability   • Hypokalemia   • Pressure ulcer of ischium, right, stage III (HCC)   • Positive blood culture   • Hypernatremia   • Upper GI bleed         Subjective:   Unobtainable due to patient nonverbal at baseline and now sedated and intubated  Objective:     Vitals: Blood pressure 127/59, pulse (!) 131, temperature 100 2 °F (37 9 °C), resp  rate 21, height 5' 5" (1 651 m), weight 54 9 kg (121 lb), SpO2 98 %  ,Body mass index is 20 14 kg/m²  Weight (last 2 days)     Date/Time Weight    11/28/22 1426 54 9 (121)            Intake/Output Summary (Last 24 hours) at 11/29/2022 1402  Last data filed at 11/29/2022 1315  Gross per 24 hour   Intake 1582 1 ml   Output 1625 ml   Net -42 9 ml     I/O last 3 completed shifts:  In: -   Out: 1350 [Urine:1350]    Urethral Catheter Temperature probe (Active)   Output (mL) 300 mL 11/29/22 1239       Physical Exam: /59   Pulse (!) 131   Temp 100 2 °F (37 9 °C)   Resp 21   Ht 5' 5" (1 651 m)   Wt 54 9 kg (121 lb)   SpO2 98%   BMI 20 14 kg/m²     General Appearance:    No acute distress  Cooperative  Appears stated age  Head:    Normocephalic  Atraumatic  Normal jaw occlusion  Eyes:    Lids, conjunctiva normal  No scleral icterus  Ears:    Normal external ears  Nose:   Nares normal  No drainage  Mouth:   Lips, tongue normal  Mucosa normal  Phonation normal    Neck:   Supple  Symmetrical    Back:     Symmetric  No CVA tenderness  Lungs:     Normal respiratory effort  Clear to auscultation bilaterally  Intubated  Chest wall:    No tenderness or deformity  Heart:    Regular rate and rhythm  Normal S1 and S2  No murmur  No JVD  No edema  Abdomen:     Soft  Non-tender  Bowel sounds active  Genitourinary: Marrufo catheter present with clear, yellow urine output  Extremities:   Extremities normal  Atraumatic  No cyanosis  Contractures  In soft restraints  Skin:   Warm and dry  No pallor, jaundice, rash, ecchymoses  Neurologic:   Nonverbal at baseline and sedated  Lab, Imaging and other studies: I have personally reviewed pertinent labs  CBC:   Lab Results   Component Value Date    WBC 27 40 (H) 11/29/2022    HGB 5 3 (LL) 11/29/2022    HCT 19 0 (L) 11/29/2022    MCV 81 (L) 11/29/2022     (H) 11/29/2022    MCH 22 6 (L) 11/29/2022    MCHC 27 9 (L) 11/29/2022    RDW 20 8 (H) 11/29/2022    MPV 9 0 11/29/2022     CMP:   Lab Results   Component Value Date    K 3 6 11/29/2022     (H) 11/29/2022    CO2 25 11/29/2022    BUN 17 11/29/2022    CREATININE 0 28 (L) 11/29/2022    CALCIUM 7 1 (L) 11/29/2022    AST 32 11/29/2022    ALT 27 11/29/2022    ALKPHOS 85 11/29/2022    EGFR 145 11/29/2022         Results from last 7 days   Lab Units 11/29/22  0939 11/28/22  1821 11/28/22  0439 11/26/22  0432 11/25/22  1550   POTASSIUM mmol/L 3 6 3 7 3 2* 3 2* 3 5   CHLORIDE mmol/L 115* 115* 118* 109* 103   CO2 mmol/L 25 23 23 27 31   BUN mg/dL 17 8 9 8 13   CREATININE mg/dL 0 28* 0 29* 0 30* 0 29* 0 42*   CALCIUM mg/dL 7 1* 8 9 9 2 9 0 9 0   ALK PHOS U/L 85  --   --  119* 135*   ALT U/L 27  --   --  19 25   AST U/L 32  --   --  18 26         Phosphorus:   Lab Results   Component Value Date    PHOS 3 5 11/29/2022     Magnesium:   Lab Results   Component Value Date    MG 1 8 (L) 11/29/2022     Urinalysis: No results found for: COLORU, CLARITYU, SPECGRAV, PHUR, LEUKOCYTESUR, NITRITE, PROTEINUA, GLUCOSEU, KETONESU, BILIRUBINUR, BLOODU  Ionized Calcium: No results found for: CAION  Coagulation:   Lab Results   Component Value Date    INR 1 46 (H) 11/29/2022     Troponin: No results found for: TROPONINI  ABG:   Lab Results   Component Value Date    PHART 7 414 11/29/2022    DYU0UFP 40 8 11/29/2022    PO2ART 100 2 11/29/2022    GKU2FAN 25 5 11/29/2022    BEART 0 9 11/29/2022    SOURCE Line, Arterial 11/29/2022     Radiology review:     IMAGING  Procedure: XR chest portable    Result Date: 11/29/2022  Narrative: CHEST INDICATION:   Respiratory distress  COMPARISON:  CXR 11/25/2022   EXAM PERFORMED/VIEWS:  XR CHEST PORTABLE FINDINGS:  ET tube 4 cm above the raymundo  NG tube in stomach  Cardiomediastinal silhouette appears unremarkable  Mild opacity in the right base  No effusion or pneumothorax Clip in the left upper quadrant  Osseous structures appear within normal limits for patient age  Impression: Mild opacity in the right base, likely atelectasis  Pneumonia/aspiration not excluded in the appropriate setting  Workstation performed: [id number]     Procedure: XR chest portable ICU    Result Date: 11/29/2022  Narrative: CHEST INDICATION:   s/p R IJ placement  COMPARISON:  11/29/2022 at 0808 hrs EXAM PERFORMED/VIEWS:  XR CHEST PORTABLE ICU FINDINGS: Right IJ line tip in superior vena cava, level of the raymundo  ETT 4 0 cm above raymundo  NGT tip beneath diaphragm and in proximal stomach  Cardiomediastinal silhouette appears unremarkable  Right base increased patchy density  No pneumothorax or pleural effusion  Osseous structures appear within normal limits for patient age  Impression: Right IJ line in place  Right base infiltrate; question aspiration  The study was marked in [facility] for immediate notification  Workstation performed: [id number]     Procedure: Echo complete w/ contrast if indicated    Result Date: 11/28/2022  Narrative: •  Left Ventricle: Left ventricular cavity size is normal  Wall thickness is normal  The left ventricular ejection fraction is 80%  Systolic function is hyperdynamic  Wall motion is normal  •  Right Ventricle: Right ventricular cavity size is mildly dilated   Systolic function is normal        Current Facility-Administered Medications   Medication Dose Route Frequency   • acetaminophen (TYLENOL) tablet 650 mg  650 mg Per G Tube Q4H PRN   • cefepime (MAXIPIME) IVPB (premix in dextrose) 2,000 mg 50 mL  2,000 mg Intravenous Q8H   • chlorhexidine (PERIDEX) 0 12 % oral rinse 15 mL  15 mL Mouth/Throat Q12H FORTINO   • collagenase (SANTYL) ointment   Topical Daily   • HYDROmorphone (DILAUDID) 50 mg in sodium chloride 0 9% 50mL drip  1 5 mg/hr Intravenous Continuous   • HYDROmorphone (DILAUDID) injection 0 5 mg  0 5 mg Intravenous Q2H PRN   • magnesium sulfate 2 g/50 mL IVPB (premix) 2 g  2 g Intravenous Once   • multi-electrolyte (ISOLYTE-S PH 7 4) bolus 1,000 mL  1,000 mL Intravenous Once   • multi-electrolyte (PLASMALYTE-A/ISOLYTE-S PH 7 4) IV solution  125 mL/hr Intravenous Continuous   • NOREPINEPHRINE 4 MG  ML NSS (CMPD ORDER) infusion  1-30 mcg/min Intravenous Titrated   • ondansetron (ZOFRAN) injection 4 mg  4 mg Intravenous Q6H PRN   • ondansetron (ZOFRAN) injection 4 mg  4 mg Intravenous Once   • pantoprazole (PROTONIX) 80 mg in sodium chloride 0 9 % 100 mL infusion  8 mg/hr Intravenous Continuous   • potassium chloride 20 mEq IVPB (premix)  20 mEq Intravenous Q2H   • propofol (DIPRIVAN) 1000 mg in 100 mL infusion (premix)  5-50 mcg/kg/min Intravenous Titrated   • sodium hypochlorite (DAKIN'S HALF-STRENGTH) 0 25 percent topical solution 1 application  1 application Irrigation BID   • vasopressin (PITRESSIN) 20 Units in sodium chloride 0 9 % 100 mL infusion  0 04 Units/min Intravenous Continuous     Medications Discontinued During This Encounter   Medication Reason   • potassium chloride (K-DUR,KLOR-CON) CR tablet 40 mEq    • vancomycin (VANCOCIN) IVPB (premix in dextrose) 750 mg 150 mL    • potassium chloride 40 mEq IVPB (premix)    • vancomycin (VANCOCIN) IVPB (premix in dextrose) 1,000 mg 200 mL    • cefepime (MAXIPIME) IVPB (premix in dextrose) 2,000 mg 50 mL    • sodium chloride 0 9 % infusion    • dextrose 5 % and sodium chloride 0 45 % infusion    • pantoprazole (PROTONIX) injection 40 mg    • enoxaparin (LOVENOX) subcutaneous injection 40 mg    • fentaNYL 1000 mcg in sodium chloride 0 9% 100mL infusion    • fentaNYL 1000 mcg in sodium chloride 0 9% 100mL infusion    • fentanyl citrate (PF) 100 MCG/2ML 50 mcg        Malcolm Martinez PA-C    Portions of the record may have been created with voice recognition software  Occasional wrong word or "sound a like" substitutions may have occurred due to the inherent limitations of voice recognition software  Read the chart carefully and recognize, using context, where substitutions have occurred

## 2022-11-29 NOTE — ASSESSMENT & PLAN NOTE
· Sodium 150 > 147  · Unspecified exact etiology, question the possibility of dehydration from high output from his colostomy versus isotonic volume expansion since arrival  · Appreciate Nephrology input  · Continue sodium monitoring

## 2022-11-29 NOTE — CONSULTS
Consultation - [facility]) [facility]  Mone Cha 61 y o  male MRN: [id number]  Unit/Bed#: ICU 05-01 Encounter: [id number]        Inpatient consult to gastroenterology  Consult performed by: Louie Greenwood PA-C  Consult ordered by: Ulices Bishop MD          Reason for Consult / Principal Problem:     GI bleed      ASSESSMENT AND PLAN:      Patient is a 61 y o  male with PMH significant for nonverbal developmental disability with functional quadriplegia and severe contractures, PEG tube, colostomy, and indwelling Marrufo catheter admitted on 11/25 having met sepsis criteria in the setting of bacteremia secondary to multiple stage IV decubitus ulcers  Patient became tachycardic and hypotensive with subsequent witnessed large volume CGE concerning for UGIB and respiratory distress suspicious for aspiration requiring intubation and transfer to ICU for higher level of care  1  Coffee-ground emesis  2  UGI bleed  Clinical picture consistent with active UGI bleed and subsequent aspiration event  Differential includes PUD, buried bumper syndrome, Dieulafoy's lesion, and less likely esophagitis, gastritis, duodenitis, AVMs, and/or esophageal/gastric mass or malignancy  Patient is currently intubated and sedated requiring pressor support  Patient has received 2 5L IV fluid bolus and has been placed on PPI gtt  Repeat hgb is 5 3 down from 7 8 this morning and CMP with rising BUN (17)  Recommend adequate resuscitation with fluid and blood products with plan for EGD later this afternoon for further evaluation of suspected UGI bleed  Consent obtained via Dr Wally Arias from patient's guardian Funmi Mancilla   Patient to receive IV Reglan 10 mg in attempt to clear gastric contents for anticipated EGD   - NPO  - Remains intubated and sedated  - Maintain two large-bore IVs and/or central venous access  - Continue resuscitation with IV fluid and blood products per primary team  - Continue PPI gtt  - Monitor vitals closely  - Monitor hgb; Transfuse for goal hgb >7 0    - Monitor OG tube, PEG tube, and ostomy output for further signs of overt GI bleeding    Additional management per ID and General Surgery, appreciate recommendations  GI will continue to follow  ______________________________________________________________________    HPI: Patient is a 61 y o  male with PMH significant for nonverbal developmental disability with functional quadriplegia and severe contractures, PEG tube, colostomy, and indwelling Marrufo catheter referred to the ED on 11/25 after being seen at [facility] wound care center with multiple wounds and overall decline in health  Of note, history is limited due to patient's nonverbal state and health proxy being an employee of the care facility where patient resides  Upon admission, patient was found to meet sepsis criteria as evidenced by a low-grade fever with tachycardia and leukocytosis  Blood cultures x2 notable for Gram-positive cocci in clusters and subsequent culture positive for MSSA and CoNS - CoNS noted to be a contaminant and MSSA noted to be secondary to multiple chronic infected stage IV decubitus ulcers  Patient is currently being followed by ID for optimization of antibiotic regimen and surgical team for debridement and additional wound care of decubitus ulcers  Patient was noted to be tachycardic and hypotensive this morning with subsequent witnessed large volume coffee-ground emesis  Patient was also noted to have significant respiratory distress, suspected to be secondary to an aspiration event  Patient was sedated and intubated for airway protection and transferred to ICU for higher level care  Per critical care team, patient was given fluid bolus and started on a PPI drip with STAT CXR and ABG and repeat H&H to be ordered once central access is achieved  OG tube placed with large amounts of CGE returned, as well as, per PEG tube   Per chart review, baseline hgb between 9-10 with hgb down trending during admission (9 1-8 0 2-7 8)  No known history of liver disease, ETOH use, or NSAID use  Patient not on AC/AP as outpatient  REVIEW OF SYSTEMS:  Unable to obtain ROS 2/2 Intubation and sedation  Historical Information   History reviewed  No pertinent past medical history  History reviewed  No pertinent surgical history  Social History   Social History     Substance and Sexual Activity   Alcohol Use Never     Social History     Substance and Sexual Activity   Drug Use Never     Social History     Tobacco Use   Smoking Status Never   Smokeless Tobacco Never     History reviewed  No pertinent family history      Meds/Allergies     Medications Prior to Admission   Medication   • bisacodyl (Dulcolax) 10 mg suppository   • calcium carbonate-vitamin D (OSCAL-D) 500 mg-200 units per tablet   • denosumab (Prolia) 60 mg/mL   • dorzolamide-timolol (COSOPT) 22 3-6 8 MG/ML ophthalmic solution   • ergocalciferol (ERGOCALCIFEROL) 1 25 MG (23906 UT) capsule   • fluticasone (FLONASE) 50 mcg/act nasal spray   • Hydromorphone HCl (Dilaudid) 1 MG/ML LIQD   • latanoprost (XALATAN) 0 005 % ophthalmic solution   • loratadine (CLARITIN) 10 mg tablet   • magnesium hydroxide (MILK OF MAGNESIA) 400 mg/5 mL oral suspension   • Polyethyl Glycol-Propyl Glycol (Systane) 0 4-0 3 % GEL   • sodium chloride (OCEAN) 0 65 % nasal spray     Current Facility-Administered Medications   Medication Dose Route Frequency   • acetaminophen (TYLENOL) tablet 650 mg  650 mg Per G Tube Q4H PRN   • cefepime (MAXIPIME) IVPB (premix in dextrose) 2,000 mg 50 mL  2,000 mg Intravenous Q8H   • collagenase (SANTYL) ointment   Topical Daily   • fentaNYL 1000 mcg in sodium chloride 0 9% 100mL infusion  50 mcg/hr Intravenous Continuous   • multi-electrolyte (ISOLYTE-S PH 7 4) bolus 1,000 mL  1,000 mL Intravenous Once   • NOREPINEPHRINE 4 MG  ML NSS (CMPD ORDER) infusion  1-30 mcg/min Intravenous Titrated • ondansetron (ZOFRAN) injection 4 mg  4 mg Intravenous Q6H PRN   • ondansetron (ZOFRAN) injection 4 mg  4 mg Intravenous Once   • pantoprazole (PROTONIX) 80 mg in sodium chloride 0 9 % 100 mL infusion  8 mg/hr Intravenous Continuous   • propofol (DIPRIVAN) 1000 mg in 100 mL infusion (premix)  5-50 mcg/kg/min Intravenous Titrated   • sodium chloride 0 9 % bolus 1,000 mL  1,000 mL Intravenous Once   • sodium hypochlorite (DAKIN'S HALF-STRENGTH) 0 25 percent topical solution 1 application  1 application Irrigation BID       Allergies   Allergen Reactions   • Albumen, Egg - Food Allergy Other (See Comments)   • Amoxicillin-Pot Clavulanate Other (See Comments)           Objective     Blood pressure (!) 86/53, pulse (!) 148, temperature 98 1 °F (36 7 °C), temperature source Axillary, resp  rate 20, height 5' 5" (1 651 m), weight 54 9 kg (121 lb), SpO2 100 %  Body mass index is 20 14 kg/m²  Intake/Output Summary (Last 24 hours) at 11/29/2022 0842  Last data filed at 11/29/2022 0547  Gross per 24 hour   Intake --   Output 1350 ml   Net -1350 ml         PHYSICAL EXAM:      General Appearance:   +Intubated and sedated; Contractured   HEENT:   Normocephalic, atraumatic, anicteric  Neck:  Supple, symmetrical, trachea midline   Lungs:   Respirations in sync with ventilator   Heart[de-identified]   Regular rate and rhythm; no murmur, rub, or gallop  Abdomen:   +PEG with CGE material noted, ostomy with brown stool with black flecks noteds;  Soft, non-tender, non-distended; normal bowel sounds; no masses, no organomegaly    Genitalia:   Deferred    Rectal:   Deferred    Extremities:  No cyanosis, clubbing or edema    Pulses:  2+ and symmetric all extremities    Skin:  No jaundice, rashes, or lesions    Lymph nodes:  No palpable cervical lymphadenopathy        Lab Results:   Admission on 11/25/2022   Component Date Value   • WBC 11/25/2022 21 92 (H)    • RBC 11/25/2022 4 07    • Hemoglobin 11/25/2022 9 1 (L)    • Hematocrit 11/25/2022 32 8 (L)    • MCV 11/25/2022 81 (L)    • MCH 11/25/2022 22 4 (L)    • MCHC 11/25/2022 27 7 (L)    • RDW 11/25/2022 20 9 (H)    • MPV 11/25/2022 9 8    • Platelets 64/42/0819 704 (H)    • nRBC 11/25/2022 0    • Neutrophils Relative 11/25/2022 80 (H)    • Immat GRANS % 11/25/2022 1    • Lymphocytes Relative 11/25/2022 9 (L)    • Monocytes Relative 11/25/2022 9    • Eosinophils Relative 11/25/2022 1    • Basophils Relative 11/25/2022 0    • Neutrophils Absolute 11/25/2022 17 52 (H)    • Immature Grans Absolute 11/25/2022 0 15    • Lymphocytes Absolute 11/25/2022 1 89    • Monocytes Absolute 11/25/2022 2 07 (H)    • Eosinophils Absolute 11/25/2022 0 24    • Basophils Absolute 11/25/2022 0 05    • Sodium 11/25/2022 143    • Potassium 11/25/2022 3 5    • Chloride 11/25/2022 103    • CO2 11/25/2022 31    • ANION GAP 11/25/2022 9    • BUN 11/25/2022 13    • Creatinine 11/25/2022 0 42 (L)    • Glucose 11/25/2022 113    • Calcium 11/25/2022 9 0    • Corrected Calcium 11/25/2022 9 9    • AST 11/25/2022 26    • ALT 11/25/2022 25    • Alkaline Phosphatase 11/25/2022 135 (H)    • Total Protein 11/25/2022 7 3    • Albumin 11/25/2022 2 9 (L)    • Total Bilirubin 11/25/2022 0 26    • eGFR 11/25/2022 123    • LACTIC ACID 11/25/2022 1 9    • Procalcitonin 11/25/2022 0 16    • Protime 11/25/2022 15 0 (H)    • INR 11/25/2022 1 18    • PTT 11/25/2022 36    • Blood Culture 11/25/2022 Staphylococcus aureus (A)    • Gram Stain Result 11/25/2022 Gram positive cocci in clusters (A)    • Blood Culture 11/25/2022 No Growth at 72 hrs      • Color, UA 11/25/2022 Yellow    • Clarity, UA 11/25/2022 Cloudy (A)    • Specific Gravity, UA 11/25/2022 1 015    • pH, UA 11/25/2022 7 5    • Leukocytes, UA 11/25/2022 3+ (A)    • Nitrite, UA 11/25/2022 Positive (A)    • Protein, UA 11/25/2022 Trace (A)    • Glucose, UA 11/25/2022 Negative    • Ketones, UA 11/25/2022 Negative    • Urobilinogen, UA 11/25/2022 0 2    • Bilirubin, UA 11/25/2022 Negative    • Occult Blood, UA 11/25/2022 2+ (A)    • SARS-CoV-2 11/25/2022 Negative    • INFLUENZA A PCR 11/25/2022 Negative    • INFLUENZA B PCR 11/25/2022 Negative    • RSV PCR 11/25/2022 Negative    • RBC, UA 11/25/2022 4-10 (A)    • WBC, UA 11/25/2022 30-50 (A)    • Epithelial Cells 11/25/2022 Occasional    • Bacteria, UA 11/25/2022 Moderate (A)    • AMORPH PHOSPATES 11/25/2022 Occasional    • Urine Culture 11/25/2022 >100,000 cfu/ml    • Ventricular Rate 11/25/2022 133    • Atrial Rate 11/25/2022 133    • IN Interval 11/25/2022 94    • QRSD Interval 11/25/2022 72    • QT Interval 11/25/2022 350    • QTC Interval 11/25/2022 520    • P Axis 11/25/2022 77    • QRS Axis 11/25/2022 9    • T Wave Axis 11/25/2022 22    • Sodium 11/26/2022 145    • Potassium 11/26/2022 3 2 (L)    • Chloride 11/26/2022 109 (H)    • CO2 11/26/2022 27    • ANION GAP 11/26/2022 9    • BUN 11/26/2022 8    • Creatinine 11/26/2022 0 29 (L)    • Glucose 11/26/2022 93    • Calcium 11/26/2022 9 0    • Corrected Calcium 11/26/2022 10 1    • AST 11/26/2022 18    • ALT 11/26/2022 19    • Alkaline Phosphatase 11/26/2022 119 (H)    • Total Protein 11/26/2022 6 8    • Albumin 11/26/2022 2 6 (L)    • Total Bilirubin 11/26/2022 0 25    • eGFR 11/26/2022 143    • Magnesium 11/26/2022 2 0    • WBC 11/26/2022 16 67 (H)    • RBC 11/26/2022 3 65 (L)    • Hemoglobin 11/26/2022 8 2 (L)    • Hematocrit 11/26/2022 29 0 (L)    • MCV 11/26/2022 80 (L)    • MCH 11/26/2022 22 5 (L)    • MCHC 11/26/2022 28 3 (L)    • RDW 11/26/2022 20 8 (H)    • Platelets 83/09/8648 498 (H)    • MPV 11/26/2022 9 4    • Wound Culture 11/26/2022 2+ Growth of Pseudomonas aeruginosa (A)    • Wound Culture 11/26/2022 2+ Growth of Proteus mirabilis (A)    • Wound Culture 11/26/2022 2+ Growth of Staphylococcus aureus (A)    • Wound Culture 11/26/2022 3+ Growth of    • Gram Stain Result 11/26/2022 4+ Gram negative rods (A)    • Gram Stain Result 11/26/2022 2+ Gram positive cocci in pairs and chains (A)    • Gram Stain Result 11/26/2022 No polys seen (A)    • Wound Culture 11/26/2022 2+ Growth of Citrobacter koseri (A)    • Wound Culture 11/26/2022 2+ Growth of Proteus mirabilis (A)    • Wound Culture 11/26/2022 2+ Growth of    • Gram Stain Result 11/26/2022 2+ Gram positive cocci in pairs and chains (A)    • Gram Stain Result 11/26/2022 2+ Gram negative rods (A)    • Gram Stain Result 11/26/2022 No polys seen (A)    • Staphylococcus aureus 11/25/2022 Detected (A)    • Staphylococcus epidermid* 11/25/2022 Detected (A)    • mecA/C (Methicillin-resi* 11/25/2022 Detected (A)    • Vancomycin Tr 11/28/2022 27 4 (HH)    • WBC 11/28/2022 17 52 (H)    • RBC 11/28/2022 3 42 (L)    • Hemoglobin 11/28/2022 7 8 (L)    • Hematocrit 11/28/2022 27 7 (L)    • MCV 11/28/2022 81 (L)    • MCH 11/28/2022 22 8 (L)    • MCHC 11/28/2022 28 2 (L)    • RDW 11/28/2022 20 9 (H)    • Platelets 62/65/0672 731 (H)    • MPV 11/28/2022 8 8 (L)    • Sodium 11/28/2022 150 (H)    • Potassium 11/28/2022 3 2 (L)    • Chloride 11/28/2022 118 (H)    • CO2 11/28/2022 23    • ANION GAP 11/28/2022 9    • BUN 11/28/2022 9    • Creatinine 11/28/2022 0 30 (L)    • Glucose 11/28/2022 122    • Calcium 11/28/2022 9 2    • eGFR 11/28/2022 141    • Blood Culture 11/28/2022 Received in Microbiology Lab  Culture in Progress  • Blood Culture 11/28/2022 Received in Microbiology Lab  Culture in Progress      • LA size 11/28/2022 3 8    • Aortic valve mean veloci* 11/28/2022 13 60    • LVPWd 11/28/2022 0 90    • Left Atrium Area-systoli* 11/28/2022 13 7    • Left Atrium Area-systoli* 11/28/2022 12 5    • IVSd 11/28/2022 2 74    • LV DIASTOLIC VOLUME (MOD* 54/88/1114 78    • LEFT VENTRICLE SYSTOLIC * 92/88/5776 22    • Left ventricular stroke * 11/28/2022 56 00    • A4C EF 11/28/2022 68    • LA length (A2C) 11/28/2022 5 00    • LVIDd 11/28/2022 4 20    • IVS 11/28/2022 1    • LVIDS 11/28/2022 2 50    • FS 11/28/2022 40    • Ao root 11/28/2022 3 10    • RVID d 11/28/2022 2 3    • LVOT mn grad 11/28/2022 5 0    • AV mean gradient 11/28/2022 8    • AV LVOT peak gradient 11/28/2022 9    • MV valve area p 1/2 meth* 11/28/2022 6 11    • E wave deceleration time 11/28/2022 125    • LVOT peak hugo 11/28/2022 1 51    • LVOT peak VTI 11/28/2022 26 23    • Aortic valve peak veloci* 11/28/2022 1 85    • Ao VTI 11/28/2022 29 83    • AV peak gradient 11/28/2022 14    • MV Peak E Hugo 11/28/2022 75    • MV Peak A Hugo 11/28/2022 1 2    • MV stenosis pressure 1/2* 11/28/2022 36    • LVSV, 2D 11/28/2022 56    • Dimensionless velociy in* 11/28/2022 0 82    • LV EF 11/28/2022 80    • Sodium 11/28/2022 147    • Potassium 11/28/2022 3 7    • Chloride 11/28/2022 115 (H)    • CO2 11/28/2022 23    • ANION GAP 11/28/2022 9    • BUN 11/28/2022 8    • Creatinine 11/28/2022 0 29 (L)    • Glucose 11/28/2022 109    • Calcium 11/28/2022 8 9    • eGFR 11/28/2022 143    • POC Glucose 11/29/2022 162 (H)        Imaging Studies: I have personally reviewed pertinent imaging studies

## 2022-11-30 ENCOUNTER — APPOINTMENT (INPATIENT)
Dept: RADIOLOGY | Facility: HOSPITAL | Age: 67
End: 2022-11-30

## 2022-11-30 ENCOUNTER — APPOINTMENT (INPATIENT)
Dept: CT IMAGING | Facility: HOSPITAL | Age: 67
End: 2022-11-30

## 2022-11-30 LAB
ABO GROUP BLD BPU: NORMAL
ALBUMIN SERPL BCP-MCNC: 2.1 G/DL (ref 3.5–5)
ALP SERPL-CCNC: 101 U/L (ref 34–104)
ALT SERPL W P-5'-P-CCNC: 19 U/L (ref 7–52)
ANION GAP SERPL CALCULATED.3IONS-SCNC: 7 MMOL/L (ref 4–13)
AST SERPL W P-5'-P-CCNC: 20 U/L (ref 13–39)
BACTERIA BLD CULT: NORMAL
BASOPHILS # BLD AUTO: 0.07 THOUSANDS/ÂΜL (ref 0–0.1)
BASOPHILS NFR BLD AUTO: 0 % (ref 0–1)
BILIRUB SERPL-MCNC: 0.56 MG/DL (ref 0.2–1)
BPU ID: NORMAL
BUN SERPL-MCNC: 5 MG/DL (ref 5–25)
CA-I BLD-SCNC: 1.05 MMOL/L (ref 1.12–1.32)
CALCIUM ALBUM COR SERPL-MCNC: 9.4 MG/DL (ref 8.3–10.1)
CALCIUM SERPL-MCNC: 7.9 MG/DL (ref 8.4–10.2)
CHLORIDE SERPL-SCNC: 102 MMOL/L (ref 96–108)
CO2 SERPL-SCNC: 28 MMOL/L (ref 21–32)
CREAT SERPL-MCNC: 0.23 MG/DL (ref 0.6–1.3)
CROSSMATCH: NORMAL
CROSSMATCH: NORMAL
EOSINOPHIL # BLD AUTO: 1.36 THOUSAND/ÂΜL (ref 0–0.61)
EOSINOPHIL NFR BLD AUTO: 5 % (ref 0–6)
ERYTHROCYTE [DISTWIDTH] IN BLOOD BY AUTOMATED COUNT: 18.9 % (ref 11.6–15.1)
GFR SERPL CREATININE-BSD FRML MDRD: 156 ML/MIN/1.73SQ M
GLUCOSE SERPL-MCNC: 100 MG/DL (ref 65–140)
GLUCOSE SERPL-MCNC: 69 MG/DL (ref 65–140)
GLUCOSE SERPL-MCNC: 82 MG/DL (ref 65–140)
GLUCOSE SERPL-MCNC: 83 MG/DL (ref 65–140)
GLUCOSE SERPL-MCNC: 89 MG/DL (ref 65–140)
HCT VFR BLD AUTO: 24.3 % (ref 36.5–49.3)
HGB BLD-MCNC: 6.8 G/DL (ref 12–17)
HGB BLD-MCNC: 7.5 G/DL (ref 12–17)
HGB BLD-MCNC: 7.9 G/DL (ref 12–17)
HGB BLD-MCNC: 8.3 G/DL (ref 12–17)
IMM GRANULOCYTES # BLD AUTO: 0.21 THOUSAND/UL (ref 0–0.2)
IMM GRANULOCYTES NFR BLD AUTO: 1 % (ref 0–2)
LYMPHOCYTES # BLD AUTO: 1.49 THOUSANDS/ÂΜL (ref 0.6–4.47)
LYMPHOCYTES NFR BLD AUTO: 5 % (ref 14–44)
MAGNESIUM SERPL-MCNC: 1.9 MG/DL (ref 1.9–2.7)
MCH RBC QN AUTO: 25.2 PG (ref 26.8–34.3)
MCHC RBC AUTO-ENTMCNC: 30.9 G/DL (ref 31.4–37.4)
MCV RBC AUTO: 82 FL (ref 82–98)
MONOCYTES # BLD AUTO: 1.41 THOUSAND/ÂΜL (ref 0.17–1.22)
MONOCYTES NFR BLD AUTO: 5 % (ref 4–12)
NEUTROPHILS # BLD AUTO: 23.32 THOUSANDS/ÂΜL (ref 1.85–7.62)
NEUTS SEG NFR BLD AUTO: 84 % (ref 43–75)
NRBC BLD AUTO-RTO: 0 /100 WBCS
PHOSPHATE SERPL-MCNC: 3.8 MG/DL (ref 2.3–4.1)
PLATELET # BLD AUTO: 387 THOUSANDS/UL (ref 149–390)
PMV BLD AUTO: 9 FL (ref 8.9–12.7)
POTASSIUM SERPL-SCNC: 3.4 MMOL/L (ref 3.5–5.3)
PROCALCITONIN SERPL-MCNC: 3.13 NG/ML
PROT SERPL-MCNC: 5.1 G/DL (ref 6.4–8.4)
RBC # BLD AUTO: 2.98 MILLION/UL (ref 3.88–5.62)
SODIUM SERPL-SCNC: 137 MMOL/L (ref 135–147)
UNIT DISPENSE STATUS: NORMAL
UNIT PRODUCT CODE: NORMAL
UNIT PRODUCT VOLUME: 280 ML
UNIT PRODUCT VOLUME: 350 ML
UNIT PRODUCT VOLUME: 350 ML
UNIT RH: NORMAL
WBC # BLD AUTO: 27.86 THOUSAND/UL (ref 4.31–10.16)

## 2022-11-30 RX ORDER — SODIUM CHLORIDE, SODIUM GLUCONATE, SODIUM ACETATE, POTASSIUM CHLORIDE, MAGNESIUM CHLORIDE, SODIUM PHOSPHATE, DIBASIC, AND POTASSIUM PHOSPHATE .53; .5; .37; .037; .03; .012; .00082 G/100ML; G/100ML; G/100ML; G/100ML; G/100ML; G/100ML; G/100ML
500 INJECTION, SOLUTION INTRAVENOUS ONCE
Status: COMPLETED | OUTPATIENT
Start: 2022-11-30 | End: 2022-11-30

## 2022-11-30 RX ORDER — INSULIN LISPRO 100 [IU]/ML
1-5 INJECTION, SOLUTION INTRAVENOUS; SUBCUTANEOUS EVERY 6 HOURS SCHEDULED
Status: DISCONTINUED | OUTPATIENT
Start: 2022-11-30 | End: 2022-12-20 | Stop reason: HOSPADM

## 2022-11-30 RX ORDER — POTASSIUM CHLORIDE 20MEQ/15ML
20 LIQUID (ML) ORAL 2 TIMES DAILY
Status: COMPLETED | OUTPATIENT
Start: 2022-11-30 | End: 2022-11-30

## 2022-11-30 RX ORDER — DEXTROSE MONOHYDRATE 25 G/50ML
INJECTION, SOLUTION INTRAVENOUS
Status: COMPLETED
Start: 2022-11-30 | End: 2022-11-30

## 2022-11-30 RX ORDER — DEXTROSE MONOHYDRATE 25 G/50ML
25 INJECTION, SOLUTION INTRAVENOUS ONCE
Status: COMPLETED | OUTPATIENT
Start: 2022-11-30 | End: 2022-11-30

## 2022-11-30 RX ADMIN — CHLORHEXIDINE GLUCONATE 0.12% ORAL RINSE 15 ML: 1.2 LIQUID ORAL at 20:42

## 2022-11-30 RX ADMIN — SODIUM CHLORIDE, SODIUM ACETATE ANHYDROUS, SODIUM GLUCONATE, POTASSIUM CHLORIDE, AND MAGNESIUM CHLORIDE 200 ML/HR: 526; 222; 502; 37; 30 INJECTION, SOLUTION INTRAVENOUS at 06:35

## 2022-11-30 RX ADMIN — CHLORHEXIDINE GLUCONATE 0.12% ORAL RINSE 15 ML: 1.2 LIQUID ORAL at 09:38

## 2022-11-30 RX ADMIN — PROPOFOL 40 MCG/KG/MIN: 10 INJECTION, EMULSION INTRAVENOUS at 18:22

## 2022-11-30 RX ADMIN — POTASSIUM CHLORIDE 20 MEQ: 20 SOLUTION ORAL at 14:27

## 2022-11-30 RX ADMIN — DEXTROSE MONOHYDRATE 25 ML: 25 INJECTION, SOLUTION INTRAVENOUS at 11:52

## 2022-11-30 RX ADMIN — CEFEPIME HYDROCHLORIDE 2000 MG: 2 INJECTION, SOLUTION INTRAVENOUS at 20:42

## 2022-11-30 RX ADMIN — PROPOFOL 30 MCG/KG/MIN: 10 INJECTION, EMULSION INTRAVENOUS at 11:28

## 2022-11-30 RX ADMIN — SODIUM CHLORIDE, SODIUM ACETATE ANHYDROUS, SODIUM GLUCONATE, POTASSIUM CHLORIDE, AND MAGNESIUM CHLORIDE 200 ML/HR: 526; 222; 502; 37; 30 INJECTION, SOLUTION INTRAVENOUS at 01:56

## 2022-11-30 RX ADMIN — HYDROMORPHONE HYDROCHLORIDE 1 MG/HR: 10 INJECTION, SOLUTION INTRAMUSCULAR; INTRAVENOUS; SUBCUTANEOUS at 17:17

## 2022-11-30 RX ADMIN — SODIUM CHLORIDE, SODIUM GLUCONATE, SODIUM ACETATE, POTASSIUM CHLORIDE, MAGNESIUM CHLORIDE, SODIUM PHOSPHATE, DIBASIC, AND POTASSIUM PHOSPHATE 500 ML: .53; .5; .37; .037; .03; .012; .00082 INJECTION, SOLUTION INTRAVENOUS at 11:08

## 2022-11-30 RX ADMIN — POTASSIUM CHLORIDE 20 MEQ: 20 SOLUTION ORAL at 09:38

## 2022-11-30 RX ADMIN — VASOPRESSIN 0.04 UNITS/MIN: 20 INJECTION INTRAVENOUS at 02:00

## 2022-11-30 RX ADMIN — COLLAGENASE SANTYL: 250 OINTMENT TOPICAL at 09:38

## 2022-11-30 RX ADMIN — NOREPINEPHRINE BITARTRATE 3 MCG/MIN: 1 INJECTION, SOLUTION INTRAVENOUS at 20:31

## 2022-11-30 RX ADMIN — PROPOFOL 40 MCG/KG/MIN: 10 INJECTION, EMULSION INTRAVENOUS at 04:35

## 2022-11-30 RX ADMIN — HYOSCYAMINE SULFATE 1 APPLICATION: 16 SOLUTION at 09:38

## 2022-11-30 RX ADMIN — PANTOPRAZOLE SODIUM 40 MG: 40 INJECTION, POWDER, FOR SOLUTION INTRAVENOUS at 20:43

## 2022-11-30 RX ADMIN — CEFEPIME HYDROCHLORIDE 2000 MG: 2 INJECTION, SOLUTION INTRAVENOUS at 14:27

## 2022-11-30 RX ADMIN — IOHEXOL 100 ML: 350 INJECTION, SOLUTION INTRAVENOUS at 13:21

## 2022-11-30 RX ADMIN — CEFEPIME HYDROCHLORIDE 2000 MG: 2 INJECTION, SOLUTION INTRAVENOUS at 04:33

## 2022-11-30 RX ADMIN — PANTOPRAZOLE SODIUM 40 MG: 40 INJECTION, POWDER, FOR SOLUTION INTRAVENOUS at 09:38

## 2022-11-30 RX ADMIN — VASOPRESSIN 0.04 UNITS/MIN: 20 INJECTION INTRAVENOUS at 10:12

## 2022-11-30 RX ADMIN — INSULIN LISPRO 1 UNITS: 100 INJECTION, SOLUTION INTRAVENOUS; SUBCUTANEOUS at 02:17

## 2022-11-30 RX ADMIN — DEXTROSE MONOHYDRATE 25 ML: 25 INJECTION, SOLUTION INTRAVENOUS at 11:51

## 2022-11-30 RX ADMIN — NOREPINEPHRINE BITARTRATE 5 MCG/MIN: 1 INJECTION, SOLUTION INTRAVENOUS at 07:38

## 2022-11-30 RX ADMIN — CALCIUM GLUCONATE 3 G: 98 INJECTION, SOLUTION INTRAVENOUS at 11:11

## 2022-11-30 RX ADMIN — VASOPRESSIN 0.04 UNITS/MIN: 20 INJECTION INTRAVENOUS at 18:22

## 2022-11-30 NOTE — ASSESSMENT & PLAN NOTE
· With witnessed aspiration event during acute decompensation in setting of GIB  · Suspect aspiration pneumonia vs pneumonitis  · Emergently intubated for airway protection/respiratory failure  · Continue IV antibiotics as noted above  · Aggressive pulmonary hygiene

## 2022-11-30 NOTE — ASSESSMENT & PLAN NOTE
· Had acute decompensation this AM on M/S unit w/tachycardia in 160s, hypotension, tachypnea  · Had projectile vomiting of very large amount of coffee-ground emesis as well as significant amount of output via PEG tube as well - was intubated at that time for airway protection and respiratory failure/distress  · Shock is multifactorial: Hypovolemic vs septic vs hemorrhagic   · COVID-19/FLU/RSV negative  · 1/2 Blood cultures from 11/25 (+) MSSA; repeat blood cultures from 11/28 (-) - will repeat blood cultures now  · UA on admission w/+ nitrites and 3+ leuks, however urine culture w/NGTD - does have chronic Marrufo  · MRSA swab pending  · Back wound culture from 11/26 (+) Pseudomonas aeruginosa, Proteus mirabilis, Staph aureus   · Sacral wound culture from 11/26 (+) Citrobacter koseri, Proteus mirabilis  · Send sputum culture   · WBC worsened top 27 4 w/9 bands  · Lactic acid 1 2 after 3L IVF bolus via pressure bag  · Procalcitonin 0 16 on admission - resend now  · TTE from 11/28 revealed EF 80% w/hyperdynamic systolic function and mildly dilated RV  · Received aggressive IVF resuscitation (> 30 cc/kg) - continue with aggressive resuscitation   · Hemoglobin trending down since admission, now 5 3 - will give PRBC x2 and FFP x1 now  · R IJ TLC/R Brachial A-Line placed - currently requiring Levophed gtt to maintain MAP > 65 - will add Vasopressin gtt  · 11/29 Femoral milagros placed, Brachial Milagros DC'd  · Continue IV Cefepime/Vancomcyin D#5 per ID's recommendations  · Monitor fever and WBC curve  · Trend procalcitonin and follow-up procalcitonin

## 2022-11-30 NOTE — NURSING NOTE
Vitals reviewed at start of shift, patient noted to have decreased BP from baseline and increased HR from baseline, also per caregiver was making an usual sound  Physician made aware  Physician and ICU nurse practitioner at bedside  Patient began vomiting coffee ground like fluids  ICU care team called to bedside  Patient then transferred to ICU

## 2022-11-30 NOTE — ASSESSMENT & PLAN NOTE
· With acute onset of large amount of projectile coffee-ground emesis and output from PEG tube   · Hgb 5 3 - will give PRBC x2, FFP x1  HGB responsive to blood products  · Continue Q4 HGB until stable  · EGD: The esophagus appeared normal  There was an ulcerated area with pigmented spot in the body of the stomach  Unclear if this was the source of bleeding or trauma from the OG tube   A clip was placed to prevent further bleeding  · Received IV Protonix bolus and started on gtt, now dc's  · Continue Protonix BID IV  · Trickle feeds started  · Holding DVT ppx

## 2022-11-30 NOTE — PROCEDURES
Arterial Line Insertion    Date/Time: 11/29/2022 8:33 PM  Performed by: AVANI Benton  Authorized by: AVANI Benton     Patient location:  Bedside and ICU  Consent:     Consent given by:  Guardian    Risks discussed:  Bleeding, ischemia, infection and pain  Universal protocol:     Procedure explained and questions answered to patient or proxy's satisfaction: yes      Required blood products, implants, devices, and special equipment available: yes      Site/side marked: yes      Immediately prior to procedure a time out was called: yes      Patient identity confirmed:  Hospital-assigned identification number and arm band  Indications:     Indications: hemodynamic monitoring, continuous blood pressure monitoring and frequent labs / infusion    Pre-procedure details:     Skin preparation:  Chlorhexidine    Preparation: Patient was prepped and draped in sterile fashion    Sedation:     Sedation type: Anxiolysis  Procedure details:     Location / Tip of Catheter:  Femoral    Laterality:  Left    Needle gauge:  18 G    Number of attempts:  1    Successful placement: yes      Transducer: waveform confirmed    Post-procedure details:     Post-procedure:  Sterile dressing applied    Patient tolerance of procedure:   Tolerated well, no immediate complications

## 2022-11-30 NOTE — ASSESSMENT & PLAN NOTE
· Noted during admission  · Suspect this was 2/2 hypovolemia  · Nephrology following  · Currently NPO - holding free water flushes  · Continue with aggressive IVF resuscitation  · Trend Na

## 2022-11-30 NOTE — PROGRESS NOTES
Progress Note -  [facility]  Martell Call 74 y o  male MRN: [id number]  Unit/Bed#: ICU 05-01 Encounter: [id number]      ASSESSMENT AND PLAN:      63-year-old male with past medical history of intellectual disability, functional quadriplegia, status post PEG tube, status post colostomy who was admitted for sepsis secondary to multiple chronic wounds  GI was consulted for coffee-ground emesis, anemia  1  Coffee-ground emesis  2  Acute anemia  Hemoglobin currently 7 4 in the midst of getting his 3rd unit of PRBCs  No reported bleeding overnight  Colostomy output showed brown stool  Aspiration of PEG tube after procedure showed no evidence of bleeding  Unclear where coffee-ground emesis had originated from  EGD showed small ulcer which may have been bleeding in the past but overall unlikely to cause acute drop in hemoglobin  Fortunately there are no signs of further bleeding  · Continue Protonix 40 mg IV b i d   Can change to omeprazole b i d  through PEG tube tomorrow  · Consider checking LDH, haptoglobin, peripheral smear or fibrinogen to rule out other sources of anemia including hemolysis, DIC  · Monitor hemoglobin, transfuse for less than 7    · Will hold off on further endoscopic management given findings of EGD  If he develops further signs of bleeding including melena, bloody stool change in hemodynamics please contact GI  · Will continue to follow along  3  Malnutrition  4  Status post PEG tube  Long-term PEG tube in place  Appears to be functioning well  · Okay to start tube feeds  · Can increase to goal based on Nutrition recommendations  Rest of care per primary team     ______________________________________________________________________    Subjective:  Seen examined  Intubated  REVIEW OF SYSTEMS:    Review of Systems   Unable to perform ROS: Patient nonverbal          Historical Information   History reviewed   No pertinent past medical history  History reviewed  No pertinent surgical history  Social History   Social History     Substance and Sexual Activity   Alcohol Use Never     Social History     Substance and Sexual Activity   Drug Use Never     Social History     Tobacco Use   Smoking Status Never   Smokeless Tobacco Never     History reviewed  No pertinent family history      Meds/Allergies     Medications Prior to Admission   Medication   • bisacodyl (Dulcolax) 10 mg suppository   • calcium carbonate-vitamin D (OSCAL-D) 500 mg-200 units per tablet   • denosumab (Prolia) 60 mg/mL   • dorzolamide-timolol (COSOPT) 22 3-6 8 MG/ML ophthalmic solution   • ergocalciferol (ERGOCALCIFEROL) 1 25 MG (28732 UT) capsule   • fluticasone (FLONASE) 50 mcg/act nasal spray   • Hydromorphone HCl (Dilaudid) 1 MG/ML LIQD   • latanoprost (XALATAN) 0 005 % ophthalmic solution   • loratadine (CLARITIN) 10 mg tablet   • magnesium hydroxide (MILK OF MAGNESIA) 400 mg/5 mL oral suspension   • Polyethyl Glycol-Propyl Glycol (Systane) 0 4-0 3 % GEL   • sodium chloride (OCEAN) 0 65 % nasal spray     Current Facility-Administered Medications   Medication Dose Route Frequency   • acetaminophen (TYLENOL) tablet 650 mg  650 mg Per G Tube Q4H PRN   • cefepime (MAXIPIME) IVPB (premix in dextrose) 2,000 mg 50 mL  2,000 mg Intravenous Q8H   • chlorhexidine (PERIDEX) 0 12 % oral rinse 15 mL  15 mL Mouth/Throat Q12H FORTINO   • collagenase (SANTYL) ointment   Topical Daily   • HYDROmorphone (DILAUDID) 50 mg in sodium chloride 0 9% 50mL drip  1 mg/hr Intravenous Continuous   • HYDROmorphone (DILAUDID) injection 0 5 mg  0 5 mg Intravenous Q2H PRN   • insulin lispro (HumaLOG) 100 units/mL subcutaneous injection 1-5 Units  1-5 Units Subcutaneous Q6H FORTINO   • multi-electrolyte (ISOLYTE-S PH 7 4) bolus 2,000 mL  2,000 mL Intravenous Once   • multi-electrolyte (PLASMALYTE-A/ISOLYTE-S PH 7 4) IV solution  200 mL/hr Intravenous Continuous   • NOREPINEPHRINE 4 MG  ML NSS (CMPD ORDER) infusion  1-30 mcg/min Intravenous Titrated   • ondansetron (ZOFRAN) injection 4 mg  4 mg Intravenous Q6H PRN   • pantoprazole (PROTONIX) injection 40 mg  40 mg Intravenous Q12H [address]   • propofol (DIPRIVAN) 1000 mg in 100 mL infusion (premix)  5-50 mcg/kg/min Intravenous Titrated   • sodium chloride 0 9 % bolus 1,000 mL  1,000 mL Intravenous Once   • sodium hypochlorite (DAKIN'S HALF-STRENGTH) 0 25 percent topical solution 1 application  1 application Irrigation BID   • vasopressin (PITRESSIN) 20 Units in sodium chloride 0 9 % 100 mL infusion  0 04 Units/min Intravenous Continuous       Allergies   Allergen Reactions   • Albumen, Egg - Food Allergy Other (See Comments)   • Amoxicillin-Pot Clavulanate Other (See Comments)           Objective     Blood pressure 136/63, pulse 66, temperature 98 2 °F (36 8 °C), resp  rate 16, height 5' 5" (1 651 m), weight 62 2 kg (137 lb 2 oz), SpO2 100 %  Body mass index is 22 82 kg/m²  Intake/Output Summary (Last 24 hours) at 11/30/2022 0836  Last data filed at 11/30/2022 0825  Gross per 24 hour   Intake 00174 5 ml   Output 5130 ml   Net 7891 5 ml         PHYSICAL EXAM:      Physical Exam  Vitals and nursing note reviewed  Constitutional:       General: He is not in acute distress  Appearance: Normal appearance  He is underweight  He is ill-appearing  Interventions: He is sedated and intubated  HENT:      Head: Normocephalic and atraumatic  Mouth/Throat:      Mouth: Mucous membranes are dry  Eyes:      Extraocular Movements: Extraocular movements intact  Conjunctiva/sclera: Conjunctivae normal    Cardiovascular:      Rate and Rhythm: Normal rate  Pulses: Normal pulses  Pulmonary:      Effort: Pulmonary effort is normal  He is intubated  Abdominal:      General: Abdomen is flat  Bowel sounds are normal  There is no distension  Palpations: Abdomen is soft  Tenderness: There is no abdominal tenderness  There is no guarding        Comments: PEG tube in place  Colostomy with brown output  Musculoskeletal:      Cervical back: Neck supple  Right lower leg: No edema  Left lower leg: No edema  Skin:     General: Skin is warm and dry  Comments: Multiple wounds  Lab Results:   No results displayed because visit has over 200 results  Imaging Studies: I have personally reviewed pertinent imaging studies  [address] [facility]    Gastroenterology Fellow  PGY-4  Available via [facility]  11/30/2022 8:36 AM

## 2022-11-30 NOTE — PROGRESS NOTES
[place] [address]  Progress Note Roman Johnson 1955, 74 y o  male MRN: [id number]  Unit/Bed#: ICU 05-01 Encounter: [id number]  Primary Care Provider: Carlos A Tejeda MD   Date and time admitted to hospital: 11/25/2022  2:30 PM    * Shock [facility])  Assessment & Plan  · Had acute decompensation this AM on M/S unit w/tachycardia in 160s, hypotension, tachypnea  · Had projectile vomiting of very large amount of coffee-ground emesis as well as significant amount of output via PEG tube as well - was intubated at that time for airway protection and respiratory failure/distress  · Shock is multifactorial: Hypovolemic vs septic vs hemorrhagic   · COVID-19/FLU/RSV negative  · 1/2 Blood cultures from 11/25 (+) MSSA; repeat blood cultures from 11/28 (-) - will repeat blood cultures now  · UA on admission w/+ nitrites and 3+ leuks, however urine culture w/NGTD - does have chronic Marrufo  · MRSA swab pending  · Back wound culture from 11/26 (+) Pseudomonas aeruginosa, Proteus mirabilis, Staph aureus   · Sacral wound culture from 11/26 (+) Citrobacter koseri, Proteus mirabilis  · Send sputum culture   · WBC worsened top 27 4 w/9 bands  · Lactic acid 1 2 after 3L IVF bolus via pressure bag  · Procalcitonin 0 16 on admission - resend now  · TTE from 11/28 revealed EF 80% w/hyperdynamic systolic function and mildly dilated RV  · Received aggressive IVF resuscitation (> 30 cc/kg) - continue with aggressive resuscitation   · Hemoglobin trending down since admission, now 5 3 - will give PRBC x2 and FFP x1 now  · R IJ TLC/R Brachial A-Line placed - currently requiring Levophed gtt to maintain MAP > 65 - will add Vasopressin gtt  · 11/29 Femoral eileen placed, Brachial Marsing DC'd  · Continue IV Cefepime/Vancomcyin D#5 per ID's recommendations  · Monitor fever and WBC curve  · Trend procalcitonin and follow-up procalcitonin     Acute respiratory failure with hypoxia [facility])  Assessment & Plan  · 11/28 Intubated emergently on M/S floor for respiratory failure/distress and airway protection in setting of GIB/shock   · CXR: Right base infiltrate; question aspiration  · ETT exchanged over bougie as cuff was not maintaining air - now volumes improved  · Suspect aspiration given imaging and acute decompensation w/large coffee-ground emesis  · CXR w/R lobe infiltrate (likely aspiration)  · Current vent settings: 16/400/70/6  · Currently sedated with Propofol/Dilaudid gtt's w/Goal RASS -1 to -2  · Will require frequent in-line suctioning for suspected aspiration  · Continue IV antibiotics as noted above  · Titrate FiO2 to maintain SpO2 > 90%  · Aggressive pulmonary hygiene    GIB (gastrointestinal bleeding)  Assessment & Plan  · With acute onset of large amount of projectile coffee-ground emesis and output from PEG tube   · Hgb 5 3 - will give PRBC x2, FFP x1  HGB responsive to blood products  · Continue Q4 HGB until stable  · EGD: The esophagus appeared normal  There was an ulcerated area with pigmented spot in the body of the stomach  Unclear if this was the source of bleeding or trauma from the OG tube   A clip was placed to prevent further bleeding  · Received IV Protonix bolus and started on gtt, now dc's  · Continue Protonix BID IV  · Trickle feeds started  · Holding DVT ppx    Acute blood loss anemia (ABLA)  Assessment & Plan  · Hgb trending down during admission - today 5 3  · In setting of acute GIB  · given PRBC x2, FFP x1 now   · Trend Hgb Q4H for now and will transfuse as noted above  · Monitor hemodynamics     Aspiration pneumonia (HCC)  Assessment & Plan  · With witnessed aspiration event during acute decompensation in setting of GIB  · Suspect aspiration pneumonia vs pneumonitis  · Emergently intubated for airway protection/respiratory failure  · Continue IV antibiotics as noted above  · Aggressive pulmonary hygiene    MSSA bacteremia  Assessment & Plan  · 1/2 Blood cultures from 11/25 (+) MSSA; repeat blood cultures from 11/28 (-) - will repeat blood cultures now  · Continue IV antibiotics as noted above   · ID following    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, stage 4 (HCC)  Assessment & Plan  · Surgery following  · Wound care per surgery's recommendations  · Wound care team consult  · Wound cultures as noted above  · Continue IV antibiotics as noted above    Hypernatremia  Assessment & Plan  · Noted during admission  · Suspect this was 2/2 hypovolemia  · Nephrology following  · Currently NPO - holding free water flushes  · Continue with aggressive IVF resuscitation  · Trend Na    Electrolyte disturbance  Assessment & Plan  · Replete electrolytes PRN    Chronic indwelling Marrufo catheter  Assessment & Plan  · Unclear if this is due to urinary retention/neurogenic bladder? · Will exchange Marrufo cath now   · UA and urine culture as noted above    Developmental disability  Assessment & Plan  · Nonverbal at baseline w/bilatereral UE/LE contractures  · Resides in group home     Pressure injury of left heel, stage 4 (HCC)  Assessment & Plan  · Wound care consult  · Local wound care      ----------------------------------------------------------------------------------------  HPI/24hr events:  Patient was a rapid response on med [employer] today where he was emergently intubated for aspirating coffee-ground emesis  Patient was tachycardic hypertensive and in acute respiratory distress  He was suctioned and mechanically ventilated  At that time his hemoglobin was 5 3 and he was transfused 2 units of red blood cells and 1 unit of FFP  Transferred to ICU where he had a bedside EGD and received a clip for a small ulcerated Area in the stomach  Central line was placed, a line was placed, he received multiple fluid boluses  Levo and vaso were started for hypotension, and he remains sedated with propofol and Dilaudid       Patient appropriate for transfer out of the ICU today?: No  Disposition: Continue Critical Care   Code Status: Level 1 - Full Code  ---------------------------------------------------------------------------------------  SUBJECTIVE  Unable to assess/intubated and sedated    Review of Systems   Unable to perform ROS: Intubated     Review of systems was unable to be performed secondary to Intubation/sedation  ---------------------------------------------------------------------------------------  OBJECTIVE    Vitals   Vitals:    22 2200 22 2300 22 0000 22 0100   BP: 127/59 127/58 138/63 141/63   BP Location: Right leg Right leg Right leg Right leg   Pulse: 83 82 84 83   Resp: 16 16 16 16   Temp: 99 1 °F (37 3 °C) 98 6 °F (37 °C) 98 2 °F (36 8 °C) 98 1 °F (36 7 °C)   TempSrc: Probe Probe Probe Probe   SpO2: 99% 99% 98% 99%   Weight:       Height:         Temp (24hrs), Av 9 °F (37 7 °C), Min:98 1 °F (36 7 °C), Max:100 9 °F (38 3 °C)  Current: Temperature: 98 1 °F (36 7 °C)  Arterial Line BP: 139/60  Arterial Line MAP (mmHg): 87 mmHg    Respiratory:  SpO2: SpO2: 99 %       Invasive/non-invasive ventilation settings   Respiratory    Lab Data (Last 4 hours)    None         O2/Vent Data (Last 4 hours)    None                Physical Exam  Vitals and nursing note reviewed  Constitutional:       General: He is not in acute distress  Appearance: He is ill-appearing  HENT:      Head: Normocephalic  Mouth/Throat:      Mouth: Mucous membranes are moist       Pharynx: Oropharynx is clear  Eyes:      Pupils: Pupils are equal, round, and reactive to light  Cardiovascular:      Rate and Rhythm: Normal rate and regular rhythm  Pulses: Normal pulses  Heart sounds: Normal heart sounds  No murmur heard  Pulmonary:      Breath sounds: Rhonchi present  Abdominal:      General: Bowel sounds are normal  There is no distension  Palpations: Abdomen is soft  Musculoskeletal:         General: Deformity present  Right lower leg: Edema present  Left lower leg: Edema present  Comments: Upper and lower extremity contractions  Bedbound at baseline   Skin:     General: Skin is warm and dry  Capillary Refill: Capillary refill takes less than 2 seconds  Findings: Lesion present        Comments: Multiple open wounds             Laboratory and Diagnostics:  Results from last 7 days   Lab Units 11/29/22  2353 11/29/22  2036 11/29/22  1703 11/29/22  0939 11/28/22  0439 11/26/22  0432 11/25/22  1510   WBC Thousand/uL  --   --   --  27 40* 17 52* 16 67* 21 92*   HEMOGLOBIN g/dL 7 4* 7 9* 7 4* 5 3* 7 8* 8 2* 9 1*   HEMATOCRIT %  --   --   --  19 0* 27 7* 29 0* 32 8*   PLATELETS Thousands/uL  --   --   --  568* 731* 498* 704*   NEUTROS PCT %  --   --   --   --   --   --  80*   BANDS PCT %  --   --   --  9*  --   --   --    MONOS PCT %  --   --   --   --   --   --  9   MONO PCT %  --   --   --  2*  --   --   --      Results from last 7 days   Lab Units 11/29/22  1703 11/29/22  0939 11/28/22  1821 11/28/22  0439 11/26/22  0432 11/25/22  1550   SODIUM mmol/L 146 147 147 150* 145 143   POTASSIUM mmol/L 3 4* 3 6 3 7 3 2* 3 2* 3 5   CHLORIDE mmol/L 112* 115* 115* 118* 109* 103   CO2 mmol/L 28 25 23 23 27 31   ANION GAP mmol/L 6 7 9 9 9 9   BUN mg/dL 10 17 8 9 8 13   CREATININE mg/dL 0 24* 0 28* 0 29* 0 30* 0 29* 0 42*   CALCIUM mg/dL 7 5* 7 1* 8 9 9 2 9 0 9 0   GLUCOSE RANDOM mg/dL 97 130 109 122 93 113   ALT U/L 22 27  --   --  19 25   AST U/L 24 32  --   --  18 26   ALK PHOS U/L 87 85  --   --  119* 135*   ALBUMIN g/dL 2 0* 1 9*  --   --  2 6* 2 9*   TOTAL BILIRUBIN mg/dL 1 13* 0 18*  --   --  0 25 0 26     Results from last 7 days   Lab Units 11/29/22  1703 11/29/22  0939 11/26/22  0432   MAGNESIUM mg/dL 2 4 1 8* 2 0   PHOSPHORUS mg/dL 2 5 3 5  --       Results from last 7 days   Lab Units 11/29/22  0939 11/25/22  1550   INR  1 46* 1 18   PTT seconds  --  36          Results from last 7 days   Lab Units 11/29/22  0939 11/25/22  1550   LACTIC ACID mmol/L 1 2 1  9     ABG:  Results from last 7 days   Lab Units 11/29/22  1711 11/29/22  1000   PH ART  7 415 7 414   PCO2 ART mm Hg 44 1* 40 8   PO2 ART mm Hg 83 0 100 2   HCO3 ART mmol/L 27 6 25 5   BASE EXC ART mmol/L 2 8 0 9   ABG SOURCE   --  Line, Arterial     VBG:  Results from last 7 days   Lab Units 11/29/22  1000   ABG SOURCE  Line, Arterial     Results from last 7 days   Lab Units 11/29/22  1703 11/25/22  1550   PROCALCITONIN ng/ml 3 30* 0 16       Micro  Results from last 7 days   Lab Units 11/29/22  0949 11/28/22  1234 11/28/22  1132 11/28/22  0440 11/26/22  0913 11/25/22  1550 11/25/22  1536 11/25/22  1530   BLOOD CULTURE  Received in Microbiology Lab  Culture in Progress  --  No Growth at 24 hrs  No Growth at 24 hrs   --  Staphylococcus aureus*  Staphylococcus coagulase negative* No Growth After 4 Days  --    GRAM STAIN RESULT   --   --   --   --  4+ Gram negative rods*  2+ Gram positive cocci in pairs and chains*  No polys seen*  2+ Gram positive cocci in pairs and chains*  2+ Gram negative rods*  No polys seen* Gram positive cocci in clusters*  --   --    URINE CULTURE   --   --   --   --   --   --   --  >100,000 cfu/ml   WOUND CULTURE   --   --   --   --  2+ Growth of Pseudomonas aeruginosa*  2+ Growth of Proteus mirabilis*  2+ Growth of Staphylococcus aureus*  3+ Growth of  2+ Growth of Citrobacter koseri*  2+ Growth of Proteus mirabilis*  2+ Growth of  --   --   --    MRSA CULTURE ONLY   --  Methicillin Resistant Staphylococcus aureus isolated*  This patient requires contact isolation precautions per [place] law  Contact precautions are not required in [place] for nasal surveillance cultures  --   --   --   --   --   --        EKG: NSR  Imaging: I have personally reviewed pertinent reports        Intake and Output  I/O       11/28 0701 11/29 0700 11/29 0701 11/30 0700    I V  (mL/kg)  7044 8 (128 3)    Blood  1582 1    IV Piggyback  3250    Total Intake(mL/kg)  83449 9 (216 3) Urine (mL/kg/hr) 1350 (1) 4675 (3 5)    Total Output 1350 4675    Net -1350 +7201 9                Height and Weights   Height: 5' 5" (165 1 cm)  IBW (Ideal Body Weight): 61 5 kg  Body mass index is 20 14 kg/m²  Weight (last 2 days)     Date/Time Weight    11/28/22 1426 54 9 (121)            Nutrition       Diet Orders   (From admission, onward)             Start     Ordered    11/30/22 0039  Diet Enteral/Parenteral; Tube Feeding No Oral Diet; Jevity 1 2 Zeyad; Continuous; 10  Diet effective now        References:    Nutrtion Support Algorithm Enteral vs  Parenteral   Question Answer Comment   Diet Type Enteral/Parenteral    Enteral/Parenteral Tube Feeding No Oral Diet    Tube Feeding Formula: Jevity 1 2 Zeyad    Bolus/Cyclic/Continuous Continuous    Tube Feeding Goal Rate (mL/hr): 10    RD to adjust diet per protocol?  No        11/30/22 0039                  Active Medications  Scheduled Meds:  Current Facility-Administered Medications   Medication Dose Route Frequency Provider Last Rate   • acetaminophen  650 mg Per G Tube Q4H PRN Maldonado Jung MD     • cefepime  2,000 mg Intravenous Q8H Maldonado Jung MD Stopped (11/29/22 2233)   • chlorhexidine  15 mL Mouth/Throat Q12H [address] AVANI Wilson     • collagenase   Topical Daily Maldonado Jung MD     • HYDROmorphone  1 mg/hr Intravenous Continuous AVANI Wilson 1 mg/hr (11/29/22 1944)   • HYDROmorphone  0 5 mg Intravenous Q2H PRN AVANI Wilson     • insulin lispro  1-5 Units Subcutaneous Q6H [address] AVANI Velasquez     • multi-electrolyte  2,000 mL Intravenous Once [employer], CRNP Stopped (11/29/22 0855)   • multi-electrolyte  200 mL/hr Intravenous Continuous AVANI Velasquez 200 mL/hr (11/29/22 2338)   • norepinephrine  1-30 mcg/min Intravenous Titrated Maldonado Jung MD 9 mcg/min (11/30/22 0118)   • ondansetron  4 mg Intravenous Q6H PRN Maldonado Jung MD     • pantoprazole  40 mg Intravenous Q12H [address] Reggie G Sebastien,      • potassium phosphate  30 mmol Intravenous Once [employer], CRNP 30 mmol (11/29/22 2335)   • propofol  5-50 mcg/kg/min Intravenous Titrated Julito Mccartney MD 30 mcg/kg/min (11/30/22 0119)   • sodium chloride  1,000 mL [facility]venous [id number] (11/29/22 0850)   • sodium hypochlorite  1 application Irrigation BID Julito Mccartney MD     • vasopressin  0 04 Units/min Intravenous Continuous AVANI Wilson Stopped (11/29/22 1201)     Continuous Infusions:  HYDROmorphone, 1 mg/hr, Last Rate: 1 mg/hr (11/29/22 1944)  multi-electrolyte, 200 mL/hr, Last Rate: 200 mL/hr (11/29/22 2338)  norepinephrine, 1-30 mcg/min, Last Rate: 9 mcg/min (11/30/22 0118)  propofol, 5-50 mcg/kg/min, Last Rate: 30 mcg/kg/min (11/30/22 0119)  vasopressin, 0 04 Units/min, Last Rate: Stopped (11/29/22 1201)      PRN Meds:   acetaminophen, 650 mg, Q4H PRN  HYDROmorphone, 0 5 mg, Q2H PRN  ondansetron, 4 mg, Q6H PRN        Invasive Devices Review  Invasive Devices     Central Venous Catheter Line  Duration           CVC Central Lines 11/29/22 Triple Internal jugular <1 day          Peripheral Intravenous Line  Duration           Peripheral IV 11/28/22 Left;Ventral (anterior) Forearm 1 day          Arterial Line  Duration           Arterial Line 11/29/22 Left Femoral <1 day    Arterial Line 11/29/22 Radial <1 day          Drain  Duration           Colostomy -- days    Urethral Catheter Temperature probe <1 day          Airway  Duration           ETT  Cuffed; Hi-Lo 7 5 mm <1 day    ETT  Cuffed; Hi-Lo 7 5 mm <1 day                Rationale for remaining devices: TLC:  Medications requiring central line  A line for hemodynamic monitoring and frequent blood draws    Marrufo catheter chronic for strict I&O  ---------------------------------------------------------------------------------------  Advance Directive and Living Will:      Power of :    POLST: ---------------------------------------------------------------------------------------  Care Time Delivered:   Upon my evaluation, this patient had a high probability of imminent or life-threatening deterioration due to 30, which required my direct attention, intervention, and personal management  I have personally provided 30 minutes (0030 to 0100) of critical care time, exclusive of procedures, teaching, family meetings, and any prior time recorded by providers other than myself  AVANI Andrade      Portions of the record may have been created with voice recognition software  Occasional wrong word or "sound a like" substitutions may have occurred due to the inherent limitations of voice recognition software    Read the chart carefully and recognize, using context, where substitutions have occurred

## 2022-11-30 NOTE — PLAN OF CARE
Problem: Prexisting or High Potential for Compromised Skin Integrity  Goal: Skin integrity is maintained or improved  Description: INTERVENTIONS:  - Identify patients at risk for skin breakdown  - Assess and monitor skin integrity  - Assess and monitor nutrition and hydration status  - Monitor labs   - Assess for incontinence   - Turn and reposition patient  - Assist with mobility/ambulation  - Relieve pressure over bony prominences  - Avoid friction and shearing  - Provide appropriate hygiene as needed including keeping skin clean and dry  - Evaluate need for skin moisturizer/barrier cream  - Collaborate with interdisciplinary team   - Patient/family teaching  - Consider wound care consult   11/29/2022 2252 by Jevon Hancock  Outcome: Progressing  11/29/2022 2252 by Aleyda Cantu  Outcome: Progressing     Problem: Nutrition/Hydration-ADULT  Goal: Nutrient/Hydration intake appropriate for improving, restoring or maintaining nutritional needs  Description: Monitor and assess patient's nutrition/hydration status for malnutrition  Collaborate with interdisciplinary team and initiate plan and interventions as ordered  Monitor patient's weight and dietary intake as ordered or per policy  Utilize nutrition screening tool and intervene as necessary  Determine patient's food preferences and provide high-protein, high-caloric foods as appropriate       INTERVENTIONS:  - Monitor oral intake, urinary output, labs, and treatment plans  - Assess nutrition and hydration status and recommend course of action  - Evaluate amount of meals eaten  - Assist patient with eating if necessary   - Allow adequate time for meals  - Recommend/ encourage appropriate diets, oral nutritional supplements, and vitamin/mineral supplements  - Order, calculate, and assess calorie counts as needed  - Recommend, monitor, and adjust tube feedings and TPN/PPN based on assessed needs  - Assess need for intravenous fluids  - Provide specific nutrition/hydration education as appropriate  - Include patient/family/caregiver in decisions related to nutrition  Outcome: Progressing     Problem: PAIN - ADULT  Goal: Verbalizes/displays adequate comfort level or baseline comfort level  Description: Interventions:  - Encourage patient to monitor pain and request assistance  - Assess pain using appropriate pain scale  - Administer analgesics based on type and severity of pain and evaluate response  - Implement non-pharmacological measures as appropriate and evaluate response  - Consider cultural and social influences on pain and pain management  - Notify physician/advanced practitioner if interventions unsuccessful or patient reports new pain  Outcome: Progressing     Problem: INFECTION - ADULT  Goal: Absence or prevention of progression during hospitalization  Description: INTERVENTIONS:  - Assess and monitor for signs and symptoms of infection  - Monitor lab/diagnostic results  - Monitor all insertion sites, i e  indwelling lines, tubes, and drains  - Monitor endotracheal if appropriate and nasal secretions for changes in amount and color  - [place] appropriate cooling/warming therapies per order  - Administer medications as ordered  - Instruct and encourage patient and family to use good hand hygiene technique  - Identify and instruct in appropriate isolation precautions for identified infection/condition  Outcome: Progressing     Problem: Knowledge Deficit  Goal: Patient/family/caregiver demonstrates understanding of disease process, treatment plan, medications, and discharge instructions  Description: Complete learning assessment and assess knowledge base    Interventions:  - Provide teaching at level of understanding  - Provide teaching via preferred learning methods  Outcome: Progressing     Problem: SAFETY,RESTRAINT: NV/NON-SELF DESTRUCTIVE BEHAVIOR  Goal: Remains free of harm/injury (restraint for non violent/non self-detsructive behavior)  Description: INTERVENTIONS:  - Instruct patient/family regarding restraint use   - Assess and monitor physiologic and psychological status   - Provide interventions and comfort measures to meet assessed patient needs   - Identify and implement measures to help patient regain control  - Assess readiness for release of restraint   Outcome: Progressing  Goal: Returns to optimal restraint-free functioning  Description: INTERVENTIONS:  - Assess the patient's behavior and symptoms that indicate continued need for restraint  - Identify and implement measures to help patient regain control  - Assess readiness for release of restraint   Outcome: Progressing     Problem: SAFETY ADULT  Goal: Patient will remain free of falls  Description: INTERVENTIONS:  - Educate patient/family on patient safety including physical limitations  - Instruct patient to call for assistance with activity   - Consult OT/PT to assist with strengthening/mobility   - Keep Call bell within reach  - Keep bed low and locked with side rails adjusted as appropriate  - Keep care items and personal belongings within reach  - Initiate and maintain comfort rounds  - Make Fall Risk Sign visible to staff  - Offer Toileting in advance of need  - Initiate/Maintain bed alarm  - Obtain necessary fall risk management equipment:   - Apply yellow socks and bracelet for high fall risk patients  - Consider moving patient to room near nurses station  Outcome: Progressing

## 2022-11-30 NOTE — ASSESSMENT & PLAN NOTE
· Intubated emergently on M/S floor for respiratory failure/distress and airway protection in setting of GIB/shock   · ETT exchanged over bougie as cuff was not maintaining air - now volumes improved  · Suspect aspiration given imaging and acute decompensation w/large coffee-ground emesis  · CXR w/R lobe infiltrate (likely aspiration)  · Current vent settings: 16/400/70/6  · Currently sedated with Propofol/Dilaudid gtt's w/Goal RASS -1 to -2  · Will require frequent in-line suctioning for suspected aspiration  · Continue IV antibiotics as noted above  · Titrate FiO2 to maintain SpO2 > 90%  · Aggressive pulmonary hygiene

## 2022-11-30 NOTE — RESPIRATORY THERAPY NOTE
11/30/22 0437   Respiratory Assessment   General Appearance Sedated   Respiratory Pattern Assisted   Chest Assessment Chest expansion symmetrical   Bilateral Breath Sounds Diminished;Coarse   Suction ET Tube   Resp Comments   (pt remains on current settings  pt suctioned as needed  ETT secured  will continue to monitor pt)   O2 Device vent   Vent Information   Vent ID [id number]   Vent type Henao C3   Henao C3/G5 Vent Mode (S)CMV   $ Vent Daily Charge-Subsequent Yes   $ Pulse Oximetry Spot Check Charge Completed   (S)CMV Settings   Resp Rate (BPM) 16 BPM   VT (mL) 400 mL   FIO2 (%) 40 %   PEEP (cmH2O) 6 cmH2O   I:E Ratio 1:2 8   Insp Time (%) 1 %   Flow Trigger (LPM) 5   Humidification Heater   Heater Temperature (Set) 98 6 °F (37 °C)   (S)CMV Actuals   Resp Rate (BPM) 16 BPM   VT (mL) 372   MV 6   MAP (cmH2O) 9 8 cmH2O   Peak Pressure (cmH2O) 24 cmH2O   I:E Ratio (Obs) 1:2 8   Insp Resistance 12   Heater Temperature (Obs) 98 6 °F (37 °C)   Static Compliance (mL/cmH20) 24 7 mL/cmH2O   Plateau Pressure (cm H2O) 20 1 cm H2O   (S)CMV Alarms   High Peak Pressure (cmH2O) 45   Low Pressure (cmH2O) 5 cm H2O   High Resp Rate (BPM) 40 BPM   Low Resp Rate (BPM) 8 BPM   High MV (L/min) 18 L/min   Low MV (L/min) 3 5 L/min   High VT (mL) 1000 mL   Low VT (mL) 300 mL   Apnea Time (s) 20 S   Maintenance   Alarm (pink) cable attached No   Resuscitation bag with peep valve at bedside Yes   Water bag changed No   Circuit changed No   ETT  Cuffed; Hi-Lo 7 5 mm   Placement Date/Time: 11/29/22 c) 9720   Type: Cuffed; Hi-Lo  Tube Size: 7 5 mm  Location: Oral  Insertion attempts: 2  Placement Verification: Chest x-ray;End tidal CO2;Symmetrical chest wall movement  Secured at (cm): 22   Secured at (cm) 22   Measured from Gums   Secured Location Left   Repositioned Center to Left   Secured by Commercial tube guillaume   Site Condition Dry   Cuff Pressure (cm H2O) 22 cm H2O   HI-LO Suction  Continuous low suction   HI-LO Secretions Scant RT Ventilator Management Note  Amaya Hernández 74 y o  male MRN: [id number]  Unit/Bed#: ICU 05-01 Encounter: [id number]      Daily Screen    No data found in the last 10 encounters  Physical Exam:   Assessment Type: Assess only  General Appearance: Sedated  Respiratory Pattern: Assisted  Chest Assessment: Chest expansion symmetrical  Bilateral Breath Sounds: Diminished, Coarse  Suction: ET Tube  O2 Device: vent      Resp Comments: (P)  (pt remains on current settings  pt suctioned as needed  ETT secured   will continue to monitor pt)

## 2022-11-30 NOTE — ASSESSMENT & PLAN NOTE
· Surgery following  · Wound care per surgery's recommendations  · Wound care team consult  · Wound cultures as noted above  · Continue IV antibiotics as noted above

## 2022-11-30 NOTE — ASSESSMENT & PLAN NOTE
· Had acute decompensation this AM on M/S unit w/tachycardia in 160s, hypotension, tachypnea  · Had projectile vomiting of very large amount of coffee-ground emesis as well as significant amount of output via PEG tube as well - was intubated at that time for airway protection and respiratory failure/distress  · Shock is multifactorial: Hypovolemic vs septic vs hemorrhagic   · COVID-19/FLU/RSV negative  · 1/2 Blood cultures from 11/25 (+) MSSA; repeat blood cultures from 11/28 (-) - will repeat blood cultures now  · UA on admission w/+ nitrites and 3+ leuks, however urine culture w/NGTD - does have chronic Marrufo  · MRSA swab pending  · Back wound culture from 11/26 (+) Pseudomonas aeruginosa, Proteus mirabilis, Staph aureus   · Sacral wound culture from 11/26 (+) Citrobacter koseri, Proteus mirabilis  · Send sputum culture   · WBC worsened top 27 4 w/9 bands  · Lactic acid 1 2 after 3L IVF bolus via pressure bag  · Procalcitonin 0 16 on admission - resend now  · TTE from 11/28 revealed EF 80% w/hyperdynamic systolic function and mildly dilated RV  · Received aggressive IVF resuscitation (> 30 cc/kg) - continue with aggressive resuscitation   · Hemoglobin trending down since admission, now 5 3 - will give PRBC x2 and FFP x1 now  · R IJ TLC/R Brachial A-Line placed - currently requiring Levophed gtt to maintain MAP > 65 - will add Vasopressin gtt  · Continue IV Cefepime/Vancomcyin D#5 per ID's recommendations  · Monitor fever and WBC curve  · Trend procalcitonin and follow-up procalcitonin

## 2022-11-30 NOTE — PROGRESS NOTES
Progress Note - [facility] Infectious Disease   Crow Dempsey 74 y o  male MRN: [id number]  Unit/Bed#: ICU 05-01 Encounter: [id number]    IMPRESSION & RECOMMENDATIONS:   1  Sepsis, present on admission, evidenced by tachycardia and leukocytosis   Patient febrile with rising leukocytosis   Lactic acid and PCT initially normal  1 of 2 admission bl cx positive   Suspect sepsis initially secondary to #2, #3, now complicated by #3   WKKDW x-ray limited though negative   UA with mixed contaminants  Now in ICU intubated in the setting of acute upper GI bleed and hemodynamic instability    -continue antibiotics as below  -monitor temperature and hemodynamics  -management per primary team   -recheck CBC and BMP in a m      2  Polymicrobial bacteremia  One of 2 admission blood cultures positive for Gram-positive cocci in clusters, culture positive for MSSA and CoNS noted on [facility] report  Documented that it was drawn from patients peripheral line and pt known to be difficult stick with hx of extensive contractures  Set drawn peripherally is negative at 72 hours  Suspect CoNs is contaminant, however MSSA likely due to #3  2D echo is normal  No known intravascular devices   Repeat blood cultures x2 sets from 11/28 are negative at 24   Repeat cx from 11/29 are pending    -continue IV cefepime 2g q8h  -follow-up blood cultures and adjust antibiotics as needed  -if repeat blood cultures remain  negative, anticipate 2 week course of IV abx for transient bacteremia from SSTI source     3  Chronic, now infected, stage IV decubitus ulcers   Despite outpatient wound carem found to have significant deterioration in wounds over the last few months, particularly the right upper back and sacral wound with increasing wound depth and necrotic tissue burden with evidence of purulence and malodor on admission  Patient also with unstageable sacral/right buttock ulcer, unstageable right ischial decubitus ulcer, and unstageable left heel pressure ulcer  Status post bedside debridement on 11/26  Consider possibility of abscesses tracking under the skin from these wounds in the setting of persistent leukocytosis  Wound cultures from upper back and sacrum are polymicrobial, positive for Citrobacter koseri, Proteus mirabilis, Pseudomonas aeruginosa, and Staph aureus  Unfortunately wounds are unlikely to heal and are at risk for reinfection due to iron deficiency anemia, protein calorie malnutrition, contractures and inability to offload pressure      -continue IV cefepime 2g q8h  -recommend CT chest abdomen pelvis with contrast   -close surgical follow-up  and daily wound care  -ongoing goals of care discussion per primary team     4  Functional quadriplegia, developmental disability   Patient nonverbal and bed-bound at baseline on tube feeds, with chronic indwelling Marrufo catheter and colostomy   Unfortunately due to contractures, very difficult to reposition patient and offload pressure   -supportive measures per primary team     5  Antibiotic Allergy   Noted to have amoxicillin and Augmentin allergy without documented reaction   Per Care everywhere, has tolerated cefepime and ceftriaxone during prior admissions  -monitor for adverse drug reactions    6  Acute upper GI bleed  Decompensated 11/29 and transferred to ICU  Patient remains on vent, pressors, and sedated  S/p EGD which showed ulcerated area in stomach s/p clip  Antibiotics:  Day 4 IV cefepime      I have discussed the above management plan in detail with patient  I have discussed the above management plan in detail with patient's RN, and the primary service, ICU AP  Subjective:  Patient intubated  Nurse at bedside administering medication  EGD yesterday showed possible ulcer at gj junction  Patient remains sedated on pressors      Objective:  Vitals:  Temp:  [98 1 °F (36 7 °C)-100 9 °F (38 3 °C)] 98 2 °F (36 8 °C)  HR:  [] 69  Resp:  [16-63] 16  BP: ()/(48-69) 129/59  SpO2:  [95 %-100 %] 100 %  Temp (24hrs), Av 6 °F (37 6 °C), Min:98 1 °F (36 7 °C), Max:100 9 °F (38 3 °C)  Current: Temperature: 98 2 °F (36 8 °C)    PHYSICAL EXAM:  General Appearance:  Appearing critically ill on ventilator  At baseline nonverbal due to profound developmental/intellectual disability  HEENT: Normocephalic, without obvious abnormality, atraumatic  Intubated on ventilator  Lungs:   Clear to auscultation bilaterally, respirations unlabored on vent   Heart:  RRR; no murmur, rub or gallop   Abdomen:   Soft, non-tender, non-distended, positive bowel sounds, peg and colostomy in place  Extremities: No cyanosis, clubbing or edema  Extensive contractures of all 4 extremities  : No CVA or suprapubic tenderness  Marrufo in place  Skin: No rashes or lesions  No draining wounds noted  Peripheral IV intact without evidence of erythema, warmth, or exudate  LABS, IMAGING, & OTHER STUDIES:  Lab Results:  I have personally reviewed pertinent labs  Results from last 7 days   Lab Units 22  0642 22  0343 22  2353 22  1703 22  0939 22  0439   WBC Thousand/uL 27 86*  --   --   --  27 40* 17 52*   HEMOGLOBIN g/dL 7 5* 6 8* 7 4*   < > 5 3* 7 8*   PLATELETS Thousands/uL 387  --   --   --  568* 731*    < > = values in this interval not displayed  Results from last 7 days   Lab Units 22  0642 22  1703 22  0939   SODIUM mmol/L 137 146 147   POTASSIUM mmol/L 3 4* 3 4* 3 6   CHLORIDE mmol/L 102 112* 115*   CO2 mmol/L 28 28 25   BUN mg/dL 5 10 17   CREATININE mg/dL 0 23* 0 24* 0 28*   EGFR ml/min/1 73sq m 156 154 145   CALCIUM mg/dL 7 9* 7 5* 7 1*   AST U/L 20 24 32   ALT U/L 19 22 27   ALK PHOS U/L 101 87 85     Results from last 7 days   Lab Units 22  0949 22  1234 22  1132 22  0440 22  0913 22  1550 22  1536 22  1530   BLOOD CULTURE  Received in Microbiology Lab  Culture in Progress    -- No Growth at 24 hrs  No Growth at 24 hrs   --  Staphylococcus aureus*  Staphylococcus coagulase negative* No Growth After 4 Days  --    GRAM STAIN RESULT   --   --   --   --  4+ Gram negative rods*  2+ Gram positive cocci in pairs and chains*  No polys seen*  2+ Gram positive cocci in pairs and chains*  2+ Gram negative rods*  No polys seen* Gram positive cocci in clusters*  --   --    URINE CULTURE   --   --   --   --   --   --   --  >100,000 cfu/ml   WOUND CULTURE   --   --   --   --  2+ Growth of Pseudomonas aeruginosa*  2+ Growth of Proteus mirabilis*  2+ Growth of Staphylococcus aureus*  3+ Growth of  2+ Growth of Citrobacter koseri*  2+ Growth of Proteus mirabilis*  2+ Growth of  --   --   --    MRSA CULTURE ONLY   --  Methicillin Resistant Staphylococcus aureus isolated*  This patient requires contact isolation precautions per [place] law  Contact precautions are not required in [place] for nasal surveillance cultures    --   --   --   --   --   --      Results from last 7 days   Lab Units 11/30/22  0642 11/29/22  1703 11/25/22  1550   PROCALCITONIN ng/ml 3 13* 3 30* 0 16

## 2022-11-30 NOTE — ASSESSMENT & PLAN NOTE
· Unclear if this is due to urinary retention/neurogenic bladder?   · Will exchange Marrufo cath now   · UA and urine culture as noted above

## 2022-11-30 NOTE — ASSESSMENT & PLAN NOTE
· 1/2 Blood cultures from 11/25 (+) MSSA; repeat blood cultures from 11/28 (-) - will repeat blood cultures now  · Continue IV antibiotics as noted above   · ID following

## 2022-11-30 NOTE — ASSESSMENT & PLAN NOTE
Monitor. · 11/28 Intubated emergently on M/S floor for respiratory failure/distress and airway protection in setting of GIB/shock   · CXR: Right base infiltrate; question aspiration    · ETT exchanged over bougie as cuff was not maintaining air - now volumes improved  · Suspect aspiration given imaging and acute decompensation w/large coffee-ground emesis  · CXR w/R lobe infiltrate (likely aspiration)  · Current vent settings: 16/400/70/6  · Currently sedated with Propofol/Dilaudid gtt's w/Goal RASS -1 to -2  · Will require frequent in-line suctioning for suspected aspiration  · Continue IV antibiotics as noted above  · Titrate FiO2 to maintain SpO2 > 90%  · Aggressive pulmonary hygiene

## 2022-11-30 NOTE — SEPSIS NOTE
Sepsis Note   Tish Pinto 61 y o  male MRN: [id number]  Unit/Bed#: ICU 05-01 Encounter: [id number]       qSOFA     Row Name 11/29/22 2000 11/29/22 1900 11/29/22 1800 11/29/22 1700 11/29/22 1600    Altered mental status GCS < 15 -- -- -- -- --    Respiratory Rate > / =22 0 0 0 0 0    Systolic BP < / =847 0 1 0 0 0    Q Sofa Score 0 1 0 0 0    Row Name 11/29/22 1559 11/29/22 1558 11/29/22 1557 11/29/22 1556 11/29/22 1555    Altered mental status GCS < 15 -- -- -- -- --    Respiratory Rate > / =22 0 0 0 0 0    Systolic BP < / =978 -- -- -- -- --    Q Sofa Score 0 0 0 0 0    Row Name 11/29/22 1554 11/29/22 1553 11/29/22 1552 11/29/22 1551 11/29/22 1550    Altered mental status GCS < 15 -- -- -- -- --    Respiratory Rate > / =22 0 0 0 0 0    Systolic BP < / =950 -- -- -- -- --    Q Sofa Score 0 0 0 0 0    Row Name 11/29/22 1549 11/29/22 1548 11/29/22 1547 11/29/22 1546 11/29/22 1545    Altered mental status GCS < 15 -- -- -- -- --    Respiratory Rate > / =22 0 0 0 0 0    Systolic BP < / =271 -- -- -- -- 0    Q Sofa Score 0 0 0 0 0    Row Name 11/29/22 1544 11/29/22 1543 11/29/22 1542 11/29/22 1541 11/29/22 1540    Altered mental status GCS < 15 -- -- -- -- --    Respiratory Rate > / =22 0 0 0 0 0    Systolic BP < / =332 -- -- -- -- --    Q Sofa Score 0 0 0 0 0    Row Name 11/29/22 1539 11/29/22 1538 11/29/22 1537 11/29/22 1536 11/29/22 1529    Altered mental status GCS < 15 -- -- -- -- --    Respiratory Rate > / =22 0 0 0 0 0    Systolic BP < / =345 -- -- -- -- --    Q Sofa Score 0 0 0 0 0    Row Name 11/29/22 1528 11/29/22 1345 11/29/22 1330 11/29/22 1315 11/29/22 1300    Altered mental status GCS < 15 -- -- -- -- --    Respiratory Rate > / =22 0 0 1 1 1    Systolic BP < / =894 -- 0 0 0 0    Q Sofa Score 0 0 1 1 1    Row Name 11/29/22 1245 11/29/22 1230 11/29/22 1222 11/29/22 1215 11/29/22 1200    Altered mental status GCS < 15 -- -- -- -- --    Respiratory Rate > / =22 1 1 0 1 1    Systolic BP < / =972 0 0 0 0 0    Q Sofa Score 1 1 0 1 1    Row Name 11/29/22 1158 11/29/22 1156 11/29/22 1145 11/29/22 1136 11/29/22 1135    Altered mental status GCS < 15 -- -- -- -- --    Respiratory Rate > / =22 1 1 1 1 1    Systolic BP < / =671 0 0 0 0 0    Q Sofa Score 1 1 1 1 1    Row Name 11/29/22 1130 11/29/22 1125 11/29/22 1115 11/29/22 1110 11/29/22 1109    Altered mental status GCS < 15 -- -- -- -- --    Respiratory Rate > / =22 1 1 1 1 1    Systolic BP < / =845 0 0 0 0 0    Q Sofa Score 1 1 1 1 1    Row Name 11/29/22 1100 11/29/22 1045 11/29/22 1030 11/29/22 0900 11/29/22 07:17:40    Altered mental status GCS < 15 -- -- -- -- --    Respiratory Rate > / =22 1 1 1 1 0    Systolic BP < / =118 -- 1 1 0 1    Q Sofa Score 2 2 2 1 1    Row Name 11/28/22 22:35:53 11/28/22 2114 11/28/22 1426 11/28/22 0800 11/28/22 07:42:32    Altered mental status GCS < 15 -- 1 -- 1 --    Respiratory Rate > / =22 0 -- -- -- 0    Systolic BP < / =934 0 -- 0 -- 0    Q Sofa Score 0 -- -- 1 0    Row Name 11/27/22 21:44:32                Altered mental status GCS < 15 --        Respiratory Rate > / =24 --        Systolic BP < / =091 0        Q Sofa Score --                   Initial Sepsis Screening     Row Name 11/29/22 0745                Is the patient's history suggestive of a new or worsening infection? Yes (Proceed)  -BA        Suspected source of infection pneumonia;wound infection  -BA        Are two or more of the following signs & symptoms of infection both present and new to the patient? Yes (Proceed)  -BA        Indicate SIRS criteria Leukocytosis (WBC > 25013 IJL); Tachycardia > 90 bpm;Tachypnea > 20 resp per min;WBC > 10% bands  -BA        If the answer is yes to both questions, suspicion of sepsis is present --        If severe sepsis is present AND tissue hypoperfusion perists in the hour after fluid resuscitation or lactate > 4, the patient meets criteria for SEPTIC SHOCK --        Are any of the following organ dysfunction criteria present within 6 hours of suspected infection and SIRS criteria that are NOT considered to be chronic conditions? Yes  -BA        Organ dysfunction SBP decrease > 40 mmHg from baseline;SBP < 90 mmHg;MAP < 65 mmHg;Acute respiratory failure (new need for invasive or non-invasive mechanical ventilation)  -BA        Date of presentation of severe sepsis 11/29/22  -BA        Time of presentation of severe sepsis 0745  -        Tissue hypoperfusion persists in the hour after crystalloid fluid administration, evidenced, by either: --        Was hypotension present within one hour of the conclusion of crystalloid fluid administration? --        Date of presentation of septic shock 11/29/22  -        Time of presentation of septic shock 0745  -              User Key  (r) = Recorded By, (t) = Taken By, (c) = Cosigned By    [id number] Name Provider Type    MATT Germain, [address] Baker Nurse Practitioner              Patient acutely decompensated in setting of combined septic shock and GIB  Blood cultures drawn within 24H, however will attempt to obtain a new set  Will send lactic acid and septic work-up  Has received > 3L IVF bolus via pressure bag and continues on IVF  Continues on IV Cefepime/Vancomycin

## 2022-11-30 NOTE — SEPSIS NOTE
Sepsis Note   Anneliese Harman 61 y o  male MRN: [id number]  Unit/Bed#: ICU 05-01 Encounter: [id number]       qSOFA     Row Name 11/29/22 2000 11/29/22 1900 11/29/22 1800 11/29/22 1700 11/29/22 1600    Altered mental status GCS < 15 -- -- -- -- --    Respiratory Rate > / =22 0 0 0 0 0    Systolic BP < / =610 0 1 0 0 0    Q Sofa Score 0 1 0 0 0    Row Name 11/29/22 1559 11/29/22 1558 11/29/22 1557 11/29/22 1556 11/29/22 1555    Altered mental status GCS < 15 -- -- -- -- --    Respiratory Rate > / =22 0 0 0 0 0    Systolic BP < / =092 -- -- -- -- --    Q Sofa Score 0 0 0 0 0    Row Name 11/29/22 1554 11/29/22 1553 11/29/22 1552 11/29/22 1551 11/29/22 1550    Altered mental status GCS < 15 -- -- -- -- --    Respiratory Rate > / =22 0 0 0 0 0    Systolic BP < / =317 -- -- -- -- --    Q Sofa Score 0 0 0 0 0    Row Name 11/29/22 1549 11/29/22 1548 11/29/22 1547 11/29/22 1546 11/29/22 1545    Altered mental status GCS < 15 -- -- -- -- --    Respiratory Rate > / =22 0 0 0 0 0    Systolic BP < / =897 -- -- -- -- 0    Q Sofa Score 0 0 0 0 0    Row Name 11/29/22 1544 11/29/22 1543 11/29/22 1542 11/29/22 1541 11/29/22 1540    Altered mental status GCS < 15 -- -- -- -- --    Respiratory Rate > / =22 0 0 0 0 0    Systolic BP < / =874 -- -- -- -- --    Q Sofa Score 0 0 0 0 0    Row Name 11/29/22 1539 11/29/22 1538 11/29/22 1537 11/29/22 1536 11/29/22 1529    Altered mental status GCS < 15 -- -- -- -- --    Respiratory Rate > / =22 0 0 0 0 0    Systolic BP < / =714 -- -- -- -- --    Q Sofa Score 0 0 0 0 0    Row Name 11/29/22 1528 11/29/22 1345 11/29/22 1330 11/29/22 1315 11/29/22 1300    Altered mental status GCS < 15 -- -- -- -- --    Respiratory Rate > / =22 0 0 1 1 1    Systolic BP < / =316 -- 0 0 0 0    Q Sofa Score 0 0 1 1 1    Row Name 11/29/22 1245 11/29/22 1230 11/29/22 1222 11/29/22 1215 11/29/22 1200    Altered mental status GCS < 15 -- -- -- -- --    Respiratory Rate > / =22 1 1 0 1 1    Systolic BP < / =655 0 0 0 0 0    Q Sofa Score 1 1 0 1 1    Row Name 11/29/22 1158 11/29/22 1156 11/29/22 1145 11/29/22 1136 11/29/22 1135    Altered mental status GCS < 15 -- -- -- -- --    Respiratory Rate > / =22 1 1 1 1 1    Systolic BP < / =337 0 0 0 0 0    Q Sofa Score 1 1 1 1 1    Row Name 11/29/22 1130 11/29/22 1125 11/29/22 1115 11/29/22 1110 11/29/22 1109    Altered mental status GCS < 15 -- -- -- -- --    Respiratory Rate > / =22 1 1 1 1 1    Systolic BP < / =915 0 0 0 0 0    Q Sofa Score 1 1 1 1 1    Row Name 11/29/22 1100 11/29/22 1045 11/29/22 1030 11/29/22 0900 11/29/22 07:17:40    Altered mental status GCS < 15 -- -- -- -- --    Respiratory Rate > / =22 1 1 1 1 0    Systolic BP < / =820 -- 1 1 0 1    Q Sofa Score 2 2 2 1 1    Row Name 11/28/22 22:35:53 11/28/22 2114 11/28/22 1426 11/28/22 0800 11/28/22 07:42:32    Altered mental status GCS < 15 -- 1 -- 1 --    Respiratory Rate > / =22 0 -- -- -- 0    Systolic BP < / =468 0 -- 0 -- 0    Q Sofa Score 0 -- -- 1 0    Row Name 11/27/22 21:44:32                Altered mental status GCS < 15 --        Respiratory Rate > / =74 --        Systolic BP < / =757 0        Q Sofa Score --                   Initial Sepsis Screening     Row Name 11/29/22 0745                Is the patient's history suggestive of a new or worsening infection? Yes (Proceed)  -BA        Suspected source of infection pneumonia;wound infection  -BA        Are two or more of the following signs & symptoms of infection both present and new to the patient? Yes (Proceed)  -BA        Indicate SIRS criteria Leukocytosis (WBC > 20652 IJL); Tachycardia > 90 bpm;Tachypnea > 20 resp per min;WBC > 10% bands  -BA        If the answer is yes to both questions, suspicion of sepsis is present --        If severe sepsis is present AND tissue hypoperfusion perists in the hour after fluid resuscitation or lactate > 4, the patient meets criteria for SEPTIC SHOCK --        Are any of the following organ dysfunction criteria present within 6 hours of suspected infection and SIRS criteria that are NOT considered to be chronic conditions?  Yes  -BA        Organ dysfunction SBP decrease > 40 mmHg from baseline;SBP < 90 mmHg;MAP < 65 mmHg;Acute respiratory failure (new need for invasive or non-invasive mechanical ventilation)  -BA        Date of presentation of severe sepsis 11/29/22  -        Time of presentation of severe sepsis 0745  -        Tissue hypoperfusion persists in the hour after crystalloid fluid administration, evidenced, by either: --        Was hypotension present within one hour of the conclusion of crystalloid fluid administration? --        Date of presentation of septic shock 11/29/22  -        Time of presentation of septic shock 0745  -              User Key  (r) = Recorded By, (t) = Taken By, (c) = Cosigned By    Initials Name Provider Type    Beck James Nurse Practitioner                  Default Flowsheet Data (last 720 hours)     Sepsis Reassess     Row Name 11/29/22 0930                   Repeat Volume Status and Tissue Perfusion Assessment Performed    Repeat Volume Status and Tissue Perfusion Assessment Performed Yes  -BA           Volume Status and Tissue Perfusion Post Fluid Resuscitation * Must Document All *    Vital Signs Reviewed (HR, RR, BP, T) --        Shock Index Reviewed --        Arterial Oxygen Saturation Reviewed (POx, SaO2 or SpO2) --        Cardio --        Pulmonary --        Capillary Refill --        Peripheral Pulses --        Skin --        Urine output assessed --           *OR*   Intensive Monitoring- Must Document One of the Following Four *:    Vital Signs Reviewed --        * Central Venous Pressure (CVP or RAP) --        * Central Venous Oxygen (SVO2, ScvO2 or Oxygen saturation via central catheter) --        * Bedside Cardiovascular US in IVC diameter and % collapse --        * Passive Leg Raise OR Crystalloid Challenge --              User Key  (r) = Recorded By, (t) = Taken By, (c) = Cosigned By    [id number] Name Provider Type    [facility], [facility] Nurse Practitioner

## 2022-11-30 NOTE — ASSESSMENT & PLAN NOTE
· Hgb trending down during admission - now 5 3  · In setting of acute GIB  · Will give PRBC x2, FFP x1 now   · Trend Hgb Q4H for now and will transfuse as noted above  · Monitor hemodynamics

## 2022-11-30 NOTE — ASSESSMENT & PLAN NOTE
· Hgb trending down during admission - today 5 3  · In setting of acute GIB  · given PRBC x2, FFP x1 now   · Trend Hgb Q4H for now and will transfuse as noted above  · Monitor hemodynamics

## 2022-11-30 NOTE — PROGRESS NOTES
Progress Note - Nephrology   Sandy Covarrubias 74 y o  male MRN: [id number]  Unit/Bed#: ICU 05-01 Encounter: [id number]    Assessment and Plan    1  Hypernatremia/dehydration  Now improved to serum sodium 137 millimole per L  no longer on IV fluids  Add free water flushes 250 mL Q 6 hours for total of 1 L free water supplement  Will adjust as needed      2  Volume depletion  Will follow with resumption in PEG tube feedings     3  GI bleed  Management per [facility] Gastro[facility]ology     4  Sepsis, present on admission  On cefepime      3  At risk for acute kidney injury  Renal function remains stable  Suspect that renal function is overestimated given low muscle mass   If more accurate assessment of renal function is require, consider Cystatin C in outpatient setting   For the prevention of acute kidney injury, Trend renal function, optimize hemodynamics, hold ACE/ARB, avoid nephrotoxins, renally dose medications, monitor volume status, monitor for urinary retention      4  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, pressure ulcer of left heel unstageable  Wound care      5  Ventilator dependent respiratory failure      6  Hyponatremia  Continue to monitor and replete for magnesium goal 2 0 mg/dL and potassium 4 0 millimole per L      Follow up reason for today's visit:     [facility])    Patient Active Problem List   Diagnosis   • Pressure injury of sacral region, stage 4 ([address] )   • 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, stage 4 (HCC)   • Pressure ulcer of right lower back, stage 3 ([address] )   • Pressure ulcer of left foot, stage 4 (HCC)   • Pressure injury of right upper back, stage 4 ([address] )   • Open wound of right hand without foreign body   • Pressure injury of left heel, stage 4 (HCC)   • Shock ([address] )   • Developmental disability   • Hypokalemia   • Pressure ulcer of ischium, right, stage III ([address] )   • MSSA bacteremia   • Hypernatremia   • GIB (gastrointestinal bleeding) • Acute respiratory failure with hypoxia (HCC)   • Electrolyte disturbance   • Acute blood loss anemia (ABLA)   • Aspiration pneumonia (HCC)   • Chronic indwelling Marrufo catheter         Subjective:   Unobtainable due to patient nonverbal at baseline and not sedated and intubated  Objective:     Vitals: Blood pressure 131/62, pulse 76, temperature 99 °F (37 2 °C), resp  rate 17, height 5' 5" (1 651 m), weight 62 2 kg (137 lb 2 oz), SpO2 100 %  ,Body mass index is 22 82 kg/m²  Weight (last 2 days)     Date/Time Weight    11/30/22 0600 62 2 (137 13)    11/28/22 1426 54 9 (121)            Intake/Output Summary (Last 24 hours) at 11/30/2022 1441  Last data filed at 11/30/2022 1206  Gross per 24 hour   Intake 7160 91 ml   Output 5080 ml   Net 2080 91 ml     I/O last 3 completed shifts: In: 05684 9 [I V :7824 8; Blood:1582 1; IV Piggyback:3250]  Out: 6251 [Urine:5905]    Urethral Catheter Temperature probe (Active)   Reasons to continue Urinary Catheter  Accurate I&O assessment in critically ill patients (48 hr  max) 11/29/22 2000   Goal for Removal Remove after 48 hrs of I/O monitoring 11/29/22 2000   Site Assessment Clean;Skin intact 11/29/22 2000   Marrufo Care Done 11/30/22 0900   Collection Container Standard drainage bag 11/29/22 2000   Securement Method Securing device (Describe) 11/29/22 2000   Output (mL) 300 mL 11/30/22 1201       Physical Exam: /62   Pulse 76   Temp 99 °F (37 2 °C)   Resp 17   Ht 5' 5" (1 651 m)   Wt 62 2 kg (137 lb 2 oz)   SpO2 100%   BMI 22 82 kg/m²     General Appearance:    Ill appearing  Intubated   Head:    Normocephalic  Atraumatic  Normal jaw occlusion  Eyes:    Lids, conjunctiva normal  No scleral icterus  Ears:    Normal external ears  Nose:   Nares normal  No drainage  Mouth:   Lips, tongue normal  Mucosa normal  Phonation normal    Neck:   Supple  Symmetrical    Back:     Symmetric  No CVA tenderness  Lungs:     Normal respiratory effort   Clear to auscultation bilaterally  Chest wall:    No tenderness or deformity  Heart:    Regular rate and rhythm  Normal S1 and S2  No murmur  No JVD  No edema  Abdomen:     Soft  Non-tender  Bowel sounds active  Genitourinary:   No Marrufo catheter present  Extremities:   Extremities normal  Atraumatic  No cyanosis  Skin:   Warm and dry  No pallor, jaundice, rash, ecchymoses  Bullae right upper extremity   Neurologic:   Sedated intubated            Lab, Imaging and other studies: I have personally reviewed pertinent labs  CBC:   Lab Results   Component Value Date    WBC 27 86 (H) 11/30/2022    HGB 7 9 (L) 11/30/2022    HCT 24 3 (L) 11/30/2022    MCV 82 11/30/2022     11/30/2022    MCH 25 2 (L) 11/30/2022    MCHC 30 9 (L) 11/30/2022    RDW 18 9 (H) 11/30/2022    MPV 9 0 11/30/2022    NRBC 0 11/30/2022     CMP:   Lab Results   Component Value Date    K 3 4 (L) 11/30/2022     11/30/2022    CO2 28 11/30/2022    BUN 5 11/30/2022    CREATININE 0 23 (L) 11/30/2022    CALCIUM 7 9 (L) 11/30/2022    AST 20 11/30/2022    ALT 19 11/30/2022    ALKPHOS 101 11/30/2022    EGFR 156 11/30/2022           Results from last 7 days   Lab Units 11/30/22  0642 11/29/22  1703 11/29/22  0939   POTASSIUM mmol/L 3 4* 3 4* 3 6   CHLORIDE mmol/L 102 112* 115*   CO2 mmol/L 28 28 25   BUN mg/dL 5 10 17   CREATININE mg/dL 0 23* 0 24* 0 28*   CALCIUM mg/dL 7 9* 7 5* 7 1*   ALK PHOS U/L 101 87 85   ALT U/L 19 22 27   AST U/L 20 24 32         Phosphorus:   Lab Results   Component Value Date    PHOS 3 8 11/30/2022     Magnesium:   Lab Results   Component Value Date    MG 1 9 11/30/2022     Urinalysis: No results found for: COLORU, CLARITYU, SPECGRAV, PHUR, LEUKOCYTESUR, NITRITE, PROTEINUA, GLUCOSEU, KETONESU, BILIRUBINUR, BLOODU  Ionized Calcium: No results found for: CAION  Coagulation: No results found for: PT, INR, APTT  Troponin: No results found for: TROPONINI  ABG:   Lab Results   Component Value Date    PHART 7 415 11/29/2022 SVN3NBU 44 1 (H) 11/29/2022    PO2ART 83 0 11/29/2022    CCP6HFC 27 6 11/29/2022    BEART 2 8 11/29/2022     Radiology review:     IMAGING  Procedure: EGD    Result Date: 11/29/2022  Narrative: Table formatting from the original result was not included  [facility] [address] [place] [facility] [id number] [phone] DATE OF SERVICE: 11/29/22 PHYSICIAN(S): Attending: No Staff Documented Demetria Garcia Fellow: No Staff Documented Anaya Miller INDICATION: Upper GI bleed, Coffee ground emesis POST-OP DIAGNOSIS: See the impression below  PREPROCEDURE: Informed consent was obtained for the procedure, including sedation  Risks of perforation, hemorrhage, adverse drug reaction and aspiration were discussed  The patient was placed in the left lateral decubitus position  Patient was explained about the risks and benefits of the procedure  Risks including but not limited to bleeding, infection, and perforation were explained in detail  Also explained about less than 100% sensitivity with the exam and other alternatives  DETAILS OF PROCEDURE: Patient was taken to the procedure room where a time out was performed to confirm correct patient and correct procedure  The patient underwent general anesthesia, which was administered by an anesthesia professional and an intensivist  The patient's blood pressure, heart rate, level of consciousness, respirations, oxygen and ETCO2 were monitored throughout the procedure  The scope was advanced to the second part of the duodenum  Retroflexion was performed in the fundus  The patient experienced no blood loss  The procedure was not difficult  The patient tolerated the procedure well  There were no apparent complications  ANESTHESIA INFORMATION: ASA: ASA status not filed in the log  Anesthesia Type: Anesthesia type not filed in the log   MEDICATIONS: No administrations occurring from 1528 to 1602 on 11/29/22 FINDINGS: The esophagus appeared normal  There was an ulcerated area with pigmented spot in the body of the stomach  Unclear if this was the source of bleeding or trauma from the OG tube  A clip was placed to prevent further bleeding  Otherwise normal stomach The duodenal bulb and 2nd part of the duodenum appeared normal  SPECIMENS: * No specimens in log *     Impression: No active GI bleeding  A clipped was placed over a pigmented lesion in the body though it is not clear that this was the source of his bleeding RECOMMENDATION:  There is no recommended follow-up for this procedure  Ok to stop octreotide gtt Ok to start feeds Monitor for further GI bleeding and trend HgB   No Staff Documented     Procedure: XR chest portable    Result Date: 11/29/2022  Narrative: CHEST INDICATION:   Respiratory distress  COMPARISON:  CXR 11/25/2022  EXAM PERFORMED/VIEWS:  XR CHEST PORTABLE FINDINGS:  ET tube 4 cm above the raymundo  NG tube in stomach  Cardiomediastinal silhouette appears unremarkable  Mild opacity in the right base  No effusion or pneumothorax Clip in the left upper quadrant  Osseous structures appear within normal limits for patient age  Impression: Mild opacity in the right base, likely atelectasis  Pneumonia/aspiration not excluded in the appropriate setting  Workstation performed: [id number]     Procedure: CT chest abdomen pelvis w contrast    Result Date: 11/30/2022  Narrative: CT CHEST, ABDOMEN AND PELVIS WITH IV CONTRAST INDICATION:   Sepsis r/o deep abscess from wounds  COMPARISON:  Chest radiograph 11/30/2022 TECHNIQUE: CT examination of the chest, abdomen and pelvis was performed  Axial, sagittal, and coronal 2D reformatted images were created from the source data and submitted for interpretation  Radiation dose length product (DLP) for this visit:  454 09 mGy-cm     This examination, like all CT scans performed in the [facility], was performed utilizing techniques to minimize radiation dose exposure, including the use of iterative reconstruction and automated exposure control  IV Contrast:  100 mL of iohexol (OMNIPAQUE) Enteric contrast was not administered  FINDINGS: CHEST LUNGS:  There is near complete collapse of the right lower lobe and complete collapse of the left lower lobe  Consolidative opacity in the right middle and lower lobes may be due to a combination of pneumonia and atelectasis  Central airways are patent  The endotracheal tube tip is 3 7 cm above the raymundo  PLEURA:  Small bilateral pleural effusions  HEART/GREAT VESSELS:  Heart is unremarkable for patient's age  No thoracic aortic aneurysm  Right internal jugular central venous catheter tip in the SVC  MEDIASTINUM AND DENNIS:  Unremarkable  CHEST WALL AND LOWER NECK: There is an ulcerated soft tissue wound in the right lower back with soft tissue defect extending to the posterior right 9th rib (series 2 image 41)  It measures approximately 5 6 x 2 0 cm  ABDOMEN LIVER/BILIARY TREE:  Unremarkable  GALLBLADDER:  No calcified gallstones  No pericholecystic inflammatory change  SPLEEN:  Unremarkable  PANCREAS:  Unremarkable  ADRENAL GLANDS:  Unremarkable  KIDNEYS/URETERS:  Right ureteral stent with the proximal tip in the renal pelvis and the distal tip in the bladder  Nonobstructing bilateral renal calculi measuring up to 5 mm  Left renal cyst   Bilateral subcentimeter renal hypodensities too small to characterize  No hydronephrosis  STOMACH AND BOWEL:  Left lower quadrant colostomy  PEG tube in place  APPENDIX:  Normal  ABDOMINOPELVIC CAVITY:  Trace ascites  No pneumoperitoneum  No lymphadenopathy  VESSELS:  Unremarkable for patient's age  PELVIS REPRODUCTIVE ORGANS:  Unremarkable for patient's age  URINARY BLADDER:  Contains a Marrufo catheter  Air in the bladder may be due to instrumentation  Millimeter bladder calculus  Mild nonspecific bladder wall thickening related to part to underdistention   ABDOMINAL WALL/INGUINAL REGIONS:  There is an ulcerated wound overlying the sacrum and coccyx with the soft tissue defect reaching the bone (series 2 image 111)  It measures approximately 6 x 1 8 cm  There is a soft tissue defect/ulcer within the subcutaneous fat of the right buttock /proximal thigh measuring 3 4 x 1 6 cm (series 2 image 127)  There is subjacent subcutaneous fat stranding and soft tissue thickening overlying the ischial tuberosity  There is a soft tissue defect/ulcer in the right proximal thigh measuring approximately 5 5 x 2 9 cm (series 2 image 131) extending to the underlying musculature  OSSEOUS STRUCTURES:  No acute fracture or destructive osseous lesion  Scoliosis  Heterotopic ossification about the hips  Intramedullary troy in the right humerus  Impression: 1  Multiple ulcerated wounds  No drainable fluid collection  - Wound in the right lower back with soft tissue defect extending to the posterior right 9th rib  - Wound overlying the sacrum and coccyx with the soft tissue defect reaching the bone  - Soft tissue defect within the subcutaneous fat of the right buttock /proximal thigh  Subjacent subcutaneous fat stranding and soft tissue thickening overlying the ischial tuberosity  - Soft tissue defect in the right proximal thigh extending to the underlying musculature  2   Small bilateral pleural effusions  3   Near complete collapse of the right lower lobe and complete collapse of the left lower lobe  Consolidative opacity in the right middle and lower lobes may be due to a combination of pneumonia and atelectasis  4   Right ureteral stent in place without hydronephrosis  Nonobstructing right renal calculi  The study was marked in [facility] for immediate notification  Workstation performed: [id number]     Procedure: XR chest portable ICU    Result Date: 11/30/2022  Narrative: CHEST INDICATION:   follow up, pulm infiltrates   COMPARISON:  11/29/2022 EXAM PERFORMED/VIEWS:  XR CHEST PORTABLE ICU FINDINGS:  Endotracheal tube tip is 4 cm proximal to the raymundo  Right jugular central venous catheter tip overlies the cavoatrial junction  Cardiomediastinal silhouette appears unremarkable  Extensive right greater than left alveolar opacities are present  No pneumothorax or pleural effusion  Osseous structures appear within normal limits for patient age  Impression: Extensive right greater than left alveolar opacities keeping with multilobar pneumonia  Workstation performed: [id number]     Procedure: XR chest portable ICU    Result Date: 11/29/2022  Narrative: CHEST INDICATION:   s/p R IJ placement  COMPARISON:  11/29/2022 at 0808 hrs EXAM PERFORMED/VIEWS:  XR CHEST PORTABLE ICU FINDINGS: Right IJ line tip in superior vena cava, level of the raymundo  ETT 4 0 cm above raymundo  NGT tip beneath diaphragm and in proximal stomach  Cardiomediastinal silhouette appears unremarkable  Right base increased patchy density  No pneumothorax or pleural effusion  Osseous structures appear within normal limits for patient age  Impression: Right IJ line in place  Right base infiltrate; question aspiration  The study was marked in [facility] for immediate notification  Workstation performed: [id number]     Procedure: US bedside procedure    Result Date: 11/29/2022  Narrative: 1 2 840 [id number]  2 446 502 [id number] 6 1    Procedure: Echo complete w/ contrast if indicated    Result Date: 11/28/2022  Narrative: •  Left Ventricle: Left ventricular cavity size is normal  Wall thickness is normal  The left ventricular ejection fraction is 80%  Systolic function is hyperdynamic  Wall motion is normal  •  Right Ventricle: Right ventricular cavity size is mildly dilated   Systolic function is normal        Current Facility-Administered Medications   Medication Dose Route Frequency   • acetaminophen (TYLENOL) tablet 650 mg  650 mg Per G Tube Q4H PRN   • cefepime (MAXIPIME) IVPB (premix in dextrose) 2,000 mg 50 mL  2,000 mg Intravenous Q8H   • chlorhexidine (PERIDEX) 0 12 % oral rinse 15 mL  15 mL Mouth/Throat Q12H [address]   • collagenase (SANTYL) ointment   Topical Daily   • HYDROmorphone (DILAUDID) 50 mg in sodium chloride 0 9% 50mL drip  1 mg/hr Intravenous Continuous   • HYDROmorphone (DILAUDID) injection 0 5 mg  0 5 mg Intravenous Q2H PRN   • insulin lispro (HumaLOG) 100 units/mL subcutaneous injection 1-5 Units  1-5 Units Subcutaneous Q6H [address]   • NOREPINEPHRINE 4 MG  ML NSS (CMPD ORDER) infusion  1-30 mcg/min Intravenous Titrated   • ondansetron (ZOFRAN) injection 4 mg  4 mg Intravenous Q6H PRN   • pantoprazole (PROTONIX) injection 40 mg  40 mg Intravenous Q12H [address]   • propofol (DIPRIVAN) 1000 mg in 100 mL infusion (premix)  5-50 mcg/kg/min Intravenous Titrated   • sodium hypochlorite (DAKIN'S HALF-STRENGTH) 0 25 percent topical solution 1 application  1 application Irrigation BID   • vasopressin (PITRESSIN) 20 Units in sodium chloride 0 9 % 100 mL infusion  0 04 Units/min Intravenous Continuous     Medications Discontinued During This Encounter   Medication Reason   • potassium chloride (K-DUR,KLOR-CON) CR tablet 40 mEq    • vancomycin (VANCOCIN) IVPB (premix in dextrose) 750 mg 150 mL    • potassium chloride 40 mEq IVPB (premix)    • vancomycin (VANCOCIN) IVPB (premix in dextrose) 1,000 mg 200 mL    • cefepime (MAXIPIME) IVPB (premix in dextrose) 2,000 mg 50 mL    • sodium chloride 0 9 % infusion    • dextrose 5 % and sodium chloride 0 45 % infusion    • pantoprazole (PROTONIX) injection 40 mg    • enoxaparin (LOVENOX) subcutaneous injection 40 mg    • fentaNYL 1000 mcg in sodium chloride 0 9% 100mL infusion    • fentaNYL 1000 mcg in sodium chloride 0 9% 100mL infusion    • fentanyl citrate (PF) 100 MCG/2ML 50 mcg    • pantoprazole (PROTONIX) 80 mg in sodium chloride 0 9 % 100 mL infusion    • potassium chloride 20 mEq IVPB (premix)    • multi-electrolyte (PLASMALYTE-A/ISOLYTE-S PH 7 4) IV solution    • multi-electrolyte (PLASMALYTE-A/ISOLYTE-S PH 7 4) IV solution    • sodium chloride 0 9 % bolus 1,000 mL    • multi-electrolyte (ISOLYTE-S PH 7 4) bolus 2,000 mL        Imer Cardoza PA-C    Portions of the record may have been created with voice recognition software  Occasional wrong word or "sound a like" substitutions may have occurred due to the inherent limitations of voice recognition software  Read the chart carefully and recognize, using context, where substitutions have occurred

## 2022-11-30 NOTE — PROGRESS NOTES
[address]  ICU Acceptance Note Erlinda Rodriguez 1955, 61 y o  male MRN: [id number]  Unit/Bed#: ICU 05-01 Encounter: [id number]  Primary Care Provider: Alisia Vanessa MD   Date and time admitted to hospital: 11/25/2022  2:30 PM    * Shock [facility])  Assessment & Plan  · Had acute decompensation this AM on M/S unit w/tachycardia in 160s, hypotension, tachypnea  · Had projectile vomiting of very large amount of coffee-ground emesis as well as significant amount of output via PEG tube as well - was intubated at that time for airway protection and respiratory failure/distress  · Shock is multifactorial: Hypovolemic vs septic vs hemorrhagic   · COVID-19/FLU/RSV negative  · 1/2 Blood cultures from 11/25 (+) MSSA; repeat blood cultures from 11/28 (-) - will repeat blood cultures now  · UA on admission w/+ nitrites and 3+ leuks, however urine culture w/NGTD - does have chronic Marrufo  · MRSA swab pending  · Back wound culture from 11/26 (+) Pseudomonas aeruginosa, Proteus mirabilis, Staph aureus   · Sacral wound culture from 11/26 (+) Citrobacter koseri, Proteus mirabilis  · Send sputum culture   · WBC worsened top 27 4 w/9 bands  · Lactic acid 1 2 after 3L IVF bolus via pressure bag  · Procalcitonin 0 16 on admission - resend now  · TTE from 11/28 revealed EF 80% w/hyperdynamic systolic function and mildly dilated RV  · Received aggressive IVF resuscitation (> 30 cc/kg) - continue with aggressive resuscitation   · Hemoglobin trending down since admission, now 5 3 - will give PRBC x2 and FFP x1 now  · R IJ TLC/R Brachial A-Line placed - currently requiring Levophed gtt to maintain MAP > 65 - will add Vasopressin gtt  · Continue IV Cefepime/Vancomcyin D#5 per ID's recommendations  · Monitor fever and WBC curve  · Trend procalcitonin and follow-up procalcitonin     Acute respiratory failure with hypoxia ([facility] [address] )  Assessment & Plan  · Intubated emergently on M/S floor for respiratory failure/distress and airway protection in setting of GIB/shock   · ETT exchanged over bougie as cuff was not maintaining air - now volumes improved  · Suspect aspiration given imaging and acute decompensation w/large coffee-ground emesis  · CXR w/R lobe infiltrate (likely aspiration)  · Current vent settings: 16/400/70/6  · Currently sedated with Propofol/Dilaudid gtt's w/Goal RASS -1 to -2  · Will require frequent in-line suctioning for suspected aspiration  · Continue IV antibiotics as noted above  · Titrate FiO2 to maintain SpO2 > 90%  · Aggressive pulmonary hygiene    GIB (gastrointestinal bleeding)  Assessment & Plan  · With acute onset of large amount of projectile coffee-ground emesis and output from PEG tube   · OGT/PEG tube to suction for now - can remove OGT after suctioned appropriately as PEG tube will be adequate  · Hgb now 5 3 - will give PRBC x2, FFP x1 now   · GI emergently consulted and planning for bedside EGD today  · Received IV Protonix bolus and started on gtt  · Keep NPO for now  · Holding DVT ppx    Acute blood loss anemia (ABLA)  Assessment & Plan  · Hgb trending down during admission - now 5 3  · In setting of acute GIB  · Will give PRBC x2, FFP x1 now   · Trend Hgb Q4H for now and will transfuse as noted above  · Monitor hemodynamics     Aspiration pneumonia ([facility] [address] )  Assessment & Plan  · With witnessed aspiration event during acute decompensation in setting of GIB  · Suspect aspiration pneumonia vs pneumonitis  · Emergently intubated for airway protection/respiratory failure  · Continue IV antibiotics as noted above  · Aggressive pulmonary hygiene    MSSA bacteremia  Assessment & Plan  · 1/2 Blood cultures from 11/25 (+) MSSA; repeat blood cultures from 11/28 (-) - will repeat blood cultures now  · Continue IV antibiotics as noted above   · ID following    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, stage 4 (HCC)  Assessment & Plan  · Surgery following  · Wound care per surgery's recommendations  · Wound care team consult  · Wound cultures as noted above  · Continue IV antibiotics as noted above    Hypernatremia  Assessment & Plan  · Noted during admission  · Suspect this was 2/2 hypovolemia  · Nephrology following  · Currently NPO - holding free water flushes  · Continue with aggressive IVF resuscitation  · Trend Na    Electrolyte disturbance  Assessment & Plan  · Replete electrolytes PRN    Chronic indwelling Marrufo catheter  Assessment & Plan  · Unclear if this is due to urinary retention/neurogenic bladder? · Will exchange Marrufo cath now   · UA and urine culture as noted above    Developmental disability  Assessment & Plan  · Nonverbal at baseline w/bilatereral UE/LE contractures  · Resides in group home     Pressure injury of left heel, stage 4 (HCC)  Assessment & Plan  · Wound care consult  · Local wound care      ----------------------------------------------------------------------------------------  HPI/24hr events: Hilda Munoz is a 62 yo male w/PMHx of profound developmental/intellectual disability-nonverbal at baseline, dysphagia s/p PEG w/recurrent aspiration pneumonia, chronic urinary catheter w/recurrent UTIs, s/p colostomy, multiple open wounds who initially presented on 11/25 for sepsis  He was started on IV antibiotics and underwent debridement of upper back, right SI joint, right ischium ulcers on 11/26  He found to have polymicrobial wound infection, MSSA bacteremia  This AM, critical care was asked to evaluate patient as he was tachypneic, tachycardic, and hypotensive w/increased WOB  On arrival to M/S unit, patient was in acute distress and began to projectile vomit large amount of coffee-ground emesis as well as had significant amount spewing from PEG tube  Patient was emergently intubated and aggressively resuscitated  Vasopressor therapy was initiated and central/arterial lines were placed  Blood was ordered for stat transfusion   ETT was exchanged over bougie as there was concern for air loss in cuff/volume loss  Volumes improved s/p ETT exchange  Patient's guardian was contacted who confirms Full code status at this time  Patient appropriate for transfer out of the ICU today?: No  Disposition: Transfer to Critical Care   Code Status: Level 1 - Full Code  ---------------------------------------------------------------------------------------  SUBJECTIVE  BRIDGETTE    Review of Systems  Review of systems was unable to be performed secondary to profound intellectual/developmental delay; intubated/sedated  ---------------------------------------------------------------------------------------  OBJECTIVE    Vitals   Vitals:    22 1700 22 1800 22 1900 22   BP: 105/52 104/51 (!) 100/49 121/56   BP Location:   Right leg Right leg   Pulse: (!) 106 96 90 86   Resp: 19 16 16 16   Temp: (!) 100 6 °F (38 1 °C) 100 2 °F (37 9 °C) 100 °F (37 8 °C) 99 9 °F (37 7 °C)   TempSrc:   Probe Probe   SpO2:    97%   Weight:       Height:         Temp (24hrs), Av °F (37 8 °C), Min:98 1 °F (36 7 °C), Max:100 9 °F (38 3 °C)  Current: Temperature: 99 9 °F (37 7 °C)  Arterial Line BP: 81/52  Arterial Line MAP (mmHg): (!) 64 mmHg    Respiratory:  SpO2: SpO2: 97 %, SpO2 Activity: SpO2 Activity: At Rest, SpO2 Device: O2 Device: Ventilator        Invasive/non-invasive ventilation settings   Respiratory    Lab Data (Last 4 hours)    None         O2/Vent Data (Last 4 hours)    None                Physical Exam  Vitals and nursing note reviewed  Constitutional:       Appearance: He is ill-appearing  Interventions: He is sedated, intubated and restrained  HENT:      Head: Normocephalic and atraumatic  Mouth/Throat:      Mouth: Mucous membranes are dry  Eyes:      Pupils: Pupils are equal, round, and reactive to light  Cardiovascular:      Rate and Rhythm: Regular rhythm  Tachycardia present        Pulses:           Radial pulses are 1+ on the right side and 1+ on the left side  Dorsalis pedis pulses are detected w/ Doppler on the right side and detected w/ Doppler on the left side  Heart sounds: Normal heart sounds  No murmur heard  Pulmonary:      Effort: Tachypnea and accessory muscle usage present  He is intubated  Breath sounds: Examination of the right-middle field reveals rhonchi  Examination of the left-middle field reveals rhonchi  Examination of the right-lower field reveals rhonchi  Examination of the left-lower field reveals rhonchi  Decreased breath sounds and rhonchi present  Abdominal:      General: There is distension  Palpations: Abdomen is soft  Comments: OGT w/coffee-ground emesis, PEG tube w/coffee-ground output; colostomy    Genitourinary:     Comments: Marrufo cath draining urine  Musculoskeletal:      Cervical back: Neck supple  Right lower leg: No edema  Left lower leg: No edema  Skin:     General: Skin is cool and dry  Capillary Refill: Capillary refill takes more than 3 seconds        Comments: Multiple open wounds    Neurological:      Comments: Nonverbal at baseline - currently sedated   Psychiatric:      Comments: Deferred         Laboratory and Diagnostics:  Results from last 7 days   Lab Units 11/29/22 2036 11/29/22 1703 11/29/22 0939 11/28/22  0439 11/26/22  0432 11/25/22  1510   WBC Thousand/uL  --   --  27 40* 17 52* 16 67* 21 92*   HEMOGLOBIN g/dL 7 9* 7 4* 5 3* 7 8* 8 2* 9 1*   HEMATOCRIT %  --   --  19 0* 27 7* 29 0* 32 8*   PLATELETS Thousands/uL  --   --  568* 731* 498* 704*   NEUTROS PCT %  --   --   --   --   --  80*   BANDS PCT %  --   --  9*  --   --   --    MONOS PCT %  --   --   --   --   --  9   MONO PCT %  --   --  2*  --   --   --      Results from last 7 days   Lab Units 11/29/22 1703 11/29/22 0939 11/28/22  1821 11/28/22  0439 11/26/22  0432 11/25/22  1550   SODIUM mmol/L 146 147 147 150* 145 143   POTASSIUM mmol/L 3 4* 3 6 3 7 3 2* 3 2* 3 5 CHLORIDE mmol/L 112* 115* 115* 118* 109* 103   CO2 mmol/L 28 25 23 23 27 31   ANION GAP mmol/L 6 7 9 9 9 9   BUN mg/dL 10 17 8 9 8 13   CREATININE mg/dL 0 24* 0 28* 0 29* 0 30* 0 29* 0 42*   CALCIUM mg/dL 7 5* 7 1* 8 9 9 2 9 0 9 0   GLUCOSE RANDOM mg/dL 97 130 109 122 93 113   ALT U/L 22 27  --   --  19 25   AST U/L 24 32  --   --  18 26   ALK PHOS U/L 87 85  --   --  119* 135*   ALBUMIN g/dL 2 0* 1 9*  --   --  2 6* 2 9*   TOTAL BILIRUBIN mg/dL 1 13* 0 18*  --   --  0 25 0 26     Results from last 7 days   Lab Units 11/29/22  1703 11/29/22  0939 11/26/22  0432   MAGNESIUM mg/dL 2 4 1 8* 2 0   PHOSPHORUS mg/dL 2 5 3 5  --       Results from last 7 days   Lab Units 11/29/22  0939 11/25/22  1550   INR  1 46* 1 18   PTT seconds  --  36          Results from last 7 days   Lab Units 11/29/22  0939 11/25/22  1550   LACTIC ACID mmol/L 1 2 1 9     ABG:  Results from last 7 days   Lab Units 11/29/22  1711 11/29/22  1000   PH ART  7 415 7 414   PCO2 ART mm Hg 44 1* 40 8   PO2 ART mm Hg 83 0 100 2   HCO3 ART mmol/L 27 6 25 5   BASE EXC ART mmol/L 2 8 0 9   ABG SOURCE   --  Line, Arterial     VBG:  Results from last 7 days   Lab Units 11/29/22  1000   ABG SOURCE  Line, Arterial     Results from last 7 days   Lab Units 11/29/22  1703 11/25/22  1550   PROCALCITONIN ng/ml 3 30* 0 16       Micro  Results from last 7 days   Lab Units 11/29/22  0949 11/28/22  1132 11/28/22  0440 11/26/22  0913 11/25/22  1550 11/25/22  1536 11/25/22  1530   BLOOD CULTURE  Received in Microbiology Lab  Culture in Progress  No Growth at 24 hrs  No Growth at 24 hrs   --  Staphylococcus aureus*  Staphylococcus coagulase negative* No Growth After 4 Days    --    GRAM STAIN RESULT   --   --   --  4+ Gram negative rods*  2+ Gram positive cocci in pairs and chains*  No polys seen*  2+ Gram positive cocci in pairs and chains*  2+ Gram negative rods*  No polys seen* Gram positive cocci in clusters*  --   --    URINE CULTURE   --   --   --   --   --   -- >100,000 cfu/ml   WOUND CULTURE   --   --   --  2+ Growth of Pseudomonas aeruginosa*  2+ Growth of Proteus mirabilis*  2+ Growth of Staphylococcus aureus*  3+ Growth of  2+ Growth of Citrobacter koseri*  2+ Growth of Proteus mirabilis*  2+ Growth of  --   --   --        EKG: sinus tachycardia  Imaging: I have personally reviewed pertinent reports  and I have personally reviewed pertinent films in PACS    Intake and Output  I/O       11/28 0701 11/29 0700 11/29 0701 11/30 0700    I V  (mL/kg)  10 3 (0 2)    Blood  1582 1    Total Intake(mL/kg)  1592 4 (29)    Urine (mL/kg/hr) 1350 (1) 3075 (3 8)    Total Output 1350 3075    Net -1350 -1482 6                Height and Weights   Height: 5' 5" (165 1 cm)  IBW (Ideal Body Weight): 61 5 kg  Body mass index is 20 14 kg/m²  Weight (last 2 days)     Date/Time Weight    11/28/22 1426 54 9 (121)            Nutrition       Diet Orders   (From admission, onward)             Start     Ordered    11/29/22 1055  Diet NPO  Diet effective now        References:    Nutrtion Support Algorithm Enteral vs  Parenteral   Question Answer Comment   Diet Type NPO    RD to adjust diet per protocol?  No        11/29/22 1054                  Active Medications  Scheduled Meds:  Current Facility-Administered Medications   Medication Dose Route Frequency Provider Last Rate   • acetaminophen  650 mg Per G Tube Q4H PRN Roseann Lauren MD     • cefepime  2,000 mg Intravenous Q8H Roseann Lauren MD 2,000 mg (11/29/22 2106)   • chlorhexidine  15 mL Mouth/Throat Q12H WernerdaAVANI Moss     • collagenase   Topical Daily Roseann Lauren MD     • dextrose  1,000 mL Intravenous Once Ephriam DO Randee     • HYDROmorphone  1 mg/hr Intravenous Continuous AVANI Wilson 1 mg/hr (11/29/22 1944)   • HYDROmorphone  0 5 mg Intravenous Q2H PRN AVANI Wilson     • multi-electrolyte  1,500 mL Intravenous Once [employer], CRNP 1,500 mL (11/29/22 2126)   • multi-electrolyte  2,000 mL Intravenous Once [employer], CRNP     • multi-electrolyte  200 mL/hr Intravenous Continuous Iraj BlockerYAHIRNP     • norepinephrine  1-30 mcg/min Intravenous Titrated Marilyn Buckner MD 10 mcg/min (11/29/22 2001)   • ondansetron  4 mg Intravenous Q6H PRN Marilyn Buckner MD     • pantoprazole  40 mg Intravenous Q12H Gill [address], DO     • potassium chloride  20 mEq Intravenous Q2H AVANI Velasquez 20 mEq (11/29/22 2033)   • potassium phosphate  30 mmol Intravenous Once Iraj BlockerYAHIRNP     • propofol  5-50 mcg/kg/min Intravenous Titrated Marilyn Buckner MD 50 mcg/kg/min (11/29/22 1724)   • sodium chloride  1,000 mL Intravenous Once AVANI Wilson     • sodium hypochlorite  1 application Irrigation BID Marilyn Buckner MD     • vasopressin  0 04 Units/min Intravenous Continuous AVANI Wilson Stopped (11/29/22 1201)     Continuous Infusions:  HYDROmorphone, 1 mg/hr, Last Rate: 1 mg/hr (11/29/22 1944)  multi-electrolyte, 200 mL/hr  norepinephrine, 1-30 mcg/min, Last Rate: 10 mcg/min (11/29/22 2001)  propofol, 5-50 mcg/kg/min, Last Rate: 50 mcg/kg/min (11/29/22 1724)  vasopressin, 0 04 Units/min, Last Rate: Stopped (11/29/22 1201)      PRN Meds:   acetaminophen, 650 mg, Q4H PRN  HYDROmorphone, 0 5 mg, Q2H PRN  ondansetron, 4 mg, Q6H PRN        Invasive Devices Review  Invasive Devices     Central Venous Catheter Line  Duration           CVC Central Lines 11/29/22 Triple Internal jugular <1 day          Peripheral Intravenous Line  Duration           Peripheral IV 11/28/22 Left;Ventral (anterior) Forearm 1 day          Arterial Line  Duration           Arterial Line 11/29/22 Other (Comment) <1 day    Arterial Line 11/29/22 Radial <1 day          Drain  Duration           Colostomy -- days    Urethral Catheter Temperature probe <1 day          Airway  Duration           ETT  Cuffed; Hi-Lo 7 5 mm <1 day ETT  Cuffed; Hi-Lo 7 5 mm <1 day                Rationale for remaining devices: R brachial A-Line - BP monitoring; R IJ TLC - Vasopressors; Chronic Marrufo cath  ---------------------------------------------------------------------------------------  Advance Directive and Living Will:      Power of :    POLST:    ---------------------------------------------------------------------------------------  Care Time Delivered:   Upon my evaluation, this patient had a high probability of imminent or life-threatening deterioration due to shock, acute respiratory failure, aspiration pneumonia/pneumonitis, MSSA bacteremia, polymicrobial wound infection, ABLA, GIB, which required my direct attention, intervention, and personal management  I have personally provided 150 minutes ([phone] to [phone]) of critical care time, exclusive of procedures, teaching, family meetings, and any prior time recorded by providers other than myself  AVANI Caro      Portions of the record may have been created with voice recognition software  Occasional wrong word or "sound a like" substitutions may have occurred due to the inherent limitations of voice recognition software    Read the chart carefully and recognize, using context, where substitutions have occurred

## 2022-11-30 NOTE — ASSESSMENT & PLAN NOTE
· With acute onset of large amount of projectile coffee-ground emesis and output from PEG tube   · OGT/PEG tube to suction for now - can remove OGT after suctioned appropriately as PEG tube will be adequate  · Hgb now 5 3 - will give PRBC x2, FFP x1 now   · GI emergently consulted and planning for bedside EGD today  · Received IV Protonix bolus and started on gtt  · Keep NPO for now  · Holding DVT ppx

## 2022-12-01 ENCOUNTER — APPOINTMENT (INPATIENT)
Dept: RADIOLOGY | Facility: HOSPITAL | Age: 67
End: 2022-12-01

## 2022-12-01 LAB
ABO GROUP BLD BPU: NORMAL
ANION GAP SERPL CALCULATED.3IONS-SCNC: 8 MMOL/L (ref 4–13)
ANION GAP SERPL CALCULATED.3IONS-SCNC: 8 MMOL/L (ref 4–13)
BPU ID: NORMAL
BUN SERPL-MCNC: 5 MG/DL (ref 5–25)
BUN SERPL-MCNC: 6 MG/DL (ref 5–25)
CA-I BLD-SCNC: 1.07 MMOL/L (ref 1.12–1.32)
CALCIUM SERPL-MCNC: 7.8 MG/DL (ref 8.4–10.2)
CALCIUM SERPL-MCNC: 8.7 MG/DL (ref 8.4–10.2)
CHLORIDE SERPL-SCNC: 107 MMOL/L (ref 96–108)
CHLORIDE SERPL-SCNC: 97 MMOL/L (ref 96–108)
CO2 SERPL-SCNC: 28 MMOL/L (ref 21–32)
CO2 SERPL-SCNC: 29 MMOL/L (ref 21–32)
CREAT SERPL-MCNC: 0.26 MG/DL (ref 0.6–1.3)
CREAT SERPL-MCNC: 0.29 MG/DL (ref 0.6–1.3)
CROSSMATCH: NORMAL
ERYTHROCYTE [DISTWIDTH] IN BLOOD BY AUTOMATED COUNT: 19.6 % (ref 11.6–15.1)
GFR SERPL CREATININE-BSD FRML MDRD: 142 ML/MIN/1.73SQ M
GFR SERPL CREATININE-BSD FRML MDRD: 148 ML/MIN/1.73SQ M
GLUCOSE SERPL-MCNC: 106 MG/DL (ref 65–140)
GLUCOSE SERPL-MCNC: 107 MG/DL (ref 65–140)
GLUCOSE SERPL-MCNC: 107 MG/DL (ref 65–140)
GLUCOSE SERPL-MCNC: 121 MG/DL (ref 65–140)
GLUCOSE SERPL-MCNC: 75 MG/DL (ref 65–140)
GLUCOSE SERPL-MCNC: 95 MG/DL (ref 65–140)
HCT VFR BLD AUTO: 23.9 % (ref 36.5–49.3)
HGB BLD-MCNC: 7.6 G/DL (ref 12–17)
HGB BLD-MCNC: 7.8 G/DL (ref 12–17)
HGB BLD-MCNC: 8.6 G/DL (ref 12–17)
MAGNESIUM SERPL-MCNC: 1.7 MG/DL (ref 1.9–2.7)
MAGNESIUM SERPL-MCNC: 2.4 MG/DL (ref 1.9–2.7)
MCH RBC QN AUTO: 25.9 PG (ref 26.8–34.3)
MCHC RBC AUTO-ENTMCNC: 31.8 G/DL (ref 31.4–37.4)
MCV RBC AUTO: 81 FL (ref 82–98)
PHOSPHATE SERPL-MCNC: 3.4 MG/DL (ref 2.3–4.1)
PLATELET # BLD AUTO: 398 THOUSANDS/UL (ref 149–390)
PMV BLD AUTO: 9.2 FL (ref 8.9–12.7)
POTASSIUM SERPL-SCNC: 2.5 MMOL/L (ref 3.5–5.3)
POTASSIUM SERPL-SCNC: 3.1 MMOL/L (ref 3.5–5.3)
RBC # BLD AUTO: 2.94 MILLION/UL (ref 3.88–5.62)
SODIUM SERPL-SCNC: 133 MMOL/L (ref 135–147)
SODIUM SERPL-SCNC: 144 MMOL/L (ref 135–147)
UNIT DISPENSE STATUS: NORMAL
UNIT PRODUCT CODE: NORMAL
UNIT PRODUCT VOLUME: 350 ML
UNIT RH: NORMAL
WBC # BLD AUTO: 17.72 THOUSAND/UL (ref 4.31–10.16)

## 2022-12-01 RX ORDER — POTASSIUM CHLORIDE 29.8 MG/ML
40 INJECTION INTRAVENOUS EVERY 4 HOURS
Status: COMPLETED | OUTPATIENT
Start: 2022-12-01 | End: 2022-12-02

## 2022-12-01 RX ORDER — HEPARIN SODIUM 5000 [USP'U]/ML
5000 INJECTION, SOLUTION INTRAVENOUS; SUBCUTANEOUS EVERY 8 HOURS SCHEDULED
Status: DISCONTINUED | OUTPATIENT
Start: 2022-12-01 | End: 2022-12-05

## 2022-12-01 RX ORDER — POTASSIUM CHLORIDE 29.8 MG/ML
40 INJECTION INTRAVENOUS ONCE
Status: COMPLETED | OUTPATIENT
Start: 2022-12-01 | End: 2022-12-01

## 2022-12-01 RX ORDER — MAGNESIUM SULFATE HEPTAHYDRATE 40 MG/ML
2 INJECTION, SOLUTION INTRAVENOUS ONCE
Status: COMPLETED | OUTPATIENT
Start: 2022-12-01 | End: 2022-12-01

## 2022-12-01 RX ORDER — ALBUMIN, HUMAN INJ 5% 5 %
12.5 SOLUTION INTRAVENOUS ONCE
Status: COMPLETED | OUTPATIENT
Start: 2022-12-01 | End: 2022-12-01

## 2022-12-01 RX ORDER — DEXMEDETOMIDINE HYDROCHLORIDE 4 UG/ML
.1-1.2 INJECTION, SOLUTION INTRAVENOUS
Status: DISCONTINUED | OUTPATIENT
Start: 2022-12-01 | End: 2022-12-04

## 2022-12-01 RX ADMIN — ALBUMIN (HUMAN) 12.5 G: 12.5 SOLUTION INTRAVENOUS at 03:42

## 2022-12-01 RX ADMIN — HEPARIN SODIUM 5000 UNITS: 5000 INJECTION INTRAVENOUS; SUBCUTANEOUS at 21:43

## 2022-12-01 RX ADMIN — CHLORHEXIDINE GLUCONATE 0.12% ORAL RINSE 15 ML: 1.2 LIQUID ORAL at 08:27

## 2022-12-01 RX ADMIN — COLLAGENASE SANTYL: 250 OINTMENT TOPICAL at 15:18

## 2022-12-01 RX ADMIN — CALCIUM GLUCONATE 3 G: 98 INJECTION, SOLUTION INTRAVENOUS at 08:26

## 2022-12-01 RX ADMIN — POTASSIUM CHLORIDE 40 MEQ: 29.8 INJECTION, SOLUTION INTRAVENOUS at 10:26

## 2022-12-01 RX ADMIN — DEXMEDETOMIDINE HYDROCHLORIDE 0.4 MCG/KG/HR: 400 INJECTION INTRAVENOUS at 13:08

## 2022-12-01 RX ADMIN — ACETAMINOPHEN 650 MG: 325 TABLET ORAL at 05:56

## 2022-12-01 RX ADMIN — DEXMEDETOMIDINE HYDROCHLORIDE 0.8 MCG/KG/HR: 400 INJECTION INTRAVENOUS at 18:36

## 2022-12-01 RX ADMIN — MAGNESIUM SULFATE HEPTAHYDRATE 2 G: 40 INJECTION, SOLUTION INTRAVENOUS at 07:46

## 2022-12-01 RX ADMIN — Medication 20 MG: at 21:44

## 2022-12-01 RX ADMIN — HEPARIN SODIUM 5000 UNITS: 5000 INJECTION INTRAVENOUS; SUBCUTANEOUS at 15:15

## 2022-12-01 RX ADMIN — CHLORHEXIDINE GLUCONATE 0.12% ORAL RINSE 15 ML: 1.2 LIQUID ORAL at 20:08

## 2022-12-01 RX ADMIN — CEFEPIME HYDROCHLORIDE 2000 MG: 2 INJECTION, SOLUTION INTRAVENOUS at 20:08

## 2022-12-01 RX ADMIN — POTASSIUM CHLORIDE 40 MEQ: 29.8 INJECTION, SOLUTION INTRAVENOUS at 20:08

## 2022-12-01 RX ADMIN — PROPOFOL 35 MCG/KG/MIN: 10 INJECTION, EMULSION INTRAVENOUS at 07:46

## 2022-12-01 RX ADMIN — PROPOFOL 40 MCG/KG/MIN: 10 INJECTION, EMULSION INTRAVENOUS at 01:50

## 2022-12-01 RX ADMIN — Medication 20 MG: at 09:04

## 2022-12-01 RX ADMIN — POTASSIUM CHLORIDE 40 MEQ: 29.8 INJECTION, SOLUTION INTRAVENOUS at 08:27

## 2022-12-01 RX ADMIN — CEFEPIME HYDROCHLORIDE 2000 MG: 2 INJECTION, SOLUTION INTRAVENOUS at 05:35

## 2022-12-01 RX ADMIN — CEFEPIME HYDROCHLORIDE 2000 MG: 2 INJECTION, SOLUTION INTRAVENOUS at 12:19

## 2022-12-01 NOTE — PROGRESS NOTES
Progress Note - [facility]   Alver Betty 74 y o  male MRN: [id number]  Unit/Bed#: ICU 05-01 Encounter: [id number]    IMPRESSION & RECOMMENDATIONS:   1  Sepsis, present on admission, evidenced by tachycardia and leukocytosis   Patient febrile with though leukocytosis is improving   Lactic acid and PCT initially normal  1 of 2 admission bl cx positive   Suspect sepsis initially secondary to #2, #3, now complicated by #0   SZKZB x-ray limited though negative  Jaun Rias with mixed contaminants   Now in ICU intubated in the setting of acute upper GI bleed and hemodynamic instability    -continue antibiotics as below  -monitor temperature and hemodynamics  -management per primary team   -recheck CBC and BMP in a m      2  Polymicrobial bacteremia  One of 2 admission blood cultures positive for Gram-positive cocci in clusters, culture positive for MSSA and CoNS noted on [facility] report  Documented that it was drawn from patients peripheral line and pt known to be difficult stick with hx of extensive contractures  Set drawn peripherally is negative at 72 hours  Suspect CoNs is contaminant, however MSSA likely due to #3  2D echo is normal  No known intravascular devices   Repeat blood cultures x4 sets are negative to date    -continue IV cefepime 2g q8h  -will plan to de-escalate to IV ancef to complete course of abx for bacteremia following completion of tx course for #3  -follow-up blood cultures and adjust antibiotics as needed  -anticipate 2 week course of IV abx for transient bacteremia from [facility] source     3  Chronic, now infected, stage IV decubitus ulcers   POA   Despite outpatient wound care, found to have significant deterioration in wounds over the last few months, particularly the right upper back and sacral wound with increasing wound depth and necrotic tissue burden with evidence of purulence and malodor on admission  Patient also with unstageable sacral/right buttock ulcer, unstageable right ischial decubitus ulcer, and unstageable left heel pressure ulcer  Status post bedside debridement on 11/26  CT C/A/P negative for deep seated abscesses beanath wounds  Wound cultures from upper back and sacrum are polymicrobial, positive for Citrobacter koseri, Proteus mirabilis, Pseudomonas aeruginosa, and Staph aureus  Per wound care team, wounds still with yellowish drainage and tracking deeper  Unfortunately wounds are unlikely to heal and are at risk for reinfection due to iron deficiency anemia, protein calorie malnutrition, contractures and inability to offload pressure     -continue to address [address]  Although no OM noted on CT, with worsening wounds it is likely that patient will develop chronic infection  If unable to cover/secure wounds especially of sacrum and upper right back, OM not treatable long term      -continue IV cefepime 2g q8h  -will ask surgery to re-assess wounds   -daily wound care  -ongoing goals of care discussion per primary team     4  Left lateral foot stage IV pressure ulcer  POA  Now with exposed bone    -continue abx as above  -pending GOC, would recommend podiatry consultation and further imaging     5  Functional quadriplegia, developmental disability   Patient nonverbal and bed-bound at baseline on tube feeds, with chronic indwelling Marrufo catheter and colostomy  Unfortunately due to contractures, very difficult to reposition patient and offload pressure   -supportive measures per primary team     6  Antibiotic Allergy   Noted to have amoxicillin and Augmentin allergy without documented reaction   Per Care everywhere, has tolerated cefepime and ceftriaxone during prior admissions  -monitor for adverse drug reactions     7  Acute upper GI bleed  Decompensated 11/29 and transferred to ICU  Patient remains on vent, pressors, and sedated  S/p EGD which showed ulcerated area in stomach s/p clip   -monitor hgb     8  Abnormal CT chest  Likely due to aspiration from acute upper GI bleed  -no additional abx for this problem     Antibiotics:  D7 cefepime     I have discussed the above management plan in detail with patient  I have discussed the above management plan in detail with patient's RN, and the primary service, ICU AP  Subjective:  Patient intubated  No overnight events  Objective:  Vitals:  Temp:  [98 2 °F (36 8 °C)-101 1 °F (38 4 °C)] 100 4 °F (38 °C)  HR:  [] 88  Resp:  [14-20] 16  BP: (131-144)/(62-63) 131/62  SpO2:  [95 %-100 %] 99 %  Temp (24hrs), Av 6 °F (37 6 °C), Min:98 2 °F (36 8 °C), Max:101 1 °F (38 4 °C)  Current: Temperature: 100 4 °F (38 °C)    PHYSICAL EXAM:  General Appearance:  Intubated on vent appearing critically ill  At baseline is nonverbal due to profound developmental/intelletual disability  HEENT: Normocephalic, without obvious abnormality, atraumatic  Conjunctiva pink and sclera anicteric  Intubated on vent  Lungs:   Clear to auscultation bilaterally, respirations unlabored   Heart:  RRR; no murmur, rub or gallop   Abdomen:   Soft, non-tender, non-distended, positive bowel sounds, PEG and colostomy     Extremities: No cyanosis, clubbing or edema  Extensive contractures of all 4 extremities  Musculoskeletal: Back symmetric without curvature, ROM normal     : No CVA or suprapubic tenderness  Marrufo patent  Skin: No rashes or lesions  No draining wounds noted  Peripheral IV intact without evidence of erythema, warmth, or exudate  LABS, IMAGING, & OTHER STUDIES:  Lab Results:  I have personally reviewed pertinent labs  Results from last 7 days   Lab Units 22  0611 22  0026 22  1753 22  1138 22  0642 22  1703 22  0939   WBC Thousand/uL 17 72*  --   --   --  27 86*  --  27 40*   HEMOGLOBIN g/dL 7 6* 7 8* 8 3*   < > 7 5*   < > 5 3*   PLATELETS Thousands/uL 398*  --   --   --  387  --  568*    < > = values in this interval not displayed       Results from last 7 days   Lab Units 12/01/22  0558 11/30/22  0642 11/29/22  1703 11/29/22  0939   SODIUM mmol/L 133* 137 146 147   POTASSIUM mmol/L 2 5* 3 4* 3 4* 3 6   CHLORIDE mmol/L 97 102 112* 115*   CO2 mmol/L 28 28 28 25   BUN mg/dL 5 5 10 17   CREATININE mg/dL 0 26* 0 23* 0 24* 0 28*   EGFR ml/min/1 73sq m 148 156 154 145   CALCIUM mg/dL 7 8* 7 9* 7 5* 7 1*   AST U/L  --  20 24 32   ALT U/L  --  19 22 27   ALK PHOS U/L  --  101 87 85     Results from last 7 days   Lab Units 11/30/22  0446 11/29/22  2041 11/29/22  0949 11/28/22  1234 11/28/22  1132 11/28/22  0440 11/26/22  0913 11/25/22  1550 11/25/22  1536 11/25/22  1530   BLOOD CULTURE   --  No Growth at 24 hrs  No Growth at 24 hrs   --  No Growth at 48 hrs  No Growth at 72 hrs   --  Staphylococcus aureus*  Staphylococcus coagulase negative* No Growth After 5 Days  --    GRAM STAIN RESULT  2+ Polys*  1+ Budding yeast*  --   --   --   --   --  4+ Gram negative rods*  2+ Gram positive cocci in pairs and chains*  No polys seen*  2+ Gram positive cocci in pairs and chains*  2+ Gram negative rods*  No polys seen* Gram positive cocci in clusters*  --   --    URINE CULTURE   --   --   --   --   --   --   --   --   --  >100,000 cfu/ml   WOUND CULTURE   --   --   --   --   --   --  2+ Growth of Pseudomonas aeruginosa*  2+ Growth of Proteus mirabilis*  2+ Growth of Staphylococcus aureus*  3+ Growth of  2+ Growth of Citrobacter koseri*  2+ Growth of Proteus mirabilis*  2+ Growth of  --   --   --    MRSA CULTURE ONLY   --   --   --  Methicillin Resistant Staphylococcus aureus isolated*  This patient requires contact isolation precautions per [place] law  Contact precautions are not required in [place] for nasal surveillance cultures    --   --   --   --   --   --      Results from last 7 days   Lab Units 11/30/22  0642 11/29/22  1703 11/25/22  1550   PROCALCITONIN ng/ml 3 13* 3 30* 0 16

## 2022-12-01 NOTE — ASSESSMENT & PLAN NOTE
· Hgb trending down during admission -Low 5 3  · In setting of acute GIB  · given PRBC x3, FFP x1    · Trend Hgb Q6H for now and will transfuse as noted above  · Monitor hemodynamics

## 2022-12-01 NOTE — PLAN OF CARE
Problem: Nutrition/Hydration-ADULT  Goal: Nutrient/Hydration intake appropriate for improving, restoring or maintaining nutritional needs  Description: Monitor and assess patient's nutrition/hydration status for malnutrition  Collaborate with interdisciplinary team and initiate plan and interventions as ordered  Monitor patient's weight and dietary intake as ordered or per policy  Utilize nutrition screening tool and intervene as necessary  Determine patient's food preferences and provide high-protein, high-caloric foods as appropriate       INTERVENTIONS:  - Monitor oral intake, urinary output, labs, and treatment plans  - Assess nutrition and hydration status and recommend course of action  - Evaluate amount of meals eaten  - Assist patient with eating if necessary   - Allow adequate time for meals  - Recommend/ encourage appropriate diets, oral nutritional supplements, and vitamin/mineral supplements  - Order, calculate, and assess calorie counts as needed  - Recommend, monitor, and adjust tube feedings and TPN/PPN based on assessed needs  - Assess need for intravenous fluids  - Provide specific nutrition/hydration education as appropriate  - Include patient/family/caregiver in decisions related to nutrition  Outcome: Progressing     Problem: INFECTION - ADULT  Goal: Absence or prevention of progression during hospitalization  Description: INTERVENTIONS:  - Assess and monitor for signs and symptoms of infection  - Monitor lab/diagnostic results  - Monitor all insertion sites, i e  indwelling lines, tubes, and drains  - Monitor endotracheal if appropriate and nasal secretions for changes in amount and color  - [place] appropriate cooling/warming therapies per order  - Administer medications as ordered  - Instruct and encourage patient and family to use good hand hygiene technique  - Identify and instruct in appropriate isolation precautions for identified infection/condition  Outcome: Progressing  Goal: Absence of fever/infection during neutropenic period  Description: INTERVENTIONS:  - Monitor WBC    Outcome: Progressing     Problem: SAFETY,RESTRAINT: NV/NON-SELF DESTRUCTIVE BEHAVIOR  Goal: Remains free of harm/injury (restraint for non violent/non self-detsructive behavior)  Description: INTERVENTIONS:  - Instruct patient/family regarding restraint use   - Assess and monitor physiologic and psychological status   - Provide interventions and comfort measures to meet assessed patient needs   - Identify and implement measures to help patient regain control  - Assess readiness for release of restraint   Outcome: Progressing  Goal: Returns to optimal restraint-free functioning  Description: INTERVENTIONS:  - Assess the patient's behavior and symptoms that indicate continued need for restraint  - Identify and implement measures to help patient regain control  - Assess readiness for release of restraint   Outcome: Progressing

## 2022-12-01 NOTE — QUICK NOTE
Contacted by Infectious Disease to re-evaluate stage IV pressure injuries of scrum and right upper/lower back  Picture updated in patient's chart and added below  Dressings taken down, wounds cleansed and packed/redressed with nursing staff  Nareshyl added to necrosis/slough  Packing with wet to dry dressing

## 2022-12-01 NOTE — ASSESSMENT & PLAN NOTE
· Noted during admission  · Suspect this was 2/2 hypovolemia  · Nephrology following  · Sodium improved  · Trickle feeds continue with free water flushes  · Trend Na

## 2022-12-01 NOTE — PROGRESS NOTES
[facility] Note -  [facility]  Barbara Finley 74 y o  male MRN: [id number]  Unit/Bed#: ICU 05-01 Encounter: [id number]      ASSESSMENT AND PLAN:      62-year-old male with past medical history of intellectual disability, functional quadriplegia, status post PEG tube, status post colostomy who was admitted for sepsis secondary to multiple chronic wounds  GI was consulted for coffee-ground emesis, anemia  1  Coffee-ground emesis  2  Acute anemia  Hemoglobin currently 7 6 s/p 3 U prbcs  No reported bleeding overnight  Colostomy output showed brown stool  Unclear where coffee-ground emesis had originated from  EGD showed small ulcer which may have been bleeding in the past but overall unlikely to cause acute drop in hemoglobin  Fortunately there are no signs of further bleeding  · Can change PPI to omeprazole b i d  through PEG tube  · Consider checking LDH, haptoglobin, peripheral smear or fibrinogen to rule out other sources of anemia including hemolysis, DIC  · Monitor hemoglobin, transfuse for less than 7    · Will hold off on further endoscopic management given findings of EGD  If he develops further signs of bleeding including melena, bloody stool change in hemodynamics please contact GI     · GI will follow peripherally at this time  Please contact with any questions  3  Malnutrition  4  Status post PEG tube  Long-term PEG tube in place  Appears to be functioning well  · Continue tube feeds    Rest of care per primary team     ______________________________________________________________________    Subjective:  Seen examined  Intubated  REVIEW OF SYSTEMS:    Review of Systems   Unable to perform ROS: Intubated          Historical Information   History reviewed  No pertinent past medical history  History reviewed  No pertinent surgical history    Social History   Social History     Substance and Sexual Activity   Alcohol Use Never     Social History     Substance and Sexual Activity   Drug Use Never     Social History     Tobacco Use   Smoking Status Never   Smokeless Tobacco Never     History reviewed  No pertinent family history      Meds/Allergies     Medications Prior to Admission   Medication   • bisacodyl (Dulcolax) 10 mg suppository   • calcium carbonate-vitamin D (OSCAL-D) 500 mg-200 units per tablet   • denosumab (Prolia) 60 mg/mL   • dorzolamide-timolol (COSOPT) 22 3-6 8 MG/ML ophthalmic solution   • ergocalciferol (ERGOCALCIFEROL) 1 25 MG (00949 UT) capsule   • fluticasone (FLONASE) 50 mcg/act nasal spray   • Hydromorphone HCl (Dilaudid) 1 MG/ML LIQD   • latanoprost (XALATAN) 0 005 % ophthalmic solution   • loratadine (CLARITIN) 10 mg tablet   • magnesium hydroxide (MILK OF MAGNESIA) 400 mg/5 mL oral suspension   • Polyethyl Glycol-Propyl Glycol (Systane) 0 4-0 3 % GEL   • sodium chloride (OCEAN) 0 65 % nasal spray     Current Facility-Administered Medications   Medication Dose Route Frequency   • acetaminophen (TYLENOL) tablet 650 mg  650 mg Per G Tube Q4H PRN   • calcium gluconate 3 g in sodium chloride 0 9 % 100 mL IVPB  3 g Intravenous Once   • cefepime (MAXIPIME) IVPB (premix in dextrose) 2,000 mg 50 mL  2,000 mg Intravenous Q8H   • chlorhexidine (PERIDEX) 0 12 % oral rinse 15 mL  15 mL Mouth/Throat Q12H FORTINO   • collagenase (SANTYL) ointment   Topical Daily   • HYDROmorphone (DILAUDID) 50 mg in sodium chloride 0 9% 50mL drip  1 mg/hr Intravenous Continuous   • HYDROmorphone (DILAUDID) injection 0 5 mg  0 5 mg Intravenous Q2H PRN   • insulin lispro (HumaLOG) 100 units/mL subcutaneous injection 1-5 Units  1-5 Units Subcutaneous Q6H [address]   • magnesium sulfate 2 g/50 mL IVPB (premix) 2 g  2 g Intravenous Once   • NOREPINEPHRINE 4 MG  ML NSS (CMPD ORDER) infusion  1-30 mcg/min Intravenous Titrated   • ondansetron (ZOFRAN) injection 4 mg  4 mg Intravenous Q6H PRN   • pantoprazole (PROTONIX) injection 40 mg  40 mg Intravenous Q12H FORTINO   • potassium chloride 40 mEq IVPB (premix)  40 mEq Intravenous Once    Followed by   • potassium chloride 40 mEq IVPB (premix)  40 mEq Intravenous Once   • propofol (DIPRIVAN) 1000 mg in 100 mL infusion (premix)  5-50 mcg/kg/min Intravenous Titrated   • vasopressin (PITRESSIN) 20 Units in sodium chloride 0 9 % 100 mL infusion  0 04 Units/min Intravenous Continuous       Allergies   Allergen Reactions   • Albumen, Egg - Food Allergy Other (See Comments)   • Amoxicillin-Pot Clavulanate Other (See Comments)           Objective     Blood pressure 131/62, pulse 88, temperature 100 4 °F (38 °C), resp  rate 16, height 5' 5" (1 651 m), weight 64 kg (141 lb 1 5 oz), SpO2 99 %  Body mass index is 23 48 kg/m²  Intake/Output Summary (Last 24 hours) at 12/1/2022 0815  Last data filed at 12/1/2022 0739  Gross per 24 hour   Intake 4989 77 ml   Output 3825 ml   Net 1164 77 ml         PHYSICAL EXAM:      Physical Exam  Vitals and nursing note reviewed  Constitutional:       General: He is not in acute distress  Appearance: Normal appearance  He is underweight  He is ill-appearing  Interventions: He is sedated and intubated  HENT:      Head: Normocephalic and atraumatic  Mouth/Throat:      Mouth: Mucous membranes are dry  Eyes:      Extraocular Movements: Extraocular movements intact  Conjunctiva/sclera: Conjunctivae normal    Cardiovascular:      Rate and Rhythm: Normal rate  Pulses: Normal pulses  Pulmonary:      Effort: Pulmonary effort is normal  He is intubated  Abdominal:      General: Abdomen is flat  Bowel sounds are normal  There is no distension  Palpations: Abdomen is soft  Tenderness: There is no abdominal tenderness  There is no guarding  Comments: PEG tube in place  Colostomy with brown output  Musculoskeletal:      Cervical back: Neck supple  Right lower leg: No edema  Left lower leg: No edema  Skin:     General: Skin is warm and dry  Comments: Multiple wounds  Neurological:      Mental Status: He is alert  Lab Results:   No results displayed because visit has over 200 results  Imaging Studies: I have personally reviewed pertinent imaging studies  [address] [facility] LAUREN     Gastroenterology Fellow  PGY-4  Available via [employer]  12/1/2022 8:15 AM

## 2022-12-01 NOTE — RESPIRATORY THERAPY NOTE
12/01/22 0305   Respiratory Assessment   Respiratory Pattern Assisted   Chest Assessment Chest expansion symmetrical   Bilateral Breath Sounds Diminished;Coarse   Suction ET Tube   Resp Comments   (pt remains on current settings without incident  pt suctioned as needed  ETT secured  will continue to monitor pt)   O2 Device vent   Vent Information   Vent ID [id number]   Vent type Henao C3   Henao C3/G5 Vent Mode (S)CMV   $ Vent Daily Charge-Subsequent Yes   $ Pulse Oximetry Spot Check Charge Completed   (S)CMV Settings   Resp Rate (BPM) 16 BPM   VT (mL) 400 mL   FIO2 (%) 40 %   PEEP (cmH2O) 8 cmH2O   I:E Ratio 1:2 8   Insp Time (%) 1 %   Flow Trigger (LPM) 5   Humidification Heater   Heater Temperature (Set) 98 6 °F (37 °C)   (S)CMV Actuals   Resp Rate (BPM) 24 BPM   VT (mL) 359   MV 9 4   MAP (cmH2O) 13 cmH2O   Peak Pressure (cmH2O) 28 cmH2O   I:E Ratio (Obs) 1:2 8   Insp Resistance 8   Heater Temperature (Obs) 98 6 °F (37 °C)   Static Compliance (mL/cmH20) 28 mL/cmH2O   Plateau Pressure (cm H2O) 20 1 cm H2O   (S)CMV Alarms   High Peak Pressure (cmH2O) 45   Low Pressure (cmH2O) 5 cm H2O   High Resp Rate (BPM) 40 BPM   Low Resp Rate (BPM) 8 BPM   High MV (L/min) 18 L/min   Low MV (L/min) 3 5 L/min   High VT (mL) 1000 mL   Low VT (mL) 300 mL   Apnea Time (s) 20 S   Maintenance   Alarm (pink) cable attached No   Resuscitation bag with peep valve at bedside Yes   Water bag changed No   Circuit changed No   Daily Screen   Patient safety screen outcome: Failed   Not Ready for Weaning due to: Underline problem not resolved   ETT  Cuffed; Hi-Lo 7 5 mm   Placement Date/Time: 11/29/22 c) 3121   Type: Cuffed; Hi-Lo  Tube Size: 7 5 mm  Location: Oral  Insertion attempts: 2  Placement Verification: Chest x-ray;End tidal CO2;Symmetrical chest wall movement  Secured at (cm): 22   Secured at (cm) 22   Measured from [facility]   Repositioned Right to [address] [place] [address] by [employer] tube guillaume   Site Condition Dry   Cuff Pressure (cm H2O) 28 cm H2O   HI-LO Suction  Continuous low suction   HI-LO Secretions Scant   RT Ventilator Management Note  Radha Ricardo 74 y o  male MRN: [id number]  Unit/Bed#: ICU 05-01 Encounter: [id number]      Daily Screen         11/30/2022  1142 12/1/2022  0305          Patient safety screen outcome[de-identified] Failed Failed      Not Ready for Weaning due to[de-identified] Underline problem not resolved Underline problem not resolved                Physical Exam:   Assessment Type: Assess only  General Appearance: Sedated  Respiratory Pattern: Assisted  Chest Assessment: Chest expansion symmetrical  Bilateral Breath Sounds: Diminished, Coarse  Cough: None  Suction: ET Tube  O2 Device: vent      Resp Comments:  (pt remains on current settings without incident  pt suctioned as needed  ETT secured   will continue to monitor pt)

## 2022-12-01 NOTE — PROGRESS NOTES
[facility] [address]  Progress Note Regina Yg 1955, 74 y o  male MRN: [id number]  Unit/Bed#: ICU 05-01 Encounter: [id number]  Primary Care Provider: Sae Davis MD   Date and time admitted to hospital: 11/25/2022  2:30 PM    * Shock [facility])  Assessment & Plan  · Had acute decompensation this AM on M/S unit w/tachycardia in 160s, hypotension, tachypnea  · Had projectile vomiting of very large amount of coffee-ground emesis as well as significant amount of output via PEG tube as well - was intubated at that time for airway protection and respiratory failure/distress  · Shock is multifactorial: Hypovolemic vs septic vs hemorrhagic   · COVID-19/FLU/RSV negative  · 1/2 Blood cultures from 11/25 (+) MSSA; repeat blood cultures from 11/28 (-) - will repeat blood cultures now  · UA on admission w/+ nitrites and 3+ leuks, however urine culture w/NGTD - does have chronic Marrufo  · MRSA swab pending  · Back wound culture from 11/26 (+) Pseudomonas aeruginosa, Proteus mirabilis, Staph aureus   · Sacral wound culture from 11/26 (+) Citrobacter koseri, Proteus mirabilis  · Send sputum culture   · WBC worsened top 27 4 w/9 bands  · Lactic acid 1 2 after 3L IVF bolus via pressure bag  · Procalcitonin 0 16 on admission - resend now  · TTE from 11/28 revealed EF 80% w/hyperdynamic systolic function and mildly dilated RV  · Received aggressive IVF resuscitation (> 30 cc/kg) - continue with aggressive resuscitation   · Hemoglobin trending down since admission, now 5 3 - will give PRBC x2 and FFP x1 now  · R IJ TLC/R Brachial A-Line placed - currently requiring Levophed gtt to maintain MAP > 65 & Vasopressin gtt  · 11/29 Femoral milagros placed, Brachial Milagros DC'd  · Continue IV Cefepime per ID's recommendations  · Monitor fever and WBC curve  · Trend procalcitonin and follow-up procalcitonin     Acute respiratory failure with hypoxia [facility])  Assessment & Plan  · 11/28 Intubated emergently on M/S floor for respiratory failure/distress and airway protection in setting of GIB/shock   · CXR: Right base infiltrate; question aspiration  · ETT exchanged over bougie as cuff was not maintaining air - now volumes improved  · Suspect aspiration given imaging and acute decompensation w/large coffee-ground emesis  · CXR w/R lobe infiltrate (likely aspiration)  · Current vent settings: 16/400/70/6  · Currently sedated with Propofol/Dilaudid gtt's w/Goal RASS -1 to -2  · Will require frequent in-line suctioning for suspected aspiration  · Continue IV antibiotics as noted above  · Titrate FiO2 to maintain SpO2 > 90%  · Aggressive pulmonary hygiene    GIB (gastrointestinal bleeding)  Assessment & Plan  · With acute onset of large amount of projectile coffee-ground emesis and output from PEG tube   · Hgb 5 3 - will give PRBC x2, FFP x1  HGB responsive to blood products  · Continue Q4 HGB until stable  · EGD: The esophagus appeared normal  There was an ulcerated area with pigmented spot in the body of the stomach  Unclear if this was the source of bleeding or trauma from the OG tube   A clip was placed to prevent further bleeding  · Received IV Protonix bolus and started on gtt, now dc's  · Continue Protonix BID IV  · Trickle feeds started  · Holding DVT ppx    Acute blood loss anemia (ABLA)  Assessment & Plan  · Hgb trending down during admission -Low 5 3  · In setting of acute GIB  · given PRBC x3, FFP x1    · Trend Hgb Q6H for now and will transfuse as noted above  · Monitor hemodynamics     Aspiration pneumonia (HCC)  Assessment & Plan  · With witnessed aspiration event during acute decompensation in setting of GIB  · Suspect aspiration pneumonia vs pneumonitis  · Emergently intubated for airway protection/respiratory failure  · Continue IV antibiotics as noted above  · Aggressive pulmonary hygiene    MSSA bacteremia  Assessment & Plan  · 1/2 Blood cultures from 11/25 (+) MSSA; repeat blood cultures from 11/28 (-) - will repeat blood cultures now  · Continue IV antibiotics as noted above   · ID following    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, stage 4 ([facility])  Assessment & Plan  · Surgery following  · Wound care per surgery's recommendations  · Wound care team consult  · Wound cultures as noted above  · Continue IV antibiotics as noted above    Hypernatremia  Assessment & Plan  · Noted during admission  · Suspect this was 2/2 hypovolemia  · Nephrology following  · Sodium improved  · Trickle feeds continue with free water flushes  · Trend Na    Electrolyte disturbance  Assessment & Plan  · Replete electrolytes PRN    Chronic indwelling Marrufo catheter  Assessment & Plan  · Unclear if this is due to urinary retention/neurogenic bladder?   · Will exchange Marrufo cath now   · UA and urine culture as noted above    Developmental disability  Assessment & Plan  · Nonverbal at baseline w/bilatereral UE/LE contractures  · Resides in group home     Pressure injury of left heel, stage 4 ([facility])  Assessment & Plan  · Wound care consult  · Local wound care        ----------------------------------------------------------------------------------------  HPI/24hr events:  No acute events overnight    Patient appropriate for transfer out of the ICU today?: No  Disposition: Continue Critical Care   Code Status: Level 1 - Full Code  ---------------------------------------------------------------------------------------  SUBJECTIVE  Unable to assess    Review of Systems   Unable to perform ROS: Intubated     Review of systems was unable to be performed secondary to Intubation/sedation/nonverbal baseline  ---------------------------------------------------------------------------------------  OBJECTIVE    Vitals   Vitals:    12/01/22 0100 12/01/22 0200 12/01/22 0300 12/01/22 0305   BP:       BP Location:       Pulse: 86 (!) 117 (!) 110 (!) 115   Resp: 16 17 17 20   Temp: 99 5 °F (37 5 °C) 99 3 °F (37 4 °C) 99 7 °F (37 6 °C) 99 7 °F (37 6 °C)   TempSrc: Probe Probe Probe Probe   SpO2: 98% 97% 95% 95%   Weight:       Height:         Temp (24hrs), Av 7 °F (37 1 °C), Min:98 2 °F (36 8 °C), Max:99 7 °F (37 6 °C)  Current: Temperature: 99 7 °F (37 6 °C)  Arterial Line BP: 95/36  Arterial Line MAP (mmHg): (!) 57 mmHg    Respiratory:  SpO2: SpO2: 95 %       Invasive/non-invasive ventilation settings   Respiratory    Lab Data (Last 4 hours)    None         O2/Vent Data (Last 4 hours)       030          Patient safety screen outcome: Failed                   Physical Exam  Vitals and nursing note reviewed  Constitutional:       Appearance: He is ill-appearing  HENT:      Head: Normocephalic  Mouth/Throat:      Mouth: Mucous membranes are moist       Pharynx: Oropharynx is clear  Eyes:      Pupils: Pupils are equal, round, and reactive to light  Cardiovascular:      Rate and Rhythm: Regular rhythm  Tachycardia present  Pulses: Normal pulses  Heart sounds: Normal heart sounds  Pulmonary:      Effort: Pulmonary effort is normal       Breath sounds: Normal breath sounds  Abdominal:      General: Bowel sounds are normal       Palpations: Abdomen is soft  Musculoskeletal:      Right lower leg: Edema present  Left lower leg: Edema present  Skin:     General: Skin is warm and dry  Capillary Refill: Capillary refill takes less than 2 seconds               Laboratory and Diagnostics:  Results from last 7 days   Lab Units 22  0026 22  1753 22  1138 22  6997 22  0343 22  2353 22  2036 22  1703 22  0939 22  0439 22  0432 22  1510   WBC Thousand/uL  --   --   --  27 86*  --   --   --   --  27 40* 17 52* 16 67* 21 92*   HEMOGLOBIN g/dL 7 8* 8 3* 7 9* 7 5* 6 8* 7 4* 7 9*   < > 5 3* 7 8* 8 2* 9 1*   HEMATOCRIT %  --   --   --  24 3*  --   --   --   --  19 0* 27 7* 29 0* 32 8*   PLATELETS Thousands/uL  --   --   --  387  --   --   --   -- 568* 731* 498* 704*   NEUTROS PCT %  --   --   --  84*  --   --   --   --   --   --   --  80*   BANDS PCT %  --   --   --   --   --   --   --   --  9*  --   --   --    MONOS PCT %  --   --   --  5  --   --   --   --   --   --   --  9   MONO PCT %  --   --   --   --   --   --   --   --  2*  --   --   --     < > = values in this interval not displayed       Results from last 7 days   Lab Units 11/30/22  0642 11/29/22  1703 11/29/22  0939 11/28/22  1821 11/28/22  0439 11/26/22  0432 11/25/22  1550   SODIUM mmol/L 137 146 147 147 150* 145 143   POTASSIUM mmol/L 3 4* 3 4* 3 6 3 7 3 2* 3 2* 3 5   CHLORIDE mmol/L 102 112* 115* 115* 118* 109* 103   CO2 mmol/L 28 28 25 23 23 27 31   ANION GAP mmol/L 7 6 7 9 9 9 9   BUN mg/dL 5 10 17 8 9 8 13   CREATININE mg/dL 0 23* 0 24* 0 28* 0 29* 0 30* 0 29* 0 42*   CALCIUM mg/dL 7 9* 7 5* 7 1* 8 9 9 2 9 0 9 0   GLUCOSE RANDOM mg/dL 100 97 130 109 122 93 113   ALT U/L 19 22 27  --   --  19 25   AST U/L 20 24 32  --   --  18 26   ALK PHOS U/L 101 87 85  --   --  119* 135*   ALBUMIN g/dL 2 1* 2 0* 1 9*  --   --  2 6* 2 9*   TOTAL BILIRUBIN mg/dL 0 56 1 13* 0 18*  --   --  0 25 0 26     Results from last 7 days   Lab Units 11/30/22 0642 11/29/22  1703 11/29/22  0939 11/26/22  0432   MAGNESIUM mg/dL 1 9 2 4 1 8* 2 0   PHOSPHORUS mg/dL 3 8 2 5 3 5  --       Results from last 7 days   Lab Units 11/29/22  0939 11/25/22  1550   INR  1 46* 1 18   PTT seconds  --  36          Results from last 7 days   Lab Units 11/29/22  0939 11/25/22  1550   LACTIC ACID mmol/L 1 2 1 9     ABG:  Results from last 7 days   Lab Units 11/29/22  1711 11/29/22  1000   PH ART  7 415 7 414   PCO2 ART mm Hg 44 1* 40 8   PO2 ART mm Hg 83 0 100 2   HCO3 ART mmol/L 27 6 25 5   BASE EXC ART mmol/L 2 8 0 9   ABG SOURCE   --  Line, Arterial     VBG:  Results from last 7 days   Lab Units 11/29/22  1000   ABG SOURCE  Line, Arterial     Results from last 7 days   Lab Units 11/30/22  0642 11/29/22  1703 11/25/22  1550 PROCALCITONIN ng/ml 3 13* 3 30* 0 16       Micro  Results from last 7 days   Lab Units 11/30/22  0446 11/29/22  2041 11/29/22  0949 11/28/22  1234 11/28/22  1132 11/28/22  0440 11/26/22  0913 11/25/22  1550 11/25/22  1536 11/25/22  1530   BLOOD CULTURE   --  Received in Microbiology Lab  Culture in Progress  No Growth at 24 hrs   --  No Growth at 48 hrs  No Growth at 48 hrs  --  Staphylococcus aureus*  Staphylococcus coagulase negative* No Growth After 5 Days  --    GRAM STAIN RESULT  2+ Polys*  1+ Budding yeast*  --   --   --   --   --  4+ Gram negative rods*  2+ Gram positive cocci in pairs and chains*  No polys seen*  2+ Gram positive cocci in pairs and chains*  2+ Gram negative rods*  No polys seen* Gram positive cocci in clusters*  --   --    URINE CULTURE   --   --   --   --   --   --   --   --   --  >100,000 cfu/ml   WOUND CULTURE   --   --   --   --   --   --  2+ Growth of Pseudomonas aeruginosa*  2+ Growth of Proteus mirabilis*  2+ Growth of Staphylococcus aureus*  3+ Growth of  2+ Growth of Citrobacter koseri*  2+ Growth of Proteus mirabilis*  2+ Growth of  --   --   --    MRSA CULTURE ONLY   --   --   --  Methicillin Resistant Staphylococcus aureus isolated*  This patient requires contact isolation precautions per [place] law  Contact precautions are not required in [place] for nasal surveillance cultures  --   --   --   --   --   --        EKG:  Reviewed  Imaging: I have personally reviewed pertinent reports  Intake and Output  I/O       11/29 0701  11/30 0700 11/30 0701  12/01 0700    I V  (mL/kg) 7824 8 (125 8) 2770 4 (44 5)    Blood 1582 1 364 6    IV Piggyback 3250 711 1    Total Intake(mL/kg) 21622 9 (203 5) 3846 1 (61 8)    Urine (mL/kg/hr) 5130 (3 4) 3775 (2 5)    Stool 50 50    Total Output 5180 3825    Net +7476 9 +21 1                Height and Weights   Height: 5' 5" (165 1 cm)  IBW (Ideal Body Weight): 61 5 kg  Body mass index is 22 82 kg/m²    Weight (last 2 days)     Date/Time Weight    11/30/22 0600 62 2 (137 13)            Nutrition       Diet Orders   (From admission, onward)             Start     Ordered    11/30/22 1444  Diet Enteral/Parenteral; Tube Feeding No Oral Diet; Jevity 1 2 Zeyad; Continuous; 10; 250; Water; Every 6 hours  Diet effective now        References:    Nutrtion Support Algorithm Enteral vs  Parenteral   Question Answer Comment   Diet Type Enteral/Parenteral    Enteral/Parenteral Tube Feeding No Oral Diet    Tube Feeding Formula: Jevity 1 2 Zeyad    Bolus/Cyclic/Continuous Continuous    Tube Feeding Goal Rate (mL/hr): 10    Tube Feeding flush (mL): 250    Water Flush type: Water    Water flush frequency: Every 6 hours    RD to adjust diet per protocol?  No        11/30/22 1443                  Active Medications  Scheduled Meds:  Current Facility-Administered Medications   Medication Dose Route Frequency Provider Last Rate   • acetaminophen  650 mg Per G Tube Q4H PRN Mehnaz Campbell MD     • Albumin 5%  12 5 g Intravenous Once PreemptionAVANI Bailey     • cefepime  2,000 mg Intravenous Q8H Mehnaz Campbell MD Stopped (11/30/22 2044)   • chlorhexidine  15 mL Mouth/Throat Q12H [address] AVANI Wilson     • collagenase   Topical Daily Mehnaz Campbell MD     • HYDROmorphone  1 mg/hr Intravenous Continuous AVANI Wilson 1 mg/hr (12/01/22 0201)   • HYDROmorphone  0 5 mg Intravenous Q2H PRN AVANI Wilson     • insulin lispro  1-5 Units Subcutaneous Q6H [address] AVANI Velasquez     • norepinephrine  1-30 mcg/min Intravenous Titrated Menhaz Campbell MD 5 mcg/min (12/01/22 0301)   • ondansetron  4 mg Intravenous Q6H PRN Mehnaz Campbell MD     • pantoprazole  40 mg Intravenous Q12H [address] Reggie FELECIA Crowe DO     • propofol  5-50 mcg/kg/min Intravenous Titrated Mehnaz Campbell MD 40 mcg/kg/min (12/01/22 0301)   • sodium hypochlorite  1 application Irrigation BID Mehnaz Campbell MD     • vasopressin  0 04 Units/min Intravenous Continuous AVANI Wilson 0 04 Units/min (12/01/22 0323)     Continuous Infusions:  HYDROmorphone, 1 mg/hr, Last Rate: 1 mg/hr (12/01/22 0201)  norepinephrine, 1-30 mcg/min, Last Rate: 5 mcg/min (12/01/22 0301)  propofol, 5-50 mcg/kg/min, Last Rate: 40 mcg/kg/min (12/01/22 0301)  vasopressin, 0 04 Units/min, Last Rate: 0 04 Units/min (12/01/22 0323)      PRN Meds:   acetaminophen, 650 mg, Q4H PRN  HYDROmorphone, 0 5 mg, Q2H PRN  ondansetron, 4 mg, Q6H PRN        Invasive Devices Review  Invasive Devices     Central Venous Catheter Line  Duration           CVC Central Lines 11/29/22 Triple Internal jugular 1 day          Peripheral Intravenous Line  Duration           Peripheral IV 11/28/22 Left;Ventral (anterior) Forearm 2 days          Arterial Line  Duration           Arterial Line 11/29/22 Left Femoral 1 day    Arterial Line 11/29/22 Radial 1 day          Drain  Duration           Colostomy -- days    Urethral Catheter Temperature probe 1 day          Airway  Duration           ETT  Cuffed; Hi-Lo 7 5 mm 1 day    ETT  Cuffed; Hi-Lo 7 5 mm 1 day                Rationale for remaining devices:  Chronic Marrufo catheter strict I&O, a line for hemodynamic monitoring, triple-lumen catheter for medications requiring central venous access  ---------------------------------------------------------------------------------------  Advance Directive and Living Will:      Power of :    POLST:    ---------------------------------------------------------------------------------------  Care Time Delivered:   Upon my evaluation, this patient had a high probability of imminent or life-threatening deterioration due to Acute respiratory failure, which required my direct attention, intervention, and personal management    I have personally provided 15 minutes (0100 to 0115) of critical care time, exclusive of procedures, teaching, family meetings, and any prior time recorded by providers other than myself  AVANI Denton      Portions of the record may have been created with voice recognition software  Occasional wrong word or "sound a like" substitutions may have occurred due to the inherent limitations of voice recognition software    Read the chart carefully and recognize, using context, where substitutions have occurred

## 2022-12-01 NOTE — PROGRESS NOTES
Progress Note - Nephrology   Akbar Deleon 74 y o  male MRN: [id number]  Unit/Bed#: ICU 05-01 Encounter: [id number]    A/P:  1  Hypernatremia   Resolved  2  Volume depletion   Continue with volume expansion according to Critical Care recommendations  Patient appears to be euvolemic at this time  3  Hypokalemia   Status post potassium supplementation by critical care colleagues  Continue to monitor and supplement as indicated  4  Hypomagnesemia   All the magnesium level is minimally reduced, would aggressively supplement given hypokalemia  5  Polyuria   Patient appears to be significantly increasing urine output at this time  Would match urine output with maintenance fluid for now, look to back off maintenance fluids slowly as the patient continues to improve  6  Hemorrhagic shock   Continue supportive care, status post packed red blood cells, continue with volume expansion as indicated and vasopressors    Care according to our gastroenterology and critical care colleagues      Follow up reason for today's visit: Hypernatremia/ Hypokalemia     [facility])    Patient Active Problem List   Diagnosis   • Pressure injury of sacral region, stage 4 ([address] )   • 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, stage 4 (HCC)   • Pressure ulcer of right lower back, stage 3 ([address] )   • Pressure ulcer of left foot, stage 4 ([address] )   • Pressure injury of right upper back, stage 4 ([address] )   • Open wound of right hand without foreign body   • Pressure injury of left heel, stage 4 ([address] )   • Shock ([address] )   • Developmental disability   • Hypokalemia   • Pressure ulcer of ischium, right, stage III ([address] )   • MSSA bacteremia   • Hypernatremia   • GIB (gastrointestinal bleeding)   • Acute respiratory failure with hypoxia ([address] )   • Electrolyte disturbance   • Acute blood loss anemia (ABLA)   • Aspiration pneumonia (HCC)   • Chronic indwelling Marrufo catheter         Subjective:   Patient eyes are open, unable to communicate baseline due to severe developmental disability  Objective:     Vitals: Blood pressure 107/53, pulse 98, temperature 100 2 °F (37 9 °C), resp  rate 16, height 5' 5" (1 651 m), weight 64 kg (141 lb 1 5 oz), SpO2 94 %  ,Body mass index is 23 48 kg/m²  Weight (last 2 days)     Date/Time Weight    12/01/22 0600 64 (141 09)    11/30/22 0600 62 2 (137 13)            Intake/Output Summary (Last 24 hours) at 12/1/2022 1336  Last data filed at 12/1/2022 1000  Gross per 24 hour   Intake 1828 91 ml   Output 3825 ml   Net -1996 09 ml     I/O last 3 completed shifts: In: 8923 8 [I V :5578 1; Blood:364 6; NG/GT:600; IV Piggyback:2261 1;  Feedings:120]  Out: 8055 [Urine:7955; Stool:100]    Urethral Catheter Temperature probe (Active)   Reasons to continue Urinary Catheter  Accurate I&O assessment in critically ill patients (48 hr  max) 11/30/22 2000   Goal for Removal No longer needed- Will place order to discontinue 11/30/22 2000   Site Assessment Clean;Skin intact 11/30/22 2000   Marrufo Care Done 12/01/22 0900   Collection Container Standard drainage bag 11/30/22 2000   Securement Method Securing device (Describe) 11/30/22 2000   Output (mL) 200 mL 12/01/22 1000       Physical Exam: /53   Pulse 98   Temp 100 2 °F (37 9 °C)   Resp 16   Ht 5' 5" (1 651 m)   Wt 64 kg (141 lb 1 5 oz)   SpO2 94%   BMI 23 48 kg/m²     General Appearance:    Intubated, awake   Head:    Normocephalic, without obvious abnormality, atraumatic   Eyes:    Conjunctiva/corneas clear   Ears:    Normal external ears   Nose:   Nares normal, septum midline, mucosa normal, no drainage    or sinus tenderness   Throat:   Lips, mucosa, and tongue normal; teeth and gums normal   Neck:   Supple   Back:     Symmetric, no curvature, ROM normal, no CVA tenderness   Lungs:     Clear to auscultation bilaterally, respirations unlabored   Chest wall:    No tenderness or deformity   Heart:    Regular rate and rhythm, S1 and S2 normal, no murmur, rub   or gallop   Abdomen:     Soft, non-tender, bowel sounds active   Extremities:   Extremities normal, atraumatic, no cyanosis or edema   Skin:   Skin color, texture, turgor normal, no rashes or lesions   Lymph nodes:   Cervical normal   Neurologic:   CNII-XII intact            Lab, Imaging and other studies: I have personally reviewed pertinent labs  CBC:   Lab Results   Component Value Date    WBC 17 72 (H) 12/01/2022    HGB 7 6 (L) 12/01/2022    HCT 23 9 (L) 12/01/2022    MCV 81 (L) 12/01/2022     (H) 12/01/2022    MCH 25 9 (L) 12/01/2022    MCHC 31 8 12/01/2022    RDW 19 6 (H) 12/01/2022    MPV 9 2 12/01/2022     CMP:   Lab Results   Component Value Date    K 2 5 (LL) 12/01/2022    CL 97 12/01/2022    CO2 28 12/01/2022    BUN 5 12/01/2022    CREATININE 0 26 (L) 12/01/2022    CALCIUM 7 8 (L) 12/01/2022    EGFR 148 12/01/2022         Results from last 7 days   Lab Units 12/01/22  0611 11/30/22  0642 11/29/22  1703 11/29/22  0939   POTASSIUM mmol/L 2 5* 3 4* 3 4* 3 6   CHLORIDE mmol/L 97 102 112* 115*   CO2 mmol/L 28 28 28 25   BUN mg/dL 5 5 10 17   CREATININE mg/dL 0 26* 0 23* 0 24* 0 28*   CALCIUM mg/dL 7 8* 7 9* 7 5* 7 1*   ALK PHOS U/L  --  101 87 85   ALT U/L  --  19 22 27   AST U/L  --  20 24 32         Phosphorus:   Lab Results   Component Value Date    PHOS 3 4 12/01/2022     Magnesium:   Lab Results   Component Value Date    MG 1 7 (L) 12/01/2022     Urinalysis: No results found for: COLORU, CLARITYU, SPECGRAV, PHUR, LEUKOCYTESUR, NITRITE, PROTEINUA, GLUCOSEU, KETONESU, BILIRUBINUR, BLOODU  Ionized Calcium: No results found for: CAION  Coagulation: No results found for: PT, INR, APTT  Troponin: No results found for: TROPONINI  ABG: No results found for: PHART, LTO5OBO, PO2ART, RFZ7JCR, S2VYSDQC, BEART, SOURCE  Radiology review:     IMAGING  Procedure: EGD    Result Date: 11/29/2022  Narrative: Table formatting from the original result was not included   [facility] [address] [place] [address] [phone] DATE OF SERVICE: 11/29/22 PHYSICIAN(S): Attending: No Staff Documented Michelle Chavez Fellow: No Staff Documented Austyn Henson INDICATION: Upper GI bleed, Coffee ground emesis POST-OP DIAGNOSIS: See the impression below  PREPROCEDURE: Informed consent was obtained for the procedure, including sedation  Risks of perforation, hemorrhage, adverse drug reaction and aspiration were discussed  The patient was placed in the left lateral decubitus position  Patient was explained about the risks and benefits of the procedure  Risks including but not limited to bleeding, infection, and perforation were explained in detail  Also explained about less than 100% sensitivity with the exam and other alternatives  DETAILS OF PROCEDURE: Patient was taken to the procedure room where a time out was performed to confirm correct patient and correct procedure  The patient underwent general anesthesia, which was administered by an anesthesia professional and an intensivist  The patient's blood pressure, heart rate, level of consciousness, respirations, oxygen and ETCO2 were monitored throughout the procedure  The scope was advanced to the second part of the duodenum  Retroflexion was performed in the fundus  The patient experienced no blood loss  The procedure was not difficult  The patient tolerated the procedure well  There were no apparent complications  ANESTHESIA INFORMATION: ASA: ASA status not filed in the log  Anesthesia Type: Anesthesia type not filed in the log  MEDICATIONS: No administrations occurring from 1528 to 1602 on 11/29/22 FINDINGS: The esophagus appeared normal  There was an ulcerated area with pigmented spot in the body of the stomach  Unclear if this was the source of bleeding or trauma from the OG tube  A clip was placed to prevent further bleeding   Otherwise normal stomach The duodenal bulb and 2nd part of the duodenum appeared normal  SPECIMENS: * No specimens in log *     Impression: No active GI bleeding  A clipped was placed over a pigmented lesion in the body though it is not clear that this was the source of his bleeding RECOMMENDATION:  There is no recommended follow-up for this procedure  Ok to stop octreotide gtt Ok to start feeds Monitor for further GI bleeding and trend HgB   No Staff Documented     Procedure: XR chest portable    Result Date: 11/29/2022  Narrative: CHEST INDICATION:   Respiratory distress  COMPARISON:  CXR 11/25/2022  EXAM PERFORMED/VIEWS:  XR CHEST PORTABLE FINDINGS:  ET tube 4 cm above the raymundo  NG tube in stomach  Cardiomediastinal silhouette appears unremarkable  Mild opacity in the right base  No effusion or pneumothorax Clip in the left upper quadrant  Osseous structures appear within normal limits for patient age  Impression: Mild opacity in the right base, likely atelectasis  Pneumonia/aspiration not excluded in the appropriate setting  Workstation performed: [id number]     Procedure: CT chest abdomen pelvis w contrast    Result Date: 11/30/2022  Narrative: CT CHEST, ABDOMEN AND PELVIS WITH IV CONTRAST INDICATION:   Sepsis r/o deep abscess from wounds  COMPARISON:  Chest radiograph 11/30/2022 TECHNIQUE: CT examination of the chest, abdomen and pelvis was performed  Axial, sagittal, and coronal 2D reformatted images were created from the source data and submitted for interpretation  Radiation dose length product (DLP) for this visit:  454 09 mGy-cm   This examination, like all CT scans performed in the [facility], was performed utilizing techniques to minimize radiation dose exposure, including the use of iterative  reconstruction and automated exposure control  IV Contrast:  100 mL of iohexol (OMNIPAQUE) Enteric contrast was not administered  FINDINGS: CHEST LUNGS:  There is near complete collapse of the right lower lobe and complete collapse of the left lower lobe    Consolidative opacity in the right middle and lower lobes may be due to a combination of pneumonia and atelectasis  Central airways are patent  The endotracheal tube tip is 3 7 cm above the raymundo  PLEURA:  Small bilateral pleural effusions  HEART/GREAT VESSELS:  Heart is unremarkable for patient's age  No thoracic aortic aneurysm  Right internal jugular central venous catheter tip in the SVC  MEDIASTINUM AND DENNIS:  Unremarkable  CHEST WALL AND LOWER NECK: There is an ulcerated soft tissue wound in the right lower back with soft tissue defect extending to the posterior right 9th rib (series 2 image 41)  It measures approximately 5 6 x 2 0 cm  ABDOMEN LIVER/BILIARY TREE:  Unremarkable  GALLBLADDER:  No calcified gallstones  No pericholecystic inflammatory change  SPLEEN:  Unremarkable  PANCREAS:  Unremarkable  ADRENAL GLANDS:  Unremarkable  KIDNEYS/URETERS:  Right ureteral stent with the proximal tip in the renal pelvis and the distal tip in the bladder  Nonobstructing bilateral renal calculi measuring up to 5 mm  Left renal cyst   Bilateral subcentimeter renal hypodensities too small to characterize  No hydronephrosis  STOMACH AND BOWEL:  Left lower quadrant colostomy  PEG tube in place  APPENDIX:  Normal  ABDOMINOPELVIC CAVITY:  Trace ascites  No pneumoperitoneum  No lymphadenopathy  VESSELS:  Unremarkable for patient's age  PELVIS REPRODUCTIVE ORGANS:  Unremarkable for patient's age  URINARY BLADDER:  Contains a Marrufo catheter  Air in the bladder may be due to instrumentation  Millimeter bladder calculus  Mild nonspecific bladder wall thickening related to part to underdistention  ABDOMINAL WALL/INGUINAL REGIONS:  There is an ulcerated wound overlying the sacrum and coccyx with the soft tissue defect reaching the bone (series 2 image 111)  It measures approximately 6 x 1 8 cm  There is a soft tissue defect/ulcer within the subcutaneous fat of the right buttock /proximal thigh measuring 3 4 x 1 6 cm (series 2 image 127)    There is subjacent subcutaneous fat stranding and soft tissue thickening overlying the ischial tuberosity  There is a soft tissue defect/ulcer in the right proximal thigh measuring approximately 5 5 x 2 9 cm (series 2 image 131) extending to the underlying musculature  OSSEOUS STRUCTURES:  No acute fracture or destructive osseous lesion  Scoliosis  Heterotopic ossification about the hips  Intramedullary troy in the right humerus  Impression: 1  Multiple ulcerated wounds  No drainable fluid collection  - Wound in the right lower back with soft tissue defect extending to the posterior right 9th rib  - Wound overlying the sacrum and coccyx with the soft tissue defect reaching the bone  - Soft tissue defect within the subcutaneous fat of the right buttock /proximal thigh  Subjacent subcutaneous fat stranding and soft tissue thickening overlying the ischial tuberosity  - Soft tissue defect in the right proximal thigh extending to the underlying musculature  2   Small bilateral pleural effusions  3   Near complete collapse of the right lower lobe and complete collapse of the left lower lobe  Consolidative opacity in the right middle and lower lobes may be due to a combination of pneumonia and atelectasis  4   Right ureteral stent in place without hydronephrosis  Nonobstructing right renal calculi  The study was marked in [facility] for immediate notification  Workstation performed: [id number]     Procedure: XR chest portable ICU    Result Date: 11/30/2022  Narrative: CHEST INDICATION:   follow up, pulm infiltrates  COMPARISON:  11/29/2022 EXAM PERFORMED/VIEWS:  XR CHEST PORTABLE ICU FINDINGS:  Endotracheal tube tip is 4 cm proximal to the raymundo  Right jugular central venous catheter tip overlies the cavoatrial junction  Cardiomediastinal silhouette appears unremarkable  Extensive right greater than left alveolar opacities are present  No pneumothorax or pleural effusion   Osseous structures appear within normal limits for patient age  Impression: Extensive right greater than left alveolar opacities keeping with multilobar pneumonia  Workstation performed: [id number]     Procedure: XR chest portable ICU    Result Date: 11/29/2022  Narrative: CHEST INDICATION:   s/p R IJ placement  COMPARISON:  11/29/2022 at 0808 hrs EXAM PERFORMED/VIEWS:  XR CHEST PORTABLE ICU FINDINGS: Right IJ line tip in superior vena cava, level of the raymundo  ETT 4 0 cm above raymundo  NGT tip beneath diaphragm and in proximal stomach  Cardiomediastinal silhouette appears unremarkable  Right base increased patchy density  No pneumothorax or pleural effusion  Osseous structures appear within normal limits for patient age  Impression: Right IJ line in place  Right base infiltrate; question aspiration  The study was marked in [facility] for immediate notification  Workstation performed: [id number]     Procedure: US bedside procedure    Result Date: 11/29/2022  Narrative: 1 2 840 [id number]  2 446 502 [id number] 6 1    Procedure: Echo complete w/ contrast if indicated    Result Date: 11/28/2022  Narrative: •  Left Ventricle: Left ventricular cavity size is normal  Wall thickness is normal  The left ventricular ejection fraction is 80%  Systolic function is hyperdynamic  Wall motion is normal  •  Right Ventricle: Right ventricular cavity size is mildly dilated   Systolic function is normal        Current Facility-Administered Medications   Medication Dose Route Frequency   • acetaminophen (TYLENOL) tablet 650 mg  650 mg Per G Tube Q4H PRN   • cefepime (MAXIPIME) IVPB (premix in dextrose) 2,000 mg 50 mL  2,000 mg Intravenous Q8H   • chlorhexidine (PERIDEX) 0 12 % oral rinse 15 mL  15 mL Mouth/Throat Q12H [facility]   • collagenase (SANTYL) ointment   Topical Daily   • dexmedeTOMIDine (Precedex) 400 mcg in sodium chloride 0 9% 100 mL  0 1-1 2 mcg/kg/hr Intravenous Titrated   • heparin (porcine) subcutaneous injection 5,000 Units  5,000 Units Subcutaneous Q8H [facility]   • HYDROmorphone (DILAUDID) 50 mg in sodium chloride 0 9% 50mL drip  1 5 mg/hr Intravenous Continuous   • HYDROmorphone (DILAUDID) injection 0 5 mg  0 5 mg Intravenous Q2H PRN   • insulin lispro (HumaLOG) 100 units/mL subcutaneous injection 1-5 Units  1-5 Units Subcutaneous Q6H [facility]   • NOREPINEPHRINE 4 MG  ML NSS (CMPD ORDER) infusion  1-30 mcg/min Intravenous Titrated   • omeprazole (PRILOSEC) suspension 2 mg/mL  20 mg Oral BID   • ondansetron (ZOFRAN) injection 4 mg  4 mg Intravenous Q6H PRN   • propofol (DIPRIVAN) 1000 mg in 100 mL infusion (premix)  5-50 mcg/kg/min Intravenous Titrated     Medications Discontinued During This Encounter   Medication Reason   • potassium chloride (K-DUR,KLOR-CON) CR tablet 40 mEq    • vancomycin (VANCOCIN) IVPB (premix in dextrose) 750 mg 150 mL    • potassium chloride 40 mEq IVPB (premix)    • vancomycin (VANCOCIN) IVPB (premix in dextrose) 1,000 mg 200 mL    • cefepime (MAXIPIME) IVPB (premix in dextrose) 2,000 mg 50 mL    • sodium chloride 0 9 % infusion    • dextrose 5 % and sodium chloride 0 45 % infusion    • pantoprazole (PROTONIX) injection 40 mg    • enoxaparin (LOVENOX) subcutaneous injection 40 mg    • fentaNYL 1000 mcg in sodium chloride 0 9% 100mL infusion    • fentaNYL 1000 mcg in sodium chloride 0 9% 100mL infusion    • fentanyl citrate (PF) 100 MCG/2ML 50 mcg    • pantoprazole (PROTONIX) 80 mg in sodium chloride 0 9 % 100 mL infusion    • potassium chloride 20 mEq IVPB (premix)    • multi-electrolyte (PLASMALYTE-A/ISOLYTE-S PH 7 4) IV solution    • multi-electrolyte (PLASMALYTE-A/ISOLYTE-S PH 7 4) IV solution    • sodium chloride 0 9 % bolus 1,000 mL    • multi-electrolyte (ISOLYTE-S PH 7 4) bolus 2,000 mL    • sodium hypochlorite (DAKIN'S HALF-STRENGTH) 0 25 percent topical solution 1 application    • pantoprazole (PROTONIX) injection 40 mg    • vasopressin (PITRESSIN) 20 Units in sodium chloride 0 9 % 100 mL infusion        Hanna Hernandez, DO      This progress note was produced in part using a dictation device which may document imprecise wording from author's original intent

## 2022-12-01 NOTE — RESPIRATORY THERAPY NOTE
RT Ventilator Management Note  Mone Cha 74 y o  male MRN: [id number]  Unit/Bed#: ICU 05-01 Encounter: [id number]      Daily Screen         12/1/2022  0305 12/1/2022  0808          Patient safety screen outcome[de-identified] Failed Failed      Not Ready for Weaning due to[de-identified] Underline problem not resolved Underline problem not resolved                Physical Exam:   Assessment Type: Assess only  General Appearance: Sleeping  Respiratory Pattern: Assisted  Chest Assessment: Chest expansion symmetrical  Bilateral Breath Sounds: Coarse  Suction: ET Tube      Resp Comments: (P) Pt resing comfortably on current settings  Strong cough noted whith suctioning  No settings changes today  Vitals are stable  Plan is to cont  on current settings overnight

## 2022-12-01 NOTE — CONSULTS
Consult - [address] 74 y o  male MRN: [id number]  Unit/Bed#: ICU 05-01 Encounter: [id number]    Assessment/Plan     Assessment:  1  Left lateral foot ulcer probes to bone   2  Left heel ulcer probes to bone   2  Sepsis - POA    Plan:  - Left foot multiple ulcers with exposed bone complicated by constant pressure due to contracture of the extremities  Thankfully, both foot ulcers are clinically stable without acute signs of infection however I suspect underlying osteomyelitis due chronicity  I will obtain baseline X-ray of left foot for further evaluation  Continue [address] at this time with Santyl and DSD  - Left foot xray pending   - Continue appropriate offloading the ulcer areas  - Rest of care per primary service       History of Present Illness     HPI:  Karly Neal is a 74 y o  male who presents with multiple left foot ulcers due to pressure and contracture deformity  As per nursing staff patient resides in a nursing home for last 40 years  Patients has been following up at wound care center for ulcer treatments to his foot as well as other parts of his body  Patient was sent to hospital from wound care for evaluation of infection last week  He is found to be septic with bacteriemia with likely source open ulcers  Consults  Review of Systems   Constitutional: Negative  HENT: Negative  Eyes: Negative  Respiratory: Negative  Cardiovascular: Negative  Gastrointestinal: Negative  Musculoskeletal: left foot pain   Skin:left foot uclers  Neurological: Negative  Psych: negative  Historical Information   History reviewed  No pertinent past medical history  History reviewed  No pertinent surgical history    Social History   Social History     Substance and Sexual Activity   Alcohol Use Never     Social History     Substance and Sexual Activity   Drug Use Never     Social History     Tobacco Use   Smoking Status Never   Smokeless Tobacco Never     Family History: History reviewed  No pertinent family history      Meds/Allergies   Medications Prior to Admission   Medication   • bisacodyl (Dulcolax) 10 mg suppository   • calcium carbonate-vitamin D (OSCAL-D) 500 mg-200 units per tablet   • denosumab (Prolia) 60 mg/mL   • dorzolamide-timolol (COSOPT) 22 3-6 8 MG/ML ophthalmic solution   • ergocalciferol (ERGOCALCIFEROL) 1 25 MG (35229 UT) capsule   • fluticasone (FLONASE) 50 mcg/act nasal spray   • Hydromorphone HCl (Dilaudid) 1 MG/ML LIQD   • latanoprost (XALATAN) 0 005 % ophthalmic solution   • loratadine (CLARITIN) 10 mg tablet   • magnesium hydroxide (MILK OF MAGNESIA) 400 mg/5 mL oral suspension   • Polyethyl Glycol-Propyl Glycol (Systane) 0 4-0 3 % GEL   • sodium chloride (OCEAN) 0 65 % nasal spray     Allergies   Allergen Reactions   • Albumen, Egg - Food Allergy Other (See Comments)   • Amoxicillin-Pot Clavulanate Other (See Comments)       Objective   First Vitals:   Blood Pressure: 136/70 (11/25/22 1438)  Pulse: (!) 132 (11/25/22 1438)  Temperature: (!) 100 6 °F (38 1 °C) (11/25/22 1438)  Temp Source: Tympanic (11/25/22 1438)  Respirations: 20 (11/25/22 1438)  Height: 5' 5" (165 1 cm) (11/25/22 1438)  Weight - Scale: 54 4 kg (120 lb) (11/25/22 1438)  SpO2: 92 % (11/25/22 1438)    Current Vitals:   Blood Pressure: 111/55 (12/01/22 1400)  Pulse: (!) 124 (12/01/22 1500)  Temperature: (!) 100 8 °F (38 2 °C) (12/01/22 1500)  Temp Source: Probe (12/01/22 0600)  Respirations: (!) 30 (12/01/22 1500)  Height: 5' 5" (165 1 cm) (11/28/22 1426)  Weight - Scale: 64 kg (141 lb 1 5 oz) (12/01/22 0600)  SpO2: 94 % (12/01/22 1145)        /55   Pulse (!) 124   Temp (!) 100 8 °F (38 2 °C)   Resp (!) 30   Ht 5' 5" (1 651 m)   Wt 64 kg (141 lb 1 5 oz)   SpO2 94%   BMI 23 48 kg/m²      General Appearance:    Alert, cooperative, no distress   Head:    Normocephalic, without obvious abnormality, atraumatic   Eyes:    PERRL, conjunctiva/corneas clear, EOM's intact Nose:   Moist mucous membranes   Neck:   Supple, symmetrical, trachea midline   Back:     Symmetric   Lungs:     Respirations unlabored   Heart:    Regular rate and rhythm, S1 and S2 normal, no murmur, rub   or gallop   Abdomen:     Soft, non-tender   Extremities:   Contracted extremities  Pulses:   Non-palpable pedal pulses  Dopplerable pulses  Skin:   Left posterior heel ulcer with eschar and necrotic slough fibrin tissue noted to the wound  Probes to bone  No drainage noted to the heel ulcer  No malodor noted  Left lateral foot ulcer with exposed metatarsal and granular wound base, no malodor present or active purulent drainage present  Clinically ulcers are stable  Lab Results:   No results displayed because visit has over 200 results  Results from last 7 days   Lab Units 11/30/22  0446 11/26/22  0913 11/25/22  1550   GRAM STAIN RESULT  2+ Polys*  1+ Budding yeast* 4+ Gram negative rods*  2+ Gram positive cocci in pairs and chains*  No polys seen*  2+ Gram positive cocci in pairs and chains*  2+ Gram negative rods*  No polys seen* Gram positive cocci in clusters*       Results from last 7 days   Lab Units 11/29/22  2041 11/29/22  0949 11/28/22  1132   BLOOD CULTURE  No Growth at 24 hrs  No Growth at 24 hrs  No Growth at 48 hrs  Invalid input(s): LABAEARO            Imaging: I have personally reviewed pertinent films in PACS  EKG, Pathology, and Other Studies: I have personally reviewed pertinent reports        Code Status: Level 1 - Full Code  Advance Directive and Living Will:      Power of :    POLST:

## 2022-12-01 NOTE — ASSESSMENT & PLAN NOTE
· Had acute decompensation this AM on M/S unit w/tachycardia in 160s, hypotension, tachypnea  · Had projectile vomiting of very large amount of coffee-ground emesis as well as significant amount of output via PEG tube as well - was intubated at that time for airway protection and respiratory failure/distress  · Shock is multifactorial: Hypovolemic vs septic vs hemorrhagic   · COVID-19/FLU/RSV negative  · 1/2 Blood cultures from 11/25 (+) MSSA; repeat blood cultures from 11/28 (-) - will repeat blood cultures now  · UA on admission w/+ nitrites and 3+ leuks, however urine culture w/NGTD - does have chronic Marrufo  · MRSA swab pending  · Back wound culture from 11/26 (+) Pseudomonas aeruginosa, Proteus mirabilis, Staph aureus   · Sacral wound culture from 11/26 (+) Citrobacter koseri, Proteus mirabilis  · Send sputum culture   · WBC worsened top 27 4 w/9 bands  · Lactic acid 1 2 after 3L IVF bolus via pressure bag  · Procalcitonin 0 16 on admission - resend now  · TTE from 11/28 revealed EF 80% w/hyperdynamic systolic function and mildly dilated RV  · Received aggressive IVF resuscitation (> 30 cc/kg) - continue with aggressive resuscitation   · Hemoglobin trending down since admission, now 5 3 - will give PRBC x2 and FFP x1 now  · R IJ TLC/R Brachial A-Line placed - currently requiring Levophed gtt to maintain MAP > 65 & Vasopressin gtt  · 11/29 Femoral milagros placed, Brachial Milagros DC'd  · Continue IV Cefepime per ID's recommendations  · Monitor fever and WBC curve  · Trend procalcitonin and follow-up procalcitonin

## 2022-12-01 NOTE — WOUND OSTOMY CARE
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, left lateral foot, right upper back and stage 3 pressure injuries to the right ischium and right lower back and developmental disability, Patient is non-verbal and 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  Patient seen with primary RN for wound care and treatments  Patient is a max assist X3 to perform wound care treatments due to contractures  Assessment Findings:   Patient assessed in bed with primary RN  Patient was seen by surgical PA who had debrided wounds on 11/26/2022, wound care orders placed at that time by surgery  Patient is intubated  He has bilateral soft wrist restraints in place  Marrufo catheter in place, LLQ colostomy pouch with liquid brown effluent, PEG tube with tube feeds infusing, Patient is bed-bound, contractures to b/l legs and arms  Patient sedated  Visitor present in room during wound care  1  POA-stage 4 PI to the left heel, wound bed mix of yellow, brown and black slough with approx 10% pink tissue at the proximal edge, small amount of brown and yellow drainage  2  POA-left posterior hand wound, dried wound bed with beefy red base and dried brown scab to the outer edge, no drainage  3  POA-Right buttock unstageable pressure injury, black eschar to the wound bed with slightly rolled epithelial edge, no drainage  4   POA-Right ischium unstageable pressure injury, mix of yellow slough to the outer edge with brown eschar and dry beefy red to the wound base, no drainage  5  POA-Sacral wound, stage 4, undermining present, no tunneling, deepest depth is at 12:00, approx 30% yellow slough to the proximal wound edge and proximal wound, granulation tissue to the wound bed, moderate amount of yellow drainage on dressing when removed  6  POA-Stage 3 pressure injury to the right lower buttocks-ischium, wound bed located within area of heavily scarred tissue, yellow slough in the wound base at the center of the scar tissue  7  POA-resolving stage 3 pressure injury to the right lower back, scar tissue with scant yellow scaly skin to the center of the scar tissue, no drainage  Hypopigmented blanchable areas  In the periwound with white in the center  8  POA-right upper back stage 4 pressure injury  Wound bed is mix of yellow and brown slough, granulation tissue to the distal area of the wound bed,  undermining noted from 12-4 and 6-12 o'clock, no tunneling noted  Mix of pink and yellow slough to the outer edge, edge unattached from wound  Heavy drainage from wound, mix of green and yellow drainage  9  POA-Left lateral foot stage 4 pressure injury  Information in flowsheet,was not listed on prior note  Beefy red wound bed, brown and yellow slough to the distal wound bed, bone exposed, was not exposed on prior assessment on 11/28/2022  10  Stage 2 pressure injury to the right outer ear, dry beefy red wound bed, no drainage, fibrotic tissue to the periiwound  Patient had a stage 2 PI previously  11  Left lower quadrant colostomy, pouch changed today  Stoma pink, slightly budded, measures 7/8 X 1 1/8 inches, oval, peristomal skin intact, functioning for liquid brown stool    Skin, Wound and Ostomy Care Plan:   1  Wound care treatment per surgery orders; Left heel  Remove dressings  Clean with soap/water  Apply betadine gel to 2x2 and cover wound  Wrap with ABD/4 inch cling wrap  Offload pressure points  Repeat daily    Posterior chest/upper back, sacrum and right SI joint, right ischium  Remove dressings and packing  Clean skin with chlorhexidine wipes  Irrigate wounds with wound cleanser  Pack with WTD dressing  Silicone cream to shanon-wound  Cover with bulky dressing for draiange (4x4, abd, heavy drainage pad)  Secure with tape or with mesh underwear  Apply santyl to sacral ulcer daily  2  Apply hydraguard to right heel and keep  both heels offloaded  3  Apply skin nourishing cream to the skin daily  4  Turn patient Q2 hours to offload pressure points   5  Non-adherent oil emulsion dressing to the left lateral foot wound, cover with Allevyn life silicone bordered dressing change daily  6  Apply 3M No Sting barrier film to the left posterior hand wound  7  Prevalon boot to the left foot  8  P-500 low air loss therapy surface  9  Change colostomy pouch every 3 days and PRN with signs of leakage, empty when 1/3-1/2 full  10  Place Allevyn life silicone bordered dressing to the right outer ear wound, momo with T, date, change every three days and PRN    Requested that wound care orders placed by surgery be clarified    Wounds:  Wound 05/16/22 Pressure Injury Back Right;Upper (Active)   Wound Image   11/28/22 1055   Wound Description BRIDGETTE 11/30/22 1600   Pressure Injury Stage 4 11/28/22 2114   Shanon-wound Assessment Dry; Intact 11/30/22 1600   Wound Length (cm) 7 6 cm 11/28/22 1055   Wound Width (cm) 4 7 cm 11/28/22 1055   Wound Depth (cm) 2 cm 11/28/22 1055   Wound Surface Area (cm^2) 35 72 cm^2 11/28/22 1055   Wound Volume (cm^3) 71 44 cm^3 11/28/22 1055   Calculated Wound Volume (cm^3) 71 44 cm^3 11/28/22 1055   Change in Wound Size % -2313 51 11/28/22 1055   Tunneling 0 cm 11/28/22 1055   Undermining 1 6 11/28/22 1055   Undermining is depth extending from 12-12 11/28/22 1055   Drainage Amount Moderate 11/30/22 1600   Drainage Description Green;Yellow 11/30/22 1600   Treatments Cleansed 11/28/22 1055   Dressing ABD;Dry dressing; Other (Comment) 11/30/22 1600   Wound packed? Yes 11/30/22 1600   Packing- # removed 1 11/30/22 1600   Packing- # inserted 1 11/30/22 1600   Dressing Changed Changed 11/28/22 1055   Patient Tolerance Tolerated well 11/28/22 1055   Dressing Status Dry; Intact 11/30/22 1600       Wound 05/16/22 Pressure Injury Back Lateral;Right; Lower (Active)   Wound Image   11/28/22 1101   Wound Description BRIDGETTE 11/30/22 1600   Pressure Injury Stage 3 11/28/22 1101   Sybil-wound Assessment Dry;Scar Tissue 11/30/22 1600   Tunneling 0 cm 11/28/22 1101   Undermining 0 11/28/22 1101   Drainage Amount None 11/30/22 1600   Treatments Cleansed 11/28/22 1101   Dressing ABD;Gauze; Other (Comment) 11/30/22 1600   Wound packed? No 11/30/22 1600   Dressing Changed Changed 11/28/22 1101   Patient Tolerance Tolerated well 11/28/22 1101   Dressing Status Dry; Intact 11/30/22 1600       Wound 05/16/22 Pressure Injury Sacrum (Active)   Wound Image   11/28/22 1115   Wound Description BRIDGETTE 11/30/22 1600   Pressure Injury Stage 4 11/28/22 2114   Sybil-wound Assessment Scar Tissue; Hyperpigmented 11/30/22 1600   Wound Length (cm) 5 5 cm 11/28/22 1115   Wound Width (cm) 3 cm 11/28/22 1115   Wound Depth (cm) 2 cm 11/28/22 1115   Wound Surface Area (cm^2) 16 5 cm^2 11/28/22 1115   Wound Volume (cm^3) 33 cm^3 11/28/22 1115   Calculated Wound Volume (cm^3) 33 cm^3 11/28/22 1115   Change in Wound Size % 51 65 11/28/22 1115   Tunneling 0 cm 11/28/22 1115   Undermining 2 5 11/28/22 1115   Drainage Amount Moderate 11/30/22 1600   Drainage Description Yellow 11/30/22 1600   Treatments Cleansed 11/28/22 1115   Dressing Other (Comment) 11/30/22 1600   Wound packed? Yes 11/30/22 1600   Packing- # removed 1 11/30/22 1600   Packing- # inserted 1 11/30/22 1600   Dressing Changed Changed 11/28/22 1115   Patient Tolerance Tolerated well 11/28/22 1115   Dressing Status Dry; Intact 11/30/22 1600       Wound 05/16/22 Pressure Injury Buttocks Right (Active)   Wound Image   11/28/22 1119   Wound Description BRIDGETTE 11/30/22 1600 Pressure Injury Stage 4 11/28/22 2114   Sybil-wound Assessment Dry 11/30/22 1600   Wound Length (cm) 5 1 cm 11/28/22 1119   Wound Width (cm) 3 6 cm 11/28/22 1119   Wound Depth (cm) 1 1 cm 11/28/22 1119   Wound Surface Area (cm^2) 18 36 cm^2 11/28/22 1119   Wound Volume (cm^3) 20 196 cm^3 11/28/22 1119   Calculated Wound Volume (cm^3) 20 2 cm^3 11/28/22 1119   Change in Wound Size % 66 6 11/28/22 1119   Tunneling 0 cm 11/28/22 1119   Undermining 0 11/28/22 1119   Treatments Cleansed 11/28/22 1119   Dressing ABD;Dry dressing; Other (Comment) 11/30/22 1600   Wound packed? No 11/30/22 1600   Dressing Changed Changed 11/28/22 1119   Dressing Status Clean;Dry; Intact 11/30/22 1600       Wound 05/16/22 Pressure Injury Foot Left;Lateral (Active)   Wound Image   11/30/22 1516   Wound Description Beefy red;Bone Exposed 11/30/22 1600   Pressure Injury Stage 4 11/30/22 1516   Sybil-wound Assessment Dry 11/30/22 1600   Tunneling 0 11/30/22 1516   Undermining 0 11/30/22 1516   Drainage Amount Small 11/30/22 1600   Drainage Description Brown;Yellow 11/30/22 1600   Treatments Cleansed 11/30/22 1516   Dressing Foam, Silicon (eg  Allevyn, etc) 11/30/22 1600   Dressing Changed Changed 11/30/22 1516   Patient Tolerance Tolerated well 11/30/22 1516   Dressing Status Clean;Dry; Intact 11/30/22 1600       Wound 08/29/22 Heel Left;Posterior (Active)   Wound Image   11/28/22 1134   Wound Description BRIDGETTE 11/30/22 1600   Pressure Injury Stage U 11/28/22 1134   Sybil-wound Assessment Scar Tissue 11/30/22 1600   Wound Length (cm) 1 6 cm 11/28/22 1134   Wound Width (cm) 2 cm 11/28/22 1134   Wound Depth (cm) 0 5 cm 11/28/22 1134   Wound Surface Area (cm^2) 3 2 cm^2 11/28/22 1134   Wound Volume (cm^3) 1 6 cm^3 11/28/22 1134   Calculated Wound Volume (cm^3) 1 6 cm^3 11/28/22 1134   Change in Wound Size % -119 18 11/28/22 1134   Tunneling 0 cm 11/28/22 1134   Undermining 0 11/28/22 1134   Drainage Amount Small 11/30/22 1600   Drainage Description Serosanguineous 11/30/22 1600   Treatments Cleansed; Other (Comment) 11/28/22 1134   Dressing Other (Comment) 11/30/22 1600   Wound packed? No 11/30/22 1600   Dressing Changed Changed 11/28/22 1134   Patient Tolerance Tolerated well 11/28/22 1134       Wound 10/26/22 Other (comment) Hand Posterior;Right (Active)   Wound Description Other (Comment) 11/30/22 1600   Dressing Open to air; Other (Comment) 11/30/22 1600       Wound 11/28/22 Buttocks Right (Active)   Wound Image   11/28/22 1121   Wound Description BRIDGETTE 11/30/22 1600   Pressure Injury Stage U 11/28/22 1121   Sybil-wound Assessment Hyperpigmented 11/30/22 1600   Wound Length (cm) 3 8 cm 11/28/22 1121   Wound Width (cm) 2 cm 11/28/22 1121   Wound Surface Area (cm^2) 7 6 cm^2 11/28/22 1121   Drainage Amount None 11/30/22 1600   Treatments Cleansed 11/28/22 1121   Dressing Other (Comment) 11/30/22 1600   Wound packed? No 11/30/22 1600   Dressing Changed Changed 11/28/22 1121       Wound 11/28/22 Hand Left;Posterior (Active)   Wound Image   11/28/22 1141   Wound Description Beefy red;Granulation tissue;Pink; White 11/30/22 1600   Sybil-wound Assessment Scar Tissue 11/30/22 1600   Drainage Amount None 11/30/22 1600   Treatments Cleansed 11/28/22 1141   Dressing Dry dressing;Gauze 11/30/22 1600   Patient Tolerance Tolerated well 11/28/22 1141   Dressing Status Clean;Dry; Intact 11/30/22 1600       Wound 11/30/22 Ear Right (Active)   Wound Image   11/30/22 1531   Wound Description Beefy red;Dry 11/30/22 1531   Pressure Injury Stage 2 11/30/22 1531   Sybil-wound Assessment Dry; Intact; Other (Comment) 11/30/22 1531   Wound Length (cm) 1 8 cm 11/30/22 1531   Wound Width (cm) 1 1 cm 11/30/22 1531   Wound Depth (cm) 0 1 cm 11/30/22 1531   Wound Surface Area (cm^2) 1 98 cm^2 11/30/22 1531   Wound Volume (cm^3) 0 198 cm^3 11/30/22 1531   Calculated Wound Volume (cm^3) 0 2 cm^3 11/30/22 1531   Drainage Amount None 11/30/22 1531   Dressing Foam, Silicon (eg   Allevyn, etc) 11/30/22 1531   Patient Tolerance Tolerated well 11/30/22 1531     Reviewed plan of care with primary RN [facility]  Recommendations written as orders  Wound care team to follow weekly while admitted  Questions or concerns [address] [facility] Nurse    Kelsie LOOMISN, RN, [facility]

## 2022-12-02 PROBLEM — E87.8 ELECTROLYTE DISTURBANCE: Status: RESOLVED | Noted: 2022-11-29 | Resolved: 2022-12-02

## 2022-12-02 LAB
ANION GAP SERPL CALCULATED.3IONS-SCNC: 7 MMOL/L (ref 4–13)
BUN SERPL-MCNC: 7 MG/DL (ref 5–25)
CALCIUM SERPL-MCNC: 8.6 MG/DL (ref 8.4–10.2)
CHLORIDE SERPL-SCNC: 111 MMOL/L (ref 96–108)
CO2 SERPL-SCNC: 28 MMOL/L (ref 21–32)
CREAT SERPL-MCNC: 0.25 MG/DL (ref 0.6–1.3)
CREAT UR-MCNC: 59.8 MG/DL
ERYTHROCYTE [DISTWIDTH] IN BLOOD BY AUTOMATED COUNT: 19.9 % (ref 11.6–15.1)
GFR SERPL CREATININE-BSD FRML MDRD: 151 ML/MIN/1.73SQ M
GLUCOSE SERPL-MCNC: 103 MG/DL (ref 65–140)
GLUCOSE SERPL-MCNC: 110 MG/DL (ref 65–140)
GLUCOSE SERPL-MCNC: 129 MG/DL (ref 65–140)
GLUCOSE SERPL-MCNC: 207 MG/DL (ref 65–140)
GLUCOSE SERPL-MCNC: 210 MG/DL (ref 65–140)
HCT VFR BLD AUTO: 29 % (ref 36.5–49.3)
HGB BLD-MCNC: 9 G/DL (ref 12–17)
MAGNESIUM SERPL-MCNC: 2.2 MG/DL (ref 1.9–2.7)
MCH RBC QN AUTO: 25.7 PG (ref 26.8–34.3)
MCHC RBC AUTO-ENTMCNC: 31 G/DL (ref 31.4–37.4)
MCV RBC AUTO: 83 FL (ref 82–98)
OSMOLALITY UR: 503 MMOL/KG
PHOSPHATE SERPL-MCNC: 2.7 MG/DL (ref 2.3–4.1)
PLATELET # BLD AUTO: 496 THOUSANDS/UL (ref 149–390)
PMV BLD AUTO: 9.2 FL (ref 8.9–12.7)
POTASSIUM SERPL-SCNC: 4.2 MMOL/L (ref 3.5–5.3)
RBC # BLD AUTO: 3.5 MILLION/UL (ref 3.88–5.62)
SODIUM 24H UR-SCNC: 62 MOL/L
SODIUM SERPL-SCNC: 146 MMOL/L (ref 135–147)
WBC # BLD AUTO: 16.75 THOUSAND/UL (ref 4.31–10.16)

## 2022-12-02 RX ORDER — DEXTROSE MONOHYDRATE 50 MG/ML
100 INJECTION, SOLUTION INTRAVENOUS CONTINUOUS
Status: DISPENSED | OUTPATIENT
Start: 2022-12-02 | End: 2022-12-02

## 2022-12-02 RX ORDER — CEFAZOLIN SODIUM 2 G/50ML
2000 SOLUTION INTRAVENOUS EVERY 8 HOURS
Status: DISCONTINUED | OUTPATIENT
Start: 2022-12-03 | End: 2022-12-12

## 2022-12-02 RX ORDER — CEFEPIME HYDROCHLORIDE 2 G/50ML
2000 INJECTION, SOLUTION INTRAVENOUS EVERY 8 HOURS
Status: COMPLETED | OUTPATIENT
Start: 2022-12-02 | End: 2022-12-02

## 2022-12-02 RX ADMIN — HYDROMORPHONE HYDROCHLORIDE 1.5 MG/HR: 10 INJECTION, SOLUTION INTRAMUSCULAR; INTRAVENOUS; SUBCUTANEOUS at 09:14

## 2022-12-02 RX ADMIN — Medication 20 MG: at 20:29

## 2022-12-02 RX ADMIN — CEFEPIME HYDROCHLORIDE 2000 MG: 2 INJECTION, SOLUTION INTRAVENOUS at 12:20

## 2022-12-02 RX ADMIN — DEXMEDETOMIDINE HYDROCHLORIDE 0.6 MCG/KG/HR: 400 INJECTION INTRAVENOUS at 21:59

## 2022-12-02 RX ADMIN — INSULIN LISPRO 1 UNITS: 100 INJECTION, SOLUTION INTRAVENOUS; SUBCUTANEOUS at 17:56

## 2022-12-02 RX ADMIN — DEXTROSE 100 ML/HR: 5 SOLUTION INTRAVENOUS at 09:27

## 2022-12-02 RX ADMIN — CEFEPIME HYDROCHLORIDE 2000 MG: 2 INJECTION, SOLUTION INTRAVENOUS at 06:00

## 2022-12-02 RX ADMIN — POTASSIUM CHLORIDE 40 MEQ: 29.8 INJECTION, SOLUTION INTRAVENOUS at 00:29

## 2022-12-02 RX ADMIN — NOREPINEPHRINE BITARTRATE 1 MCG/MIN: 1 INJECTION, SOLUTION INTRAVENOUS at 17:53

## 2022-12-02 RX ADMIN — CEFEPIME HYDROCHLORIDE 2000 MG: 2 INJECTION, SOLUTION INTRAVENOUS at 20:29

## 2022-12-02 RX ADMIN — HEPARIN SODIUM 5000 UNITS: 5000 INJECTION INTRAVENOUS; SUBCUTANEOUS at 06:04

## 2022-12-02 RX ADMIN — CHLORHEXIDINE GLUCONATE 0.12% ORAL RINSE 15 ML: 1.2 LIQUID ORAL at 20:29

## 2022-12-02 RX ADMIN — INSULIN LISPRO 1 UNITS: 100 INJECTION, SOLUTION INTRAVENOUS; SUBCUTANEOUS at 12:22

## 2022-12-02 RX ADMIN — DEXMEDETOMIDINE HYDROCHLORIDE 0.6 MCG/KG/HR: 400 INJECTION INTRAVENOUS at 13:07

## 2022-12-02 RX ADMIN — COLLAGENASE SANTYL: 250 OINTMENT TOPICAL at 08:00

## 2022-12-02 RX ADMIN — HEPARIN SODIUM 5000 UNITS: 5000 INJECTION INTRAVENOUS; SUBCUTANEOUS at 13:07

## 2022-12-02 RX ADMIN — Medication 20 MG: at 08:00

## 2022-12-02 RX ADMIN — CHLORHEXIDINE GLUCONATE 0.12% ORAL RINSE 15 ML: 1.2 LIQUID ORAL at 08:00

## 2022-12-02 RX ADMIN — HEPARIN SODIUM 5000 UNITS: 5000 INJECTION INTRAVENOUS; SUBCUTANEOUS at 21:59

## 2022-12-02 NOTE — ASSESSMENT & PLAN NOTE
· With witnessed aspiration event during acute decompensation in setting of GIB  · Suspect aspiration pneumonia vs pneumonitis  · Emergently intubated for airway protection/respiratory failure  · Continue Cefepime day 7/7 per ID   · Aggressive pulmonary hygiene

## 2022-12-02 NOTE — PROGRESS NOTES
[place] 47  Progress Note Eloisa Nuñez 1955, 74 y o  male MRN: [id number]  Unit/Bed#: ICU 05-01 Encounter: [id number]  Primary Care Provider: Sherlyn Slacedo MD   Date and time admitted to hospital: 11/25/2022  2:30 PM    * Shock [facility])  Assessment & Plan  · Likely multifactorial in the setting of sepsis, recent ABLA, and sedation   · Multiple sources for sepsis including sacral wound, aspiration PNA, and MSSA bacteremia   · Continue levophed, titrate for goal MAP>65    · Continue IV Cefepime day 7/7, then transition to Cefazolin per ID  · Follow up on repeat BC, currently NGTD  · Monitor fever and WBC curve  · Trend procalcitonin     Acute respiratory failure with hypoxia [facility])  Assessment & Plan  · 11/28 Intubated emergently on M/S floor for respiratory failure/distress and airway protection in setting of GIB/shock   · CXR: Right base infiltrate; question aspiration    · ETT exchanged over bougie as cuff was not maintaining air - now volumes improved  · Likely in the setting of aspiration pneumonia, now concern for volume overload   · Continue mechanical ventilation ACVC 16/400/40/8, titrate FiO2 and PEEP for goal SpO2>90  · Continue abx as above  · Consider diuresis if able to be maintained off of vasopressors  · Continue precedex and dilaudid for sedation and pain management, goal RASS -1   · Frequent suctioning, turn/repo for pulmonary hygiene     GIB (gastrointestinal bleeding)  Assessment & Plan  · With acute onset of large amount of projectile coffee-ground emesis and output from PEG tube   · Received 3 U PRBC, 3 FFP   · S/p EGD with clip placed to ulceration in the stomach   · Continue Protonix BID IV  · Advance TF to goal     Acute blood loss anemia (ABLA)  Assessment & Plan  · Acute on chronic anemia in the setting of acute GIB   · Received 3 U PRBC, 3 FFP throughout admission   · Continue to trend Hgb, transfuse for Hgb< 7 or signs of active bleeding   · Monitor hemodynamics     Aspiration pneumonia ([facility] [address] )  Assessment & Plan  · With witnessed aspiration event during acute decompensation in setting of GIB  · Suspect aspiration pneumonia vs pneumonitis  · Emergently intubated for airway protection/respiratory failure  · Continue Cefepime day 7/7 per ID   · Aggressive pulmonary hygiene    MSSA bacteremia  Assessment & Plan  · [facility] 11/25 + MSSA, repeat BC NGTD  · Continue Cefepime day 7/7, transition to IV cefazolin to complete course per ID   · ID following    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 · Marrufo exchanged on arrival to ICU     Developmental disability  Assessment & Plan  · Nonverbal at baseline w/bilatereral UE/LE contractures  · Resides in group home     Pressure injury of left heel, stage 4 (HCC)  Assessment & Plan  · Podiatry consult, appreciate recommendations   · Local wound care  · Wound consult, appreciate recommendations       ----------------------------------------------------------------------------------------  HPI/24hr events: Transitioned from propofol to precedex  Low dose levo throughout the day, titrated off overnight  No other acute events       Patient appropriate for transfer out of the ICU today?: No  Disposition: Continue Critical Care   Code Status: Level 1 - Full Code  ---------------------------------------------------------------------------------------  SUBJECTIVE  BRIDGETTE, intubated    Review of Systems  Review of systems was unable to be performed secondary to intubation  ---------------------------------------------------------------------------------------  OBJECTIVE    Vitals   Vitals:    22 2200 22 2300 22 0000 22 0100   BP:       Pulse: 75 70 67 67   Resp: 16 16 16 16   Temp: 98 6 °F (37 °C) 98 6 °F (37 °C) 98 4 °F (36 9 °C) 98 1 °F (36 7 °C)   TempSrc: Probe Probe Probe Probe   SpO2: 99% 97% 98% 98%   Weight:       Height:         Temp (24hrs), Av 7 °F (37 6 °C), Min:98 1 °F (36 7 °C), Max:101 1 °F (38 4 °C)  Current: Temperature: 98 1 °F (36 7 °C)  Arterial Line BP: 102/47  Arterial Line MAP (mmHg): 69 mmHg    Respiratory:  SpO2: SpO2: 98 %, SpO2 Device: O2 Device: Ventilator       Invasive/non-invasive ventilation settings   Respiratory    Lab Data (Last 4 hours)    None         O2/Vent Data (Last 4 hours)    None                Physical Exam  Constitutional:       General: He is not in acute distress  Appearance: He is ill-appearing  Interventions: He is sedated and intubated  HENT:      Head: Normocephalic and atraumatic  Mouth/Throat:      Mouth: Mucous membranes are moist    Eyes:      Pupils: Pupils are equal, round, and reactive to light  Cardiovascular:      Rate and Rhythm: Normal rate and regular rhythm  Pulses: Normal pulses  Heart sounds: Normal heart sounds  No murmur heard  No friction rub  No gallop  Pulmonary:      Effort: Pulmonary effort is normal  No respiratory distress  He is intubated  Breath sounds: Rhonchi present  No wheezing or rales  Abdominal:      General: Bowel sounds are normal  There is no distension  Palpations: Abdomen is soft  Tenderness: There is no abdominal tenderness  Musculoskeletal:         General: Swelling present  Normal range of motion  Cervical back: Neck supple  Right lower leg: Edema present  Left lower leg: Edema present  Skin:     General: Skin is warm and dry  Capillary Refill: Capillary refill takes less than 2 seconds  Comments: Multiple wounds b/l LE, blisters UE    Neurological:      Comments: Intubated and sedated, does not open eyes to voice  Pupils equal and reactive, + cough and gag, flexes to painful stimuli  Laboratory and Diagnostics:  Results from last 7 days   Lab Units 12/01/22  1627 12/01/22  0611 12/01/22  0026 11/30/22  1753 11/30/22  1138 11/30/22  3422 11/30/22  0343 11/29/22  1703 11/29/22  0939 11/28/22  0439 11/26/22  0432 11/25/22  1510   WBC Thousand/uL  --  17 72*  --   --   --  27 86*  --   --  27 40* 17 52* 16 67* 21 92*   HEMOGLOBIN g/dL 8 6* 7 6* 7 8* 8 3* 7 9* 7 5* 6 8*   < > 5 3* 7 8* 8 2* 9 1*   HEMATOCRIT %  --  23 9*  --   --   --  24 3*  --   --  19 0* 27 7* 29 0* 32 8*   PLATELETS Thousands/uL  --  398*  --   --   --  387  --   --  568* 731* 498* 704*   NEUTROS PCT %  --   --   --   --   --  84*  --   --   --   --   --  80*   BANDS PCT %  --   --   --   --   --   --   --   --  9*  --   --   --    MONOS PCT %  --   --   --   --   --  5  --   --   --   --   --  9   MONO PCT %  --   --   --   --   --   --   --   --  2*  --   --   --     < > = values in this interval not displayed       Results from last 7 days   Lab Units 12/01/22  1627 12/01/22  7361 11/30/22  2730 11/29/22  1703 11/29/22  0939 11/28/22  1821 11/28/22  0439 11/26/22  0432 11/25/22  1550   SODIUM mmol/L 144 133* 137 146 147 147 150* 145 143   POTASSIUM mmol/L 3 1* 2 5* 3 4* 3 4* 3 6 3 7 3 2* 3 2* 3 5   CHLORIDE mmol/L 107 97 102 112* 115* 115* 118* 109* 103   CO2 mmol/L 29 28 28 28 25 23 23 27 31   ANION GAP mmol/L 8 8 7 6 7 9 9 9 9   BUN mg/dL 6 5 5 10 17 8 9 8 13   CREATININE mg/dL 0 29* 0 26* 0 23* 0 24* 0 28* 0 29* 0 30* 0 29* 0 42*   CALCIUM mg/dL 8 7 7 8* 7 9* 7 5* 7 1* 8 9 9 2 9 0 9 0   GLUCOSE RANDOM mg/dL 121 107 100 97 130 109 122 93 113   ALT U/L  --   --  19 22 27  --   --  19 25   AST U/L  --   --  20 24 32  --   --  18 26   ALK PHOS U/L  --   --  101 87 85  --   --  119* 135*   ALBUMIN g/dL  --   --  2 1* 2 0* 1 9*  --   --  2 6* 2 9*   TOTAL BILIRUBIN mg/dL  --   --  0 56 1 13* 0 18*  --   --  0 25 0 26     Results from last 7 days   Lab Units 12/01/22  1627 12/01/22  0611 11/30/22  0642 11/29/22  1703 11/29/22  0939 11/26/22  0432   MAGNESIUM mg/dL 2 4 1 7* 1 9 2 4 1 8* 2 0   PHOSPHORUS mg/dL  --  3 4 3 8 2 5 3 5  --       Results from last 7 days   Lab Units 11/29/22  0939 11/25/22  1550   INR  1 46* 1 18   PTT seconds  --  36          Results from last 7 days   Lab Units 11/29/22  0939 11/25/22  1550   LACTIC ACID mmol/L 1 2 1 9     ABG:  Results from last 7 days   Lab Units 11/29/22  1711 11/29/22  1000   PH ART  7 415 7 414   PCO2 ART mm Hg 44 1* 40 8   PO2 ART mm Hg 83 0 100 2   HCO3 ART mmol/L 27 6 25 5   BASE EXC ART mmol/L 2 8 0 9   ABG SOURCE   --  Line, Arterial     VBG:  Results from last 7 days   Lab Units 11/29/22  1000   ABG SOURCE  Line, Arterial     Results from last 7 days   Lab Units 11/30/22  0642 11/29/22  1703 11/25/22  1550   PROCALCITONIN ng/ml 3 13* 3 30* 0 16       Micro  Results from last 7 days   Lab Units 11/30/22  0446 11/29/22  2041 11/29/22  0949 11/28/22  1234 11/28/22  1132 11/28/22  0440 11/26/22  0913 11/25/22  1550 11/25/22  1536 11/25/22  1530   BLOOD CULTURE   --  No Growth at 24 hrs  No Growth at 48 hrs  --  No Growth at 72 hrs  No Growth at 72 hrs   --  Staphylococcus aureus*  Staphylococcus coagulase negative* No Growth After 5 Days    --    SPUTUM CULTURE  4+ Growth of Candida tropicalis*  --   --   --   --   --   --   --   --   --    GRAM STAIN RESULT  2+ Polys*  1+ Budding yeast*  --   --   --   --   --  4+ Gram negative rods*  2+ Gram positive cocci in pairs and chains*  No polys seen*  2+ Gram positive cocci in pairs and chains*  2+ Gram negative rods* No polys seen* Gram positive cocci in clusters*  --   --    URINE CULTURE   --   --   --   --   --   --   --   --   --  >100,000 cfu/ml   WOUND CULTURE   --   --   --   --   --   --  2+ Growth of Pseudomonas aeruginosa*  2+ Growth of Proteus mirabilis*  2+ Growth of Staphylococcus aureus*  3+ Growth of  2+ Growth of Citrobacter koseri*  2+ Growth of Proteus mirabilis*  2+ Growth of  --   --   --    MRSA CULTURE ONLY   --   --   --  Methicillin Resistant Staphylococcus aureus isolated*  This patient requires contact isolation precautions per [place] law  Contact precautions are not required in [place] for nasal surveillance cultures  --   --   --   --   --   --        EKG: NSR rate 60  Imaging: I have personally reviewed pertinent reports  and I have personally reviewed pertinent films in PACS    Intake and Output  I/O       11/30 0701 12/01 0700 12/01 0701 12/02 0700    I V  (mL/kg) 2894 1 (45 2) 348 6 (5 4)    Blood 364 6     NG/     IV Piggyback 1011 1 550    Feedings 120     Total Intake(mL/kg) 4989 8 (78) 898 6 (14)    Urine (mL/kg/hr) 4475 (2 9) 3200 (2 1)    Stool 50     Total Output 4525 3200    Net +464 8 -2301 4                Height and Weights   Height: 5' 5" (165 1 cm)  IBW (Ideal Body Weight): 61 5 kg  Body mass index is 23 48 kg/m²  Weight (last 2 days)     Date/Time Weight    12/01/22 0600 64 (141 09)    11/30/22 0600 62 2 (137 13)            Nutrition       Diet Orders   (From admission, onward)             Start     Ordered    12/01/22 1025  Diet Enteral/Parenteral; Tube Feeding No Oral Diet; Jevity 1 2 Zeyad; Continuous; 50; 250;  Water; Every 6 hours  Diet effective now        Comments: Advance by 20 ml/hr every four hours to goal of 50   References:    Nutrtion Support Algorithm Enteral vs  Parenteral   Question Answer Comment   Diet Type Enteral/Parenteral    Enteral/Parenteral Tube Feeding No Oral Diet    Tube Feeding Formula: Jevity 1 2 Zeyad    Bolus/Cyclic/Continuous Continuous    Tube Feeding Goal Rate (mL/hr): 50    Tube Feeding flush (mL): 250    Water Flush type: Water    Water flush frequency: Every 6 hours    RD to adjust diet per protocol?  No        12/01/22 1027                  Active Medications  Scheduled Meds:  Current Facility-Administered Medications   Medication Dose Route Frequency Provider Last Rate   • acetaminophen  650 mg Per G Tube Q4H PRN Mehnaz Campbell MD     • cefepime  2,000 mg Intravenous Q8H Mehnaz Campbell MD Stopped (12/01/22 2038)   • chlorhexidine  15 mL Mouth/Throat Q12H AVANI Antoine     • collagenase   Topical Daily Mehnaz Campbell MD     • dexmedetomidine  0 1-1 2 mcg/kg/hr Intravenous Titrated AVANI Mahmood 0 6 mcg/kg/hr (12/02/22 0001)   • heparin (porcine)  5,000 Units Subcutaneous Q8H [facility] AVANI Mahmood     • HYDROmorphone  1 5 mg/hr Intravenous Continuous AVANI Mahmood 1 5 mg/hr (12/02/22 0001)   • HYDROmorphone  0 5 mg Intravenous Q2H PRN AVANI Wilson     • insulin lispro  1-5 Units Subcutaneous Q6H [facility] AVANI Ladd     • norepinephrine  1-30 mcg/min Intravenous Titrated Mehnaz Campbell MD Stopped (12/01/22 2100)   • omeprazole (PRILOSEC) suspension 2 mg/mL  20 mg Oral BID Reggie G Sebastien, DO     • ondansetron  4 mg Intravenous Q6H PRN Mehnaz Campbell MD     • potassium chloride  40 mEq Intravenous Q4H AVANI Chicas 40 mEq (12/02/22 0029)     Continuous Infusions:  dexmedetomidine, 0 1-1 2 mcg/kg/hr, Last Rate: 0 6 mcg/kg/hr (12/02/22 0001)  HYDROmorphone, 1 5 mg/hr, Last Rate: 1 5 mg/hr (12/02/22 0001)  norepinephrine, 1-30 mcg/min, Last Rate: Stopped (12/01/22 2100)      PRN Meds:   acetaminophen, 650 mg, Q4H PRN  HYDROmorphone, 0 5 mg, Q2H PRN  ondansetron, 4 mg, Q6H PRN        Invasive Devices Review  Invasive Devices     Central Venous Catheter Line  Duration           CVC Central Lines 11/29/22 Triple Internal jugular 2 days Peripheral Intravenous Line  Duration           Peripheral IV 11/28/22 Left;Ventral (anterior) Forearm 3 days          Arterial Line  Duration           Arterial Line 11/29/22 Left Femoral 2 days    Arterial Line 11/29/22 Radial 2 days          Drain  Duration           Colostomy -- days    Urethral Catheter Temperature probe 2 days          Airway  Duration           ETT  Cuffed; Hi-Lo 7 5 mm 2 days    ETT  Cuffed; Hi-Lo 7 5 mm 2 days                ---------------------------------------------------------------------------------------  Advance Directive and Living Will:      Power of :    POLST:    ---------------------------------------------------------------------------------------  Care Time Delivered:   Upon my evaluation, this patient had a high probability of imminent or life-threatening deterioration due to resp failure, shock, which required my direct attention, intervention, and personal management  I have personally provided 33 minutes (0400 to [phone]) of critical care time, exclusive of procedures, teaching, family meetings, and any prior time recorded by providers other than myself  AVANI Valencia      Portions of the record may have been created with voice recognition software  Occasional wrong word or "sound a like" substitutions may have occurred due to the inherent limitations of voice recognition software    Read the chart carefully and recognize, using context, where substitutions have occurred

## 2022-12-02 NOTE — PROGRESS NOTES
Progress Note - [facility] Infectious Disease   Familia Dash 74 y o  male MRN: [id number]  Unit/Bed#: ICU 05-01 Encounter: [id number]      IMPRESSION & RECOMMENDATIONS:   1  Sepsis, present on admission, evidenced by tachycardia and leukocytosis  Patient febrile with though leukocytosis is improving   Lactic acid and PCT initially normal  1 of 2 admission bl cx positive   Suspect sepsis initially secondary to #2, #3, now complicated by #0   PFSWI x-ray limited though negative   UA with mixed contaminants   Remains intubated in ICU, sedated and on pressors in the setting of acute upper GI bleed and hemodynamic instability    -continue antibiotics as below  -monitor temperature and hemodynamics  -management per primary team   -recheck CBC and BMP in a m      2  Polymicrobial bacteremia  One of 2 admission blood cultures positive for Gram-positive cocci in clusters, culture positive for MSSA and CoNS  Documented that it was drawn from patients peripheral line and pt known to be difficult stick with hx of extensive contractures  Set drawn peripherally is negative to date  Suspect CoNs is contaminant, however MSSA likely due to #3  2D echo is normal  No known intravascular devices   Repeat blood cultures x4 sets are negative to date    -continue IV cefepime 2g q8h through today   -transition to IV ancef 2g q8 tomorrow to complete course of abx from first negative blood cx through 12/11/22  -continue to follow-up blood cultures and adjust antibiotics as needed     3  Chronic, now infected, stage IV decubitus ulcers   POA   Despite outpatient wound care, found to have significant deterioration in wounds over the last few months, particularly the right upper back and sacral wound with increasing wound depth and necrotic tissue burden with evidence of purulence and malodor on admission  Patient also with unstageable sacral/right buttock ulcer, unstageable right ischial decubitus ulcer, and unstageable left heel pressure ulcer  Status post bedside debridement on 11/26  CT C/A/P negative for deep seated abscesses  Wound cultures from upper back and sacrum are polymicrobial, positive for Citrobacter koseri, Proteus mirabilis, Pseudomonas aeruginosa, and Staph aureus  Per wound care team/rigoberto, wounds still with yellowish drainage and appear to be tracking deeper  Unfortunately wounds are unlikely to heal and are at risk for reinfection due to iron deficiency anemia, protein calorie malnutrition, contractures, inability to offload pressure and inability for closure     -continue to address [address]  Although no OM noted on CT, with worsening wounds it is likely that patient will develop chronic infection  If unable to cover/secure wounds especially of sacrum and upper right back, OM not treatable long term      -continue IV cefepime 2g q8h through today to complete 7 day course   -daily wound care  -ongoing goals of care discussion per primary team     4  Left lateral foot stage IV pressure ulcer with suspect OM  POA  Now with left lateral foot ulcer and left heel ulcer both probing to bone  XR shows absence of the 5th metatarsal head which likely represents chronic osteomyelitis  No evidence of overlying cellulitis or infection   -continue to address [address]  In the setting of chronic wound with exposed bone and no plan/indication for surgical intervention, patients osteomyelitis is not treatable long-term  Likely to continue to have progression of wound despite local wound care given extensive contractures and inability to offload pressure  He remains at risk for recurrent infection, bacteremia, sepsis  -continue local wound care and offloading pressure      5  Functional quadriplegia, developmental disability   Patient nonverbal and bed-bound at baseline on tube feeds, with chronic indwelling Marrufo catheter and colostomy   Unfortunately due to contractures, very difficult to reposition patient and offload pressure   -supportive measures per primary team     6  Antibiotic Allergy   Noted to have amoxicillin and Augmentin allergy without documented reaction   Per Care everywhere, has tolerated cefepime and ceftriaxone during prior admissions  -monitor for adverse drug reactions     7  Acute upper GI bleed  Decompensated  and transferred to ICU  Patient remains on vent, pressors, and sedated  S/p EGD which showed ulcerated area in stomach s/p clip   -monitor hgb and transfuse PRN      8  Abnormal CT chest  Likely due to aspiration from acute upper GI bleed    -no additional abx for this problem     Antibiotics:  D7 cefepime    I have discussed the above management plan in detail with patient  I have discussed the above management plan in detail with patient's RN, and the primary service, ICU AP  Subjective:  Patient intubated  No overnight events  Remains sedated on vent  Still on levophed  Objective:  Vitals:  Temp:  [97 2 °F (36 2 °C)-100 8 °F (38 2 °C)] 97 5 °F (36 4 °C)  HR:  [] 67  Resp:  [16-30] 16  BP: (110-114)/(55-56) 111/55  SpO2:  [94 %-100 %] 99 %  Temp (24hrs), Av 7 °F (37 1 °C), Min:97 2 °F (36 2 °C), Max:100 8 °F (38 2 °C)  Current: Temperature: 97 5 °F (36 4 °C)    PHYSICAL EXAM:  General Appearance:  Appearing chronically ill and malnourished, nontoxic, and in no distress   HEENT: Normocephalic, without obvious abnormality, atraumatic  Conjunctiva pink and sclera anicteric  Oropharynx moist without lesions  ETT in place  Lungs:   Scattered rhonchi on auscultation bilaterally, respirations unlabored intubated on vent    Heart:  RRR; no murmur, rub or gallop   Abdomen:   Soft, non-tender, non-distended, positive bowel sounds, PEG in place with colostomy     Extremities: No cyanosis, clubbing or edema   Musculoskeletal: Quadriplegic  Extensive contractures of x4 extremities  : No CVA or suprapubic tenderness  Marrufo patent  Skin: No rashes or lesions   Multiple wounds noted to sacrum, upper back, right ear, left foot, left hand  Peripheral IV and CVC IJ intact without evidence of erythema, warmth, or exudate  Left groin A-line  LABS, IMAGING, & OTHER STUDIES:  Lab Results:  I have personally reviewed pertinent labs  Results from last 7 days   Lab Units 12/02/22  0620 12/01/22  1627 12/01/22  0611 11/30/22  1138 11/30/22  0642   WBC Thousand/uL 16 75*  --  17 72*  --  27 86*   HEMOGLOBIN g/dL 9 0* 8 6* 7 6*   < > 7 5*   PLATELETS Thousands/uL 496*  --  398*  --  387    < > = values in this interval not displayed  Results from last 7 days   Lab Units 12/02/22  0620 12/01/22  1627 12/01/22  0611 11/30/22  0642 11/29/22  1703 11/29/22  0939   SODIUM mmol/L 146 144 133* 137 146 147   POTASSIUM mmol/L 4 2 3 1* 2 5* 3 4* 3 4* 3 6   CHLORIDE mmol/L 111* 107 97 102 112* 115*   CO2 mmol/L 28 29 28 28 28 25   BUN mg/dL 7 6 5 5 10 17   CREATININE mg/dL 0 25* 0 29* 0 26* 0 23* 0 24* 0 28*   EGFR ml/min/1 73sq m 151 142 148 156 154 145   CALCIUM mg/dL 8 6 8 7 7 8* 7 9* 7 5* 7 1*   AST U/L  --   --   --  20 24 32   ALT U/L  --   --   --  19 22 27   ALK PHOS U/L  --   --   --  101 87 85     Results from last 7 days   Lab Units 11/30/22  0446 11/29/22  2041 11/29/22  0949 11/28/22  1234 11/28/22  1132 11/28/22  0440 11/26/22  0913 11/25/22  1550 11/25/22  1536 11/25/22  1530   BLOOD CULTURE   --  No Growth at 48 hrs  No Growth at 48 hrs  --  No Growth at 72 hrs  No Growth After 4 Days  --  Staphylococcus aureus*  Staphylococcus coagulase negative* No Growth After 5 Days    --    SPUTUM CULTURE  4+ Growth of Candida tropicalis*  --   --   --   --   --   --   --   --   --    GRAM STAIN RESULT  2+ Polys*  1+ Budding yeast*  --   --   --   --   --  4+ Gram negative rods*  2+ Gram positive cocci in pairs and chains*  No polys seen*  2+ Gram positive cocci in pairs and chains*  2+ Gram negative rods*  No polys seen* Gram positive cocci in clusters*  --   --    URINE CULTURE   --   --   --   --   --   -- --   --   --  >100,000 cfu/ml   WOUND CULTURE   --   --   --   --   --   --  2+ Growth of Pseudomonas aeruginosa*  2+ Growth of Proteus mirabilis*  2+ Growth of Staphylococcus aureus*  3+ Growth of  2+ Growth of Citrobacter koseri*  2+ Growth of Proteus mirabilis*  2+ Growth of  --   --   --    MRSA CULTURE ONLY   --   --   --  Methicillin Resistant Staphylococcus aureus isolated*  This patient requires contact isolation precautions per [place] law  Contact precautions are not required in [place] for nasal surveillance cultures    --   --   --   --   --   --      Results from last 7 days   Lab Units 11/30/22  0642 11/29/22  1703 11/25/22  1550   PROCALCITONIN ng/ml 3 13* 3 30* 0 16

## 2022-12-02 NOTE — PLAN OF CARE
Problem: Nutrition/Hydration-ADULT  Goal: Nutrient/Hydration intake appropriate for improving, restoring or maintaining nutritional needs  Description: Monitor and assess patient's nutrition/hydration status for malnutrition  Collaborate with interdisciplinary team and initiate plan and interventions as ordered  Monitor patient's weight and dietary intake as ordered or per policy  Utilize nutrition screening tool and intervene as necessary  Determine patient's food preferences and provide high-protein, high-caloric foods as appropriate       INTERVENTIONS:  - Monitor oral intake, urinary output, labs, and treatment plans  - Assess nutrition and hydration status and recommend course of action  - Evaluate amount of meals eaten  - Assist patient with eating if necessary   - Allow adequate time for meals  - Recommend/ encourage appropriate diets, oral nutritional supplements, and vitamin/mineral supplements  - Order, calculate, and assess calorie counts as needed  - Recommend, monitor, and adjust tube feedings and TPN/PPN based on assessed needs  - Assess need for intravenous fluids  - Provide specific nutrition/hydration education as appropriate  - Include patient/family/caregiver in decisions related to nutrition  Outcome: Progressing     Problem: INFECTION - ADULT  Goal: Absence or prevention of progression during hospitalization  Description: INTERVENTIONS:  - Assess and monitor for signs and symptoms of infection  - Monitor lab/diagnostic results  - Monitor all insertion sites, i e  indwelling lines, tubes, and drains  - Monitor endotracheal if appropriate and nasal secretions for changes in amount and color  - [place] appropriate cooling/warming therapies per order  - Administer medications as ordered  - Instruct and encourage patient and family to use good hand hygiene technique  - Identify and instruct in appropriate isolation precautions for identified infection/condition  Outcome: Progressing  Goal: Absence of fever/infection during neutropenic period  Description: INTERVENTIONS:  - Monitor WBC    Outcome: Progressing

## 2022-12-02 NOTE — ASSESSMENT & PLAN NOTE
· Podiatry consult, appreciate recommendations   · Local wound care  · Wound consult, appreciate recommendations

## 2022-12-02 NOTE — ASSESSMENT & PLAN NOTE
· Acute on chronic anemia in the setting of acute GIB   · Received 3 U PRBC, 3 FFP throughout admission   · Continue to trend Hgb, transfuse for Hgb< 7 or signs of active bleeding   · Monitor hemodynamics

## 2022-12-02 NOTE — NUTRITION
12/02/22 1214   Feeding Route   Tube Feeding PEG tube   Formula rate Jevity 1 2 at 50 mL/hr continuous   Free Water From  mL  (mL from 647 mL Jevity 1 2 received in 24 hours)   Total Free Water  1964 mL  (from TF, flushes, and IV in 24 hours)   Total EN Volume 647 mL  (mL TF received in 24 hours)   Total Kcal intake 776   Protein Intake (g) 35 g   Recommendations/Interventions   Interventions/Recommendations Adjust EN/ PN;Supplement initiate; Tube feeding recs provided   Recommendations to Provider Recommend adjusting TF regimen to Jevity 1 2 at 55 mL/hr with Prosource No Carb Liquid Protein BID to provide 1704 kcal, 103 g protein, 1065 mL free water in 1320 mL total TF volume  Estimated needs: 1620 - 1890 kcals, 108 - 135 g protein daily  Patient Nutrition Goals   Goal Adequate hydration;Meet EN/PN needs   Goal Status Extended; Not met   Nutrition Complexity Risk   Nutrition complexity level High risk

## 2022-12-02 NOTE — RESPIRATORY THERAPY NOTE
RT Ventilator Management Note  Tiffany Patterson 74 y o  male MRN: [id number]  Unit/Bed#: ICU 05-01 Encounter: [id number]      Daily Screen         12/1/2022  0808 12/2/2022  0814          Patient safety screen outcome[de-identified] Failed Failed      Not Ready for Weaning due to[de-identified] Underline problem not resolved Underline problem not resolved                Physical Exam:   Assessment Type: Assess only  General Appearance: Sedated  Respiratory Pattern: Assisted  Chest Assessment: Chest expansion symmetrical  Bilateral Breath Sounds: Clear  Suction: ET Tube      Resp Comments: (P) Pt stable on current settings  No weaning performed this shift  Pt has strong cough when suctioned  Plan is to cont  on current settings overnight

## 2022-12-02 NOTE — ASSESSMENT & PLAN NOTE
· Unclear if this is due to urinary retention/neurogenic bladder?   · Marrufo exchanged on arrival to ICU

## 2022-12-02 NOTE — ASSESSMENT & PLAN NOTE
· With acute onset of large amount of projectile coffee-ground emesis and output from PEG tube   · Received 3 U PRBC, 3 FFP   · S/p EGD with clip placed to ulceration in the stomach   · Continue Protonix BID IV  · Advance TF to goal

## 2022-12-02 NOTE — ASSESSMENT & PLAN NOTE
· 11/28 Intubated emergently on M/S floor for respiratory failure/distress and airway protection in setting of GIB/shock   · CXR: Right base infiltrate; question aspiration    · ETT exchanged over bougie as cuff was not maintaining air - now volumes improved  · Likely in the setting of aspiration pneumonia, now concern for volume overload   · Continue mechanical ventilation ACVC 16/400/40/8, titrate FiO2 and PEEP for goal SpO2>90  · Continue abx as above  · Consider diuresis if able to be maintained off of vasopressors  · Continue precedex and dilaudid for sedation and pain management, goal RASS -1   · Frequent suctioning, turn/repo for pulmonary hygiene

## 2022-12-02 NOTE — PROGRESS NOTES
NEPHROLOGY PROGRESS NOTE   Nuzhat Kaufman 74 y o  male MRN: [id number]  Unit/Bed#: ICU 05-01 Encounter: [id number]    Assessment/Plan:    73 yo nonverbal and developmentally delayed gentleman with chronic PEG tube, scattered skin wounds, pressure ulcers presented 11/29 from group home due to sepsis POA being treated for multifactorial shock, VDRF, ABLA d/t GIB s/p 3PRBC, 1FFP  Nephrology following along for electrolyte derangements and polyuria-plan outlined below    1  Polyuria  · Polyuric despite borderline hypernatremia with 3 7 L urine output in total yesterday  Concern for underlying DI picture-check urine osmolality now  2  Hypernatremia  · Increase free water flush to 250 mL q 4 hours and give 1 L D5W  (hyponatremia noted 12/1 at 6:00 a m -suspect this was an erroneous sample)  3  Hypokalemia-resolved  4  Hypomagnesemia-resolved  5  Multifactorial shock  · Remains vasopressor support  Management per critical care team, Infectious Disease, General surgery  6  Ventilator dependent respiratory failure  · Per Critical Care colleagues  7  ABLA, hemorrhagic shock in setting of GI bleed  · Management per Critical Care colleagues      ROS  Unable to obtain due to intubated and sedated      Historical Information   History reviewed  No pertinent past medical history  History reviewed  No pertinent surgical history  Social History   Social History     Substance and Sexual Activity   Alcohol Use Never     Social History     Substance and Sexual Activity   Drug Use Never     Social History     Tobacco Use   Smoking Status Never   Smokeless Tobacco Never       Family History:   History reviewed  No pertinent family history      Medications:  Pertinent medications were reviewed  Current Facility-Administered Medications   Medication Dose Route Frequency Provider Last Rate   • acetaminophen  650 mg Per G Tube Q4H PRN Michael Wang MD     • cefepime  2,000 mg Intravenous Lou Sandoval MD Stopped (12/02/22 0811)   • chlorhexidine  15 mL Mouth/Throat Q12H [facility] AVANI Wilson     • collagenase   Topical Daily Michael Wang MD     • dexmedetomidine  0 1-1 2 mcg/kg/hr Intravenous Titrated Diana AVANI Jeffries 0 6 mcg/kg/hr (12/02/22 1550)   • dextrose  100 mL/hr Intravenous Continuous [place] AVANI Lindo     • heparin (porcine)  5,000 Units Subcutaneous Q8H [facility] AVANI Mahmood     • HYDROmorphone  1 5 mg/hr Intravenous Continuous Tracy A SedYAHIR hodgeNP 1 5 mg/hr (12/02/22 8197)   • HYDROmorphone  0 5 mg Intravenous Q2H PRN AVANI Wilson     • insulin lispro  1-5 Units Subcutaneous Q6H AVANI Cardoso     • norepinephrine  1-30 mcg/min Intravenous Titrated Michael Wang MD 0 4 mcg/min (12/02/22 0803)   • omeprazole (PRILOSEC) suspension 2 mg/mL  20 mg Oral BID Reggie G DO Sebastien     • ondansetron  4 mg Intravenous Q6H PRN Michael Wang MD           Allergies   Allergen Reactions   • Albumen, Egg - Food Allergy Other (See Comments)   • Amoxicillin-Pot Clavulanate Other (See Comments)         Vitals:   /55   Pulse 67   Temp 97 5 °F (36 4 °C)   Resp 16   Ht 5' 5" (1 651 m)   Wt 61 4 kg (135 lb 5 8 oz)   SpO2 99%   BMI 22 53 kg/m²   Body mass index is 22 53 kg/m²    SpO2: 99 %,   SpO2 Activity: At Rest,   O2 Device: Ventilator      Intake/Output Summary (Last 24 hours) at 12/2/2022 0854  Last data filed at 12/2/2022 0800  Gross per 24 hour   Intake 1785 91 ml   Output 3525 ml   Net -1739 09 ml     Invasive Devices     Central Venous Catheter Line  Duration           CVC Central Lines 11/29/22 Triple Internal jugular 2 days          Peripheral Intravenous Line  Duration           Peripheral IV 11/28/22 Left;Ventral (anterior) Forearm 3 days          Arterial Line  Duration           Arterial Line 11/29/22 Radial 3 days    Arterial Line 11/29/22 Left Femoral 2 days          Drain  Duration           Colostomy -- days    Urethral Catheter Temperature probe 2 days          Airway  Duration           ETT  Cuffed; Hi-Lo 7 5 mm 3 days    ETT  Cuffed; Hi-Lo 7 5 mm 2 days                Physical Exam  General:  Intubated and sedated  Eyes: conjunctivae pink, anicteric sclerae  ENT: lips and mucous membranes dry, ET tube  Neck: supple, no JVD, no masses  Chest:  Diminished, mechanical breath sounds bilaterally, no crackles, ronchus or wheezings  CVS: S1 & S2, normal rate, regular rhythm  Abdomen: soft, non-tender, softly-distended, normoactive bowel sounds PEG tube  Extremities: no edema of both legs  Skin:  Multiple open areas to extremities  Neuro:  Intubated and sedated, GCS 4 T      Diagnostic Data:  Lab: I have personally reviewed pertinent lab results  ,   CBC:  Results from last 7 days   Lab Units 12/02/22  0620   WBC Thousand/uL 16 75*   HEMOGLOBIN g/dL 9 0*   HEMATOCRIT % 29 0*   PLATELETS Thousands/uL 496*      CMP:   Lab Results   Component Value Date    SODIUM 146 12/02/2022    K 4 2 12/02/2022     (H) 12/02/2022    CO2 28 12/02/2022    BUN 7 12/02/2022    CREATININE 0 25 (L) 12/02/2022    CALCIUM 8 6 12/02/2022    EGFR 151 12/02/2022   ,   PT/INR: No results found for: PT, INR,   Magnesium: No components found for: MAG,  Phosphorous:   Lab Results   Component Value Date    PHOS 2 7 12/02/2022       Microbiology:  @[email or web address],(urinecx:7)@        AVANI Barry    Portions of the record may have been created with voice recognition software  Occasional wrong word or "sound a like" substitutions may have occurred due to the inherent limitations of voice recognition software  Read the chart carefully and recognize, using context, where substitutions have occurred

## 2022-12-02 NOTE — ASSESSMENT & PLAN NOTE
· WC + pseudomonas aeruginosa, proteus mirabilis, and staph aureus   · Surgery following, appreciate recommendations, continue palliative debridement  · Wound care following, appreciate recommendations   · Continue IV antibiotics as noted above

## 2022-12-02 NOTE — ASSESSMENT & PLAN NOTE
· Noted during admission, now improving since restarting TF  · Likely in the setting of NPO/free water deficit   · Continue TF with FWF  · Trend Na

## 2022-12-02 NOTE — RESPIRATORY THERAPY NOTE
12/01/22 2130   Respiratory Assessment   Assessment Type Assess only   General Appearance Sleeping   Respiratory Pattern Assisted   Chest Assessment Chest expansion symmetrical   Bilateral Breath Sounds Coarse   Suction ET Tube   Resp Comments pt resting comfortably, no changes in settings remains on  16 +8 40%, Ett 7 5 [email or web address]  moved from L to Center, skin and strap intact  with 2 finger pass to back of head, pt was intubated for airway protection from resp failure, will cont to monitor   O2 Device vent   Vent Information   Vent ID [id number]   Vent type [place] C3   [place] C3/G5 Vent Mode (S)CMV   $ Pulse Oximetry Spot Check Charge Completed   SpO2 100 %   (S)CMV Settings   Resp Rate (BPM) 16 BPM   VT (mL) 400 mL   FIO2 (%) 40 %   PEEP (cmH2O) 8 cmH2O   I:E Ratio   (1:2 8)   Insp Time (%) 1 %   Flow Trigger (LPM) 5   Humidification Heater   Heater Temperature (Set) 98 6 °F (37 °C)   (S)CMV Actuals   Resp Rate (BPM) 16 BPM   VT (mL) 397   MV 6 2   MAP (cmH2O) 11 cmH2O   Peak Pressure (cmH2O) 24 cmH2O   I:E Ratio (Obs) 1:2 8   Insp Resistance 7   Heater Temperature (Obs) 98 6 °F (37 °C)   Static Compliance (mL/cmH20) 28 mL/cmH2O   Plateau Pressure (cm H2O) 20 3 cm H2O   (S)CMV Alarms   High Peak Pressure (cmH2O) 45   Low Pressure (cmH2O) 5 cm H2O   High Resp Rate (BPM) 40 BPM   Low Resp Rate (BPM) 8 BPM   High MV (L/min) 16 L/min   Low MV (L/min) 3 5 L/min   High VT (mL) 1000 mL   Low VT (mL) 300 mL   Apnea Time (s) 20 S   Maintenance   Alarm (pink) cable attached No   Resuscitation bag with peep valve at bedside Yes   Water bag changed No   Circuit changed No   IHI Ventilator Associated Pneumonia Bundle   Head of Bed Elevated HOB 30   ETT  Cuffed; Hi-Lo 7 5 mm   Placement Date/Time: 11/29/22 (c) 0745   Type: Cuffed; Hi-Lo  Tube Size: 7 5 mm  Location: Oral  Insertion attempts: 2  Placement Verification: Chest x-ray;End tidal CO2;Symmetrical chest wall movement  Secured at (cm): 22   Secured at (cm) 22 Measured from [address] (S)  Center   Repositioned (S)  Left to [address] [place] by [employer] tube guillaume  (skin and strap intact with two finger pass to back of head)   Site Condition Dry   Cuff Pressure (cm H2O) 22 cm H2O   HI-LO Suction  Continuous low suction   HI-LO Secretions Scant;Thin;Tan   HI-LO Intervention Patent   RT Ventilator Management Note  Alethea Gandhi 74 y o  male MRN: [id number]  Unit/Bed#: ICU 05-01 Encounter: [id number]      Daily Screen         12/1/2022  0305 12/1/2022  0808          Patient safety screen outcome[de-identified] Failed Failed      Not Ready for Weaning due to[de-identified] Underline problem not resolved Underline problem not resolved                Physical Exam:   Assessment Type: Assess only  General Appearance: Sleeping  Respiratory Pattern: Assisted  Chest Assessment: Chest expansion symmetrical  Bilateral Breath Sounds: Coarse  Suction: ET Tube  O2 Device: vent      Resp Comments: pt resting comfortably, no changes in settings remains on  16 +8 40%, Ett 7 5 [email or web address]  moved from L to Center, skin and strap intact  with 2 finger pass to back of head, pt was intubated for airway protection from resp failure, will cont to monitor

## 2022-12-02 NOTE — ASSESSMENT & PLAN NOTE
· BC 11/25 + MSSA, repeat BC NGTD  · Continue Cefepime day 7/7, transition to IV cefazolin to complete course per ID   · ID following

## 2022-12-02 NOTE — CASE MANAGEMENT
Case Management Discharge Planning Note    Patient name Chalino Harding  Location ICU 05/ICU  MRN [id number]  : 1955 Date 2022       Current Admission Date: 2022  Current Admission Diagnosis:Shock [facility])   Patient Active Problem List    Diagnosis Date Noted   • GIB (gastrointestinal bleeding) 2022   • Acute respiratory failure with hypoxia ([address] ) 2022   • Acute blood loss anemia (ABLA) 2022   • Aspiration pneumonia ([address] ) 2022   • Chronic indwelling Marrufo catheter 2022   • Hypernatremia 2022   • MSSA bacteremia 2022   • Hypokalemia 2022   • Pressure ulcer of ischium, right, stage III ([address] ) 2022   • Shock ([address] ) 2022   • Developmental disability 2022   • Pressure injury of sacral region, stage 4 ([address] ) 10/26/2022   • 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, stage 4 ([address] ) 10/26/2022   • Pressure ulcer of right lower back, stage 3 ([address] ) 10/26/2022   • Pressure ulcer of left foot, stage 4 ([address] ) 10/26/2022   • Pressure injury of right upper back, stage 4 ([address] ) 10/26/2022   • Open wound of right hand without foreign body 10/26/2022   • Pressure injury of left heel, stage 4 ([address] ) 10/26/2022      LOS (days): 7  Geometric Mean LOS (GMLOS) (days):   Days to GMLOS:     OBJECTIVE:  Risk of Unplanned Readmission Score: 18 14         Current admission status: Inpatient   Preferred Pharmacy: No Pharmacies Listed  Primary Care Provider: Owen Pa MD    Primary Insurance: [address] [facility]  Secondary Insurance:     DISCHARGE DETAILS:    Discharge planning discussed with[de-identified] [facility] Staff  Freedom of Choice: Yes  Comments - Freedom of Choice: CM continuing to follow for discharge planning  Pt remains intubated  Pt is a long term care resident at [facility]  He is bed bound at baseline and requires total care with ADLs  CM in continued contact with [facility] staff  CM to follow

## 2022-12-02 NOTE — ASSESSMENT & PLAN NOTE
· Likely multifactorial in the setting of sepsis, recent ABLA, and sedation   · Multiple sources for sepsis including sacral wound, aspiration PNA, and MSSA bacteremia   · Continue levophed, titrate for goal MAP>65    · Continue IV Cefepime day 7/7, then transition to Cefazolin per ID  · Follow up on repeat BC, currently NGTD  · Monitor fever and WBC curve  · Trend procalcitonin

## 2022-12-03 ENCOUNTER — APPOINTMENT (INPATIENT)
Dept: RADIOLOGY | Facility: HOSPITAL | Age: 67
End: 2022-12-03

## 2022-12-03 LAB
ANION GAP SERPL CALCULATED.3IONS-SCNC: 5 MMOL/L (ref 4–13)
BACTERIA BLD CULT: NORMAL
BACTERIA BLD CULT: NORMAL
BACTERIA UR QL AUTO: ABNORMAL /HPF
BILIRUB UR QL STRIP: NEGATIVE
BUN SERPL-MCNC: 8 MG/DL (ref 5–25)
CALCIUM SERPL-MCNC: 8.2 MG/DL (ref 8.4–10.2)
CHLORIDE SERPL-SCNC: 109 MMOL/L (ref 96–108)
CLARITY UR: CLEAR
CO2 SERPL-SCNC: 30 MMOL/L (ref 21–32)
COLOR UR: YELLOW
CREAT SERPL-MCNC: 0.3 MG/DL (ref 0.6–1.3)
ERYTHROCYTE [DISTWIDTH] IN BLOOD BY AUTOMATED COUNT: 20.8 % (ref 11.6–15.1)
GFR SERPL CREATININE-BSD FRML MDRD: 140 ML/MIN/1.73SQ M
GLUCOSE SERPL-MCNC: 119 MG/DL (ref 65–140)
GLUCOSE SERPL-MCNC: 125 MG/DL (ref 65–140)
GLUCOSE SERPL-MCNC: 143 MG/DL (ref 65–140)
GLUCOSE SERPL-MCNC: 95 MG/DL (ref 65–140)
GLUCOSE UR STRIP-MCNC: NEGATIVE MG/DL
HCT VFR BLD AUTO: 28.9 % (ref 36.5–49.3)
HGB BLD-MCNC: 8.7 G/DL (ref 12–17)
HGB UR QL STRIP.AUTO: ABNORMAL
KETONES UR STRIP-MCNC: NEGATIVE MG/DL
LACTATE SERPL-SCNC: 1.7 MMOL/L (ref 0.5–2)
LEUKOCYTE ESTERASE UR QL STRIP: ABNORMAL
MAGNESIUM SERPL-MCNC: 2 MG/DL (ref 1.9–2.7)
MCH RBC QN AUTO: 25.7 PG (ref 26.8–34.3)
MCHC RBC AUTO-ENTMCNC: 30.1 G/DL (ref 31.4–37.4)
MCV RBC AUTO: 85 FL (ref 82–98)
NITRITE UR QL STRIP: NEGATIVE
NON-SQ EPI CELLS URNS QL MICRO: ABNORMAL /HPF
OTHER STN SPEC: ABNORMAL
PH UR STRIP.AUTO: 7 [PH]
PHOSPHATE SERPL-MCNC: 2.7 MG/DL (ref 2.3–4.1)
PLATELET # BLD AUTO: 492 THOUSANDS/UL (ref 149–390)
PMV BLD AUTO: 9 FL (ref 8.9–12.7)
POTASSIUM SERPL-SCNC: 3.6 MMOL/L (ref 3.5–5.3)
PROCALCITONIN SERPL-MCNC: 0.67 NG/ML
PROT UR STRIP-MCNC: ABNORMAL MG/DL
RBC # BLD AUTO: 3.39 MILLION/UL (ref 3.88–5.62)
RBC #/AREA URNS AUTO: ABNORMAL /HPF
SODIUM SERPL-SCNC: 144 MMOL/L (ref 135–147)
SP GR UR STRIP.AUTO: 1.01
UROBILINOGEN UR QL STRIP.AUTO: 0.2 E.U./DL
WBC # BLD AUTO: 15.76 THOUSAND/UL (ref 4.31–10.16)
WBC #/AREA URNS AUTO: ABNORMAL /HPF

## 2022-12-03 RX ORDER — POLYETHYLENE GLYCOL 3350 17 G/17G
17 POWDER, FOR SOLUTION ORAL DAILY
Status: DISCONTINUED | OUTPATIENT
Start: 2022-12-03 | End: 2022-12-20 | Stop reason: HOSPADM

## 2022-12-03 RX ORDER — POTASSIUM CHLORIDE 29.8 MG/ML
40 INJECTION INTRAVENOUS ONCE
Status: COMPLETED | OUTPATIENT
Start: 2022-12-03 | End: 2022-12-03

## 2022-12-03 RX ORDER — HYDROMORPHONE HCL/PF 1 MG/ML
1 SYRINGE (ML) INJECTION ONCE
Status: COMPLETED | OUTPATIENT
Start: 2022-12-03 | End: 2022-12-03

## 2022-12-03 RX ORDER — AMOXICILLIN 250 MG
1 CAPSULE ORAL 2 TIMES DAILY
Status: DISCONTINUED | OUTPATIENT
Start: 2022-12-03 | End: 2022-12-20 | Stop reason: HOSPADM

## 2022-12-03 RX ADMIN — ACETAMINOPHEN 650 MG: 325 TABLET ORAL at 15:57

## 2022-12-03 RX ADMIN — ALTEPLASE 2 MG: 2.2 INJECTION, POWDER, LYOPHILIZED, FOR SOLUTION INTRAVENOUS at 06:03

## 2022-12-03 RX ADMIN — POTASSIUM CHLORIDE 40 MEQ: 29.8 INJECTION, SOLUTION INTRAVENOUS at 06:15

## 2022-12-03 RX ADMIN — Medication 20 MG: at 08:48

## 2022-12-03 RX ADMIN — SENNOSIDES AND DOCUSATE SODIUM 1 TABLET: 50; 8.6 TABLET ORAL at 09:32

## 2022-12-03 RX ADMIN — HEPARIN SODIUM 5000 UNITS: 5000 INJECTION INTRAVENOUS; SUBCUTANEOUS at 05:22

## 2022-12-03 RX ADMIN — CEFAZOLIN SODIUM 2000 MG: 2 SOLUTION INTRAVENOUS at 05:24

## 2022-12-03 RX ADMIN — CEFAZOLIN SODIUM 2000 MG: 2 SOLUTION INTRAVENOUS at 21:52

## 2022-12-03 RX ADMIN — DEXMEDETOMIDINE HYDROCHLORIDE 0.6 MCG/KG/HR: 400 INJECTION INTRAVENOUS at 08:48

## 2022-12-03 RX ADMIN — CHLORHEXIDINE GLUCONATE 0.12% ORAL RINSE 15 ML: 1.2 LIQUID ORAL at 08:48

## 2022-12-03 RX ADMIN — HYDROMORPHONE HYDROCHLORIDE 1 MG: 1 INJECTION, SOLUTION INTRAMUSCULAR; INTRAVENOUS; SUBCUTANEOUS at 17:01

## 2022-12-03 RX ADMIN — HEPARIN SODIUM 5000 UNITS: 5000 INJECTION INTRAVENOUS; SUBCUTANEOUS at 21:52

## 2022-12-03 RX ADMIN — ACETAMINOPHEN 650 MG: 325 TABLET ORAL at 02:32

## 2022-12-03 RX ADMIN — Medication 20 MG: at 20:12

## 2022-12-03 RX ADMIN — POLYETHYLENE GLYCOL 3350 17 G: 17 POWDER, FOR SOLUTION ORAL at 09:32

## 2022-12-03 RX ADMIN — CHLORHEXIDINE GLUCONATE 0.12% ORAL RINSE 15 ML: 1.2 LIQUID ORAL at 20:12

## 2022-12-03 RX ADMIN — HEPARIN SODIUM 5000 UNITS: 5000 INJECTION INTRAVENOUS; SUBCUTANEOUS at 14:09

## 2022-12-03 RX ADMIN — VASOPRESSIN 0.04 UNITS/MIN: 20 INJECTION INTRAVENOUS at 07:34

## 2022-12-03 RX ADMIN — CEFAZOLIN SODIUM 2000 MG: 2 SOLUTION INTRAVENOUS at 14:09

## 2022-12-03 RX ADMIN — COLLAGENASE SANTYL: 250 OINTMENT TOPICAL at 08:53

## 2022-12-03 RX ADMIN — SENNOSIDES AND DOCUSATE SODIUM 1 TABLET: 50; 8.6 TABLET ORAL at 17:01

## 2022-12-03 NOTE — PLAN OF CARE
Problem: PAIN - ADULT  Goal: Verbalizes/displays adequate comfort level or baseline comfort level  Description: Interventions:  - Encourage patient to monitor pain and request assistance  - Assess pain using appropriate pain scale  - Administer analgesics based on type and severity of pain and evaluate response  - Implement non-pharmacological measures as appropriate and evaluate response  - Consider cultural and social influences on pain and pain management  - Notify physician/advanced practitioner if interventions unsuccessful or patient reports new pain  Outcome: Progressing     Problem: INFECTION - ADULT  Goal: Absence or prevention of progression during hospitalization  Description: INTERVENTIONS:  - Assess and monitor for signs and symptoms of infection  - Monitor lab/diagnostic results  - Monitor all insertion sites, i e  indwelling lines, tubes, and drains  - Monitor endotracheal if appropriate and nasal secretions for changes in amount and color  - [place] appropriate cooling/warming therapies per order  - Administer medications as ordered  - Instruct and encourage patient and family to use good hand hygiene technique  - Identify and instruct in appropriate isolation precautions for identified infection/condition  Outcome: Progressing  Goal: Absence of fever/infection during neutropenic period  Description: INTERVENTIONS:  - Monitor WBC    Outcome: Progressing

## 2022-12-03 NOTE — NURSING NOTE
Tube feeding stopped at this time, residual 430cc's  Provider made aware  Medicated for temp 100 8 at this time, see MAR for details  Cool cloth placed to forehead and ice packs to groin and axilla

## 2022-12-03 NOTE — PLAN OF CARE
Problem: Prexisting or High Potential for Compromised Skin Integrity  Goal: Skin integrity is maintained or improved  Description: INTERVENTIONS:  - Identify patients at risk for skin breakdown  - Assess and monitor skin integrity  - Assess and monitor nutrition and hydration status  - Monitor labs   - Assess for incontinence   - Turn and reposition patient  - Assist with mobility/ambulation  - Relieve pressure over bony prominences  - Avoid friction and shearing  - Provide appropriate hygiene as needed including keeping skin clean and dry  - Evaluate need for skin moisturizer/barrier cream  - Collaborate with interdisciplinary team   - Patient/family teaching  - Consider wound care consult   Outcome: Progressing     Problem: Nutrition/Hydration-ADULT  Goal: Nutrient/Hydration intake appropriate for improving, restoring or maintaining nutritional needs  Description: Monitor and assess patient's nutrition/hydration status for malnutrition  Collaborate with interdisciplinary team and initiate plan and interventions as ordered  Monitor patient's weight and dietary intake as ordered or per policy  Utilize nutrition screening tool and intervene as necessary  Determine patient's food preferences and provide high-protein, high-caloric foods as appropriate       INTERVENTIONS:  - Monitor oral intake, urinary output, labs, and treatment plans  - Assess nutrition and hydration status and recommend course of action  - Evaluate amount of meals eaten  - Assist patient with eating if necessary   - Allow adequate time for meals  - Recommend/ encourage appropriate diets, oral nutritional supplements, and vitamin/mineral supplements  - Order, calculate, and assess calorie counts as needed  - Recommend, monitor, and adjust tube feedings and TPN/PPN based on assessed needs  - Assess need for intravenous fluids  - Provide specific nutrition/hydration education as appropriate  - Include patient/family/caregiver in decisions related to nutrition  Outcome: Progressing     Problem: PAIN - ADULT  Goal: Verbalizes/displays adequate comfort level or baseline comfort level  Description: Interventions:  - Encourage patient to monitor pain and request assistance  - Assess pain using appropriate pain scale  - Administer analgesics based on type and severity of pain and evaluate response  - Implement non-pharmacological measures as appropriate and evaluate response  - Consider cultural and social influences on pain and pain management  - Notify physician/advanced practitioner if interventions unsuccessful or patient reports new pain  Outcome: Progressing     Problem: INFECTION - ADULT  Goal: Absence or prevention of progression during hospitalization  Description: INTERVENTIONS:  - Assess and monitor for signs and symptoms of infection  - Monitor lab/diagnostic results  - Monitor all insertion sites, i e  indwelling lines, tubes, and drains  - Monitor endotracheal if appropriate and nasal secretions for changes in amount and color  - [place] appropriate cooling/warming therapies per order  - Administer medications as ordered  - Instruct and encourage patient and family to use good hand hygiene technique  - Identify and instruct in appropriate isolation precautions for identified infection/condition  Outcome: Progressing     Problem: INFECTION - ADULT  Goal: Absence of fever/infection during neutropenic period  Description: INTERVENTIONS:  - Monitor WBC    Outcome: Progressing     Problem: DISCHARGE PLANNING  Goal: Discharge to home or other facility with appropriate resources  Description: INTERVENTIONS:  - Identify barriers to discharge w/patient and caregiver  - Arrange for needed discharge resources and transportation as appropriate  - Identify discharge learning needs (meds, wound care, etc )  - Arrange for interpretive services to assist at discharge as needed  - Refer to Case Management Department for coordinating discharge planning if the patient needs post-hospital services based on physician/advanced practitioner order or complex needs related to functional status, cognitive ability, or social support system  Outcome: Progressing     Problem: Knowledge Deficit  Goal: Patient/family/caregiver demonstrates understanding of disease process, treatment plan, medications, and discharge instructions  Description: Complete learning assessment and assess knowledge base    Interventions:  - Provide teaching at level of understanding  - Provide teaching via preferred learning methods  Outcome: Progressing

## 2022-12-03 NOTE — RESPIRATORY THERAPY NOTE
RT Ventilator Management Note  Hoang Dove 74 y o  male MRN: [id number]  Unit/Bed#: ICU 05-01 Encounter: [id number]      Daily Screen         12/2/2022  0814 12/3/2022  0351          Patient safety screen outcome[de-identified] Failed Passed (P)       Not Ready for Weaning due to[de-identified] Underline problem not resolved --                Physical Exam:   Assessment Type: Assess only  General Appearance: Sedated  Respiratory Pattern: Assisted  Chest Assessment: Chest expansion symmetrical  Bilateral Breath Sounds: Diminished, Coarse  Suction: ET Tube  O2 Device: vent      Resp Comments: (P) no changes made this shift, pt remains stable on curent settings

## 2022-12-03 NOTE — ASSESSMENT & PLAN NOTE
· Noted during admission, now improving since restarting TF  · Likely in the setting of NPO/free water deficit   · Follow up on urine studies   · Continue TF with FWF  · Trend Na

## 2022-12-03 NOTE — ASSESSMENT & PLAN NOTE
· Likely multifactorial in the setting of sepsis, recent ABLA, and sedation   · Multiple sources for sepsis including sacral wound, aspiration PNA, and MSSA bacteremia   · Continue levophed, titrate for goal MAP>65    · Continue Cefazolin, previously completed 7 days of Cefepime per ID  · Follow up on repeat BC, currently NGTD  · Monitor fever and WBC curve  · Trend procalcitonin

## 2022-12-03 NOTE — ASSESSMENT & PLAN NOTE
· With witnessed aspiration event during acute decompensation in setting of GIB  · Suspect aspiration pneumonia vs pneumonitis  · Emergently intubated for airway protection/respiratory failure  · Completed cefepime per ID   · Aggressive pulmonary hygiene

## 2022-12-03 NOTE — RESPIRATORY THERAPY NOTE
12/03/22 1040   Respiratory Assessment   Assessment Type Assess only   General Appearance Awake; Alert   Respiratory Pattern Assisted   Chest Assessment Chest expansion symmetrical   Bilateral Breath Sounds Coarse;Diminished   Cough Weak   Suction Oral;ET Tube   Resp Comments pt tolerating sbt with good volumes, stable pressures, stable heart rate and blood pressure  pt extubated to low flow cannula and mouth suctioned     Vent Information   Henao C3/G5 Vent Mode SPONT   SPONT Settings   FIO2 (%) 40 %   PEEP (cmH2O) 6 cmH2O   Pressure Support (cmH2O) 6 cmH20   Flow Trigger (LPM) 5 LPM   P-ramp (ms) 100 ms   ETS  (%) 25 %   Humidification Heater   Heater Temp 98 6 °F (37 °C)   SPONT Actuals   Resp Rate (BPM) 28 BPM   VT (mL) 210 mL   MV (Obs) 6 3   MAP (cmH2O) 8 3 cmH2O   Peak Pressure (cmH2O) 12 cmH2O   I:E Ratio (Obs) 1:2   RSBI (f/vt) 131 f/Vt   Heater Temperature (Obs) 98 4 °F (36 9 °C)   SPONT Alarms   High Peak Pressure (cmH20) 45 cmH2O   High Resp Rate (BPM) 40 BPM   High MV (L/min) 16 L/min   Low MV (L/min) 3 5 L/min   High Spont VTE (mL) 1000 mL   Low Spont VTE (mL) 200 mL   Apnea Time (S) 20 S   SPONT Apnea Settings   Resp Rate (BPM) 16 BPM   FIO2 (%) 100 %   %TI (%) 0 75 %   Apnea Time (S) 20 S   Maintenance   Alarm (pink) cable attached No   Resuscitation bag with peep valve at bedside Yes   Water bag changed No   Circuit changed No   Daily Screen   Patient safety screen outcome: Passed   Spont breathing trial outcome: Passed   Name of Medical Team Notified: Sayed   Preparing to extubate/ Notify Nurse Yes   Extubation order obtained Yes   Consider Cuff Test Yes   Patient extubated Yes   RSBI 131   IHI Ventilator Associated Pneumonia Bundle   Head of Bed Elevated HOB 30   Oral Care Mouth suctioned

## 2022-12-03 NOTE — RESPIRATORY THERAPY NOTE
12/02/22 2100   Respiratory Assessment   Assessment Type Assess only   General Appearance Sedated   Respiratory Pattern Assisted   Chest Assessment Chest expansion symmetrical   Bilateral Breath Sounds Diminished;Coarse   Suction ET Tube   Resp Comments pt stable remains on vent same settings CMV  400 16 40% +8, vent day # 4, 7 5 22@ lip moved ett from Rt to Center, skin and strap intact, BS dim romi coarse, sxn'd x 3 mod amt white/pale yellow thin, will cont to monitor   O2 Device vent   Vent Information   Vent ID [id number]   Vent type Henao C3   Henao C3/G5 Vent Mode (S)CMV   $ Pulse Oximetry Spot Check Charge Completed   SpO2 98 %   (S)CMV Settings   Resp Rate (BPM) 16 BPM   VT (mL) 400 mL   FIO2 (%) 40 %   PEEP (cmH2O) 8 cmH2O   I:E Ratio 1:2 8   Insp Time (%) 1 %   Flow Trigger (LPM) 5   Humidification Heater   Heater Temperature (Set) 98 6 °F (37 °C)   (S)CMV Actuals   Resp Rate (BPM) 16 BPM   VT (mL) 393   MV 6 2   MAP (cmH2O) 12 cmH2O   Peak Pressure (cmH2O) 28 cmH2O   I:E Ratio (Obs) 1:4   Insp Resistance 10   Heater Temperature (Obs) 98 6 °F (37 °C)   Static Compliance (mL/cmH20) 25 mL/cmH2O   Plateau Pressure (cm H2O) 20 cm H2O   (S)CMV Alarms   High Peak Pressure (cmH2O) 45   Low Pressure (cmH2O) 5 cm H2O   High Resp Rate (BPM) 40 BPM   Low Resp Rate (BPM) 8 BPM   High MV (L/min) 16 L/min   Low MV (L/min) 3 5 L/min   High VT (mL) 1000 mL   Low VT (mL) 300 mL   Apnea Time (s) 20 S   Maintenance   Alarm (pink) cable attached No   Resuscitation bag with peep valve at bedside Yes   Water bag changed No   Circuit changed No   IHI Ventilator Associated Pneumonia Bundle   Head of Bed Elevated HOB 30   ETT  Cuffed; Hi-Lo 7 5 mm   Placement Date/Time: 11/29/22 (c) 0708   Type: Cuffed; Hi-Lo  Tube Size: 7 5 mm  Location: Oral  Insertion attempts: 2  Placement Verification: Chest x-ray;End tidal CO2;Symmetrical chest wall movement  Secured at (cm): 22   Secured at (cm) 22   Measured from [place] (S)  Center   Repositioned (S)  Center to Right   Secured by Commercial tube guillaume  (skin and strap intact with 2 finger pass to back of head)   Site Condition Dry   Cuff Pressure (cm H2O) 25 cm H2O   HI-LO Suction  Continuous low suction   HI-LO Secretions Small; Thick; Ángel Noel Intervention Flushed   RT Ventilator Management Note  Brandon Fernando 74 y o  male MRN: [id number]  Unit/Bed#: ICU 05-01 Encounter: [id number]      Daily Screen         12/1/2022  0808 12/2/2022  0814          Patient safety screen outcome[de-identified] Failed Failed      Not Ready for Weaning due to[de-identified] Underline problem not resolved Underline problem not resolved                Physical Exam:   Assessment Type: Assess only  General Appearance: Sedated  Respiratory Pattern: Assisted  Chest Assessment: Chest expansion symmetrical  Bilateral Breath Sounds: Diminished, Coarse  Suction: ET Tube  O2 Device: vent      Resp Comments: pt stable remains on vent same settings CMV  400 16 40% +8, vent day # 4, 7 5 22@ lip moved ett from Rt to Center, skin and strap intact, BS dim romi coarse, sxn'd x 3 mod amt white/pale yellow thin, will cont to monitor

## 2022-12-03 NOTE — PROGRESS NOTES
Progress Note - Nephrology   Barbara Finley 74 y o  male MRN: [id number]  Unit/Bed#: ICU 05-01 Encounter: [id number]    A/P:  1  Polyuric UOP previously, 3 7 L  UOP less than 3 Liters in past 24 hr    Hypernatremia, improving, Na 144 WNL  Urine osm 503 not consistent with complete DI, could not exclude potential for partial DI  Hypernatremia 2/2 Sodium induced polyuria from volume expansion/vasopressors/ hyperglycemia + insufficient free water intake with increased insensible losses  Sodium on 11/25 was normal at 143, less likely he would have developed DI  He is in shock and would not proceed with further evaluation of DI at this juncture  If TF on hold then he should receive IVF in the form of D5W to replace FWF  Static deficit without accounting for ongoing losses to correct 144->140 is 0 9 L     2  Shock state- hypovolemic vs septic vs hemorrhagic  Vasopressors per CC  3  ABLA, gi bleed  Trend hgb  Transfuse if < 7    egd 11/29 small gastric ulcer vs lower GIB  PPI for 8 weeks   Sp 3 unit PRBC, 1 unit FFP    4  Chronic freire, exchanged 11/29  Maintain      Follow up reason for today's visit:     Polyuria/hypernatremia     Subjective:   Patient seen and examined today  Patient awake, nonverbal at baseline  Chart reviewed  Cannot obtain ROS  He is on 2 vasopressors  UOP not polyuric if accurate--2 5 L/24 hr    Objective:     Vitals: Blood pressure 118/56, pulse 85, temperature 98 8 °F (37 1 °C), resp  rate (!) 33, height 5' 5" (1 651 m), weight 63 8 kg (140 lb 10 5 oz), SpO2 99 %  ,Body mass index is 23 41 kg/m²  Weight (last 2 days)     Date/Time Weight    12/03/22 0600 63 8 (140 65)    12/02/22 0600 61 4 (135 36)    12/01/22 0600 64 (141 09)            Intake/Output Summary (Last 24 hours) at 12/3/2022 1404  Last data filed at 12/3/2022 1200  Gross per 24 hour   Intake 2416 53 ml   Output 950 ml   Net 1466 53 ml     I/O last 3 completed shifts:   In: 3647 7 [I V :1547 7; NG/GT:800; IV Piggyback:400; Feedings:900]  Out: 2500 [Urine:2500]    Urethral Catheter Temperature probe (Active)   Reasons to continue Urinary Catheter  Accurate I&O assessment in critically ill patients (48 hr  max) 12/03/22 0800   Goal for Removal Remove after 48 hrs of I/O monitoring 12/03/22 0800   Site Assessment Clean;Skin intact 12/03/22 0800   Marrufo Care Done 12/03/22 0800   Collection Container Standard drainage bag 12/03/22 0800   Securement Method Securing device (Describe) 12/03/22 0800   Output (mL) 125 mL 12/03/22 1200       Physical Exam: /56   Pulse 85   Temp 98 8 °F (37 1 °C)   Resp (!) 33   Ht 5' 5" (1 651 m)   Wt 63 8 kg (140 lb 10 5 oz)   SpO2 99%   BMI 23 41 kg/m²     General Appearance:    NAD, contracted ext    Head:    Normocephalic   Eyes:    Conjunctiva/corneas clear   Ears:       Nose:   Nares normal, septum midline, mucosa normal, no drainage    or sinus tenderness   Throat:     Neck:  no JVD noted, supple   Back:      Lungs:     + rhonchi, on vent    Chest wall:    No tenderness or deformity   Heart:    Regular rate and rhythm, S1 and S2 normal, no murmur, rub   or gallop   Abdomen:     Soft, non-tender, + PEG tube in place    Extremities:  trace edema BL UE/LE        Lymph nodes:      Neurologic:   Nonverbal at baseline               Lab, Imaging and other studies: I have personally reviewed pertinent labs  CBC:   Lab Results   Component Value Date    WBC 15 76 (H) 12/03/2022    HGB 8 7 (L) 12/03/2022    HCT 28 9 (L) 12/03/2022    MCV 85 12/03/2022     (H) 12/03/2022    MCH 25 7 (L) 12/03/2022    MCHC 30 1 (L) 12/03/2022    RDW 20 8 (H) 12/03/2022    MPV 9 0 12/03/2022     CMP:   Lab Results   Component Value Date    K 3 6 12/03/2022     (H) 12/03/2022    CO2 30 12/03/2022    BUN 8 12/03/2022    CREATININE 0 30 (L) 12/03/2022    CALCIUM 8 2 (L) 12/03/2022    EGFR 140 12/03/2022           Results from last 7 days   Lab Units 12/03/22  0444 12/02/22  8612 12/01/22  1627 12/01/22  4903 11/30/22  0642 11/29/22  1703 11/29/22  0939   POTASSIUM mmol/L 3 6 4 2 3 1*   < > 3 4* 3 4* 3 6   CHLORIDE mmol/L 109* 111* 107   < > 102 112* 115*   CO2 mmol/L 30 28 29   < > 28 28 25   BUN mg/dL 8 7 6   < > 5 10 17   CREATININE mg/dL 0 30* 0 25* 0 29*   < > 0 23* 0 24* 0 28*   CALCIUM mg/dL 8 2* 8 6 8 7   < > 7 9* 7 5* 7 1*   ALK PHOS U/L  --   --   --   --  101 87 85   ALT U/L  --   --   --   --  19 22 27   AST U/L  --   --   --   --  20 24 32    < > = values in this interval not displayed  Phosphorus:   Lab Results   Component Value Date    PHOS 2 7 12/03/2022     Magnesium:   Lab Results   Component Value Date    MG 2 0 12/03/2022     Urinalysis:   Lab Results   Component Value Date    COLORU Yellow 12/03/2022    CLARITYU Clear 12/03/2022    SPECGRAV 1 015 12/03/2022    PHUR 7 0 12/03/2022    LEUKOCYTESUR 3+ (A) 12/03/2022    NITRITE Negative 12/03/2022    GLUCOSEU Negative 12/03/2022    KETONESU Negative 12/03/2022    BILIRUBINUR Negative 12/03/2022    BLOODU Trace-Intact (A) 12/03/2022     Ionized Calcium: No results found for: CAION  Coagulation: No results found for: PT, INR, APTT  Troponin: No results found for: TROPONINI  ABG: No results found for: PHART, AAX0FUK, PO2ART, QFO2TBE, H3IEADEU, BEART, SOURCE  Radiology review:     IMAGING  Procedure: XR chest portable    Result Date: 12/1/2022  Narrative: CHEST RADIOGRAPH INDICATION:   follow up/atelectasis  COMPARISON:  Chest radiograph 11/30/2022 EXAM PERFORMED/VIEWS:  XR CHEST PORTABLE  Technique: AP portable semierect  Images:  1 FINDINGS:  Lines/Tubes/Devices: Endotracheal tube tip 3 2 cm above the raymundo  Right IJ central venous catheter with tip overlying the lower SVC  Partially visualized right humeral intramedullary troy  Mediastinum: Cardiomediastinal silhouette appears within normal limits  Lungs: Partial atelectasis of both lungs with patchy alveolar opacities similar to prior studies   Pleura: Large right and small left pleural effusion, unchanged  No pneumothorax within the limitations of a portable exam  Bones: Osseous structures appear within normal limits for patient age  Impression: No change in patchy bilateral airspace opacities possibly representing multifocal pneumonia, with left greater than right pleural effusions causing partial atelectasis of both lungs  Workstation performed: [id number]     Procedure: XR foot 3+ vw left    Result Date: 12/2/2022  Narrative: LEFT FOOT INDICATION:   left lateral foot ucler and heel ulcer with bone exposed    COMPARISON:  None VIEWS:  XR FOOT 3+ VW LEFT FINDINGS: Note that the lateral foot is obscured by overlying opacity on the AP view  There is no acute fracture or dislocation  No significant degenerative changes  There is periosteal reaction along the distal 5th metatarsal with absence of the metatarsal head  This appears chronic and possibly postsurgical though there is no reported surgical history  There is ulceration overlying the posterior calcaneus  Lateral ulcer is not definitely visualized though evaluation is limited by overlying opacity  Impression: 1  Soft tissue ulcer overlying the posterior calcaneus without osseous destruction  2   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 head  This appears chronic and possibly postsurgical though there is no reported surgical history  Chronic osteomyelitis is likely in the absence of surgical history  The study was marked in [facility] for immediate notification  Workstation performed: [id number]     Procedure: XR abdomen 1 view kub    Result Date: 12/3/2022  Narrative: ABDOMEN INDICATION:   evaluate for free air  COMPARISON:  None VIEWS:  AP supine FINDINGS: ET tube and right-sided central venous catheter remain in place  Right nephroureteral stent  Rectal tube  There is a nonobstructive bowel gas pattern  No discernible free air on this supine study    Upright or left lateral decubitus imaging is more sensitive to detect subtle free air in the appropriate setting  No pathologic calcifications or soft tissue masses  Persistent bibasilar pulmonary opacities  Visualized osseous structures are unremarkable for the patient's age  Impression: There is a nonobstructive bowel gas pattern  No discernible free air on this supine study     Workstation performed: [id number]       Current Facility-Administered Medications   Medication Dose Route Frequency   • acetaminophen (TYLENOL) tablet 650 mg  650 mg Per G Tube Q4H PRN   • ceFAZolin (ANCEF) IVPB (premix in dextrose) 2,000 mg 50 mL  2,000 mg Intravenous Q8H   • chlorhexidine (PERIDEX) 0 12 % oral rinse 15 mL  15 mL Mouth/Throat Q12H FORTINO   • collagenase (SANTYL) ointment   Topical Daily   • dexmedeTOMIDine (Precedex) 400 mcg in sodium chloride 0 9% 100 mL  0 1-1 2 mcg/kg/hr Intravenous Titrated   • heparin (porcine) subcutaneous injection 5,000 Units  5,000 Units Subcutaneous Q8H [facility]   • HYDROmorphone (DILAUDID) 50 mg in sodium chloride 0 9% 50mL drip  1 5 mg/hr Intravenous Continuous   • HYDROmorphone (DILAUDID) injection 0 5 mg  0 5 mg Intravenous Q2H PRN   • insulin lispro (HumaLOG) 100 units/mL subcutaneous injection 1-5 Units  1-5 Units Subcutaneous Q6H [facility]   • NOREPINEPHRINE 4 MG  ML NSS (CMPD ORDER) infusion  1-30 mcg/min Intravenous Titrated   • omeprazole (PRILOSEC) suspension 2 mg/mL  20 mg Oral BID   • ondansetron (ZOFRAN) injection 4 mg  4 mg Intravenous Q6H PRN   • polyethylene glycol (MIRALAX) packet 17 g  17 g Oral Daily   • senna-docusate sodium (SENOKOT S) 8 6-50 mg per tablet 1 tablet  1 tablet Oral BID   • vasopressin (PITRESSIN) 20 Units in sodium chloride 0 9 % 100 mL infusion  0 04 Units/min Intravenous Continuous     Medications Discontinued During This Encounter   Medication Reason   • potassium chloride (K-DUR,KLOR-CON) CR tablet 40 mEq    • vancomycin (VANCOCIN) IVPB (premix in dextrose) 750 mg 150 mL    • potassium chloride 40 mEq IVPB (premix)    • vancomycin (VANCOCIN) IVPB (premix in dextrose) 1,000 mg 200 mL    • cefepime (MAXIPIME) IVPB (premix in dextrose) 2,000 mg 50 mL    • sodium chloride 0 9 % infusion    • dextrose 5 % and sodium chloride 0 45 % infusion    • pantoprazole (PROTONIX) injection 40 mg    • enoxaparin (LOVENOX) subcutaneous injection 40 mg    • fentaNYL 1000 mcg in sodium chloride 0 9% 100mL infusion    • fentaNYL 1000 mcg in sodium chloride 0 9% 100mL infusion    • fentanyl citrate (PF) 100 MCG/2ML 50 mcg    • pantoprazole (PROTONIX) 80 mg in sodium chloride 0 9 % 100 mL infusion    • potassium chloride 20 mEq IVPB (premix)    • multi-electrolyte (PLASMALYTE-A/ISOLYTE-S PH 7 4) IV solution    • multi-electrolyte (PLASMALYTE-A/ISOLYTE-S PH 7 4) IV solution    • sodium chloride 0 9 % bolus 1,000 mL    • multi-electrolyte (ISOLYTE-S PH 7 4) bolus 2,000 mL    • sodium hypochlorite (DAKIN'S HALF-STRENGTH) 0 25 percent topical solution 1 application    • pantoprazole (PROTONIX) injection 40 mg    • vasopressin (PITRESSIN) 20 Units in sodium chloride 0 9 % 100 mL infusion    • propofol (DIPRIVAN) 1000 mg in 100 mL infusion (premix)    • cefepime (MAXIPIME) IVPB (premix in dextrose) 2,000 mg 50 mL        [place], DO      This progress note was produced in part using a dictation device which may document imprecise wording from author's original intent

## 2022-12-03 NOTE — NURSING NOTE
Attempted to do wound care at this time with patient, he became extremely agitated with HR increasing to 150, RR 40's, and patient diaphoretic, provider made aware, instructed to allow patient to settle and calm before attempting to complete wound care again

## 2022-12-03 NOTE — NURSING NOTE
0700- On morning assessment patient noted to have BP 66/32, levo increased at this time per [facility] documentation, provider made aware order for vasopressin placed and started per provider order  Blood cultures and Lactic acid sent at this time  1030-Sedation paused for potential extubation  1045-Pt extubated to 6L NC at this time  Tolerating well  No signs of distress, vital signs stable  Turn and repositioning every two hours as patient tolerates

## 2022-12-03 NOTE — ASSESSMENT & PLAN NOTE
· Unclear if this is due to urinary retention/neurogenic bladder  · Marrufo exchanged on arrival to ICU

## 2022-12-03 NOTE — ASSESSMENT & PLAN NOTE
· BC 11/25 + MSSA, repeat BC NGTD  · Completed course of Cefepime per ID, now will transition to Cefazolin   · ID following

## 2022-12-04 LAB
ANION GAP SERPL CALCULATED.3IONS-SCNC: 3 MMOL/L (ref 4–13)
BACTERIA BLD CULT: NORMAL
BACTERIA SPT RESP CULT: ABNORMAL
BASOPHILS # BLD AUTO: 0.02 THOUSANDS/ÂΜL (ref 0–0.1)
BASOPHILS NFR BLD AUTO: 0 % (ref 0–1)
BUN SERPL-MCNC: 8 MG/DL (ref 5–25)
CALCIUM SERPL-MCNC: 8.2 MG/DL (ref 8.4–10.2)
CHLORIDE SERPL-SCNC: 109 MMOL/L (ref 96–108)
CO2 SERPL-SCNC: 32 MMOL/L (ref 21–32)
CREAT SERPL-MCNC: 0.24 MG/DL (ref 0.6–1.3)
EOSINOPHIL # BLD AUTO: 0.58 THOUSAND/ÂΜL (ref 0–0.61)
EOSINOPHIL NFR BLD AUTO: 4 % (ref 0–6)
ERYTHROCYTE [DISTWIDTH] IN BLOOD BY AUTOMATED COUNT: 20.8 % (ref 11.6–15.1)
GFR SERPL CREATININE-BSD FRML MDRD: 153 ML/MIN/1.73SQ M
GLUCOSE SERPL-MCNC: 112 MG/DL (ref 65–140)
GLUCOSE SERPL-MCNC: 125 MG/DL (ref 65–140)
GLUCOSE SERPL-MCNC: 93 MG/DL (ref 65–140)
GLUCOSE SERPL-MCNC: 98 MG/DL (ref 65–140)
GRAM STN SPEC: ABNORMAL
GRAM STN SPEC: ABNORMAL
HCT VFR BLD AUTO: 28 % (ref 36.5–49.3)
HGB BLD-MCNC: 8.3 G/DL (ref 12–17)
IMM GRANULOCYTES # BLD AUTO: 0.09 THOUSAND/UL (ref 0–0.2)
IMM GRANULOCYTES NFR BLD AUTO: 1 % (ref 0–2)
LYMPHOCYTES # BLD AUTO: 1.97 THOUSANDS/ÂΜL (ref 0.6–4.47)
LYMPHOCYTES NFR BLD AUTO: 14 % (ref 14–44)
MAGNESIUM SERPL-MCNC: 1.9 MG/DL (ref 1.9–2.7)
MCH RBC QN AUTO: 25.5 PG (ref 26.8–34.3)
MCHC RBC AUTO-ENTMCNC: 29.6 G/DL (ref 31.4–37.4)
MCV RBC AUTO: 86 FL (ref 82–98)
MONOCYTES # BLD AUTO: 1.87 THOUSAND/ÂΜL (ref 0.17–1.22)
MONOCYTES NFR BLD AUTO: 13 % (ref 4–12)
NEUTROPHILS # BLD AUTO: 9.69 THOUSANDS/ÂΜL (ref 1.85–7.62)
NEUTS SEG NFR BLD AUTO: 68 % (ref 43–75)
NRBC BLD AUTO-RTO: 0 /100 WBCS
PHOSPHATE SERPL-MCNC: 2.7 MG/DL (ref 2.3–4.1)
PLATELET # BLD AUTO: 402 THOUSANDS/UL (ref 149–390)
PMV BLD AUTO: 9.3 FL (ref 8.9–12.7)
POTASSIUM SERPL-SCNC: 3.6 MMOL/L (ref 3.5–5.3)
PROCALCITONIN SERPL-MCNC: 1.59 NG/ML
RBC # BLD AUTO: 3.25 MILLION/UL (ref 3.88–5.62)
SODIUM SERPL-SCNC: 144 MMOL/L (ref 135–147)
WBC # BLD AUTO: 14.22 THOUSAND/UL (ref 4.31–10.16)

## 2022-12-04 RX ORDER — HYDROMORPHONE HYDROCHLORIDE 2 MG/1
5 TABLET ORAL EVERY 4 HOURS
Status: DISCONTINUED | OUTPATIENT
Start: 2022-12-04 | End: 2022-12-20 | Stop reason: HOSPADM

## 2022-12-04 RX ORDER — METOPROLOL TARTRATE 5 MG/5ML
5 INJECTION INTRAVENOUS ONCE
Status: COMPLETED | OUTPATIENT
Start: 2022-12-04 | End: 2022-12-04

## 2022-12-04 RX ORDER — LACTULOSE 20 G/30ML
20 SOLUTION ORAL 3 TIMES DAILY
Status: DISCONTINUED | OUTPATIENT
Start: 2022-12-04 | End: 2022-12-06

## 2022-12-04 RX ORDER — HYDROMORPHONE HCL/PF 1 MG/ML
1 SYRINGE (ML) INJECTION EVERY 2 HOUR PRN
Status: DISCONTINUED | OUTPATIENT
Start: 2022-12-04 | End: 2022-12-20 | Stop reason: HOSPADM

## 2022-12-04 RX ADMIN — LACTULOSE 20 G: 20 SOLUTION ORAL at 10:08

## 2022-12-04 RX ADMIN — METOPROLOL TARTRATE 5 MG: 5 INJECTION, SOLUTION INTRAVENOUS at 22:26

## 2022-12-04 RX ADMIN — DEXMEDETOMIDINE HYDROCHLORIDE 0.6 MCG/KG/HR: 400 INJECTION INTRAVENOUS at 00:13

## 2022-12-04 RX ADMIN — HEPARIN SODIUM 5000 UNITS: 5000 INJECTION INTRAVENOUS; SUBCUTANEOUS at 05:54

## 2022-12-04 RX ADMIN — ACETAMINOPHEN 650 MG: 325 TABLET ORAL at 14:03

## 2022-12-04 RX ADMIN — ONDANSETRON 4 MG: 2 INJECTION INTRAMUSCULAR; INTRAVENOUS at 21:04

## 2022-12-04 RX ADMIN — HYDROMORPHONE HYDROCHLORIDE 0.5 MG: 1 INJECTION, SOLUTION INTRAMUSCULAR; INTRAVENOUS; SUBCUTANEOUS at 11:29

## 2022-12-04 RX ADMIN — CEFAZOLIN SODIUM 2000 MG: 2 SOLUTION INTRAVENOUS at 21:14

## 2022-12-04 RX ADMIN — HEPARIN SODIUM 5000 UNITS: 5000 INJECTION INTRAVENOUS; SUBCUTANEOUS at 14:03

## 2022-12-04 RX ADMIN — HEPARIN SODIUM 5000 UNITS: 5000 INJECTION INTRAVENOUS; SUBCUTANEOUS at 21:14

## 2022-12-04 RX ADMIN — HYDROMORPHONE HYDROCHLORIDE 5 MG: 2 TABLET ORAL at 17:39

## 2022-12-04 RX ADMIN — COLLAGENASE SANTYL: 250 OINTMENT TOPICAL at 08:04

## 2022-12-04 RX ADMIN — CEFAZOLIN SODIUM 2000 MG: 2 SOLUTION INTRAVENOUS at 14:03

## 2022-12-04 RX ADMIN — CEFAZOLIN SODIUM 2000 MG: 2 SOLUTION INTRAVENOUS at 05:54

## 2022-12-04 RX ADMIN — HYDROMORPHONE HYDROCHLORIDE 1 MG: 1 INJECTION, SOLUTION INTRAMUSCULAR; INTRAVENOUS; SUBCUTANEOUS at 20:13

## 2022-12-04 RX ADMIN — HYDROMORPHONE HYDROCHLORIDE 5 MG: 2 TABLET ORAL at 22:26

## 2022-12-04 RX ADMIN — HYDROMORPHONE HYDROCHLORIDE 5 MG: 2 TABLET ORAL at 10:08

## 2022-12-04 RX ADMIN — HYDROMORPHONE HYDROCHLORIDE 5 MG: 2 TABLET ORAL at 14:03

## 2022-12-04 RX ADMIN — LACTULOSE 20 G: 20 SOLUTION ORAL at 16:23

## 2022-12-04 RX ADMIN — POLYETHYLENE GLYCOL 3350 17 G: 17 POWDER, FOR SOLUTION ORAL at 08:04

## 2022-12-04 RX ADMIN — HYDROMORPHONE HYDROCHLORIDE 1 MG/HR: 10 INJECTION, SOLUTION INTRAMUSCULAR; INTRAVENOUS; SUBCUTANEOUS at 04:25

## 2022-12-04 RX ADMIN — SENNOSIDES AND DOCUSATE SODIUM 1 TABLET: 50; 8.6 TABLET ORAL at 08:04

## 2022-12-04 RX ADMIN — CHLORHEXIDINE GLUCONATE 0.12% ORAL RINSE 15 ML: 1.2 LIQUID ORAL at 20:13

## 2022-12-04 RX ADMIN — ACETAMINOPHEN 650 MG: 325 TABLET ORAL at 20:13

## 2022-12-04 RX ADMIN — SENNOSIDES AND DOCUSATE SODIUM 1 TABLET: 50; 8.6 TABLET ORAL at 17:39

## 2022-12-04 RX ADMIN — CHLORHEXIDINE GLUCONATE 0.12% ORAL RINSE 15 ML: 1.2 LIQUID ORAL at 08:04

## 2022-12-04 RX ADMIN — Medication 20 MG: at 08:04

## 2022-12-04 NOTE — ASSESSMENT & PLAN NOTE
· WC + pseudomonas aeruginosa, proteus mirabilis, and staph aureus   · Surgery following, appreciate recommendations, continue palliative debridement  · Wound care following, appreciate recommendations   · Continue IV antibiotics as noted above  · Continue dilaudid infusion for pain management, consider titrating down today

## 2022-12-04 NOTE — ASSESSMENT & PLAN NOTE
· 11/28 Intubated emergently on M/S floor for respiratory failure/distress and airway protection in setting of GIB/shock, extubated to NC 12/3  · CXR: Right base infiltrate; question aspiration    · ETT exchanged over bougie as cuff was not maintaining air - now volumes improved  · Likely in the setting of aspiration pneumonia/pneumonitis  · Continue supplemental O2 via nasal cannula, titrate for SpO2>90  · Continue abx as above  · Consider diuresis if able to be maintained off of vasopressors  · Frequent suctioning, turn/repo for pulmonary hygiene

## 2022-12-04 NOTE — ASSESSMENT & PLAN NOTE
· BC 11/25 + MSSA, repeat BC NGTD  · Completed course of Cefepime per ID, now will continue cefazolin, day 2  · ID following

## 2022-12-04 NOTE — ASSESSMENT & PLAN NOTE
· Likely multifactorial in the setting of sepsis, recent ABLA, and sedation   · Multiple sources for sepsis including sacral wound, aspiration PNA, and MSSA bacteremia   · Now off of pressors, maintain MAP>65  · Continue Cefazolin day 2, previously completed 7 days of Cefepime per ID  · Follow up on repeat BC, currently NGTD  · Monitor fever and WBC curve  · Trend procalcitonin

## 2022-12-04 NOTE — PROGRESS NOTES
[address] [address]  68   Progress Note Jann [place] 1955, 74 y o  male MRN: [id number]  Unit/Bed#: ICU 05-01 Encounter: [id number]  Primary Care Provider: Giovani Villalta MD   Date and time admitted to hospital: 11/25/2022  2:30 PM    * Shock [facility])  Assessment & Plan  · Likely multifactorial in the setting of sepsis, recent ABLA, and sedation   · Multiple sources for sepsis including sacral wound, aspiration PNA, and MSSA bacteremia   · Now off of pressors, maintain MAP>65  · Continue Cefazolin day 2, previously completed 7 days of Cefepime per ID  · Follow up on repeat BC, currently NGTD  · Monitor fever and WBC curve  · Trend procalcitonin     Acute respiratory failure with hypoxia [facility])  Assessment & Plan  · 11/28 Intubated emergently on M/S floor for respiratory failure/distress and airway protection in setting of GIB/shock, extubated to NC 12/3  · CXR: Right base infiltrate; question aspiration    · ETT exchanged over bougie as cuff was not maintaining air - now volumes improved  · Likely in the setting of aspiration pneumonia/pneumonitis  · Continue supplemental O2 via nasal cannula, titrate for SpO2>90  · Continue abx as above  · Consider diuresis if able to be maintained off of vasopressors  · Frequent suctioning, turn/repo for pulmonary hygiene     GIB (gastrointestinal bleeding)  Assessment & Plan  · With acute onset of large amount of projectile coffee-ground emesis and output from PEG tube   · Received 3 U PRBC, 3 FFP   · S/p EGD with clip placed to ulceration in the stomach   · Continue Protonix BID IV  · Advance TF to goal     Acute blood loss anemia (ABLA)  Assessment & Plan  · Acute on chronic anemia in the setting of acute GIB   · Received 3 U PRBC, 3 FFP throughout admission   · Continue to trend Hgb, transfuse for Hgb< 7 or signs of active bleeding   · Monitor hemodynamics     Aspiration pneumonia (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, stage 4 (HCC)  Assessment & Plan  · WC + pseudomonas aeruginosa, proteus mirabilis, and staph aureus   · Surgery following, appreciate recommendations, continue palliative debridement  · Wound care following, appreciate recommendations   · Continue IV antibiotics as noted above  · Continue dilaudid infusion for pain management, consider titrating down today    Hypernatremia  Assessment & Plan  · Noted during admission, now improving since restarting TF  · Likely in the setting of NPO/free water deficit   · Continue TF with FWF  · Trend Na    Chronic indwelling Marrufo catheter  Assessment & Plan  · Unclear if this is due to urinary retention/neurogenic bladder  · Marrufo exchanged on arrival to ICU     Developmental disability  Assessment & Plan  · Nonverbal at baseline w/bilatereral UE/LE contractures  · Continue precedex for anxiety, goal RASS 0  · Resides in group home     Pressure injury of left heel, stage 4 (HCC)  Assessment & Plan  · Podiatry consult, appreciate recommendations   · Local wound care  · Wound consult, appreciate recommendations         ----------------------------------------------------------------------------------------  HPI/24hr events: Extubated to 6L NC  Pressors off  Repeat cultures sent given fever  No other acute events       Patient appropriate for transfer out of the ICU today?: No  Disposition: Continue Critical Care   Code Status: Level 1 - Full Code  ---------------------------------------------------------------------------------------  SUBJECTIVE  BRIDGETTE,nonverbal    Review of Systems  Review of systems was unable to be performed secondary to nonverbal  ---------------------------------------------------------------------------------------  OBJECTIVE    Vitals   Vitals:    22 2100 22 2200 22 2300 22 0000   BP: 95/50 102/54 111/56 104/54   BP Location:       Pulse: 102 90 101 88   Resp: (!) 28 (!) 24 (!) 30 (!) 25   Temp: 98 8 °F (37 1 °C) 98 8 °F (37 1 °C) 98 6 °F (37 °C) 99 °F (37 2 °C)   TempSrc:       SpO2: 97% 97% 97% 96%   Weight:       Height:         Temp (24hrs), Av 8 °F (37 7 °C), Min:98 6 °F (37 °C), Max:101 1 °F (38 4 °C)  Current: Temperature: 99 °F (37 2 °C)  Arterial Line BP: 132/86  Arterial Line MAP (mmHg): 104 mmHg    Respiratory:  SpO2: SpO2: 96 %, SpO2 Device: O2 Device: Nasal cannula  Nasal Cannula O2 Flow Rate (L/min): 3 L/min    Invasive/non-invasive ventilation settings   Respiratory    Lab Data (Last 4 hours)    None         O2/Vent Data (Last 4 hours)    None                Physical Exam  Constitutional:       General: He is not in acute distress  Appearance: He is ill-appearing  HENT:      Head: Normocephalic and atraumatic  Mouth/Throat:      Mouth: Mucous membranes are moist    Eyes:      Pupils: Pupils are equal, round, and reactive to light  Cardiovascular:      Rate and Rhythm: Normal rate and regular rhythm  Pulses: Normal pulses  Heart sounds: Normal heart sounds  No murmur heard  No friction rub  No gallop  Pulmonary:      Effort: Pulmonary effort is normal  No respiratory distress  Breath sounds: Rhonchi present  No wheezing or rales  Abdominal:      General: Bowel sounds are normal  There is distension  Palpations: Abdomen is soft  Tenderness: There is no abdominal tenderness  Musculoskeletal:         General: Swelling present        Cervical back: Neck supple  Skin:     General: Skin is warm and dry  Neurological:      Mental Status: He is alert  Comments: Opens eyes and tracks, minimal spontaneous movement of LUE  Contracted  Laboratory and Diagnostics:  Results from last 7 days   Lab Units 12/03/22 0444 12/02/22  0620 12/01/22  1627 12/01/22  1963 12/01/22  0026 11/30/22  1753 11/30/22  1138 11/30/22  0642 11/29/22  1703 11/29/22  0939 11/28/22  0439   WBC Thousand/uL 15 76* 16 75*  --  17 72*  --   --   --  27 86*  --  27 40* 17 52*   HEMOGLOBIN g/dL 8 7* 9 0* 8 6* 7 6* 7 8* 8 3* 7 9* 7 5*   < > 5 3* 7 8*   HEMATOCRIT % 28 9* 29 0*  --  23 9*  --   --   --  24 3*  --  19 0* 27 7*   PLATELETS Thousands/uL 492* 496*  --  398*  --   --   --  387  --  568* 731*   NEUTROS PCT %  --   --   --   --   --   --   --  84*  --   --   --    BANDS PCT %  --   --   --   --   --   --   --   --   --  9*  --    MONOS PCT %  --   --   --   --   --   --   --  5  --   --   --    MONO PCT %  --   --   --   --   --   --   --   --   --  2*  --     < > = values in this interval not displayed       Results from last 7 days   Lab Units 12/03/22 0444 12/02/22  0620 12/01/22  1627 12/01/22  0611 11/30/22  0642 11/29/22  1703 11/29/22  0939   SODIUM mmol/L 144 146 144 133* 137 146 147   POTASSIUM mmol/L 3 6 4 2 3 1* 2 5* 3 4* 3 4* 3 6   CHLORIDE mmol/L 109* 111* 107 97 102 112* 115*   CO2 mmol/L 30 28 29 28 28 28 25   ANION GAP mmol/L 5 7 8 8 7 6 7   BUN mg/dL 8 7 6 5 5 10 17   CREATININE mg/dL 0 30* 0 25* 0 29* 0 26* 0 23* 0 24* 0 28*   CALCIUM mg/dL 8 2* 8 6 8 7 7 8* 7 9* 7 5* 7 1*   GLUCOSE RANDOM mg/dL 143* 110 121 107 100 97 130   ALT U/L  --   --   --   --  19 22 27   AST U/L  --   --   --   --  20 24 32   ALK PHOS U/L  --   --   --   --  101 87 85   ALBUMIN g/dL  --   --   --   --  2 1* 2 0* 1 9*   TOTAL BILIRUBIN mg/dL  --   --   --   --  0 56 1 13* 0 18*     Results from last 7 days   Lab Units 12/03/22  0444 12/02/22  0620 12/01/22  1627 12/01/22  9635 11/30/22  0642 11/29/22  1703 11/29/22  0939   MAGNESIUM mg/dL 2 0 2 2 2 4 1 7* 1 9 2 4 1 8*   PHOSPHORUS mg/dL 2 7 2 7  --  3 4 3 8 2 5 3 5      Results from last 7 days   Lab Units 11/29/22  0939   INR  1 46*          Results from last 7 days   Lab Units 12/03/22  0820 11/29/22  0939   LACTIC ACID mmol/L 1 7 1 2     ABG:  Results from last 7 days   Lab Units 11/29/22  1711 11/29/22  1000   PH ART  7 415 7 414   PCO2 ART mm Hg 44 1* 40 8   PO2 ART mm Hg 83 0 100 2   HCO3 ART mmol/L 27 6 25 5   BASE EXC ART mmol/L 2 8 0 9   ABG SOURCE   --  Line, Arterial     VBG:  Results from last 7 days   Lab Units 11/29/22  1000   ABG SOURCE  Line, Arterial     Results from last 7 days   Lab Units 12/03/22  0444 11/30/22  0642 11/29/22  1703   PROCALCITONIN ng/ml 0 67* 3 13* 3 30*       Micro  Results from last 7 days   Lab Units 11/30/22  0446 11/29/22  2041 11/29/22  0949 11/28/22  1234 11/28/22  1132 11/28/22  0440   BLOOD CULTURE   --  No Growth at 72 hrs  No Growth After 4 Days  --  No Growth After 5 Days  No Growth After 5 Days  SPUTUM CULTURE  4+ Growth of Candida tropicalis*  4+ Growth of Candida glabrata*  1+ Growth of Achromobacter xylosoxidans*  Few Colonies of  --   --   --   --   --    GRAM STAIN RESULT  2+ Polys*  1+ Budding yeast*  --   --   --   --   --    MRSA CULTURE ONLY   --   --   --  Methicillin Resistant Staphylococcus aureus isolated*  This patient requires contact isolation precautions per [place] law  Contact precautions are not required in [place] for nasal surveillance cultures  --   --        EKG: NSR rate 77  Imaging: I have personally reviewed pertinent reports  and I have personally reviewed pertinent films in PACS    Intake and Output  I/O       12/02 0701 12/03 0700 12/03 0701 12/04 0700    P  O  0 0    I V  (mL/kg) 1370 4 (21 5) 251 6 (3 9)    NG/ 500    IV Piggyback 100 43 8    Feedings 560 240    Total Intake(mL/kg) 2530 4 (39 7) 1035 3 (16 2)    Urine (mL/kg/hr) 1075 (0 7) 1175 (0 8)    Stool  0    Total Output 1075 1175    Net +1455 4 -139 7                Height and Weights   Height: 5' 5" (165 1 cm)  IBW (Ideal Body Weight): 61 5 kg  Body mass index is 23 41 kg/m²  Weight (last 2 days)     Date/Time Weight    12/03/22 0600 63 8 (140 65)    12/02/22 0600 61 4 (135 36)            Nutrition       Diet Orders   (From admission, onward)             Start     Ordered    12/02/22 0854  Diet Enteral/Parenteral; Tube Feeding No Oral Diet; Jevity 1 2 Zeyad; Continuous; 50; 250; Water; Every 4 hours  Diet effective now        Comments: Advance by 20 ml/hr every four hours to goal of 50   References:    Nutrtion Support Algorithm Enteral vs  Parenteral   Question Answer Comment   Diet Type Enteral/Parenteral    Enteral/Parenteral Tube Feeding No Oral Diet    Tube Feeding Formula: Jevity 1 2 Zeyad    Bolus/Cyclic/Continuous Continuous    Tube Feeding Goal Rate (mL/hr): 50    Tube Feeding flush (mL): 250    Water Flush type: Water    Water flush frequency: Every 4 hours    RD to adjust diet per protocol?  No        12/02/22 0854                  Active Medications  Scheduled Meds:  Current Facility-Administered Medications   Medication Dose Route Frequency Provider Last Rate   • acetaminophen  650 mg Per G Tube Q4H PRN Emma Bailey MD     • cefazolin  2,000 mg Intravenous Q8H Fidencio Mir MD 2,000 mg (12/03/22 2152)   • chlorhexidine  15 mL Mouth/Throat Q12H [address] AVANI Lo     • collagenase   Topical Daily Emma Bailey MD     • dexmedetomidine  0 1-1 2 mcg/kg/hr Intravenous Titrated AVANI Mahmood 0 6 mcg/kg/hr (12/04/22 0013)   • heparin (porcine)  5,000 Units Subcutaneous Q8H [address] AVANI Mahmood     • HYDROmorphone  1 mg/hr Intravenous Continuous AVANI Borges 1 mg/hr (12/04/22 0000)   • HYDROmorphone  0 5 mg Intravenous Q2H PRN AVANI Wilson     • insulin lispro  1-5 Units Subcutaneous Q6H 45 AVANI Dobbins • norepinephrine  1-30 mcg/min Intravenous Titrated Rolando Chandler MD Stopped (12/03/22 1300)   • omeprazole (PRILOSEC) suspension 2 mg/mL  20 mg Oral BID Reggie FELECIA Crowe DO     • ondansetron  4 mg Intravenous Q6H PRN Rolando Chandler MD     • polyethylene glycol  17 g Oral Daily AVANI Del Castillo     • senna-docusate sodium  1 tablet Oral BID AVANI Del Castillo       Continuous Infusions:  dexmedetomidine, 0 1-1 2 mcg/kg/hr, Last Rate: 0 6 mcg/kg/hr (12/04/22 0013)  HYDROmorphone, 1 mg/hr, Last Rate: 1 mg/hr (12/04/22 0000)  norepinephrine, 1-30 mcg/min, Last Rate: Stopped (12/03/22 1300)      PRN Meds:   acetaminophen, 650 mg, Q4H PRN  HYDROmorphone, 0 5 mg, Q2H PRN  ondansetron, 4 mg, Q6H PRN        Invasive Devices Review  Invasive Devices     Central Venous Catheter Line  Duration           CVC Central Lines 11/29/22 Triple Internal jugular 4 days          Peripheral Intravenous Line  Duration           Peripheral IV 12/03/22 Right;Upper Arm <1 day          Arterial Line  Duration           Arterial Line 11/29/22 Radial 4 days          Drain  Duration           Colostomy -- days    Urethral Catheter Temperature probe 4 days          Airway  Duration           ETT  Cuffed; Hi-Lo 7 5 mm 4 days                ---------------------------------------------------------------------------------------  Advance Directive and Living Will:      Power of :    POLST:    ---------------------------------------------------------------------------------------  Care Time Delivered:   No Critical Care time spent       YAHIR FragaNP      Portions of the record may have been created with voice recognition software  Occasional wrong word or "sound a like" substitutions may have occurred due to the inherent limitations of voice recognition software    Read the chart carefully and recognize, using context, where substitutions have occurred

## 2022-12-04 NOTE — PLAN OF CARE
Problem: Prexisting or High Potential for Compromised Skin Integrity  Goal: Skin integrity is maintained or improved  Description: INTERVENTIONS:  - Identify patients at risk for skin breakdown  - Assess and monitor skin integrity  - Assess and monitor nutrition and hydration status  - Monitor labs   - Assess for incontinence   - Turn and reposition patient  - Assist with mobility/ambulation  - Relieve pressure over bony prominences  - Avoid friction and shearing  - Provide appropriate hygiene as needed including keeping skin clean and dry  - Evaluate need for skin moisturizer/barrier cream  - Collaborate with interdisciplinary team   - Patient/family teaching  - Consider wound care consult   Outcome: Progressing     Problem: Nutrition/Hydration-ADULT  Goal: Nutrient/Hydration intake appropriate for improving, restoring or maintaining nutritional needs  Description: Monitor and assess patient's nutrition/hydration status for malnutrition  Collaborate with interdisciplinary team and initiate plan and interventions as ordered  Monitor patient's weight and dietary intake as ordered or per policy  Utilize nutrition screening tool and intervene as necessary  Determine patient's food preferences and provide high-protein, high-caloric foods as appropriate       INTERVENTIONS:  - Monitor oral intake, urinary output, labs, and treatment plans  - Assess nutrition and hydration status and recommend course of action  - Evaluate amount of meals eaten  - Assist patient with eating if necessary   - Allow adequate time for meals  - Recommend/ encourage appropriate diets, oral nutritional supplements, and vitamin/mineral supplements  - Order, calculate, and assess calorie counts as needed  - Recommend, monitor, and adjust tube feedings and TPN/PPN based on assessed needs  - Assess need for intravenous fluids  - Provide specific nutrition/hydration education as appropriate  - Include patient/family/caregiver in decisions related to nutrition  Outcome: Progressing     Problem: PAIN - ADULT  Goal: Verbalizes/displays adequate comfort level or baseline comfort level  Description: Interventions:  - Encourage patient to monitor pain and request assistance  - Assess pain using appropriate pain scale  - Administer analgesics based on type and severity of pain and evaluate response  - Implement non-pharmacological measures as appropriate and evaluate response  - Consider cultural and social influences on pain and pain management  - Notify physician/advanced practitioner if interventions unsuccessful or patient reports new pain  Outcome: Progressing     Problem: INFECTION - ADULT  Goal: Absence or prevention of progression during hospitalization  Description: INTERVENTIONS:  - Assess and monitor for signs and symptoms of infection  - Monitor lab/diagnostic results  - Monitor all insertion sites, i e  indwelling lines, tubes, and drains  - Monitor endotracheal if appropriate and nasal secretions for changes in amount and color  - [place] appropriate cooling/warming therapies per order  - Administer medications as ordered  - Instruct and encourage patient and family to use good hand hygiene technique  - Identify and instruct in appropriate isolation precautions for identified infection/condition  Outcome: Progressing     Problem: INFECTION - ADULT  Goal: Absence of fever/infection during neutropenic period  Description: INTERVENTIONS:  - Monitor WBC    Outcome: Progressing     Problem: DISCHARGE PLANNING  Goal: Discharge to home or other facility with appropriate resources  Description: INTERVENTIONS:  - Identify barriers to discharge w/patient and caregiver  - Arrange for needed discharge resources and transportation as appropriate  - Identify discharge learning needs (meds, wound care, etc )  - Arrange for interpretive services to assist at discharge as needed  - Refer to Case Management Department for coordinating discharge planning if the patient needs post-hospital services based on physician/advanced practitioner order or complex needs related to functional status, cognitive ability, or social support system  Outcome: Progressing     Problem: Knowledge Deficit  Goal: Patient/family/caregiver demonstrates understanding of disease process, treatment plan, medications, and discharge instructions  Description: Complete learning assessment and assess knowledge base    Interventions:  - Provide teaching at level of understanding  - Provide teaching via preferred learning methods  Outcome: Progressing     Problem: SAFETY ADULT  Goal: Maintain or return to baseline ADL function  Description: INTERVENTIONS:  -  Assess patient's ability to carry out ADLs; assess patient's baseline for ADL function and identify physical deficits which impact ability to perform ADLs (bathing, care of mouth/teeth, toileting, grooming, dressing, etc )  - Assess/evaluate cause of self-care deficits   - Assess range of motion  - Assess patient's mobility; develop plan if impaired  - Assess patient's need for assistive devices and provide as appropriate  - Encourage maximum independence but intervene and supervise when necessary  - Involve family in performance of ADLs  - Assess for home care needs following discharge   - Consider OT consult to assist with ADL evaluation and planning for discharge  - Provide patient education as appropriate  Outcome: Progressing     Problem: SAFETY ADULT  Goal: Patient will remain free of falls  Description: INTERVENTIONS:  - Educate patient/family on patient safety including physical limitations  - Instruct patient to call for assistance with activity   - Consult OT/PT to assist with strengthening/mobility   - Keep Call bell within reach  - Keep bed low and locked with side rails adjusted as appropriate  - Keep care items and personal belongings within reach  - Initiate and maintain comfort rounds  - Make Fall Risk Sign visible to staff  - Offer Toileting in advance of need  - Initiate/Maintain bed alarm  - Obtain necessary fall risk management equipment:   - Apply yellow socks and bracelet for high fall risk patients  - Consider moving patient to room near nurses station  Outcome: Progressing     Problem: SAFETY ADULT  Goal: Maintains/Returns to pre admission functional level  Description: INTERVENTIONS:  - Perform BMAT or MOVE assessment daily    - Set and communicate daily mobility goal to care team and patient/family/caregiver     - Collaborate with rehabilitation services on mobility goals if consulted  - Record patient progress and toleration of activity level   Outcome: Progressing

## 2022-12-04 NOTE — ASSESSMENT & PLAN NOTE
· Nonverbal at baseline w/bilatereral UE/LE contractures  · Continue precedex for anxiety, goal RASS 0  · Resides in group home

## 2022-12-04 NOTE — QUICK NOTE
Chart reviewed  Patient not seen/examined  His sodium trends are stable off of D5W gtt and on FWF  Cr trends stable  Metabolic stable  Urine osm not consistent with a complete DI picture  Cannot exclude partial necessarily, but in shock state not appropriate to evaluate right now  Likely insensible losses in combination with sodium IVF without appropriate free water intake caused hypernatremia  He is no longer polyuric-- UOP 1 5 L, this goes against DI as well  Nephrology will sign off, please call with questions or if Na trends rise  Defer FWF to primary service as of now

## 2022-12-04 NOTE — NURSING NOTE
1300- Wound care completed to B/L lower extremities, patient did not tolerate well with -160, RR 50's  Per provider, allow patient to rest and calm before attempting to complete other wound care  Will continue to attempt as patient tolerates  Pt wedged and repositioned  1800- All wound care completed at this time per orders

## 2022-12-04 NOTE — QUICK NOTE
72-year-old male with history intellectual disability and deconditioning who is a group home resident  He was admitted under ums service with sepsis started on IV antibiotics  Patient subsequently was transferred to ICU due to large amount of coffee-ground emesis and forearm peg tube output  Patient was intubated on 11/29 due to acute hypoxic respiratory failure and extubated 12/3  He received blood transfusion  Verbal report received from the ICU team  Patient is now on SL service  Medical management per progress note from prior today

## 2022-12-05 ENCOUNTER — APPOINTMENT (INPATIENT)
Dept: NON INVASIVE DIAGNOSTICS | Facility: HOSPITAL | Age: 67
End: 2022-12-05

## 2022-12-05 PROBLEM — I82.621 ACUTE DEEP VEIN THROMBOSIS (DVT) OF RIGHT UPPER EXTREMITY (HCC): Status: ACTIVE | Noted: 2022-12-05

## 2022-12-05 PROBLEM — M79.89 SWELLING OF ARM: Status: ACTIVE | Noted: 2022-12-05

## 2022-12-05 LAB
ANION GAP SERPL CALCULATED.3IONS-SCNC: 7 MMOL/L (ref 4–13)
BACTERIA BLD CULT: NORMAL
BUN SERPL-MCNC: 6 MG/DL (ref 5–25)
CALCIUM SERPL-MCNC: 8.7 MG/DL (ref 8.4–10.2)
CHLORIDE SERPL-SCNC: 104 MMOL/L (ref 96–108)
CO2 SERPL-SCNC: 32 MMOL/L (ref 21–32)
CREAT SERPL-MCNC: 0.31 MG/DL (ref 0.6–1.3)
ERYTHROCYTE [DISTWIDTH] IN BLOOD BY AUTOMATED COUNT: 21.2 % (ref 11.6–15.1)
GFR SERPL CREATININE-BSD FRML MDRD: 138 ML/MIN/1.73SQ M
GLUCOSE SERPL-MCNC: 101 MG/DL (ref 65–140)
GLUCOSE SERPL-MCNC: 101 MG/DL (ref 65–140)
GLUCOSE SERPL-MCNC: 87 MG/DL (ref 65–140)
GLUCOSE SERPL-MCNC: 96 MG/DL (ref 65–140)
GLUCOSE SERPL-MCNC: 98 MG/DL (ref 65–140)
HCT VFR BLD AUTO: 30.9 % (ref 36.5–49.3)
HGB BLD-MCNC: 9.1 G/DL (ref 12–17)
MAGNESIUM SERPL-MCNC: 2 MG/DL (ref 1.9–2.7)
MCH RBC QN AUTO: 25.3 PG (ref 26.8–34.3)
MCHC RBC AUTO-ENTMCNC: 29.4 G/DL (ref 31.4–37.4)
MCV RBC AUTO: 86 FL (ref 82–98)
PHOSPHATE SERPL-MCNC: 3 MG/DL (ref 2.3–4.1)
PLATELET # BLD AUTO: 571 THOUSANDS/UL (ref 149–390)
PMV BLD AUTO: 9.1 FL (ref 8.9–12.7)
POTASSIUM SERPL-SCNC: 3.1 MMOL/L (ref 3.5–5.3)
RBC # BLD AUTO: 3.59 MILLION/UL (ref 3.88–5.62)
SODIUM SERPL-SCNC: 143 MMOL/L (ref 135–147)
WBC # BLD AUTO: 17.43 THOUSAND/UL (ref 4.31–10.16)

## 2022-12-05 RX ORDER — POTASSIUM CHLORIDE 20MEQ/15ML
40 LIQUID (ML) ORAL ONCE
Status: COMPLETED | OUTPATIENT
Start: 2022-12-05 | End: 2022-12-05

## 2022-12-05 RX ORDER — METOPROLOL TARTRATE 5 MG/5ML
5 INJECTION INTRAVENOUS ONCE
Status: COMPLETED | OUTPATIENT
Start: 2022-12-05 | End: 2022-12-05

## 2022-12-05 RX ADMIN — POTASSIUM CHLORIDE 40 MEQ: 20 SOLUTION ORAL at 13:25

## 2022-12-05 RX ADMIN — ACETAMINOPHEN 650 MG: 325 TABLET ORAL at 06:11

## 2022-12-05 RX ADMIN — COLLAGENASE SANTYL: 250 OINTMENT TOPICAL at 09:13

## 2022-12-05 RX ADMIN — METOPROLOL TARTRATE 5 MG: 5 INJECTION, SOLUTION INTRAVENOUS at 04:09

## 2022-12-05 RX ADMIN — HYDROMORPHONE HYDROCHLORIDE 5 MG: 2 TABLET ORAL at 13:26

## 2022-12-05 RX ADMIN — CHLORHEXIDINE GLUCONATE 0.12% ORAL RINSE 15 ML: 1.2 LIQUID ORAL at 22:24

## 2022-12-05 RX ADMIN — APIXABAN 10 MG: 5 TABLET, FILM COATED ORAL at 18:25

## 2022-12-05 RX ADMIN — HEPARIN SODIUM 5000 UNITS: 5000 INJECTION INTRAVENOUS; SUBCUTANEOUS at 13:24

## 2022-12-05 RX ADMIN — SENNOSIDES AND DOCUSATE SODIUM 1 TABLET: 50; 8.6 TABLET ORAL at 18:25

## 2022-12-05 RX ADMIN — SENNOSIDES AND DOCUSATE SODIUM 1 TABLET: 50; 8.6 TABLET ORAL at 09:14

## 2022-12-05 RX ADMIN — CEFAZOLIN SODIUM 2000 MG: 2 SOLUTION INTRAVENOUS at 05:46

## 2022-12-05 RX ADMIN — LACTULOSE 20 G: 20 SOLUTION ORAL at 22:05

## 2022-12-05 RX ADMIN — CHLORHEXIDINE GLUCONATE 0.12% ORAL RINSE 15 ML: 1.2 LIQUID ORAL at 09:10

## 2022-12-05 RX ADMIN — CEFAZOLIN SODIUM 2000 MG: 2 SOLUTION INTRAVENOUS at 13:19

## 2022-12-05 RX ADMIN — HYDROMORPHONE HYDROCHLORIDE 5 MG: 2 TABLET ORAL at 18:24

## 2022-12-05 RX ADMIN — Medication 20 MG: at 22:33

## 2022-12-05 RX ADMIN — HYDROMORPHONE HYDROCHLORIDE 5 MG: 2 TABLET ORAL at 10:05

## 2022-12-05 RX ADMIN — CEFAZOLIN SODIUM 2000 MG: 2 SOLUTION INTRAVENOUS at 22:05

## 2022-12-05 RX ADMIN — LACTULOSE 20 G: 20 SOLUTION ORAL at 09:53

## 2022-12-05 RX ADMIN — HYDROMORPHONE HYDROCHLORIDE 5 MG: 2 TABLET ORAL at 02:18

## 2022-12-05 RX ADMIN — LACTULOSE 20 G: 20 SOLUTION ORAL at 18:24

## 2022-12-05 RX ADMIN — HYDROMORPHONE HYDROCHLORIDE 5 MG: 2 TABLET ORAL at 22:23

## 2022-12-05 RX ADMIN — HEPARIN SODIUM 5000 UNITS: 5000 INJECTION INTRAVENOUS; SUBCUTANEOUS at 05:46

## 2022-12-05 RX ADMIN — Medication 20 MG: at 09:53

## 2022-12-05 RX ADMIN — HYDROMORPHONE HYDROCHLORIDE 5 MG: 2 TABLET ORAL at 05:46

## 2022-12-05 RX ADMIN — POLYETHYLENE GLYCOL 3350 17 G: 17 POWDER, FOR SOLUTION ORAL at 09:13

## 2022-12-05 NOTE — NUTRITION
12/05/22 1231   Feeding Route   Tube Feeding PEG tube   Formula Jevity 1 2   Formula rate Jevity 1 2 at 50 mL/hr continuous   Total Free Water  960 mL  (from TF, flushes, and IV)   Total EN Volume 800 mL  (mL TF received in 24 hours)   Total Kcal intake 960   Protein Intake (g) 44 g   Current PO Intake   Estimated calorie intake compared to estimated need Nutrition needs not met at this time  Recommendations/Interventions   Summary TF stopped 12/3 for increased residuals per RN note  Pt extubated to 6L NC on 12/3  TF restarted and then held again on 12/4 at 2100 for two episodes of emesis  Per RN, TF running at present  Nutrition needs not met at this time  To decrease total TF volume administered via PEG, recommend goal of Jevity 1 5 at 40 mL/hr with Prosource No Carb Liquid Protein TID to provide 1620 kcal, 106 g protein, 729 mL free water in 960 mL total TF volume  Pt is full code  Interventions/Recommendations Adjust EN/ PN;Tube feeding recs provided;Monitor I & O's   Recommendations to Provider To decrease total TF volume administered via PEG, recommend goal of Jevity 1 5 at 40 mL/hr with Prosource No Carb Liquid Protein TID to provide 1620 kcal, 106 g protein, 729 mL free water in 960 mL total TF volume     Education Assessment   Education Education not indicated at this time;Patient/caregiver not appropriate for education at this time

## 2022-12-05 NOTE — RESPIRATORY THERAPY NOTE
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, stage 4 (HCC)    Pressure injury of left heel, stage 4 (HCC)    Developmental disability    MSSA bacteremia    Hypernatremia    GIB (gastrointestinal bleeding)    Acute respiratory failure with hypoxia (HCC)    Acute blood loss anemia (ABLA)    Aspiration pneumonia (HCC)    Chronic indwelling Marrufo catheter      Home Pulmonary Medications:         History reviewed  No pertinent past medical history  Social History     Socioeconomic History    Marital status: Single     Spouse name: None    Number of children: None    Years of education: None    Highest education level: None   Occupational History    None   Tobacco Use    Smoking status: Never    Smokeless tobacco: Never   Vaping Use    Vaping Use: Never used   Substance and Sexual Activity    Alcohol use: Never    Drug use: Never    Sexual activity: Never   Other Topics Concern    None   Social History Narrative    None     Social Determinants of Health     Financial Resource Strain: Not on file   Food Insecurity: No Food Insecurity    Worried About Running Out of Food in the Last Year: Never true    Ran Out of Food in the Last Year: Never true   Transportation Needs: No Transportation Needs    Lack of Transportation (Medical): No    Lack of Transportation (Non-Medical):  No   Physical Activity: Not on file   Stress: Not on file   Social Connections: Not on file   Intimate Partner Violence: Not on file   Housing Stability: Low Risk     Unable to Pay for Housing in the Last Year: No    Number of Places Lived in the Last Year: 1    Unstable Housing in the Last Year: No       Subjective         Objective    Physical Exam:   Assessment Type: Assess only  General Appearance: Awake  Respiratory Pattern: Normal  Chest Assessment: Chest expansion symmetrical  Bilateral Breath Sounds: Coarse, Diminished    Vitals:  Blood pressure 142/67, pulse (!) 110, temperature (!) 100 6 °F (38 1 °C), resp  rate (!) 32, height 5' 5" (1 651 m), weight 60 4 kg (133 lb 2 5 oz), SpO2 93 %  Results from last 7 days   Lab Units 11/29/22  1711 11/29/22  1000   PH ART  7 415 7 414   PCO2 ART mm Hg 44 1* 40 8   PO2 ART mm Hg 83 0 100 2   HCO3 ART mmol/L 27 6 25 5   BASE EXC ART mmol/L 2 8 0 9   O2 CONTENT ART mL/dL 11 0* 8 4*   O2 HGB, ARTERIAL % 94 2 95 8   ABG SOURCE   --  Line, Arterial       Imaging and other studies: I have personally reviewed pertinent reports        O2 Device: vent     Plan    Respiratory Plan: Discontinue Protocol        Resp Comments: pt has no resp needs at this time will discont protocol has not needed at resp meds, was extubated from vent 12/3

## 2022-12-05 NOTE — PLAN OF CARE
Problem: Prexisting or High Potential for Compromised Skin Integrity  Goal: Skin integrity is maintained or improved  Description: INTERVENTIONS:  - Identify patients at risk for skin breakdown  - Assess and monitor skin integrity  - Assess and monitor nutrition and hydration status  - Monitor labs   - Assess for incontinence   - Turn and reposition patient  - Assist with mobility/ambulation  - Relieve pressure over bony prominences  - Avoid friction and shearing  - Provide appropriate hygiene as needed including keeping skin clean and dry  - Evaluate need for skin moisturizer/barrier cream  - Collaborate with interdisciplinary team   - Patient/family teaching  - Consider wound care consult   Outcome: Progressing     Problem: Nutrition/Hydration-ADULT  Goal: Nutrient/Hydration intake appropriate for improving, restoring or maintaining nutritional needs  Description: Monitor and assess patient's nutrition/hydration status for malnutrition  Collaborate with interdisciplinary team and initiate plan and interventions as ordered  Monitor patient's weight and dietary intake as ordered or per policy  Utilize nutrition screening tool and intervene as necessary  Determine patient's food preferences and provide high-protein, high-caloric foods as appropriate       INTERVENTIONS:  - Monitor oral intake, urinary output, labs, and treatment plans  - Assess nutrition and hydration status and recommend course of action  - Evaluate amount of meals eaten  - Assist patient with eating if necessary   - Allow adequate time for meals  - Recommend/ encourage appropriate diets, oral nutritional supplements, and vitamin/mineral supplements  - Order, calculate, and assess calorie counts as needed  - Recommend, monitor, and adjust tube feedings and TPN/PPN based on assessed needs  - Assess need for intravenous fluids  - Provide specific nutrition/hydration education as appropriate  - Include patient/family/caregiver in decisions related to nutrition  Outcome: Progressing     Problem: PAIN - ADULT  Goal: Verbalizes/displays adequate comfort level or baseline comfort level  Description: Interventions:  - Encourage patient to monitor pain and request assistance  - Assess pain using appropriate pain scale  - Administer analgesics based on type and severity of pain and evaluate response  - Implement non-pharmacological measures as appropriate and evaluate response  - Consider cultural and social influences on pain and pain management  - Notify physician/advanced practitioner if interventions unsuccessful or patient reports new pain  Outcome: Progressing     Problem: INFECTION - ADULT  Goal: Absence or prevention of progression during hospitalization  Description: INTERVENTIONS:  - Assess and monitor for signs and symptoms of infection  - Monitor lab/diagnostic results  - Monitor all insertion sites, i e  indwelling lines, tubes, and drains  - Monitor endotracheal if appropriate and nasal secretions for changes in amount and color  - Lovilia appropriate cooling/warming therapies per order  - Administer medications as ordered  - Instruct and encourage patient and family to use good hand hygiene technique  - Identify and instruct in appropriate isolation precautions for identified infection/condition  Outcome: Progressing  Goal: Absence of fever/infection during neutropenic period  Description: INTERVENTIONS:  - Monitor WBC    Outcome: Progressing     Problem: DISCHARGE PLANNING  Goal: Discharge to home or other facility with appropriate resources  Description: INTERVENTIONS:  - Identify barriers to discharge w/patient and caregiver  - Arrange for needed discharge resources and transportation as appropriate  - Identify discharge learning needs (meds, wound care, etc )  - Arrange for interpretive services to assist at discharge as needed  - Refer to Case Management Department for coordinating discharge planning if the patient needs post-hospital services based on physician/advanced practitioner order or complex needs related to functional status, cognitive ability, or social support system  Outcome: Progressing     Problem: Knowledge Deficit  Goal: Patient/family/caregiver demonstrates understanding of disease process, treatment plan, medications, and discharge instructions  Description: Complete learning assessment and assess knowledge base    Interventions:  - Provide teaching at level of understanding  - Provide teaching via preferred learning methods  Outcome: Progressing     Problem: SAFETY,RESTRAINT: NV/NON-SELF DESTRUCTIVE BEHAVIOR  Goal: Remains free of harm/injury (restraint for non violent/non self-detsructive behavior)  Description: INTERVENTIONS:  - Instruct patient/family regarding restraint use   - Assess and monitor physiologic and psychological status   - Provide interventions and comfort measures to meet assessed patient needs   - Identify and implement measures to help patient regain control  - Assess readiness for release of restraint   Outcome: Progressing  Goal: Returns to optimal restraint-free functioning  Description: INTERVENTIONS:  - Assess the patient's behavior and symptoms that indicate continued need for restraint  - Identify and implement measures to help patient regain control  - Assess readiness for release of restraint   Outcome: Progressing     Problem: SAFETY ADULT  Goal: Patient will remain free of falls  Description: INTERVENTIONS:  - Educate patient/family on patient safety including physical limitations  - Instruct patient to call for assistance with activity   - Consult OT/PT to assist with strengthening/mobility   - Keep Call bell within reach  - Keep bed low and locked with side rails adjusted as appropriate  - Keep care items and personal belongings within reach  - Initiate and maintain comfort rounds  - Make Fall Risk Sign visible to staff  - Offer Toileting in advance of need  - Initiate/Maintain bed alarm  - Obtain necessary fall risk management equipment:   - Apply yellow socks and bracelet for high fall risk patients  - Consider moving patient to room near nurses station  Outcome: Progressing  Goal: Maintain or return to baseline ADL function  Description: INTERVENTIONS:  -  Assess patient's ability to carry out ADLs; assess patient's baseline for ADL function and identify physical deficits which impact ability to perform ADLs (bathing, care of mouth/teeth, toileting, grooming, dressing, etc )  - Assess/evaluate cause of self-care deficits   - Assess range of motion  - Assess patient's mobility; develop plan if impaired  - Assess patient's need for assistive devices and provide as appropriate  - Encourage maximum independence but intervene and supervise when necessary  - Involve family in performance of ADLs  - Assess for home care needs following discharge   - Consider OT consult to assist with ADL evaluation and planning for discharge  - Provide patient education as appropriate  Outcome: Progressing  Goal: Maintains/Returns to pre admission functional level  Description: INTERVENTIONS:  - Perform BMAT or MOVE assessment daily    - Set and communicate daily mobility goal to care team and patient/family/caregiver     - Collaborate with rehabilitation services on mobility goals if consulted  - Record patient progress and toleration of activity level   Outcome: Progressing

## 2022-12-05 NOTE — ASSESSMENT & PLAN NOTE
· Originally admitted for shock, patient developed tachypnea, tachycardia, and hypotension and noted to be in respiratory distress possibly due to aspiration (patient has PEG tube, receives tube feeding)  · Patient was witnessed to have a large amount of coffee-ground emesis while being suctioned in preparation for intubation  Patient was intubated and upgraded to the ICU at that time    He was extubated on 12/03  · He received 2 units of packed red blood cells, followed by a 3rd and 1 unit FFP  · GI recommendations appreciated  · Patient downgraded to medical service on 12/04/2022  · Hemoglobin is stable  · Heparin had been resumed on 12/01/2022, and on 12/05, patient was noted to have a DVT of the right upper extremity and started on Eliquis  · Monitor for bleeding, monitor hemoglobin

## 2022-12-05 NOTE — ASSESSMENT & PLAN NOTE
· S/p bedside debridement with surgery 11/26  · Continue Ancef, D3  (s/p 5 days of Cefepime)  · Wound care

## 2022-12-05 NOTE — ASSESSMENT & PLAN NOTE
· Sepsis was present on admission as evidenced by tachycardia, leukocytosis, and fever  · Status post initial treatment with IV vancomycin  · S/p 5 days of IV cefepime, which was transitioned to cefazolin, currently day 8 of antibiotics  · 1/2 blood cultures from 11/25/2022 revealed Staph aureus -Staph epidermidis, however a methicillin resistance gene was positive  · Repeat blood cultures from 11/28/2022 were negative  · Repeat blood cultures from 12/03/2022 are pending  · Wound cultures- also positive for Proteus and Staph aureus as well as Psuedomonas  · Infectious Disease recommendations appreciated

## 2022-12-05 NOTE — NURSING NOTE
2100: Noted with two large bouts of emesis that appear to be tube feeding  Tube feeding immediately held  HOB raised  Mouth suctioned  PRN zofran given  0 residual noted from feeding tube  SLIM made aware of emesis and VS  Orders received  5 mg IV lopressor given  0000: No further emesis at this time  Routine medications given    0350: HR and BP elevated  SLIM made aware  Orders received and lopressor 5 mg given at this time  Noted to be effective

## 2022-12-05 NOTE — PROGRESS NOTES
[address]  Progress Note Cintia Hardwick 1955, 74 y o  male MRN: [id number]  Unit/Bed#: -01 Encounter: [id number]  Primary Care Provider: Radha Bailey MD   Date and time admitted to hospital: 11/25/2022  2:30 PM    * Shock [facility])  Assessment & Plan  · Sepsis was present on admission as evidenced by tachycardia, leukocytosis, and fever  · Status post initial treatment with IV vancomycin  · S/p 5 days of IV cefepime, which was transitioned to cefazolin, currently day 8 of antibiotics  · 1/2 blood cultures from 11/25/2022 revealed Staph aureus -Staph epidermidis, however a methicillin resistance gene was positive  · Repeat blood cultures from 11/28/2022 were negative  · Repeat blood cultures from 12/03/2022 are pending  · Wound cultures- also positive for Proteus and Staph aureus as well as Psuedomonas  · Infectious Disease recommendations appreciated    GIB (gastrointestinal bleeding)  Assessment & Plan  · Originally admitted for shock, patient developed tachypnea, tachycardia, and hypotension and noted to be in respiratory distress possibly due to aspiration (patient has PEG tube, receives tube feeding)  · Patient was witnessed to have a large amount of coffee-ground emesis while being suctioned in preparation for intubation  Patient was intubated and upgraded to the ICU at that time    He was extubated on 12/03  · He received 2 units of packed red blood cells, followed by a 3rd and 1 unit FFP  · GI recommendations appreciated  · Patient downgraded to medical service on 12/04/2022  · Hemoglobin is stable  · Heparin had been resumed on 12/01/2022, and on 12/05, patient was noted to have a DVT of the right upper extremity and started on Eliquis  · Monitor for bleeding, monitor hemoglobin      Acute deep vein thrombosis (DVT) of right upper extremity (HCC)  Assessment & Plan  · Right arm swelling and tenderness noted  · Venous duplex of right upper extremity was positive for DVT  · Oxygen saturation 95-96% on 2 L  Patient has been persistently tachycardic since admission  · Start Eliquis 10 mg twice daily for 7 days, transition to 5 mg twice daily thereafter  · Monitor vital signs, labs, serial assessments  · Will hold off on obtaining further imaging as treatment for DVT has been initiated    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, stage 4 (HCC)  Assessment & Plan  · S/p bedside debridement with surgery 11/26  · Continue Ancef, D3  (s/p 5 days of Cefepime)  · Wound care      Hypernatremia  Assessment & Plan  · Sodium 150 > 143  · Etiology unclear, possibly due to dehydration from high output from colostomy vs volume expansion  · Nephrology recommendations appreciated  · Continue to monitor sodium    MSSA bacteremia  Assessment & Plan  · Management as outlined above    Developmental disability  Assessment & Plan  · Patient with profound development disability, nonverbal, bed bound  · Patient on tube feeds only with 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    Pressure injury of left heel, stage 4 (HCC)  Assessment & Plan  · S/p bedside debridement with surgery 11/26  · Continue management as outlined above    VTE Pharmacologic Prophylaxis:   Pharmacologic: Heparin  Mechanical VTE Prophylaxis in Place: Yes    Patient Centered Rounds: I have performed bedside rounds with nursing staff today  Discussions with Specialists or Other Care Team Provider:  Nursing, Case Management    Education and Discussions with Family / Patient:  Caregiver at bedside    Time Spent for Care: 30 minutes  More than 50% of total time spent on counseling and coordination of care as described above  Current Length of Stay: 10 day(s)    Current Patient Status: Inpatient   Certification Statement: The patient will continue to require additional inpatient hospital stay due to per plan above  Discharge Plan: To be determined      Code Status: Level 1 - Full Code      Subjective:   Patient seen and examined  Unable to obtain review of systems  Objective:     Vitals:   Temp (24hrs), Av 2 °F (37 9 °C), Min:99 5 °F (37 5 °C), Max:100 9 °F (38 3 °C)    Temp:  [99 5 °F (37 5 °C)-100 9 °F (38 3 °C)] 99 9 °F (37 7 °C)  HR:  [109-141] 124  Resp:  [20-51] 20  BP: (116-190)/() 159/87  SpO2:  [87 %-98 %] 95 %  Body mass index is 22 27 kg/m²  Input and Output Summary (last 24 hours): Intake/Output Summary (Last 24 hours) at 2022 1920  Last data filed at 2022 1530  Gross per 24 hour   Intake 760 ml   Output 1950 ml   Net -1190 ml       Physical Exam:     Physical Exam  Vitals and nursing note reviewed  Constitutional:       General: He is not in acute distress  Appearance: He is ill-appearing  HENT:      Head: Normocephalic and atraumatic  Mouth/Throat:      Mouth: Mucous membranes are dry  Pharynx: Oropharynx is clear  Eyes:      Pupils: Pupils are equal, round, and reactive to light  Cardiovascular:      Rate and Rhythm: Normal rate and regular rhythm  Pulses: Normal pulses  Pulmonary:      Effort: Pulmonary effort is normal  No respiratory distress  Breath sounds: No decreased air movement  Decreased breath sounds present  No wheezing or rales  Abdominal:      General: Bowel sounds are normal       Palpations: Abdomen is soft  Tenderness: There is no abdominal tenderness  Genitourinary:     Comments: Marrufo catheter in place for yellow urine  Musculoskeletal:      Cervical back: Neck supple  Skin:     General: Skin is warm and dry  Capillary Refill: Capillary refill takes less than 2 seconds  Comments: Dressings to multiple wounds as pictured in media with dressings clean dry and intact   Neurological:      General: No focal deficit present  Mental Status: He is alert         Additional Data:     Labs:    Results from last 7 days   Lab Units 22  0557 22  0436 22  5364 11/29/22  0939   WBC Thousand/uL 17 43* 14 22*   < > 27 40*   HEMOGLOBIN g/dL 9 1* 8 3*   < > 5 3*   HEMATOCRIT % 30 9* 28 0*   < > 19 0*   PLATELETS Thousands/uL 571* 402*   < > 568*   BANDS PCT %  --   --   --  9*   NEUTROS PCT %  --  68   < >  --    LYMPHS PCT %  --  14   < >  --    LYMPHO PCT %  --   --   --  2*   MONOS PCT %  --  13*   < >  --    MONO PCT %  --   --   --  2*   EOS PCT %  --  4   < > 0    < > = values in this interval not displayed  Results from last 7 days   Lab Units 12/05/22  0557 12/01/22  0611 11/30/22  0642   SODIUM mmol/L 143   < > 137   POTASSIUM mmol/L 3 1*   < > 3 4*   CHLORIDE mmol/L 104   < > 102   CO2 mmol/L 32   < > 28   BUN mg/dL 6   < > 5   CREATININE mg/dL 0 31*   < > 0 23*   ANION GAP mmol/L 7   < > 7   CALCIUM mg/dL 8 7   < > 7 9*   ALBUMIN g/dL  --   --  2 1*   TOTAL BILIRUBIN mg/dL  --   --  0 56   ALK PHOS U/L  --   --  101   ALT U/L  --   --  19   AST U/L  --   --  20   GLUCOSE RANDOM mg/dL 87   < > 100    < > = values in this interval not displayed  Results from last 7 days   Lab Units 11/29/22  0939   INR  1 46*     Results from last 7 days   Lab Units 12/05/22  1819 12/05/22  1145 12/05/22  0600 12/05/22  0013 12/04/22  1803 12/04/22  1132 12/04/22  0009 12/03/22  1729 12/03/22  1145 12/03/22  0009 12/02/22  1755 12/02/22  1215   POC GLUCOSE mg/dl 98 101 101 96 98 112 93 125 119 95 207* 210*         Results from last 7 days   Lab Units 12/04/22  0436 12/03/22  0820 12/03/22  0444 11/30/22  0642 11/29/22  1703 11/29/22  0939   LACTIC ACID mmol/L  --  1 7  --   --   --  1 2   PROCALCITONIN ng/ml 1 59*  --  0 67* 3 13* 3 30*  --            * I Have Reviewed All Lab Data Listed Above  * Additional Pertinent Lab Tests Reviewed: [address] Admission Reviewed    Recent Cultures (last 7 days):     Results from last 7 days   Lab Units 12/03/22  0836 12/03/22  0825 11/30/22  0446 11/29/22  2041 11/29/22  0949   BLOOD CULTURE  No Growth at 24 hrs   No Growth at 24 hrs   --  No Growth After 5 Days  No Growth After 5 Days  SPUTUM CULTURE   --   --  4+ Growth of Candida tropicalis*  4+ Growth of Candida glabrata*  1+ Growth of Achromobacter xylosoxidans*  Few Colonies of  --   --    GRAM STAIN RESULT   --   --  2+ Polys*  1+ Budding yeast*  --   --        Last 24 Hours Medication List:   Current Facility-Administered Medications   Medication Dose Route Frequency Provider Last Rate   • acetaminophen  650 mg Per G Tube Q4H PRN AVANI Caal     • apixaban  10 mg Oral BID AVANI Bauer     • [START ON 12/12/2022] apixaban  5 mg Oral BID AVANI Bauer     • cefazolin  2,000 mg Intravenous Q8H AVANI Caal 2,000 mg (12/05/22 1319)   • chlorhexidine  15 mL Mouth/Throat Q12H [address] Maritza Ting, YAHIRNP     • collagenase   Topical Daily Maritza TingAVANI     • HYDROmorphone  1 mg Intravenous Q2H PRN Maritza TingAVANI     • HYDROmorphone  5 mg Oral Q4H Maritza Ting, CRNP     • insulin lispro  1-5 Units Subcutaneous Q6H [address] Maritza Ting, YAHIRNP     • lactulose  20 g Oral TID Maritza Ting, CRNP     • omeprazole (PRILOSEC) suspension 2 mg/mL  20 mg Oral BID Maritza Ting, CRNP     • ondansetron  4 mg Intravenous Q6H PRN Maritza TingAVANI     • polyethylene glycol  17 g Oral Daily Maritza Ting, AVANI     • senna-docusate sodium  1 tablet Oral BID Maritza Ting, AVANI          Today, Patient Was Seen By: AVANI Bauer    ** Please Note: Dictation voice to text software may have been used in the creation of this document   **

## 2022-12-05 NOTE — PROGRESS NOTES
Progress Note - [facility]   Colletta Seitz 74 y o  male MRN: [id number]  Unit/Bed#: ICU 05-01 Encounter: [id number]      IMPRESSION & RECOMMENDATIONS:   1  Sepsis, present on admission, evidenced by tachycardia and leukocytosis  Patient now continues to be febrile  Lactic acid and PCT initially normal  1 of 2 admission bl cx positive  All sets of repeat blood cultures are negative  Suspect sepsis initially secondary to #2, #3, now complicated by #0  ZZ completed 7d course of IV cefepime now on IV ancef for MSSA bacteremia  CT C/A/P negative for deep seated abscess or osteomyelitis, but did show possible opacities in the right middle and lower lobes  Patient is now extubated to NC with improved O2 sats, and continues to have low grade fevers with episodes of vomiting  Repeat CXR shows improving infiltrates  SIRS may be due to aspiration pneumonitis  Patient also with edematous and warm RUE  Consider possibility for development of thrombophlebitis vs DVT  -continue antibiotics as below  -if patient decompensates, would obtain repeat imaging of chest and change abx to IV vancomycin and ceftazidime   -check RUE venous duplex   -monitor temperature and hemodynamics  -management per primary team   -recheck CBC and BMP in a m      2  Polymicrobial bacteremia  One of 2 admission blood cultures positive for Gram-positive cocci in clusters, culture positive for MSSA and CoNS  Documented that it was drawn from patients peripheral line and pt known to be difficult stick with hx of extensive contractures  Set drawn peripherally is negative to date  Suspect CoNs is contaminant, however MSSA likely due to #3   2D echo is normal  No known intravascular devices   Repeat blood cultures x4 sets are negative to date    -continue IV ancef 2g q8 to complete course of abx from first negative blood cx through 12/11/22  -continue to follow-up blood cultures and adjust antibiotics as needed     3  Chronic, now infected, stage IV decubitus ulcers  POA   Despite outpatient wound care, found to have significant deterioration in wounds over the last few months, particularly the right upper back and sacral wound with increasing wound depth and necrotic tissue burden with evidence of purulence and malodor on admission  Patient also with unstageable sacral/right buttock ulcer, unstageable right ischial decubitus ulcer, and unstageable left heel pressure ulcer  Status post bedside debridement on 11/26  CT C/A/P negative for deep seated abscesses  Wound cultures from upper back and sacrum are polymicrobial, positive for Citrobacter koseri, Proteus mirabilis, Pseudomonas aeruginosa, and Staph aureus  Per wound care team/rigoberto, wounds still with yellowish drainage and appear to be tracking deeper  Unfortunately wounds are unlikely to heal and are at risk for reinfection due to iron deficiency anemia, protein calorie malnutrition, contractures, inability to offload pressure and inability for closure  Completed 7 day course of IV cefepime   -continue to address GOC  Although no OM noted on CT, with worsening wounds it is likely that patient will develop chronic infection  If unable to cover/secure wounds especially of sacrum and upper right back, OM not treatable long term      -daily wound care and surgery follow up      4  Left lateral foot stage IV pressure ulcer with suspect OM  POA  Now with left lateral foot ulcer and left heel ulcer both probing to bone  XR shows absence of the 5th metatarsal head which likely represents chronic osteomyelitis  No evidence of overlying cellulitis or infection   -continue to address [address]  In the setting of chronic wound with exposed bone and no plan/indication for surgical intervention, patients osteomyelitis is not treatable long-term  Likely to continue to have progression of wound despite local wound care given extensive contractures and inability to offload pressure   He remains at risk for recurrent infection, bacteremia, sepsis  -continue local wound care and offloading pressure      5  Acute respiratory failure with hypoxia  Patient emergently intubated in the setting of respiratory failure, aspiration and upper GI bleeding 11/29  Patient remained vented, sedated and on pressors  Now extubated to nasal cannula 12/3  S/p EGD which showed ulcerated area in stomach s/p clip  CT chest shows consolidative opacities in the right middle and lower lobes possibly due to pneumonia and atelectasis  Continues to have vomiting episodes  Consider possibility of aspiration pneumonitis versus pneumonia  Repeat chest x-ray shows improving opacities  Sputum cx while intubated shows growth of candida and Achromobacter    -monitor respiratory status and O2 requirements   -monitor hgb and transfuse PRN     6  RUE swelling and erythema  Patient had peripheral IV in RUE earlier in hospital stay that infiltrated  Also with bullous eruption of right arm  Consider possibility of underlying thrombophlebitis or DVT  Unlikely cellulitis given recent abx use    -check venous duplex RUE   -supportive care   -serial skin exams     7  Functional quadriplegia, developmental disability   Patient nonverbal and bed-bound at baseline on tube feeds, with chronic indwelling Marrufo catheter and colostomy  Unfortunately due to contractures, very difficult to reposition patient and offload pressure   -supportive measures per primary team     8  Antibiotic Allergy   Noted to have amoxicillin and Augmentin allergy without documented reaction   Per Care everywhere, has tolerated cefepime and ceftriaxone during prior admissions  -monitor for adverse drug reactions     Antibiotics:  D10 abx   D3 ancef    I have discussed the above management plan in detail with patient  I have discussed the above management plan in detail with patient's RN, and the primary service, SLIM  Subjective:  Patient non verbal  Now extubated  Objective:  Vitals:  Temp:  [99 °F (37 2 °C)-100 9 °F (38 3 °C)] 100 6 °F (38 1 °C)  HR:  [] 120  Resp:  [16-51] 29  BP: (104-163)/() 148/67  SpO2:  [87 %-99 %] 97 %  Temp (24hrs), Av 2 °F (37 9 °C), Min:99 °F (37 2 °C), Max:100 9 °F (38 3 °C)  Current: Temperature: (!) 100 6 °F (38 1 °C)    PHYSICAL EXAM:  General Appearance:  Awake and  Alert  Appearing chronically ill, nontoxic, and in no distress   HEENT: Normocephalic, without obvious abnormality, atraumatic  Conjunctiva pink and sclera anicteric  Oropharynx moist without lesions  Lungs:   Clear to auscultation bilaterally, respirations unlabored   Heart:  RRR; no murmur, rub or gallop   Abdomen:   Soft, non-tender, non-distended, positive bowel sounds, colostomy and PEG in place    Extremities: No cyanosis, clubbing or edema   Musculoskeletal: Paraplegic  Extensive contractures x 4 extremities   : No CVA or suprapubic tenderness  Marrufo patent  Skin: No rashes or lesions  Multiple wounds noted to sacrum and upper back, right ear, left foot, left hand  RUE bullous eruption  Warmth noted with swelling around elbow  Peripheral IV intact without evidence of erythema, warmth, or exudate  LABS, IMAGING, & OTHER STUDIES:  Lab Results:  I have personally reviewed pertinent labs    Results from last 7 days   Lab Units 22  0436 22  0444   WBC Thousand/uL 17 43* 14 22* 15 76*   HEMOGLOBIN g/dL 9 1* 8 3* 8 7*   PLATELETS Thousands/uL 571* 402* 492*     Results from last 7 days   Lab Units 22  0557 22  0436 22  0444 22  0611 22  0642 22  1703 22  0939   SODIUM mmol/L 143 144 144   < > 137 146 147   POTASSIUM mmol/L 3 1* 3 6 3 6   < > 3 4* 3 4* 3 6   CHLORIDE mmol/L 104 109* 109*   < > 102 112* 115*   CO2 mmol/L 32 32 30   < > 28 28 25   BUN mg/dL 6 8 8   < > 5 10 17   CREATININE mg/dL 0 31* 0 24* 0 30*   < > 0 23* 0 24* 0 28*   EGFR ml/min/1 73sq m 138 153 140   < > 156 154 145   CALCIUM mg/dL 8 7 8 2* 8 2*   < > 7 9* 7 5* 7 1*   AST U/L  --   --   --   --  20 24 32   ALT U/L  --   --   --   --  19 22 27   ALK PHOS U/L  --   --   --   --  101 87 85    < > = values in this interval not displayed  Results from last 7 days   Lab Units 12/03/22  0836 12/03/22  0825 11/30/22  0446 11/29/22  2041 11/29/22  0949 11/28/22  1234 11/28/22  1132   BLOOD CULTURE  No Growth at 24 hrs  No Growth at 24 hrs   --  No Growth After 5 Days  No Growth After 5 Days  --  No Growth After 5 Days  SPUTUM CULTURE   --   --  4+ Growth of Candida tropicalis*  4+ Growth of Candida glabrata*  1+ Growth of Achromobacter xylosoxidans*  Few Colonies of  --   --   --   --    GRAM STAIN RESULT   --   --  2+ Polys*  1+ Budding yeast*  --   --   --   --    MRSA CULTURE ONLY   --   --   --   --   --  Methicillin Resistant Staphylococcus aureus isolated*  This patient requires contact isolation precautions per [place] law  Contact precautions are not required in [place] for nasal surveillance cultures    --      Results from last 7 days   Lab Units 12/04/22  0436 12/03/22  0444 11/30/22  0642 11/29/22  1703   PROCALCITONIN ng/ml 1 59* 0 67* 3 13* 3 30*

## 2022-12-05 NOTE — ASSESSMENT & PLAN NOTE
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

## 2022-12-05 NOTE — ASSESSMENT & PLAN NOTE
· Right arm swelling and tenderness noted  · Venous duplex of right upper extremity was positive for DVT  · Oxygen saturation 95-96% on 2 L   Patient has been persistently tachycardic since admission  · Start Eliquis 10 mg twice daily for 7 days, transition to 5 mg twice daily thereafter  · Monitor vital signs, labs, serial assessments  · Will hold off on obtaining further imaging as treatment for DVT has been initiated

## 2022-12-06 PROBLEM — D62 ACUTE BLOOD LOSS ANEMIA (ABLA): Status: RESOLVED | Noted: 2022-11-29 | Resolved: 2022-12-06

## 2022-12-06 PROBLEM — J96.01 ACUTE RESPIRATORY FAILURE WITH HYPOXIA (HCC): Status: RESOLVED | Noted: 2022-11-29 | Resolved: 2022-12-06

## 2022-12-06 LAB
ANION GAP SERPL CALCULATED.3IONS-SCNC: 6 MMOL/L (ref 4–13)
BASOPHILS # BLD AUTO: 0.03 THOUSANDS/ÂΜL (ref 0–0.1)
BASOPHILS NFR BLD AUTO: 0 % (ref 0–1)
BUN SERPL-MCNC: 5 MG/DL (ref 5–25)
CALCIUM SERPL-MCNC: 8.2 MG/DL (ref 8.4–10.2)
CHLORIDE SERPL-SCNC: 108 MMOL/L (ref 96–108)
CO2 SERPL-SCNC: 30 MMOL/L (ref 21–32)
CREAT SERPL-MCNC: 0.25 MG/DL (ref 0.6–1.3)
EOSINOPHIL # BLD AUTO: 0.47 THOUSAND/ÂΜL (ref 0–0.61)
EOSINOPHIL NFR BLD AUTO: 4 % (ref 0–6)
ERYTHROCYTE [DISTWIDTH] IN BLOOD BY AUTOMATED COUNT: 21.2 % (ref 11.6–15.1)
GFR SERPL CREATININE-BSD FRML MDRD: 151 ML/MIN/1.73SQ M
GLUCOSE SERPL-MCNC: 104 MG/DL (ref 65–140)
GLUCOSE SERPL-MCNC: 108 MG/DL (ref 65–140)
GLUCOSE SERPL-MCNC: 120 MG/DL (ref 65–140)
GLUCOSE SERPL-MCNC: 123 MG/DL (ref 65–140)
GLUCOSE SERPL-MCNC: 125 MG/DL (ref 65–140)
GLUCOSE SERPL-MCNC: 125 MG/DL (ref 65–140)
HCT VFR BLD AUTO: 27.9 % (ref 36.5–49.3)
HGB BLD-MCNC: 8.4 G/DL (ref 12–17)
IMM GRANULOCYTES # BLD AUTO: 0.11 THOUSAND/UL (ref 0–0.2)
IMM GRANULOCYTES NFR BLD AUTO: 1 % (ref 0–2)
LYMPHOCYTES # BLD AUTO: 1.89 THOUSANDS/ÂΜL (ref 0.6–4.47)
LYMPHOCYTES NFR BLD AUTO: 15 % (ref 14–44)
MCH RBC QN AUTO: 25.1 PG (ref 26.8–34.3)
MCHC RBC AUTO-ENTMCNC: 30.1 G/DL (ref 31.4–37.4)
MCV RBC AUTO: 83 FL (ref 82–98)
MONOCYTES # BLD AUTO: 2.07 THOUSAND/ÂΜL (ref 0.17–1.22)
MONOCYTES NFR BLD AUTO: 16 % (ref 4–12)
NEUTROPHILS # BLD AUTO: 8.37 THOUSANDS/ÂΜL (ref 1.85–7.62)
NEUTS SEG NFR BLD AUTO: 64 % (ref 43–75)
NRBC BLD AUTO-RTO: 0 /100 WBCS
PLATELET # BLD AUTO: 542 THOUSANDS/UL (ref 149–390)
PMV BLD AUTO: 9.3 FL (ref 8.9–12.7)
POTASSIUM SERPL-SCNC: 3.3 MMOL/L (ref 3.5–5.3)
RBC # BLD AUTO: 3.35 MILLION/UL (ref 3.88–5.62)
SODIUM SERPL-SCNC: 144 MMOL/L (ref 135–147)
WBC # BLD AUTO: 12.94 THOUSAND/UL (ref 4.31–10.16)

## 2022-12-06 RX ADMIN — Medication 20 MG: at 10:30

## 2022-12-06 RX ADMIN — SENNOSIDES AND DOCUSATE SODIUM 1 TABLET: 50; 8.6 TABLET ORAL at 17:55

## 2022-12-06 RX ADMIN — POLYETHYLENE GLYCOL 3350 17 G: 17 POWDER, FOR SOLUTION ORAL at 10:25

## 2022-12-06 RX ADMIN — CEFAZOLIN SODIUM 2000 MG: 2 SOLUTION INTRAVENOUS at 22:19

## 2022-12-06 RX ADMIN — COLLAGENASE SANTYL: 250 OINTMENT TOPICAL at 10:36

## 2022-12-06 RX ADMIN — SENNOSIDES AND DOCUSATE SODIUM 1 TABLET: 50; 8.6 TABLET ORAL at 10:25

## 2022-12-06 RX ADMIN — ACETAMINOPHEN 650 MG: 325 TABLET ORAL at 06:02

## 2022-12-06 RX ADMIN — CEFAZOLIN SODIUM 2000 MG: 2 SOLUTION INTRAVENOUS at 15:27

## 2022-12-06 RX ADMIN — HYDROMORPHONE HYDROCHLORIDE 5 MG: 2 TABLET ORAL at 14:33

## 2022-12-06 RX ADMIN — HYDROMORPHONE HYDROCHLORIDE 5 MG: 2 TABLET ORAL at 17:57

## 2022-12-06 RX ADMIN — LACTULOSE 20 G: 20 SOLUTION ORAL at 10:24

## 2022-12-06 RX ADMIN — CHLORHEXIDINE GLUCONATE 0.12% ORAL RINSE 15 ML: 1.2 LIQUID ORAL at 22:22

## 2022-12-06 RX ADMIN — APIXABAN 10 MG: 5 TABLET, FILM COATED ORAL at 17:55

## 2022-12-06 RX ADMIN — Medication 20 MG: at 22:23

## 2022-12-06 RX ADMIN — CEFAZOLIN SODIUM 2000 MG: 2 SOLUTION INTRAVENOUS at 06:03

## 2022-12-06 RX ADMIN — APIXABAN 10 MG: 5 TABLET, FILM COATED ORAL at 10:25

## 2022-12-06 RX ADMIN — HYDROMORPHONE HYDROCHLORIDE 5 MG: 2 TABLET ORAL at 06:03

## 2022-12-06 RX ADMIN — HYDROMORPHONE HYDROCHLORIDE 5 MG: 2 TABLET ORAL at 02:16

## 2022-12-06 RX ADMIN — HYDROMORPHONE HYDROCHLORIDE 5 MG: 2 TABLET ORAL at 10:24

## 2022-12-06 RX ADMIN — CHLORHEXIDINE GLUCONATE 0.12% ORAL RINSE 15 ML: 1.2 LIQUID ORAL at 10:24

## 2022-12-06 RX ADMIN — HYDROMORPHONE HYDROCHLORIDE 5 MG: 2 TABLET ORAL at 22:19

## 2022-12-06 NOTE — ASSESSMENT & PLAN NOTE
· Sepsis was present on admission as evidenced by tachycardia, leukocytosis, and fever  · Status post initial treatment with IV vancomycin  · S/p 5 days of IV cefepime, which was transitioned to cefazolin 2 g every 8 hours complete course antibiotic from first negative blood culture through 12/11/2022  · 1/2 blood cultures from 11/25/2022 revealed Staph aureus -Staph epidermidis, however a methicillin resistance gene was positive  · Repeat blood cultures from 11/28/2022 were negative  · Repeat blood cultures from 12/03/2022: No growth x 48 hours  · Wound cultures- also positive for Proteus and Staph aureus as well as Psuedomonas  · Infectious Disease recommendations appreciated

## 2022-12-06 NOTE — ASSESSMENT & PLAN NOTE
· S/p bedside debridement with surgery 11/26  · Continue Ancef IV as above  · Wound care consultation appreciated

## 2022-12-06 NOTE — PROGRESS NOTES
Progress Note - [facility]   Elvia Ochoa 74 y o  male MRN: [id number]  Unit/Bed#: -01 Encounter: [id number]      IMPRESSION & RECOMMENDATIONS:   1  Sepsis, present on admission, evidenced by tachycardia and leukocytosis  Patient now continues to be febrile though leukocytosis is finally downtrending  Lactic acid and PCT initially normal  1 of 2 admission bl cx positive  All sets of repeat blood cultures are negative  Suspect sepsis initially secondary to #2, #3, now complicated by #9  YK completed 7d course of IV cefepime now on IV ancef for MSSA bacteremia  CT C/A/P negative for deep seated abscess or osteomyelitis, but did show possible opacities in the right middle and lower lobes  Patient is now extubated to NC with improved O2 sats, and continues to have intermittent low grade fevers in the setting of RUE pain, swelling and warmth, likely due to DVT as noted on RUE US  -continue antibiotics as below  -if patient decompensates, would obtain repeat imaging of chest and change abx to IV vancomycin and ceftazidime   -check RUE venous duplex   -monitor temperature and hemodynamics  -management per primary team   -recheck CBC and BMP in a m      2  Polymicrobial bacteremia  One of 2 admission blood cultures positive for Gram-positive cocci in clusters, culture positive for MSSA and CoNS  Documented that it was drawn from patients peripheral line and pt known to be difficult stick with hx of extensive contractures  Set drawn peripherally is negative to date  Suspect CoNs is contaminant, however MSSA likely due to #3   2D echo is normal  No known intravascular devices   Repeat blood cultures x4 sets are negative to date    -continue IV ancef 2g q8 to complete course of abx from first negative blood cx through 12/11/22  -continue to follow-up blood cultures and adjust antibiotics as needed     3  Chronic, now infected, stage IV decubitus ulcers  POA   Despite outpatient wound care, found to have significant deterioration in wounds over the last few months, particularly the right upper back and sacral wound with increasing wound depth and necrotic tissue burden with evidence of purulence and malodor on admission  Patient also with unstageable sacral/right buttock ulcer, unstageable right ischial decubitus ulcer, and unstageable left heel pressure ulcer  Status post bedside debridement on 11/26  CT C/A/P negative for deep seated abscesses  Wound cultures from upper back and sacrum are polymicrobial, positive for Citrobacter koseri, Proteus mirabilis, Pseudomonas aeruginosa, and Staph aureus  Per wound care team/rigoberto, wounds still with yellowish drainage and appear to be tracking deeper  Unfortunately wounds are unlikely to heal and are at risk for reinfection due to iron deficiency anemia, protein calorie malnutrition, contractures, inability to offload pressure and inability for closure  Completed 7 day course of IV cefepime   -continue to address GOC  Although no OM noted on CT, with worsening wounds it is likely that patient will develop chronic infection  If unable to cover/secure wounds especially of sacrum and upper right back, OM not treatable long term      -daily wound care and surgery follow up      4  Left lateral foot stage IV pressure ulcer with suspect OM  POA   Now with left lateral foot ulcer and left heel ulcer both probing to bone   XR shows absence of the 5th metatarsal head which likely represents chronic osteomyelitis   No evidence of overlying cellulitis or infection   -continue to address [address]  In the setting of chronic wound with exposed bone and no plan/indication for surgical intervention, patients osteomyelitis is not treatable long-term  Likely to continue to have progression of wound despite local wound care given extensive contractures and inability to offload pressure  He remains at risk for recurrent infection, bacteremia, sepsis     -continue local wound care and offloading pressure     5  Acute respiratory failure with hypoxia  Patient emergently intubated in the setting of respiratory failure, aspiration and upper GI bleeding 11/29  Patient remained vented, sedated and on pressors  Now extubated to nasal cannula 12/3  S/p EGD which showed ulcerated area in stomach s/p clip  CT chest shows consolidative opacities in the right middle and lower lobes possibly due to pneumonia and atelectasis  Continues to have vomiting episodes  Consider possibility of aspiration pneumonitis versus pneumonia  Repeat chest x-ray shows improving opacities  Sputum cx while intubated shows growth of candida and Achromobacter    -monitor respiratory status and O2 requirements   -monitor hgb and transfuse PRN      6  Acute RUE DVT  Noted to have RUE pain, swelling, and warmth on exam  Patient had peripheral IV in RUE earlier in hospital stay that infiltrated  Also with bullous eruption of right arm  RUE US shows acute DVT in paired brachial vein and chronic non-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  Unlikely cellulitis given recent abx use  -started on Eliquis per primary team   -supportive care   -serial skin exams      7  Functional quadriplegia, developmental disability   Patient nonverbal and bed-bound at baseline on tube feeds, with chronic indwelling Marrufo catheter and colostomy  Unfortunately due to contractures, very difficult to reposition patient and offload pressure   -supportive measures per primary team     8  Antibiotic Allergy   Noted to have amoxicillin and Augmentin allergy without documented reaction   Per Care everywhere, has tolerated cefepime and ceftriaxone during prior admissions  -monitor for adverse drug reactions     Antibiotics:  D11 abx   D4 ancef     I have discussed the above management plan in detail with patient  I have discussed the above management plan in detail with patient's RN, and the primary service, SLIM        Subjective:  Patient is non verbal  Grimaces when I move and palpate right arm  Objective:  Vitals:  Temp:  [99 7 °F (37 6 °C)-100 4 °F (38 °C)] 100 1 °F (37 8 °C)  HR:  [118-133] 122  Resp:  [20-29] 20  BP: (127-162)/(63-87) 130/67  SpO2:  [94 %-97 %] 97 %  Temp (24hrs), Av 9 °F (37 7 °C), Min:99 7 °F (37 6 °C), Max:100 4 °F (38 °C)  Current: Temperature: 100 1 °F (37 8 °C)    PHYSICAL EXAM:  General Appearance:  Awake and alert, appearing chronically ill, nontoxic, and in no distress   HEENT: Normocephalic, without obvious abnormality, atraumatic  Conjunctiva pink and sclera anicteric  Oropharynx moist without lesions  Lungs:   Clear to auscultation bilaterally, respirations unlabored   Heart:  Reg rhythm but tachycardic; no murmur, rub or gallop   Abdomen:   Soft, non-tender, non-distended, positive bowel sounds  Colostomy and PEG in place  Extremities: No cyanosis, clubbing  Musculoskeletal: Paraplegic  Extensive contractures x 4 extremities   : No CVA or suprapubic tenderness  Marrufo patent  Skin: No rashes or lesions  Multiple wounds noted to sacrum and upper back, right ear, left foot, left hand  RUE bullous eruption covered with dressing  Warmth noted with swelling around elbow and pain noted with palpation  Peripheral IV intact without evidence of erythema, warmth, or exudate  LABS, IMAGING, & OTHER STUDIES:  Lab Results:  I have personally reviewed pertinent labs    Results from last 7 days   Lab Units 22  0610 22  0557 22  0436   WBC Thousand/uL 12 94* 17 43* 14 22*   HEMOGLOBIN g/dL 8 4* 9 1* 8 3*   PLATELETS Thousands/uL 542* 571* 402*     Results from last 7 days   Lab Units 22  0610 22  0557 22  0436 22  0611 22  0642 22  1703   SODIUM mmol/L 144 143 144   < > 137 146   POTASSIUM mmol/L 3 3* 3 1* 3 6   < > 3 4* 3 4*   CHLORIDE mmol/L 108 104 109*   < > 102 112*   CO2 mmol/L 30 32 32   < > 28 28   BUN mg/dL 5 6 8   < > 5 10   CREATININE mg/dL 0 25* 0 31* 0 24*   < > 0 23* 0 24*   EGFR ml/min/1 73sq m 151 138 153   < > 156 154   CALCIUM mg/dL 8 2* 8 7 8 2*   < > 7 9* 7 5*   AST U/L  --   --   --   --  20 24   ALT U/L  --   --   --   --  19 22   ALK PHOS U/L  --   --   --   --  101 87    < > = values in this interval not displayed  Results from last 7 days   Lab Units 12/03/22  0836 12/03/22  0825 11/30/22 0446 11/29/22  2041   BLOOD CULTURE  No Growth at 48 hrs  No Growth at 48 hrs  --  No Growth After 5 Days     SPUTUM CULTURE   --   --  4+ Growth of Candida tropicalis*  4+ Growth of Candida glabrata*  1+ Growth of Achromobacter xylosoxidans*  Few Colonies of  --    GRAM STAIN RESULT   --   --  2+ Polys*  1+ Budding yeast*  --      Results from last 7 days   Lab Units 12/04/22  0436 12/03/22  0444 11/30/22  0642 11/29/22  1703   PROCALCITONIN ng/ml 1 59* 0 67* 3 13* 3 30*

## 2022-12-06 NOTE — NURSING NOTE
Caretaker informed RN that coughing has improved,pt had no residual, resumed tube feeds  hospitalitis informed

## 2022-12-06 NOTE — PROGRESS NOTES
[place] 47  Progress Note Noé Odom 1955, 74 y o  male MRN: [id number]  Unit/Bed#: -01 Encounter: [id number]  Primary Care Provider: Adilia Wilson MD   Date and time admitted to hospital: 11/25/2022  2:30 PM    * Shock [facility])  Assessment & Plan  · Sepsis was present on admission as evidenced by tachycardia, leukocytosis, and fever  · Status post initial treatment with IV vancomycin  · S/p 5 days of IV cefepime, which was transitioned to cefazolin 2 g every 8 hours complete course antibiotic from first negative blood culture through 12/11/2022  · 1/2 blood cultures from 11/25/2022 revealed Staph aureus -Staph epidermidis, however a methicillin resistance gene was positive  · Repeat blood cultures from 11/28/2022 were negative  · Repeat blood cultures from 12/03/2022: No growth x 48 hours  · Wound cultures- also positive for Proteus and Staph aureus as well as Psuedomonas  · Infectious Disease recommendations appreciated    GIB (gastrointestinal bleeding)  Assessment & Plan  · Originally admitted for shock, patient developed tachypnea, tachycardia, and hypotension and noted to be in respiratory distress possibly due to aspiration (patient has PEG tube, receives tube feeding)  · Patient was witnessed to have a large amount of coffee-ground emesis while being suctioned in preparation for intubation  Patient was intubated and upgraded to the ICU at that time    He was extubated on 12/03  · He received 2 units of packed red blood cells, followed by a 3rd unit and 1 unit FFP  · GI recommendations appreciated  · Patient downgraded to medical service on 12/04/2022  · Hemoglobin is stable  · Heparin had been resumed on 12/01/2022, and on 12/05, patient was noted to have a DVT of the right upper extremity and started on Eliquis  · Monitor for bleeding, monitor hemoglobin      Acute deep vein thrombosis (DVT) of right upper extremity (HCC)  Assessment & Plan  · Right arm swelling and tenderness noted  · Venous duplex of right upper extremity was positive for DVT  · Oxygen saturation 95-96% on 2 L  Patient has been persistently tachycardic since admission  · Start Eliquis 10 mg twice daily for 7 days, transition to 5 mg twice daily thereafter  · Monitor vital signs, labs, serial assessments  · Will hold off on obtaining further imaging as treatment for DVT has been initiated    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, stage 4 ([facility])  Assessment & Plan  · S/p bedside debridement with surgery 11/26  · Continue Ancef IV as above  · Wound care consultation appreciated      Aspiration pneumonia [facility])  Assessment & Plan  · With witnessed aspiration event during intubation  · Completed cefepime per infectious disease  · Serial assessments      Hypernatremia  Assessment & Plan  · Sodium 150 > 143  · Etiology unclear, possibly due to dehydration from high output from colostomy vs volume expansion  · Nephrology recommendations appreciated  · Continue to monitor sodium    MSSA bacteremia  Assessment & Plan  · Management as outlined above    Developmental disability  Assessment & Plan  · Patient with profound development disability, nonverbal, bed bound  · Patient on tube feeds only with 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    Pressure injury of left heel, stage 4 ([address] )  Assessment & Plan  · S/p bedside debridement with surgery 11/26  · Continue management as outlined above    VTE Pharmacologic Prophylaxis:   Pharmacologic: Apixaban (Eliquis)  Mechanical VTE Prophylaxis in Place: Yes    Patient Centered Rounds: I have performed bedside rounds with nursing staff today  Discussions with Specialists or Other Care Team Provider: ID,    Education and Discussions with Family / Patient: Caregiver    Time Spent for Care: 45 minutes  More than 50% of total time spent on counseling and coordination of care as described above      Current Length of Stay: 11 day(s)    Current Patient Status: Inpatient   Certification Statement: The patient will continue to require additional inpatient hospital stay due to per plan above  Discharge Plan: TBD    Code Status: Level 1 - Full Code      Subjective:   Patient seen and examined  Unable to obtain ROS due to patient non verbal      Objective:     Vitals:   Temp (24hrs), Av 1 °F (37 8 °C), Min:99 9 °F (37 7 °C), Max:100 4 °F (38 °C)    Temp:  [99 9 °F (37 7 °C)-100 4 °F (38 °C)] 100 1 °F (37 8 °C)  HR:  [122-133] 122  Resp:  [20] 20  BP: (127-162)/(63-87) 130/67  SpO2:  [94 %-97 %] 97 %  Body mass index is 22 78 kg/m²  Input and Output Summary (last 24 hours): Intake/Output Summary (Last 24 hours) at 2022 1606  Last data filed at 2022 0700  Gross per 24 hour   Intake 1250 ml   Output 1100 ml   Net 150 ml       Physical Exam:     Physical Exam  Vitals and nursing note reviewed  Constitutional:       Appearance: He is ill-appearing  Comments: Chronically ill-appearing   HENT:      Head: Normocephalic and atraumatic  Nose: Nose normal       Mouth/Throat:      Mouth: Mucous membranes are dry  Pharynx: Oropharynx is clear  Eyes:      Pupils: Pupils are equal, round, and reactive to light  Cardiovascular:      Rate and Rhythm: Normal rate and regular rhythm  Pulmonary:      Effort: Pulmonary effort is normal  No respiratory distress  Breath sounds: Normal breath sounds  Abdominal:      General: Bowel sounds are normal       Palpations: Abdomen is soft  Tenderness: There is no abdominal tenderness  Comments: Colostomy in place for soft light brown stool   Genitourinary:     Comments: Marrufo catheter in place for yellow urine  Musculoskeletal:      Cervical back: Neck supple  Right lower leg: No edema  Left lower leg: No edema  Comments: Extremities with contractures, grimaces and right arm touched   Skin:     General: Skin is warm and dry        Capillary Refill: Capillary refill takes less than 2 seconds  Comments: Multiple wounds as pictured in media with dressings clean dry and intact   Neurological:      General: No focal deficit present  Mental Status: He is alert  Mental status is at baseline  Additional Data:     Labs:    Results from last 7 days   Lab Units 12/06/22  0610   WBC Thousand/uL 12 94*   HEMOGLOBIN g/dL 8 4*   HEMATOCRIT % 27 9*   PLATELETS Thousands/uL 542*   NEUTROS PCT % 64   LYMPHS PCT % 15   MONOS PCT % 16*   EOS PCT % 4     Results from last 7 days   Lab Units 12/06/22  0610 12/01/22  0611 11/30/22  0642   SODIUM mmol/L 144   < > 137   POTASSIUM mmol/L 3 3*   < > 3 4*   CHLORIDE mmol/L 108   < > 102   CO2 mmol/L 30   < > 28   BUN mg/dL 5   < > 5   CREATININE mg/dL 0 25*   < > 0 23*   ANION GAP mmol/L 6   < > 7   CALCIUM mg/dL 8 2*   < > 7 9*   ALBUMIN g/dL  --   --  2 1*   TOTAL BILIRUBIN mg/dL  --   --  0 56   ALK PHOS U/L  --   --  101   ALT U/L  --   --  19   AST U/L  --   --  20   GLUCOSE RANDOM mg/dL 120   < > 100    < > = values in this interval not displayed  Results from last 7 days   Lab Units 12/06/22  1244 12/06/22  0605 12/06/22  0008 12/05/22  1819 12/05/22  1145 12/05/22  0600 12/05/22  0013 12/04/22  1803 12/04/22  1132 12/04/22  0009 12/03/22  1729 12/03/22  1145   POC GLUCOSE mg/dl 125 123 104 98 101 101 96 98 112 93 125 119         Results from last 7 days   Lab Units 12/04/22  0436 12/03/22  0820 12/03/22  0444 11/30/22  0642 11/29/22  1703   LACTIC ACID mmol/L  --  1 7  --   --   --    PROCALCITONIN ng/ml 1 59*  --  0 67* 3 13* 3 30*           * I Have Reviewed All Lab Data Listed Above  * Additional Pertinent Lab Tests Reviewed: [address] Admission Reviewed    Recent Cultures (last 7 days):     Results from last 7 days   Lab Units 12/03/22  0836 12/03/22  0825 11/30/22  0446 11/29/22  2041   BLOOD CULTURE  No Growth at 48 hrs  No Growth at 48 hrs  --  No Growth After 5 Days  SPUTUM CULTURE   --   --  4+ Growth of Candida tropicalis*  4+ Growth of Candida glabrata*  1+ Growth of Achromobacter xylosoxidans*  Few Colonies of  --    GRAM STAIN RESULT   --   --  2+ Polys*  1+ Budding yeast*  --        Last 24 Hours Medication List:   Current Facility-Administered Medications   Medication Dose Route Frequency Provider Last Rate   • acetaminophen  650 mg Per G Tube Q4H PRN Gwyndolyn Fear, CRNP     • apixaban  10 mg Oral BID AVANI Brooks     • [START ON 12/12/2022] apixaban  5 mg Oral BID AVANI Brooks     • cefazolin  2,000 mg Intravenous Q8H Gwyndolyn Fear, CRNP 2,000 mg (12/06/22 1527)   • chlorhexidine  15 mL Mouth/Throat Q12H [address] Gwyndolyn Fear, CRNP     • collagenase   Topical Daily Gwyndolyn Fear, CRNP     • HYDROmorphone  1 mg Intravenous Q2H PRN Gwyndolyn Fear, CRNP     • HYDROmorphone  5 mg Oral Q4H Gwyndolyn Fear, CRNP     • insulin lispro  1-5 Units Subcutaneous Q6H [address] Gwyndolyn Fear, CRNP     • omeprazole (PRILOSEC) suspension 2 mg/mL  20 mg Oral BID Gwyndolyn Fear, CRNP     • ondansetron  4 mg Intravenous Q6H PRN Gwyndolyn Fear, CRNP     • polyethylene glycol  17 g Oral Daily Gwyndolyn Fear, CRNP     • senna-docusate sodium  1 tablet Oral BID Gwyndolyn Fear, CRNP          Today, Patient Was Seen By: AVANI Brooks    ** Please Note: Dictation voice to text software may have been used in the creation of this document   **

## 2022-12-06 NOTE — ASSESSMENT & PLAN NOTE
· Intubated emergently on medical unit for respiratory failure and airway protection in the setting of gastrointestinal bleeding and shock    Extubated 12/03/2022  · Possibly due to aspiration pneumonia/pneumonitis  · Continue oxygen protocol  · Monitor oxygen requirements

## 2022-12-06 NOTE — WOUND OSTOMY CARE
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  Patient seen with primary RN for wound care and treatments  Patient is a max assist X3 to perform wound care  Marrufo catheter in place, LLQ colostomy pouch with liquid brown effluent, PEG tube with tube feeds infusing, Patient is bed-bound, contractures to b/l legs and arms  Caregiver present in room during wound care assisted with care  Patient seen by podiatry for left foot wounds, and surgery has seen patient initially to debride wounds  Further skin breakdown noted on this assessment, discussed with unit manager  Patient has multiple areas of scarring and hyperpigmented skin from prior wounds  Additional Allevyn foam dressings applied to the shoulders and left lateral thigh for prevention  Prevalon boots applied to bilateral feet  1  Left lower quadrant colostomy, pouch changed today  Stoma pink, slightly budded, measures 7/8 X 1 1/8 inches, oval, peristomal skin intact, functioning for liquid brown stool  2  POA-Stage 2 pressure injury to the right outer ear, dry beefy red wound bed, no drainage, thick fibrotic tissue to the periiwound  Patient had a stage 2 PI previously  Partial thickness wound noted to the upper ear and scalp junction, beefy red, no drainage  purple non-blanchable tissue to the top of the ear, suspect deep tissue injury, not noted on prior assessment, was present on prior assessment per image  3  HA-deep tissue injury to the left outer ear, purple soft non-blanchable blistered skin, suspect stage 3 stage 4 or unstageable pressure injury when evolves  Ear lobe is thick with fibrotic tissue  4  POA-two ruptured serous filled blisters to the right upper arm  Arm is edematous   Proximal wound bed is partial thickness, beefy red, scant serosanguineous drainage  Distal wound bed mix of beefy red tissue and yellow slough in approx 60% of wound bed, scant serosanguineous drainage when cleansed  [place] between wound beds to medial arm intact yellow blister, no drainage  5  POA-left hand resolving wound, unknown etiology, appearance of resolving pressure wound  Wound bed is dry, mix of beefy red, pale red and dried yellow scaly skin, no open area or drainage  6  POA-stage 4 pressure injury to the left lateral foot, bone exposed, mix of yellow slough and beefy red tissue  Hyperpigmented skin to the periwound  7  HA-left lateral ankle unstageable pressure injury, approx 60% yellow slough and 40% beefy red with area of purple in the center  Suspect wound may have resulted from deep tissue injury  Scant yellow drainage on dressing when removed  8  POA-unstageable pressure injury to the left posterior heel, mix of yellow slough and necrotic tissue in the wound bed, hyperpigmented periwound, rolled edges, small amount of yellow drainage on dressing when removed  9  POA-stage 4 pressure injury to the upper right back, wound is mix of yellow-green slough, granulation tissue noted to the outer edge and wound base  Moderate amount of  green and yellow drainage, undermining noted, no tunneling, no odor  10  POA-right ischium-lower buttocks heavily scarred tissue with open area to the center with beefy red and yellow wound base, scant yellow-brown drainage  11  POA-right buttock unstageable wound bed, brown and yellow eschar to the wound base, no drainage, fluctuant wound base  12  POA-stage 4 sacral wound, wound bed mix of yellow slough and pink, undermining noted, moderate yellow and serosanguineous drainage  13  POA-unstageable wound to the right buttocks, necrotic tissue to the wound base with beefy red buds to the center, outer edge mix of yellow slough and beefy red tissue, fluctuant wound bed  14   Plantar surface of the left great toe light purple, suspicious for developing deep tissue injury  Suspect stage 3, stage 4 or unstageable wound  15  Right heel intact, Allevyn foam dressing in place for prevention  16  Right buttock skin tear from tape, small amount of bloody drainage, partial thickness      12/06/2022- requested surgery to evaluate wounds due to fluctuance of wound beds on the buttocks and decline of wounds    See flowsheet for further assessments and measurements of wounds    Skin and Wound Care Plan:  1  Apply hydraguard to right heel and keep heels offloaded  2  Apply skin nourishing cream to the skin daily  3  Turn patient Q2 hours to offload pressure points  4  Posterior chest/upper back, sacrum and right SI joint, right ischium  Remove dressings and packing  Clean skin with chlorhexidine wipes  Irrigate wounds with wound cleanser  Pack with WTD dressing  Silicone cream to shanon-wound  Cover with bulky dressing for draiange (4x4, abd, heavy drainage pad)  Secure with tape or with mesh underwear  Apply santyl to sacral ulcer daily  5  Left heel and lateral foot ulcer: please apply santyl to the heel and lateral foot ulcers cover with dry sterile dressings  6  Place Allevyn life silicone bordered dressing to the right and left outer ear wounds and left anterior hand, momo with T, date, change every three days and PRN  7  Right upper arm wounds, cleanse with NSS, place Dermagran on wound beds cut to size of wounds, top with 4X4 and wrap with gauze, change daily and PRN  8  Prevalon boots-left and right foot  9  P-500 low air loss therapy surface    Wound:  Wound 05/16/22 Pressure Injury Back Right;Upper (Active)   Wound Image   12/05/22 1415   Wound Description Beefy red;Yellow;Slough;Pink 12/05/22 1415   Pressure Injury Stage 4 12/05/22 1415   Shanon-wound Assessment Pink; Hyperpigmented;Scar Tissue 12/05/22 1415   Wound Length (cm) 7 cm 12/05/22 1415   Wound Width (cm) 5 3 cm 12/05/22 1415   Wound Depth (cm) 1 2 cm 12/05/22 1415   Wound Surface Area (cm^2) 37 1 cm^2 12/05/22 1415   Wound Volume (cm^3) 44 52 cm^3 12/05/22 1415   Calculated Wound Volume (cm^3) 44 52 cm^3 12/05/22 1415   Change in Wound Size % -1404 05 12/05/22 1415   Tunneling 0 cm 12/05/22 1415   Undermining 1 7 12/05/22 1415   Undermining is depth extending from 12:00- 12/05/22 1415   Drainage Amount Moderate 12/05/22 1415   Drainage Description Green;Yellow 12/05/22 1415   Treatments Cleansed;Site care 12/02/22 1200   Dressing Moist to Moist;ABD 12/05/22 1415   Wound packed? Yes 12/05/22 1415   Packing- # removed 1 12/05/22 1415   Packing- # inserted 1 12/05/22 1415   Dressing Changed Changed 12/05/22 1415   Patient Tolerance Tolerated well 12/05/22 1415   Dressing Status Dry; Intact 12/05/22 1000   Number of days: 203       Wound 05/16/22 Pressure Injury Back Lateral;Right; Lower (Active)   Wound Image   11/28/22 1101   Wound Description Dry; Intact 12/05/22 1415   Pressure Injury Stage 3 12/02/22 1200   Sybil-wound Assessment Dry;Scar Tissue 12/04/22 1200   Wound Length (cm) 0 cm 12/05/22 1415   Wound Width (cm) 0 cm 12/05/22 1415   Wound Depth (cm) 0 cm 12/05/22 1415   Wound Surface Area (cm^2) 0 cm^2 12/05/22 1415   Wound Volume (cm^3) 0 cm^3 12/05/22 1415   Calculated Wound Volume (cm^3) 0 cm^3 12/05/22 1415   Change in Wound Size % 100 12/05/22 1415   Tunneling 0 cm 11/28/22 1101   Undermining 0 11/28/22 1101   Drainage Amount None 12/04/22 1200   Treatments Cleansed;Site care 12/02/22 1200   Dressing ABD 12/05/22 1415   Wound packed? No 12/04/22 1200   Dressing Changed Changed 12/05/22 1415   Patient Tolerance Tolerated well 12/05/22 1415   Dressing Status Dry; Intact 12/05/22 1000   Number of days: 203       Wound 05/16/22 Pressure Injury Sacrum (Active)   Wound Image   12/05/22 1408   Wound Description Beefy red;Yellow;Slough 12/05/22 1408   Pressure Injury Stage 4 12/05/22 1408   Sybil-wound Assessment Scar Tissue; Hyperpigmented;Fragile 12/05/22 1408   Wound Length (cm) 4 5 cm 12/05/22 1408 Wound Width (cm) 3 8 cm 12/05/22 1408   Wound Depth (cm) 2 2 cm 12/05/22 1408   Wound Surface Area (cm^2) 17 1 cm^2 12/05/22 1408   Wound Volume (cm^3) 37 62 cm^3 12/05/22 1408   Calculated Wound Volume (cm^3) 37 62 cm^3 12/05/22 1408   Change in Wound Size % 44 88 12/05/22 1408   Tunneling 0 cm 12/05/22 1408   Undermining 4 9 12/05/22 1408   Undermining is depth extending from 3:00 12/05/22 1408   Drainage Amount Moderate 12/05/22 1408   Drainage Description Yellow 12/05/22 1408   Treatments Cleansed 12/05/22 1408   Dressing ABD;Enzymatic debrider;Dry dressing 12/05/22 1408   Wound packed? Yes 12/05/22 1408   Packing- # removed 1 12/05/22 1408   Packing- # inserted 1 12/05/22 1408   Dressing Changed Changed 12/05/22 1408   Patient Tolerance Tolerated well 12/05/22 1408   Dressing Status Clean;Dry; Intact 12/05/22 1000   Number of days: 203       Wound 05/16/22 Pressure Injury Buttocks Right (Active)   Wound Image   12/05/22 1413   Wound Description Yellow;Slough 12/05/22 1413   Pressure Injury Stage 3 12/05/22 1413   Sybil-wound Assessment Dry;Scar Tissue 12/05/22 1413   Wound Length (cm) 1 6 cm 12/05/22 1413   Wound Width (cm) 1 2 cm 12/05/22 1413   Wound Depth (cm) 0 8 cm 12/05/22 1413   Wound Surface Area (cm^2) 1 92 cm^2 12/05/22 1413   Wound Volume (cm^3) 1 536 cm^3 12/05/22 1413   Calculated Wound Volume (cm^3) 1 54 cm^3 12/05/22 1413   Change in Wound Size % 97 45 12/05/22 1413   Tunneling 0 cm 12/05/22 1413   Undermining 0 12/05/22 1413   Treatments Cleansed 12/05/22 1413   Dressing Moist to Moist 12/05/22 1413   Wound packed? No 12/04/22 1200   Dressing Changed Changed 12/05/22 1413   Patient Tolerance Tolerated well 12/05/22 1413   Dressing Status Clean;Dry; Intact 12/05/22 1000   Number of days: 203       Wound 05/16/22 Pressure Injury Foot Left;Lateral (Active)   Wound Image   12/05/22 1439   Wound Description Beefy red;Exposed bone;Fragile; Yellow;Slough 12/05/22 1439   Pressure Injury Stage 4 12/05/22 1439   Sybil-wound Assessment Hyperpigmented;Fragile;Scar Tissue 12/05/22 1439   Wound Length (cm) 3 4 cm 12/05/22 1439   Wound Width (cm) 1 5 cm 12/05/22 1439   Wound Depth (cm) 0 3 cm 12/05/22 1439   Wound Surface Area (cm^2) 5 1 cm^2 12/05/22 1439   Wound Volume (cm^3) 1 53 cm^3 12/05/22 1439   Calculated Wound Volume (cm^3) 1 53 cm^3 12/05/22 1439   Change in Wound Size % -427 59 12/05/22 1439   Tunneling 0 cm 12/05/22 1439   Undermining 0 12/05/22 1439   Drainage Amount Small 12/05/22 1439   Drainage Description Brown;Yellow 12/05/22 1439   Treatments Cleansed 12/05/22 1439   Dressing Enzymatic debrider;ABD;Dry dressing 12/05/22 1439   Dressing Changed Changed 12/05/22 1439   Patient Tolerance Tolerated well 12/05/22 1439   Dressing Status Clean;Dry; Intact 12/05/22 1000   Number of days: 203       Wound 08/29/22 Heel Left;Posterior (Active)   Wound Image   12/05/22 1436   Wound Description Beefy red;Yellow;Slough;Fragile; Necrotic 12/05/22 1436   Pressure Injury Stage 4 12/05/22 1436   Sybil-wound Assessment Hyperpigmented;Scar Tissue 12/05/22 1436   Wound Length (cm) 1 6 cm 12/05/22 1436   Wound Width (cm) 1 5 cm 12/05/22 1436   Wound Depth (cm) 0 3 cm 12/05/22 1436   Wound Surface Area (cm^2) 2 4 cm^2 12/05/22 1436   Wound Volume (cm^3) 0 72 cm^3 12/05/22 1436   Calculated Wound Volume (cm^3) 0 72 cm^3 12/05/22 1436   Change in Wound Size % 1 37 12/05/22 1436   Tunneling 0 cm 11/28/22 1134   Undermining 0 11/28/22 1134   Drainage Amount Small 12/05/22 1436   Drainage Description Serosanguineous 12/05/22 1436   Treatments Cleansed 12/05/22 1436   Dressing Enzymatic debrider;ABD;Gauze 12/05/22 1436   Wound packed? No 12/05/22 1436   Dressing Changed Changed 12/05/22 1436   Patient Tolerance Tolerated well 12/05/22 1436   Dressing Status Clean;Dry; Intact 12/05/22 1000   Number of days: 98       Wound 10/26/22 Other (comment) Hand Posterior;Right (Active)   Wound Description Clean;Dry; Intact 12/05/22 1513   Dressing Open to air 12/05/22 1000   Number of days: 40       Wound 11/28/22 Buttocks Right (Active)   Wound Image   12/05/22 1411   Wound Description Dry;Brown;Eschar 12/05/22 1411   Pressure Injury Stage U 12/05/22 1411   Sybil-wound Assessment Hyperpigmented 12/05/22 1411   Wound Length (cm) 3 5 cm 12/05/22 1411   Wound Width (cm) 2 2 cm 12/05/22 1411   Wound Depth (cm) 0 2 cm 12/05/22 1411   Wound Surface Area (cm^2) 7 7 cm^2 12/05/22 1411   Wound Volume (cm^3) 1 54 cm^3 12/05/22 1411   Calculated Wound Volume (cm^3) 1 54 cm^3 12/05/22 1411   Tunneling 0 cm 12/05/22 1411   Undermining 1 12/05/22 1411   Undermining is depth extending from 2:00 12/05/22 1411   Drainage Amount None 12/05/22 1411   Treatments Cleansed 12/05/22 1411   Dressing Moist to Moist;ABD 12/05/22 1411   Wound packed? No 12/04/22 1200   Dressing Changed Changed 12/05/22 1411   Patient Tolerance Tolerated well 12/05/22 1411   Dressing Status Clean;Dry; Intact 12/05/22 1000   Number of days: 7       Wound 11/28/22 Hand Left;Posterior (Active)   Wound Image   12/05/22 1513   Wound Description Dry;Beefy red 12/05/22 1513   Sybil-wound Assessment Scar Tissue 12/05/22 1513   Wound Length (cm) 2 5 cm 12/05/22 1513   Wound Width (cm) 0 7 cm 12/05/22 1513   Wound Surface Area (cm^2) 1 75 cm^2 12/05/22 1513   Drainage Amount None 12/05/22 1513   Treatments Cleansed 12/05/22 1513   Dressing Gauze;Dry dressing 12/05/22 1000   Patient Tolerance Tolerated well 12/05/22 1513   Dressing Status Clean;Dry; Intact 12/05/22 1000   Number of days: 7       Wound 11/30/22 Ear Right (Active)   Wound Image   12/05/22 1430   Wound Description Beefy red;Dry 12/05/22 1430   Pressure Injury Stage 2 12/05/22 1430   Sybil-wound Assessment Dry; Other (Comment) 12/05/22 1430   Wound Length (cm) 2 cm 12/05/22 1430   Wound Width (cm) 1 7 cm 12/05/22 1430   Wound Depth (cm) 0 cm 12/05/22 1430   Wound Surface Area (cm^2) 3 4 cm^2 12/05/22 1430   Wound Volume (cm^3) 0 cm^3 12/05/22 1430 Calculated Wound Volume (cm^3) 0 cm^3 12/05/22 1430   Change in Wound Size % 100 12/05/22 1430   Drainage Amount None 12/05/22 1430   Treatments Cleansed 12/05/22 1430   Dressing Foam, Silicon (eg  Allevyn, etc) 12/05/22 1430   Patient Tolerance Tolerated well 12/05/22 1430   Dressing Status Clean;Dry; Intact 12/05/22 1000   Number of days: 5       Wound 12/05/22 Pressure Injury Ankle Anterior; Left (Active)   Wound Image   12/05/22 1438   Wound Description Dry;Beefy red;Slough; Yellow 12/05/22 1438   Pressure Injury Stage U 12/05/22 1438   Sybil-wound Assessment Hyperpigmented 12/05/22 1438   Wound Length (cm) 1 2 cm 12/05/22 1438   Wound Width (cm) 1 cm 12/05/22 1438   Wound Depth (cm) 0 2 cm 12/05/22 1438   Wound Surface Area (cm^2) 1 2 cm^2 12/05/22 1438   Wound Volume (cm^3) 0 24 cm^3 12/05/22 1438   Calculated Wound Volume (cm^3) 0 24 cm^3 12/05/22 1438   Tunneling 0 cm 12/05/22 1438   Undermining 0 12/05/22 1438   Drainage Amount None 12/05/22 1438   Treatments Cleansed 12/05/22 1438   Dressing Enzymatic debrider 12/05/22 1438   Dressing Changed Changed 12/05/22 1438   Patient Tolerance Tolerated well 12/05/22 1438   Number of days: 0       Wound 12/05/22 Arm Posterior;Right;Upper (Active)   Wound Image   12/05/22 1515   Wound Description Beefy red;Brown;Yellow;Slough 12/05/22 1515   Sybil-wound Assessment Brown;Fragile 12/05/22 1515   Wound Length (cm) 2 3 cm 12/05/22 1515   Wound Width (cm) 1 1 cm 12/05/22 1515   Wound Surface Area (cm^2) 2 53 cm^2 12/05/22 1515   Tunneling 0 cm 12/05/22 1515   Undermining 0 12/05/22 1515   Drainage Amount Small 12/05/22 1515   Drainage Description Serosanguineous 12/05/22 1515   Treatments Cleansed 12/05/22 1515   Patient Tolerance Tolerated well 12/05/22 1515   Number of days: 0       Wound 12/05/22 Shoulder Posterior;Right (Active)   Wound Image   12/05/22 1516   Wound Description Beefy red 12/05/22 1516   Sybil-wound Assessment Dry;Edema 12/05/22 1516   Wound Length (cm) 2 1 cm 12/05/22 1516   Wound Width (cm) 2 6 cm 12/05/22 1516   Wound Depth (cm) 0 1 cm 12/05/22 1516   Wound Surface Area (cm^2) 5 46 cm^2 12/05/22 1516   Wound Volume (cm^3) 0 546 cm^3 12/05/22 1516   Calculated Wound Volume (cm^3) 0 55 cm^3 12/05/22 1516   Tunneling 0 cm 12/05/22 1516   Undermining 0 12/05/22 1516   Drainage Amount Scant 12/05/22 1516   Drainage Description Serosanguineous 12/05/22 1516   Non-staged Wound Description Partial thickness 12/05/22 1516   Treatments Cleansed 12/05/22 1516   Patient Tolerance Tolerated well 12/05/22 1516   Number of days: 0       Wound 12/05/22 Pressure Injury Ear Left (Active)   Wound Image   12/05/22 1519   Wound Description Fragile 12/05/22 1519   Pressure Injury Stage DTPI 12/05/22 1519   Sybil-wound Assessment Scar Tissue 12/05/22 1519   Wound Length (cm) 3 2 cm 12/05/22 1519   Wound Width (cm) 1 cm 12/05/22 1519   Wound Depth (cm) 0 cm 12/05/22 1519   Wound Surface Area (cm^2) 3 2 cm^2 12/05/22 1519   Wound Volume (cm^3) 0 cm^3 12/05/22 1519   Calculated Wound Volume (cm^3) 0 cm^3 12/05/22 1519   Tunneling 0 cm 12/05/22 1519   Undermining 0 12/05/22 1519   Drainage Amount None 12/05/22 1519   Treatments Cleansed 12/05/22 1519   Patient Tolerance Tolerated well 12/05/22 1519   Number of days: 0     NG/OG/Enteral Tube Enteral Feeding Tube (Active)   Placement Reverification Auscultation 12/04/22 2114   Site Assessment Clean; Intact;Dry 12/04/22 2114   Status Tube feed stopped or held 12/04/22 2114   Drainage Appearance Tan 12/04/22 2114   Intake (mL) 40 mL 12/05/22 0601   Number of days: 0       Colostomy (Active)   Stomal Appliance 2 piece; Changed 12/05/22 1530   Stoma Assessment Pink;Budded 12/05/22 1530   Stoma Shape Oval 12/05/22 1530   Peristomal Assessment Clean; Intact 12/05/22 1530   Treatment Bag change;Site care 12/05/22 1530   Output (mL) 200 mL 12/05/22 1530   Number of days:          Reviewed plan of care with primary RN Chapo Fraser  Recommendations written as orders  Wound care team to follow weekly while admitted  Questions or concerns Fina LOOMISN, RN, [employer]

## 2022-12-06 NOTE — NURSING NOTE
Patient ostomy bag leaking, linens changed, wound care completed and new ostomy applied  Pt tolerated fairly well

## 2022-12-06 NOTE — PROCEDURES
Procedure Note - General Surgery   Domenico Julio 61 y o  male MRN: [id number]  Unit/Bed#: -01     Preoperative diagnosis: stage IV sacrum, unstageable right ischium, stage IV posterior chest, unstageable right SI joint, stage I left SI joint      Post operative diagnosis: stage IV sacrum, unstageable right ischium, stage IV posterior chest, unstageable right SI joint, stage I left SI joint      Procedure: Sharp excisional debridement of necrotic skin of right ischium and sacrum, iris scissors     Surgeon:  Shanique Cadet PA-C    Assistant:  Damián Perry PA-C, Mary Wang PA-C, PA student     Anesthesia:  None     Estimated blood loss:  Minimal     Complications:  None     Specimens:  None     Indications for procedure re evaluation of multiple pressure injures    Operative findings:  New necrosis of sacral and ischial ulcers requiring sharp excisional debridement  Right ischial extending to fascial layer  Operative technique Previous dressing taken down at bedside  Packing removed  Wounds cleansed  Sharp excisional debridement of necrotic skin from sacral and ischial wounds  Tolerated well without complications  Minimal bleeding well controlled with pressure, no need for cauterization  Wounds again cleansed, packed and redressed       Signature:  Shanique Cadet PA-C  12/06/22

## 2022-12-06 NOTE — ASSESSMENT & PLAN NOTE
· With witnessed aspiration event during intubation  · Completed cefepime per infectious disease  · Serial assessments

## 2022-12-06 NOTE — ASSESSMENT & PLAN NOTE
· Originally admitted for shock, patient developed tachypnea, tachycardia, and hypotension and noted to be in respiratory distress possibly due to aspiration (patient has PEG tube, receives tube feeding)  · Patient was witnessed to have a large amount of coffee-ground emesis while being suctioned in preparation for intubation  Patient was intubated and upgraded to the ICU at that time    He was extubated on 12/03  · He received 2 units of packed red blood cells, followed by a 3rd unit and 1 unit FFP  · GI recommendations appreciated  · Patient downgraded to medical service on 12/04/2022  · Hemoglobin is stable  · Heparin had been resumed on 12/01/2022, and on 12/05, patient was noted to have a DVT of the right upper extremity and started on Eliquis  · Monitor for bleeding, monitor hemoglobin

## 2022-12-06 NOTE — NURSING NOTE
Patient coughing and RN noted patient continuously kept swallowing, care taker in room stated pt just started with this cough and last time he started to vomit he kept swallowing  Tube feeds stopped and residual checked, RN noted an increase in residual of 50cc as to 10 cc this AM  Hospitalitis informed and plan is to keep tube feed off for 1-2 hrs and re-check residual  Once tube feed stopped patient cough decreased  Will cont to monitor

## 2022-12-06 NOTE — ASSESSMENT & PLAN NOTE
· Sodium 150 > 143  · Etiology unclear, possibly due to dehydration from high output from colostomy vs volume expansion  · Nephrology recommendations appreciated  · Continue to monitor sodium

## 2022-12-07 ENCOUNTER — APPOINTMENT (INPATIENT)
Dept: RADIOLOGY | Facility: HOSPITAL | Age: 67
End: 2022-12-07

## 2022-12-07 LAB
ANION GAP SERPL CALCULATED.3IONS-SCNC: 6 MMOL/L (ref 4–13)
BASOPHILS # BLD AUTO: 0.06 THOUSANDS/ÂΜL (ref 0–0.1)
BASOPHILS NFR BLD AUTO: 0 % (ref 0–1)
BUN SERPL-MCNC: 4 MG/DL (ref 5–25)
CALCIUM SERPL-MCNC: 8.4 MG/DL (ref 8.4–10.2)
CHLORIDE SERPL-SCNC: 107 MMOL/L (ref 96–108)
CO2 SERPL-SCNC: 29 MMOL/L (ref 21–32)
CREAT SERPL-MCNC: 0.27 MG/DL (ref 0.6–1.3)
EOSINOPHIL # BLD AUTO: 0.41 THOUSAND/ÂΜL (ref 0–0.61)
EOSINOPHIL NFR BLD AUTO: 3 % (ref 0–6)
ERYTHROCYTE [DISTWIDTH] IN BLOOD BY AUTOMATED COUNT: 21.6 % (ref 11.6–15.1)
GFR SERPL CREATININE-BSD FRML MDRD: 146 ML/MIN/1.73SQ M
GLUCOSE SERPL-MCNC: 115 MG/DL (ref 65–140)
GLUCOSE SERPL-MCNC: 118 MG/DL (ref 65–140)
GLUCOSE SERPL-MCNC: 93 MG/DL (ref 65–140)
GLUCOSE SERPL-MCNC: 96 MG/DL (ref 65–140)
GLUCOSE SERPL-MCNC: 96 MG/DL (ref 65–140)
HCT VFR BLD AUTO: 29.7 % (ref 36.5–49.3)
HGB BLD-MCNC: 9.1 G/DL (ref 12–17)
IMM GRANULOCYTES # BLD AUTO: 0.16 THOUSAND/UL (ref 0–0.2)
IMM GRANULOCYTES NFR BLD AUTO: 1 % (ref 0–2)
LYMPHOCYTES # BLD AUTO: 2.09 THOUSANDS/ÂΜL (ref 0.6–4.47)
LYMPHOCYTES NFR BLD AUTO: 15 % (ref 14–44)
MAGNESIUM SERPL-MCNC: 2 MG/DL (ref 1.9–2.7)
MCH RBC QN AUTO: 25.3 PG (ref 26.8–34.3)
MCHC RBC AUTO-ENTMCNC: 30.6 G/DL (ref 31.4–37.4)
MCV RBC AUTO: 83 FL (ref 82–98)
MONOCYTES # BLD AUTO: 1.83 THOUSAND/ÂΜL (ref 0.17–1.22)
MONOCYTES NFR BLD AUTO: 13 % (ref 4–12)
NEUTROPHILS # BLD AUTO: 9.18 THOUSANDS/ÂΜL (ref 1.85–7.62)
NEUTS SEG NFR BLD AUTO: 68 % (ref 43–75)
NRBC BLD AUTO-RTO: 1 /100 WBCS
PLATELET # BLD AUTO: 574 THOUSANDS/UL (ref 149–390)
PMV BLD AUTO: 9.4 FL (ref 8.9–12.7)
POTASSIUM SERPL-SCNC: 3.3 MMOL/L (ref 3.5–5.3)
RBC # BLD AUTO: 3.6 MILLION/UL (ref 3.88–5.62)
SODIUM SERPL-SCNC: 142 MMOL/L (ref 135–147)
WBC # BLD AUTO: 13.73 THOUSAND/UL (ref 4.31–10.16)

## 2022-12-07 RX ADMIN — HYDROMORPHONE HYDROCHLORIDE 5 MG: 2 TABLET ORAL at 15:24

## 2022-12-07 RX ADMIN — CEFAZOLIN SODIUM 2000 MG: 2 SOLUTION INTRAVENOUS at 15:34

## 2022-12-07 RX ADMIN — SENNOSIDES AND DOCUSATE SODIUM 1 TABLET: 50; 8.6 TABLET ORAL at 18:37

## 2022-12-07 RX ADMIN — CEFAZOLIN SODIUM 2000 MG: 2 SOLUTION INTRAVENOUS at 22:56

## 2022-12-07 RX ADMIN — Medication 20 MG: at 10:00

## 2022-12-07 RX ADMIN — SENNOSIDES AND DOCUSATE SODIUM 1 TABLET: 50; 8.6 TABLET ORAL at 09:45

## 2022-12-07 RX ADMIN — POLYETHYLENE GLYCOL 3350 17 G: 17 POWDER, FOR SOLUTION ORAL at 09:47

## 2022-12-07 RX ADMIN — ACETAMINOPHEN 650 MG: 325 TABLET ORAL at 23:25

## 2022-12-07 RX ADMIN — CEFAZOLIN SODIUM 2000 MG: 2 SOLUTION INTRAVENOUS at 06:21

## 2022-12-07 RX ADMIN — HYDROMORPHONE HYDROCHLORIDE 5 MG: 2 TABLET ORAL at 22:37

## 2022-12-07 RX ADMIN — HYDROMORPHONE HYDROCHLORIDE 5 MG: 2 TABLET ORAL at 06:21

## 2022-12-07 RX ADMIN — Medication 20 MG: at 22:20

## 2022-12-07 RX ADMIN — APIXABAN 10 MG: 5 TABLET, FILM COATED ORAL at 18:36

## 2022-12-07 RX ADMIN — HYDROMORPHONE HYDROCHLORIDE 1 MG: 1 INJECTION, SOLUTION INTRAMUSCULAR; INTRAVENOUS; SUBCUTANEOUS at 00:23

## 2022-12-07 RX ADMIN — APIXABAN 10 MG: 5 TABLET, FILM COATED ORAL at 09:45

## 2022-12-07 RX ADMIN — HYDROMORPHONE HYDROCHLORIDE 5 MG: 2 TABLET ORAL at 02:36

## 2022-12-07 RX ADMIN — CHLORHEXIDINE GLUCONATE 0.12% ORAL RINSE 15 ML: 1.2 LIQUID ORAL at 20:00

## 2022-12-07 RX ADMIN — CHLORHEXIDINE GLUCONATE 0.12% ORAL RINSE 15 ML: 1.2 LIQUID ORAL at 09:45

## 2022-12-07 RX ADMIN — COLLAGENASE SANTYL: 250 OINTMENT TOPICAL at 13:05

## 2022-12-07 RX ADMIN — HYDROMORPHONE HYDROCHLORIDE 5 MG: 2 TABLET ORAL at 09:46

## 2022-12-07 NOTE — PROGRESS NOTES
2230 Pt with increased coughing and restlessness  Tube feeds stopped, residuals checked with 50mls  Hospitalist made aware, hold for a couple hours  0100  Pt Continues to be restless and coughing, PRN IV diluadid given for possible pain/agitation  Hospitalist made aware, will hold tube feeds for rest of the night, xray in AM  Residuals at this time 0mls

## 2022-12-07 NOTE — NUTRITION
12/07/22 1435   Current PO Intake   Estimated calorie intake compared to estimated need Nutrition needs not met at this time  Recommendations/Interventions   Recommendations to Provider Recommend adjusting TF regimen to Jevity 1 5 at 40 mL/hr with Prosource No Carb Liquid Protein TID to provide 1620 kcal, 106 g protein, 729 mL free water in 960 mL total TF volume to provide less TF volume  Patient Nutrition Goals   Goal Adequate hydration;Meet EN/PN needs   Goal Status Not met; Extended

## 2022-12-07 NOTE — PROGRESS NOTES
[address]  Progress Note Jesu Mcintosh 1955, 74 y o  male MRN: [id number]  Unit/Bed#: -01 Encounter: [id number]  Primary Care Provider: Mary Mansfield MD   Date and time admitted to hospital: 11/25/2022  2:30 PM    * Shock [facility])  Assessment & Plan  · Sepsis was present on admission as evidenced by tachycardia, leukocytosis, and fever  · Status post initial treatment with IV vancomycin  · S/p 5 days of IV cefepime, which was transitioned to cefazolin 2 g every 8 hours complete course of antibiotic from first negative blood culture through 12/11/2022  · 1/2 blood cultures from 11/25/2022 revealed Staph aureus -Staph epidermidis, however a methicillin resistance gene was positive  · Repeat blood cultures from 11/28/2022 were negative  · Repeat blood cultures from 12/03/2022: NGTD  · Wound cultures- also positive for Proteus and Staph aureus as well as Psuedomonas  · Infectious Disease recommendations appreciated    GIB (gastrointestinal bleeding)  Assessment & Plan  · Originally admitted for shock, patient developed tachypnea, tachycardia, and hypotension and noted to be in respiratory distress possibly due to aspiration (patient has PEG tube, receives tube feeding)  · Patient was witnessed to have a large amount of coffee-ground emesis while being suctioned in preparation for intubation  Patient was intubated and upgraded to the ICU at that time    He was extubated on 12/03  · He received 2 units of packed red blood cells, followed by a 3rd unit and 1 unit FFP  · GI recommendations appreciated  · Patient downgraded to medical service on 12/04/2022  · Hemoglobin is stable  · Heparin had been resumed on 12/01/2022, and on 12/05, patient was noted to have a DVT of the right upper extremity and started on Eliquis  · Monitor for bleeding, monitor hemoglobin      Acute deep vein thrombosis (DVT) of right upper extremity (HCC)  Assessment & Plan  · Right arm swelling and tenderness noted  · Venous duplex of right upper extremity was positive for DVT  · Oxygen saturation 95-96% on 2 L  Patient has been persistently tachycardic since admission  · Start Eliquis 10 mg twice daily for 7 days, transition to 5 mg twice daily thereafter  · Monitor vital signs, labs, serial assessments  · Will hold off on obtaining further imaging as treatment for DVT has been initiated    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)  Assessment & Plan  · With witnessed aspiration event during intubation  · Completed cefepime per infectious disease  · Serial assessments      Hypernatremia  Assessment & Plan  · Sodium 150 > 143  · Etiology unclear, possibly due to dehydration from high output from colostomy vs volume expansion  · Nephrology recommendations appreciated  · Continue to monitor sodium    MSSA bacteremia  Assessment & Plan  · Management as outlined above    Developmental disability  Assessment & Plan  · Patient with profound development disability, nonverbal, bed bound  · Patient on tube feeds only with 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    Pressure injury of left heel, stage 4 ([address] )  Assessment & Plan  · S/p bedside debridement with surgery 11/26  · Continue management as outlined above    VTE Pharmacologic Prophylaxis:   Pharmacologic: Apixaban (Eliquis)  Mechanical VTE Prophylaxis in Place: Yes    Patient Centered Rounds: I have performed bedside rounds with nursing staff today  Discussions with Specialists or Other Care Team Provider: CM    Education and Discussions with Family / Patient: Caregiver    Time Spent for Care: 30 minutes  More than 50% of total time spent on counseling and coordination of care as described above      Current Length of Stay: 12 day(s)    Current Patient Status: Inpatient   Certification Statement: The patient will continue to require additional inpatient hospital stay due to per plan above  Discharge Plan: Return to facility, possibly 24 to 48 hours    Code Status: Level 1 - Full Code      Subjective:   Patient seen and examined  Unable to obtain review of systems due to patient nonverbal     Objective:     Vitals:   Temp (24hrs), Av 9 °F (37 7 °C), Min:98 9 °F (37 2 °C), Max:100 4 °F (38 °C)    Temp:  [98 9 °F (37 2 °C)-100 4 °F (38 °C)] 98 9 °F (37 2 °C)  HR:  [120-133] 133  Resp:  [20-22] 22  BP: (130-153)/(78-86) 130/78  SpO2:  [90 %-94 %] 90 %  Body mass index is 21 17 kg/m²  Input and Output Summary (last 24 hours): Intake/Output Summary (Last 24 hours) at 2022 1452  Last data filed at 2022 2901  Gross per 24 hour   Intake 1360 ml   Output 1850 ml   Net -490 ml       Physical Exam:     Physical Exam  Vitals and nursing note reviewed  HENT:      Head: Normocephalic and atraumatic  Nose: Nose normal       Mouth/Throat:      Mouth: Mucous membranes are dry  Pharynx: Oropharynx is clear  Eyes:      Pupils: Pupils are equal, round, and reactive to light  Cardiovascular:      Rate and Rhythm: Normal rate  Pulmonary:      Effort: Pulmonary effort is normal  No respiratory distress  Breath sounds: Decreased breath sounds present  No wheezing or rhonchi  Abdominal:      General: Bowel sounds are normal       Palpations: Abdomen is soft  Tenderness: There is no abdominal tenderness  Comments: Ostomy in place for soft brown stool   Musculoskeletal:      Cervical back: Neck supple  Right lower leg: No edema  Left lower leg: No edema  Comments: Extremity contractures   Skin:     General: Skin is warm and dry  Capillary Refill: Capillary refill takes less than 2 seconds  Comments: Dressings clean dry and intact   Neurological:      General: No focal deficit present        Mental Status: He is alert  Mental status is at baseline  Additional Data:     Labs:    Results from last 7 days   Lab Units 12/07/22  0632   WBC Thousand/uL 13 73*   HEMOGLOBIN g/dL 9 1*   HEMATOCRIT % 29 7*   PLATELETS Thousands/uL 574*   NEUTROS PCT % 68   LYMPHS PCT % 15   MONOS PCT % 13*   EOS PCT % 3     Results from last 7 days   Lab Units 12/07/22  0632   SODIUM mmol/L 142   POTASSIUM mmol/L 3 3*   CHLORIDE mmol/L 107   CO2 mmol/L 29   BUN mg/dL 4*   CREATININE mg/dL 0 27*   ANION GAP mmol/L 6   CALCIUM mg/dL 8 4   GLUCOSE RANDOM mg/dL 96         Results from last 7 days   Lab Units 12/07/22  1202 12/07/22  0635 12/07/22  0030 12/06/22  2124 12/06/22  1817 12/06/22  1244 12/06/22  0605 12/06/22  0008 12/05/22  1819 12/05/22  1145 12/05/22  0600 12/05/22  0013   POC GLUCOSE mg/dl 118 96 115 125 108 125 123 104 98 101 101 96         Results from last 7 days   Lab Units 12/04/22  0436 12/03/22  0820 12/03/22  0444   LACTIC ACID mmol/L  --  1 7  --    PROCALCITONIN ng/ml 1 59*  --  0 67*           * I Have Reviewed All Lab Data Listed Above  * Additional Pertinent Lab Tests Reviewed: All [facility] Admission Reviewed    Recent Cultures (last 7 days):     Results from last 7 days   Lab Units 12/03/22  0836 12/03/22  0825   BLOOD CULTURE  No Growth at 72 hrs  No Growth at 72 hrs         Last 24 Hours Medication List:   Current Facility-Administered Medications   Medication Dose Route Frequency Provider Last Rate   • acetaminophen  650 mg Per G Tube Q4H PRN AVANI Mora     • apixaban  10 mg Oral BID AVANI Lambert     • [START ON 12/12/2022] apixaban  5 mg Oral BID AVANI Lambert     • cefazolin  2,000 mg Intravenous Q8H AVANI Mora 2,000 mg (12/07/22 1881)   • chlorhexidine  15 mL Mouth/Throat Q12H [facility] AVANI Mora     • collagenase   Topical Daily AVANI Mora     • HYDROmorphone  1 mg Intravenous Q2H PRN AVANI Mora     • HYDROmorphone  5 mg Oral Q4H Erik Starr AVANI Mujica     • insulin lispro  1-5 Units Subcutaneous Q6H [facility] HOME AVANI Mcneill     • omeprazole (PRILOSEC) suspension 2 mg/mL  20 mg Oral BID AVANI Mcneill     • ondansetron  4 mg Intravenous Q6H PRN AVANI Mcneill     • polyethylene glycol  17 g Oral Daily AVANI Mcneill     • senna-docusate sodium  1 tablet Oral BID AVANI Mcneill          Today, Patient Was Seen By: AVANI Ospina    ** Please Note: Dictation voice to text software may have been used in the creation of this document   **

## 2022-12-07 NOTE — PLAN OF CARE
Problem: INFECTION - ADULT  Goal: Absence or prevention of progression during hospitalization  Description: INTERVENTIONS:  - Assess and monitor for signs and symptoms of infection  - Monitor lab/diagnostic results  - Monitor all insertion sites, i e  indwelling lines, tubes, and drains  - Monitor endotracheal if appropriate and nasal secretions for changes in amount and color  - [place] appropriate cooling/warming therapies per order  - Administer medications as ordered  - Instruct and encourage patient and family to use good hand hygiene technique  - Identify and instruct in appropriate isolation precautions for identified infection/condition  Outcome: Progressing

## 2022-12-07 NOTE — ASSESSMENT & PLAN NOTE
· Sepsis was present on admission as evidenced by tachycardia, leukocytosis, and fever  · Status post initial treatment with IV vancomycin  · S/p 5 days of IV cefepime, which was transitioned to cefazolin 2 g every 8 hours complete course of antibiotic from first negative blood culture through 12/11/2022  · 1/2 blood cultures from 11/25/2022 revealed Staph aureus -Staph epidermidis, however a methicillin resistance gene was positive  · Repeat blood cultures from 11/28/2022 were negative  · Repeat blood cultures from 12/03/2022: NGTD  · Wound cultures- also positive for Proteus and Staph aureus as well as Psuedomonas  · Infectious Disease recommendations appreciated-they have signed off

## 2022-12-07 NOTE — PROGRESS NOTES
Progress Note - [facility] Infectious Disease   Artist Chencho 74 y o  male MRN: [id number]  Unit/Bed#: -01 Encounter: [id number]    IMPRESSION & RECOMMENDATIONS:   1  Sepsis, present on admission, evidenced by tachycardia and leukocytosis  Patient now continues to be febrile though leukocytosis is finally downtrending  Lactic acid and PCT initially normal  1 of 2 admission bl cx positive  All sets of repeat blood cultures are negative  Suspect sepsis initially secondary to #2, #3, now complicated by #7  KI completed 7d course of IV cefepime now on IV ancef for MSSA bacteremia  CT C/A/P negative for deep seated abscess or osteomyelitis, but did show possible opacities in the right middle and lower lobes  Patient was extubated to NC with improved O2 sats, with intermittent low grade fevers in the setting of RUE pain, swelling and warmth, likely due to DVT as noted on RUE US  Fevers now resolved  -continue antibiotics as below  -if patient decompensates, would obtain repeat imaging of chest and change abx to IV vancomycin and ceftazidime   -monitor temperature and hemodynamics  -management per primary team   -recheck CBC and BMP in a m      2  Polymicrobial bacteremia  One of 2 admission blood cultures positive for Gram-positive cocci in clusters, culture positive for MSSA and CoNS  Documented that it was drawn from patients peripheral line and pt known to be difficult stick with hx of extensive contractures  Set drawn peripherally is negative to date  Suspect CoNs is contaminant, however MSSA likely due to #3   2D echo is normal  No known intravascular devices  Repeat blood cultures all remain negative to date    -continue IV ancef 2g q8 to complete course of abx from first negative blood cx through 12/11/22  -may not be candidate for PICC due to extensive contractures   -continue to follow-up blood cultures and adjust antibiotics as needed     3  Chronic, now infected, stage IV decubitus ulcers  POA   Despite outpatient wound care, found to have significant deterioration in wounds over the last few months, particularly the right upper back and sacral wound with increasing wound depth and necrotic tissue burden with evidence of purulence and malodor on admission  Patient also with unstageable sacral/right buttock ulcer, unstageable right ischial decubitus ulcer, and unstageable left heel pressure ulcer  Status post bedside debridement on 11/26  CT C/A/P negative for deep seated abscesses  Wound cultures from upper back and sacrum are polymicrobial, positive for Citrobacter koseri, Proteus mirabilis, Pseudomonas aeruginosa, and Staph aureus  Surgery and wound care following for chemical and manual debridement  Unfortunately wounds are unlikely to heal and are at risk for reinfection due to iron deficiency anemia, protein calorie malnutrition, contractures, inability to offload pressure and inability for closure  Completed 7 day course of IV cefepime    -continue to address GOC  Although no OM noted on CT, with worsening wounds it is likely that patient will develop chronic infection  If unable to cover/secure wounds especially of sacrum and upper right back, OM not treatable long term      -daily wound care and surgery follow up      4  Left lateral foot stage IV pressure ulcer with suspect OM  POA   Now with left lateral foot ulcer and left heel ulcer both probing to bone   XR shows absence of the 5th metatarsal head which likely represents chronic osteomyelitis   No evidence of overlying cellulitis or infection   -continue to address [address]  In the setting of chronic wound with exposed bone and no plan/indication for surgical intervention, patients osteomyelitis is not treatable long-term  Likely to continue to have progression of wound despite local wound care given extensive contractures and inability to offload pressure  He remains at risk for recurrent infection, bacteremia, sepsis, and limb loss  -continue local wound care and offloading pressure      5  Acute respiratory failure with hypoxia   Patient emergently intubated in the setting of respiratory failure, aspiration and upper GI bleeding 11/29  Stephy Menendez remained vented, sedated and on pressors   Now extubated to nasal cannula 12/3  S/p EGD which showed ulcerated area in stomach s/p clip   CT chest shows consolidative opacities in the right middle and lower lobes possibly due to pneumonia and atelectasis   Continues to have vomiting episodes   Consider possibility of aspiration pneumonitis versus pneumonia   Repeat chest x-ray shows improving opacities  Sputum cx while intubated shows growth of candida and Achromobacter    -monitor respiratory status and O2 requirements   -monitor hgb and transfuse PRN      6  Acute RUE DVT  Noted to have RUE pain, swelling, and warmth on exam  Patient had peripheral IV in RUE earlier in hospital stay that infiltrated  Also with bullous eruption of right arm  RUE US shows acute DVT in paired brachial vein and chronic non-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  Unlikely cellulitis given recent abx use  -started on Eliquis per primary team   -supportive care   -serial skin exams      7  Functional quadriplegia, developmental disability   Patient nonverbal and bed-bound at baseline on tube feeds, with chronic indwelling Marrufo catheter and colostomy  Unfortunately due to contractures, very difficult to reposition patient and offload pressure   -supportive measures per primary team     8  Antibiotic Allergy   Noted to have amoxicillin and Augmentin allergy without documented reaction   Per Care everywhere, has tolerated cefepime and ceftriaxone during prior admissions  -monitor for adverse drug reactions    Antibiotics:  D12 abx   D5 ancef     I have discussed the above management plan in detail with patient  I have discussed the above management plan in detail with patient's RN, and the primary service, SLIM  Subjective:  Patient is nonverbal  Shakes head back and forth and grimaces when I attempt to move his RUE  Objective:  Vitals:  Temp:  [98 9 °F (37 2 °C)-100 4 °F (38 °C)] 98 9 °F (37 2 °C)  HR:  [120-133] 133  Resp:  [20-22] 22  BP: (130-153)/(78-86) 130/78  SpO2:  [90 %-94 %] 90 %  Temp (24hrs), Av 9 °F (37 7 °C), Min:98 9 °F (37 2 °C), Max:100 4 °F (38 °C)  Current: Temperature: 98 9 °F (37 2 °C)    PHYSICAL EXAM:  General Appearance:  Appearing awake and alert, chronically ill, and in no distress,   HEENT: Normocephalic, without obvious abnormality, atraumatic  Conjunctiva pink and sclera anicteric  Oropharynx dry without lesions  Lungs:   Clear to auscultation bilaterally, respirations unlabored   Heart:  RRR; no murmur, rub or gallop   Abdomen:   Soft, non-tender, non-distended, positive bowel sounds    Extremities: No cyanosis, clubbing or edema   Musculoskeletal: Extensive contractures of all 4 extremities    : No CVA or suprapubic tenderness  Marrufo patent  Skin: No rashes or lesions  No draining wounds noted  Peripheral IV intact without evidence of erythema, warmth, or exudate  LABS, IMAGING, & OTHER STUDIES:  Lab Results:  I have personally reviewed pertinent labs  Results from last 7 days   Lab Units 22  0632 22  0610 22  0557   WBC Thousand/uL 13 73* 12 94* 17 43*   HEMOGLOBIN g/dL 9 1* 8 4* 9 1*   PLATELETS Thousands/uL 574* 542* 571*     Results from last 7 days   Lab Units 22  0632 22  0610 22  0557   SODIUM mmol/L 142 144 143   POTASSIUM mmol/L 3 3* 3 3* 3 1*   CHLORIDE mmol/L 107 108 104   CO2 mmol/L 29 30 32   BUN mg/dL 4* 5 6   CREATININE mg/dL 0 27* 0 25* 0 31*   EGFR ml/min/1 73sq m 146 151 138   CALCIUM mg/dL 8 4 8 2* 8 7     Results from last 7 days   Lab Units 22  0836 22  0825   BLOOD CULTURE  No Growth at 72 hrs  No Growth at 72 hrs       Results from last 7 days   Lab Units 22  0436 22  0444 PROCALCITONIN ng/ml 1 59* 0 67*

## 2022-12-07 NOTE — CASE MANAGEMENT
Case Management Discharge Planning Note    Patient name Miguel Angel Chatterjee  Location [id number] MRN [id number]  : 1955 Date 2022       Current Admission Date: 2022  Current Admission Diagnosis:Shock [facility])   Patient Active Problem List    Diagnosis Date Noted   • Acute deep vein thrombosis (DVT) of right upper extremity ([facility] ) 2022   • GIB (gastrointestinal bleeding) 2022   • Aspiration pneumonia ([address] ) 2022   • Chronic indwelling Marrufo catheter 2022   • Hypernatremia 2022   • MSSA bacteremia 2022   • Hypokalemia 2022   • Pressure ulcer of ischium, right, stage III ([facility] ) 2022   • Shock ([address] ) 2022   • Developmental disability 2022   • Pressure injury of sacral region, stage 4 ([address] ) 10/26/2022   • 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, stage 4 ([address] ) 10/26/2022   • Pressure ulcer of right lower back, stage 3 ([address] ) 10/26/2022   • Pressure ulcer of left foot, stage 4 ([address] ) 10/26/2022   • Pressure injury of right upper back, stage 4 ([address] ) 10/26/2022   • Open wound of right hand without foreign body 10/26/2022   • Pressure injury of left heel, stage 4 ([facility] ) 10/26/2022      LOS (days): 12  Geometric Mean LOS (GMLOS) (days):   Days to GMLOS:     OBJECTIVE:  Risk of Unplanned Readmission Score: 17 67         Current admission status: Inpatient   Preferred Pharmacy: No Pharmacies Listed  Primary Care Provider: Charlene Milligan MD    Primary Insurance: [address] [facility]  Secondary Insurance:     DISCHARGE DETAILS:  Discharge planning discussed with[de-identified] [facility] Staff  Freedom of Choice: Yes  Comments - Freedom of Choice: CM continuing to follow for discharge planning  Pt not yet stable for discharge  Pt is a long term care resident at [facility]  He is bed bound at baseline and requires total care with ADLs  CM in continued contact with [facility] staff   Plan remains for pt to return there once stable  CM to follow

## 2022-12-07 NOTE — ACP (ADVANCE CARE PLANNING)
Serious Illness Conversation    1  What is your understanding now of where you are with your illness? Prognostic Understanding: no understanding of prognosis    Patient is developmentally delayed and chronically ill  He is a resident in a care facility for over 30 years  He has a guardian and is a crook of the [facility]  I discussed goals of care with nursing supervisor Nancy Olivarez at facility ,  [phone]  She said they have been trying to get an allow natural death status for some time at his regular facility [facility]  This would require 2 physicians stating that he has a terminal diagnosis and then would have to go in front of a human rights/ethics committee at the facility  2  How much information about what is likely to be ahead with patient's illness would you like to have? Patient is probably not able to understand, however details will need to be given to guardian and nursing supervisor at facility     3  What did you (clinician) communicate to the patient's representative? Prognostic Communication: Time - I wish we were not in this situation, but I am worried that time may be as short as weeks to months  I told Nancy Sher patient's infections may not be able to be effectively treated and also may lead to unwanted side effects that will potentially cause further suffering  Additionally, he has a VTE in his right upper extremity that is now being treated with anticoagulation, but he was treated for gastrointestinal bleeding  Also, he has had aspiration witnessed during this hospitalization  I’ve heard you say that keeping Cintia Mchugh comfortable is really important to you  Keeping that in mind, and what we know about his illness, I recommend that we pursue palliative or hospice care  This will help us make sure that his treatment plans reflect what’s important  How does this plan sound to you? I will do everything I can to help you through this    Patient representative verbalized understanding of the plan     I have spent 20 minutes speaking with my patient's representative on advanced care planning today or during this visit

## 2022-12-08 LAB
ALBUMIN SERPL BCP-MCNC: 2.5 G/DL (ref 3.5–5)
ALP SERPL-CCNC: 145 U/L (ref 34–104)
ALT SERPL W P-5'-P-CCNC: 4 U/L (ref 7–52)
ANION GAP SERPL CALCULATED.3IONS-SCNC: 8 MMOL/L (ref 4–13)
AST SERPL W P-5'-P-CCNC: 20 U/L (ref 13–39)
BASOPHILS # BLD AUTO: 0.05 THOUSANDS/ÂΜL (ref 0–0.1)
BASOPHILS NFR BLD AUTO: 0 % (ref 0–1)
BILIRUB DIRECT SERPL-MCNC: 0.08 MG/DL (ref 0–0.2)
BILIRUB SERPL-MCNC: 0.28 MG/DL (ref 0.2–1)
BUN SERPL-MCNC: 7 MG/DL (ref 5–25)
CALCIUM SERPL-MCNC: 8.6 MG/DL (ref 8.4–10.2)
CHLORIDE SERPL-SCNC: 108 MMOL/L (ref 96–108)
CO2 SERPL-SCNC: 27 MMOL/L (ref 21–32)
CREAT SERPL-MCNC: 0.32 MG/DL (ref 0.6–1.3)
EOSINOPHIL # BLD AUTO: 0.46 THOUSAND/ÂΜL (ref 0–0.61)
EOSINOPHIL NFR BLD AUTO: 3 % (ref 0–6)
ERYTHROCYTE [DISTWIDTH] IN BLOOD BY AUTOMATED COUNT: 21.8 % (ref 11.6–15.1)
GFR SERPL CREATININE-BSD FRML MDRD: 136 ML/MIN/1.73SQ M
GLUCOSE SERPL-MCNC: 102 MG/DL (ref 65–140)
GLUCOSE SERPL-MCNC: 112 MG/DL (ref 65–140)
GLUCOSE SERPL-MCNC: 113 MG/DL (ref 65–140)
GLUCOSE SERPL-MCNC: 135 MG/DL (ref 65–140)
GLUCOSE SERPL-MCNC: 136 MG/DL (ref 65–140)
GLUCOSE SERPL-MCNC: 138 MG/DL (ref 65–140)
HCT VFR BLD AUTO: 32.6 % (ref 36.5–49.3)
HGB BLD-MCNC: 9.6 G/DL (ref 12–17)
IMM GRANULOCYTES # BLD AUTO: 0.07 THOUSAND/UL (ref 0–0.2)
IMM GRANULOCYTES NFR BLD AUTO: 1 % (ref 0–2)
LYMPHOCYTES # BLD AUTO: 1.88 THOUSANDS/ÂΜL (ref 0.6–4.47)
LYMPHOCYTES NFR BLD AUTO: 13 % (ref 14–44)
MCH RBC QN AUTO: 24.9 PG (ref 26.8–34.3)
MCHC RBC AUTO-ENTMCNC: 29.4 G/DL (ref 31.4–37.4)
MCV RBC AUTO: 85 FL (ref 82–98)
MONOCYTES # BLD AUTO: 1.59 THOUSAND/ÂΜL (ref 0.17–1.22)
MONOCYTES NFR BLD AUTO: 11 % (ref 4–12)
NEUTROPHILS # BLD AUTO: 10.04 THOUSANDS/ÂΜL (ref 1.85–7.62)
NEUTS SEG NFR BLD AUTO: 72 % (ref 43–75)
NRBC BLD AUTO-RTO: 1 /100 WBCS
PLATELET # BLD AUTO: 661 THOUSANDS/UL (ref 149–390)
PMV BLD AUTO: 9.9 FL (ref 8.9–12.7)
POTASSIUM SERPL-SCNC: 3.5 MMOL/L (ref 3.5–5.3)
PROT SERPL-MCNC: 6.9 G/DL (ref 6.4–8.4)
RBC # BLD AUTO: 3.86 MILLION/UL (ref 3.88–5.62)
SODIUM SERPL-SCNC: 143 MMOL/L (ref 135–147)
WBC # BLD AUTO: 14.09 THOUSAND/UL (ref 4.31–10.16)

## 2022-12-08 RX ADMIN — SENNOSIDES AND DOCUSATE SODIUM 1 TABLET: 50; 8.6 TABLET ORAL at 08:59

## 2022-12-08 RX ADMIN — POLYETHYLENE GLYCOL 3350 17 G: 17 POWDER, FOR SOLUTION ORAL at 08:58

## 2022-12-08 RX ADMIN — CHLORHEXIDINE GLUCONATE 0.12% ORAL RINSE 15 ML: 1.2 LIQUID ORAL at 08:50

## 2022-12-08 RX ADMIN — HYDROMORPHONE HYDROCHLORIDE 5 MG: 2 TABLET ORAL at 09:00

## 2022-12-08 RX ADMIN — HYDROMORPHONE HYDROCHLORIDE 5 MG: 2 TABLET ORAL at 02:06

## 2022-12-08 RX ADMIN — CHLORHEXIDINE GLUCONATE 0.12% ORAL RINSE 15 ML: 1.2 LIQUID ORAL at 22:32

## 2022-12-08 RX ADMIN — CEFAZOLIN SODIUM 2000 MG: 2 SOLUTION INTRAVENOUS at 22:32

## 2022-12-08 RX ADMIN — HYDROMORPHONE HYDROCHLORIDE 5 MG: 2 TABLET ORAL at 06:04

## 2022-12-08 RX ADMIN — APIXABAN 10 MG: 5 TABLET, FILM COATED ORAL at 17:56

## 2022-12-08 RX ADMIN — Medication 20 MG: at 22:32

## 2022-12-08 RX ADMIN — APIXABAN 10 MG: 5 TABLET, FILM COATED ORAL at 09:00

## 2022-12-08 RX ADMIN — Medication 20 MG: at 09:18

## 2022-12-08 RX ADMIN — CEFAZOLIN SODIUM 2000 MG: 2 SOLUTION INTRAVENOUS at 06:05

## 2022-12-08 RX ADMIN — HYDROMORPHONE HYDROCHLORIDE 5 MG: 2 TABLET ORAL at 22:31

## 2022-12-08 RX ADMIN — HYDROMORPHONE HYDROCHLORIDE 5 MG: 2 TABLET ORAL at 17:57

## 2022-12-08 RX ADMIN — SENNOSIDES AND DOCUSATE SODIUM 1 TABLET: 50; 8.6 TABLET ORAL at 17:57

## 2022-12-08 RX ADMIN — COLLAGENASE SANTYL: 250 OINTMENT TOPICAL at 10:35

## 2022-12-08 NOTE — PROGRESS NOTES
[address]  Progress Note Radha Ricardo 1955, 74 y o  male MRN: [id number]  Unit/Bed#: -01 Encounter: [id number]  Primary Care Provider: Re Darling MD   Date and time admitted to hospital: 11/25/2022  2:30 PM    * Shock [facility])  Assessment & Plan  · Sepsis was present on admission as evidenced by tachycardia, leukocytosis, and fever  · Status post initial treatment with IV vancomycin  · S/p 5 days of IV cefepime, which was transitioned to cefazolin 2 g every 8 hours complete course of antibiotic from first negative blood culture through 12/11/2022  · 1/2 blood cultures from 11/25/2022 revealed Staph aureus -Staph epidermidis, however a methicillin resistance gene was positive  · Repeat blood cultures from 11/28/2022 were negative  · Repeat blood cultures from 12/03/2022: NGTD  · Wound cultures- also positive for Proteus and Staph aureus as well as Psuedomonas  · Infectious Disease recommendations appreciated-they have signed off    GIB (gastrointestinal bleeding)  Assessment & Plan  · Originally admitted for shock, patient developed tachypnea, tachycardia, and hypotension and noted to be in respiratory distress possibly due to aspiration (patient has PEG tube, receives tube feeding)  · Patient was witnessed to have a large amount of coffee-ground emesis while being suctioned in preparation for intubation  Patient was intubated and upgraded to the ICU at that time    He was extubated on 12/03  · He received 2 units of packed red blood cells, followed by a 3rd unit and 1 unit FFP  · GI recommendations appreciated  · Patient downgraded to medical service on 12/04/2022  · Hemoglobin is stable  · Heparin had been resumed on 12/01/2022, and on 12/05, patient was noted to have a DVT of the right upper extremity and started on Eliquis  · Monitor for bleeding, monitor hemoglobin      Acute deep vein thrombosis (DVT) of right upper extremity ([facility] [address] )  Assessment & Plan  · Right arm swelling and tenderness noted  · Venous duplex of right upper extremity was positive for DVT  · Oxygen saturation 95-96% on 2 L  Patient has been persistently tachycardic since admission  · Start Eliquis 10 mg twice daily for 7 days, transition to 5 mg twice daily thereafter  · Monitor vital signs, labs, serial assessments      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, stage 4 (HCC)  Assessment & Plan  · S/p bedside debridement with surgery 11/26  · Continue Ancef IV as above  · Wound care consultation appreciated      Aspiration pneumonia [facility])  Assessment & Plan  · With witnessed aspiration event during intubation  · Completed cefepime per infectious disease  · Serial assessments      Hypernatremia  Assessment & Plan  · Sodium 150 > 143  · Etiology unclear, possibly due to dehydration from high output from colostomy vs volume expansion  · Nephrology recommendations appreciated  · Continue to monitor sodium    MSSA bacteremia  Assessment & Plan  · Management as outlined above    Developmental disability  Assessment & Plan  · Patient with profound development disability, nonverbal, bed bound  · Patient on tube feeds only with 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    Pressure injury of left heel, stage 4 (HCC)  Assessment & Plan  · S/p bedside debridement with surgery 11/26  · Continue management as outlined above    VTE Pharmacologic Prophylaxis:   Pharmacologic: Apixaban (Eliquis)  Mechanical VTE Prophylaxis in Place: Yes    Patient Centered Rounds: I have performed bedside rounds with nursing staff today  Discussions with Specialists or Other Care Team Provider: CM    Education and Discussions with Family / Patient: Caregiver    Time Spent for Care: 45 minutes  More than 50% of total time spent on counseling and coordination of care as described above      Current Length of Stay: 13 day(s)    Current Patient Status: Inpatient   Certification Statement: The patient will continue to require additional inpatient hospital stay due to per plan above  Discharge Plan: Return to facility, maybe tomorrow  Code Status: Level 1 - Full Code      Subjective:   Patient seen and examined  Unable to obtain ROS due to patient non verbal      Objective:     Vitals:   Temp (24hrs), Av 6 °F (37 6 °C), Min:98 6 °F (37 °C), Max:100 7 °F (38 2 °C)    Temp:  [98 6 °F (37 °C)-100 7 °F (38 2 °C)] 98 9 °F (37 2 °C)  HR:  [121-138] 121  Resp:  [20-22] 22  BP: (105-155)/(53-93) 155/83  SpO2:  [91 %-97 %] 97 %  Body mass index is 20 47 kg/m²  Input and Output Summary (last 24 hours): Intake/Output Summary (Last 24 hours) at 2022 1640  Last data filed at 2022 1300  Gross per 24 hour   Intake 931 ml   Output 775 ml   Net 156 ml       Physical Exam:     Physical Exam  Vitals and nursing note reviewed  HENT:      Head: Normocephalic and atraumatic  Mouth/Throat:      Pharynx: Oropharynx is clear  Eyes:      Pupils: Pupils are equal, round, and reactive to light  Cardiovascular:      Rate and Rhythm: Normal rate and regular rhythm  Pulmonary:      Effort: Pulmonary effort is normal  No respiratory distress  Breath sounds: Decreased breath sounds present  Abdominal:      General: The ostomy site is clean  Bowel sounds are normal       Palpations: Abdomen is soft  Tenderness: There is no abdominal tenderness  Comments: G tube in place   Genitourinary:     Comments: Marrufo cath in place for clear yellow urine  Musculoskeletal:      Cervical back: Neck supple  Right lower leg: No edema  Left lower leg: No edema  Comments: Extremities contracted   Skin:     General: Skin is warm and dry  Capillary Refill: Capillary refill takes less than 2 seconds  Neurological:      General: No focal deficit present  Mental Status: He is alert  Mental status is at baseline       Additional Data:     Labs:    Results from last 7 days   Lab Units 12/08/22  0455   WBC Thousand/uL 14 09*   HEMOGLOBIN g/dL 9 6*   HEMATOCRIT % 32 6*   PLATELETS Thousands/uL 661*   NEUTROS PCT % 72   LYMPHS PCT % 13*   MONOS PCT % 11   EOS PCT % 3     Results from last 7 days   Lab Units 12/08/22  0455   SODIUM mmol/L 143   POTASSIUM mmol/L 3 5   CHLORIDE mmol/L 108   CO2 mmol/L 27   BUN mg/dL 7   CREATININE mg/dL 0 32*   ANION GAP mmol/L 8   CALCIUM mg/dL 8 6   ALBUMIN g/dL 2 5*   TOTAL BILIRUBIN mg/dL 0 28   ALK PHOS U/L 145*   ALT U/L 4*   AST U/L 20   GLUCOSE RANDOM mg/dL 113         Results from last 7 days   Lab Units 12/08/22  1222 12/08/22  0639 12/08/22  0051 12/07/22  1806 12/07/22  1202 12/07/22  0635 12/07/22  0030 12/06/22  2124 12/06/22  1817 12/06/22  1244 12/06/22  0605 12/06/22  0008   POC GLUCOSE mg/dl 138 136 112 93 118 96 115 125 108 125 123 104         Results from last 7 days   Lab Units 12/04/22  0436 12/03/22  0820 12/03/22  0444   LACTIC ACID mmol/L  --  1 7  --    PROCALCITONIN ng/ml 1 59*  --  0 67*           * I Have Reviewed All Lab Data Listed Above  * Additional Pertinent Lab Tests Reviewed: [address] Admission Reviewed    Recent Cultures (last 7 days):     Results from last 7 days   Lab Units 12/03/22  0836 12/03/22  0825   BLOOD CULTURE  No Growth After 4 Days  No Growth After 4 Days         Last 24 Hours Medication List:   Current Facility-Administered Medications   Medication Dose Route Frequency Provider Last Rate   • acetaminophen  650 mg Per G Tube Q4H PRN AVANI Delgado     • apixaban  10 mg Oral BID AVANI Bryson     • [START ON 12/12/2022] apixaban  5 mg Oral BID AVANI Bryson     • cefazolin  2,000 mg Intravenous Q8H AVANI Delgado Stopped (12/08/22 1523)   • chlorhexidine  15 mL Mouth/Throat Q12H [facility] AVANI Delgado     • collagenase   Topical Daily AVANI Delgado     • HYDROmorphone  1 mg Intravenous Q2H PRN AVANI Delgado     • HYDROmorphone  5 mg Oral Q4H AVANI Delgado     • insulin lispro  1-5 Units Subcutaneous Q6H [facility] AVANI Del Castillo     • omeprazole (PRILOSEC) suspension 2 mg/mL  20 mg Oral BID AVANI Del Castillo     • ondansetron  4 mg Intravenous Q6H PRN AVANI Del Castillo     • polyethylene glycol  17 g Oral Daily AVANI Del Castillo     • senna-docusate sodium  1 tablet Oral BID AVANI Del Castillo          Today, Patient Was Seen By: AVANI Jung    ** Please Note: Dictation voice to text software may have been used in the creation of this document   **

## 2022-12-08 NOTE — PLAN OF CARE
Problem: Prexisting or High Potential for Compromised Skin Integrity  Goal: Skin integrity is maintained or improved  Description: INTERVENTIONS:  - Identify patients at risk for skin breakdown  - Assess and monitor skin integrity  - Assess and monitor nutrition and hydration status  - Monitor labs   - Assess for incontinence   - Turn and reposition patient  - Assist with mobility/ambulation  - Relieve pressure over bony prominences  - Avoid friction and shearing  - Provide appropriate hygiene as needed including keeping skin clean and dry  - Evaluate need for skin moisturizer/barrier cream  - Collaborate with interdisciplinary team   - Patient/family teaching  - Consider wound care consult   Outcome: Progressing     Problem: Nutrition/Hydration-ADULT  Goal: Nutrient/Hydration intake appropriate for improving, restoring or maintaining nutritional needs  Description: Monitor and assess patient's nutrition/hydration status for malnutrition  Collaborate with interdisciplinary team and initiate plan and interventions as ordered  Monitor patient's weight and dietary intake as ordered or per policy  Utilize nutrition screening tool and intervene as necessary  Determine patient's food preferences and provide high-protein, high-caloric foods as appropriate       INTERVENTIONS:  - Monitor oral intake, urinary output, labs, and treatment plans  - Assess nutrition and hydration status and recommend course of action  - Evaluate amount of meals eaten  - Assist patient with eating if necessary   - Allow adequate time for meals  - Recommend/ encourage appropriate diets, oral nutritional supplements, and vitamin/mineral supplements  - Order, calculate, and assess calorie counts as needed  - Recommend, monitor, and adjust tube feedings and TPN/PPN based on assessed needs  - Assess need for intravenous fluids  - Provide specific nutrition/hydration education as appropriate  - Include patient/family/caregiver in decisions related to nutrition  Outcome: Progressing     Problem: PAIN - ADULT  Goal: Verbalizes/displays adequate comfort level or baseline comfort level  Description: Interventions:  - Encourage patient to monitor pain and request assistance  - Assess pain using appropriate pain scale  - Administer analgesics based on type and severity of pain and evaluate response  - Implement non-pharmacological measures as appropriate and evaluate response  - Consider cultural and social influences on pain and pain management  - Notify physician/advanced practitioner if interventions unsuccessful or patient reports new pain  Outcome: Progressing     Problem: INFECTION - ADULT  Goal: Absence or prevention of progression during hospitalization  Description: INTERVENTIONS:  - Assess and monitor for signs and symptoms of infection  - Monitor lab/diagnostic results  - Monitor all insertion sites, i e  indwelling lines, tubes, and drains  - Monitor endotracheal if appropriate and nasal secretions for changes in amount and color  - [place] appropriate cooling/warming therapies per order  - Administer medications as ordered  - Instruct and encourage patient and family to use good hand hygiene technique  - Identify and instruct in appropriate isolation precautions for identified infection/condition  Outcome: Progressing  Goal: Absence of fever/infection during neutropenic period  Description: INTERVENTIONS:  - Monitor WBC    Outcome: Progressing     Problem: DISCHARGE PLANNING  Goal: Discharge to home or other facility with appropriate resources  Description: INTERVENTIONS:  - Identify barriers to discharge w/patient and caregiver  - Arrange for needed discharge resources and transportation as appropriate  - Identify discharge learning needs (meds, wound care, etc )  - Arrange for interpretive services to assist at discharge as needed  - Refer to Case Management Department for coordinating discharge planning if the patient needs post-hospital services based on physician/advanced practitioner order or complex needs related to functional status, cognitive ability, or social support system  Outcome: Progressing     Problem: Knowledge Deficit  Goal: Patient/family/caregiver demonstrates understanding of disease process, treatment plan, medications, and discharge instructions  Description: Complete learning assessment and assess knowledge base    Interventions:  - Provide teaching at level of understanding  - Provide teaching via preferred learning methods  Outcome: Progressing     Problem: SAFETY,RESTRAINT: NV/NON-SELF DESTRUCTIVE BEHAVIOR  Goal: Remains free of harm/injury (restraint for non violent/non self-detsructive behavior)  Description: INTERVENTIONS:  - Instruct patient/family regarding restraint use   - Assess and monitor physiologic and psychological status   - Provide interventions and comfort measures to meet assessed patient needs   - Identify and implement measures to help patient regain control  - Assess readiness for release of restraint   Outcome: Progressing  Goal: Returns to optimal restraint-free functioning  Description: INTERVENTIONS:  - Assess the patient's behavior and symptoms that indicate continued need for restraint  - Identify and implement measures to help patient regain control  - Assess readiness for release of restraint   Outcome: Progressing     Problem: SAFETY ADULT  Goal: Patient will remain free of falls  Description: INTERVENTIONS:  - Educate patient/family on patient safety including physical limitations  - Instruct patient to call for assistance with activity   - Consult OT/PT to assist with strengthening/mobility   - Keep Call bell within reach  - Keep bed low and locked with side rails adjusted as appropriate  - Keep care items and personal belongings within reach  - Initiate and maintain comfort rounds  - Make Fall Risk Sign visible to staff  - Offer Toileting in advance of need  - Initiate/Maintain bed alarm  - Obtain necessary fall risk management equipment:   - Apply yellow socks and bracelet for high fall risk patients  - Consider moving patient to room near nurses station  Outcome: Progressing  Goal: Maintain or return to baseline ADL function  Description: INTERVENTIONS:  -  Assess patient's ability to carry out ADLs; assess patient's baseline for ADL function and identify physical deficits which impact ability to perform ADLs (bathing, care of mouth/teeth, toileting, grooming, dressing, etc )  - Assess/evaluate cause of self-care deficits   - Assess range of motion  - Assess patient's mobility; develop plan if impaired  - Assess patient's need for assistive devices and provide as appropriate  - Encourage maximum independence but intervene and supervise when necessary  - Involve family in performance of ADLs  - Assess for home care needs following discharge   - Consider OT consult to assist with ADL evaluation and planning for discharge  - Provide patient education as appropriate  Outcome: Progressing  Goal: Maintains/Returns to pre admission functional level  Description: INTERVENTIONS:  - Perform BMAT or MOVE assessment daily    - Set and communicate daily mobility goal to care team and patient/family/caregiver     - Collaborate with rehabilitation services on mobility goals if consulted  - Record patient progress and toleration of activity level   Outcome: Progressing

## 2022-12-08 NOTE — NURSING NOTE
Pt had CHG bath and hygiene was also perfomed pericare done oral casre done pt repositioned Q 2 hrs RN aware

## 2022-12-08 NOTE — PROGRESS NOTES
Progress Note - [facility] Infectious Disease   Padmini Blazing 74 y o  male MRN: [id number]  Unit/Bed#: -01 Encounter: [id number]      IMPRESSION & RECOMMENDATIONS:   1  Sepsis, present on admission, evidenced by tachycardia and leukocytosis  Per chart review, leukocytosis is chronic since Dec 2021  1 of 2 admission bl cx positive  All sets of repeat blood cultures are negative  Suspect sepsis initially secondary to #2, #3, now complicated by #1  YL completed 7d course of IV cefepime now on IV ancef for MSSA bacteremia  CT C/A/P negative for deep seated abscess or osteomyelitis, but did show possible opacities in the right middle and lower lobes  Patient was extubated to NC with improved O2 sats, with intermittent low grade fevers  Unknown etiology for recurrent fevers; may be due to recurrent aspiration, RUE DVT, versus other occult etiology as patient is nonverbal    -continue antibiotics as below  -if patient decompensates and/or continues to spike fevers would recommend repeat CT C/A/P   -monitor temperature and hemodynamics  -management per primary team   -recheck CBC and BMP in a m       2  Polymicrobial bacteremia  One of 2 admission blood cultures positive for Gram-positive cocci in clusters, culture positive for MSSA and CoNS  Documented that it was drawn from patients peripheral line and pt known to be difficult stick with hx of extensive contractures  Set drawn peripherally is negative to date  Suspect CoNs is contaminant, however MSSA likely due to #3   2D echo is normal  No known intravascular devices  Repeat blood cultures all remain negative to date    -continue IV ancef 2g q8 to complete course of abx from first negative blood cx through 12/11/22  -may not be candidate for PICC due to extensive contractures   -continue to follow-up blood cultures and adjust antibiotics as needed     3  Chronic, now infected, stage IV decubitus ulcers  POA   Despite outpatient wound care, found to have significant deterioration in wounds over the last few months, particularly the right upper back and sacral wound with increasing wound depth and necrotic tissue burden with evidence of purulence and malodor on admission  Patient also with unstageable sacral/right buttock ulcer, unstageable right ischial decubitus ulcer, and unstageable left heel pressure ulcer  CT C/A/P negative for deep seated abscesses  Wound cultures from initial bedside debridement upper back and sacrum are polymicrobial, positive for Citrobacter koseri, Proteus mirabilis, Pseudomonas aeruginosa, and Staph aureus  Surgery and wound care following for chemical and manual debridement  Unfortunately wounds are unlikely to heal and are at risk for reinfection due to protein calorie malnutrition, contractures, inability to offload pressure and inability for closure  Completed 7 day course of IV cefepime    -continue to address GOC  Although no OM noted on CT, with worsening wounds it is likely that patient will develop chronic infection  If unable to cover/secure wounds especially of sacrum and upper right back, OM not treatable long term      -daily wound care and surgery follow up      4  Left lateral foot stage IV pressure ulcer with suspect OM  POA   Now with left lateral foot ulcer and left heel ulcer both probing to bone   XR shows absence of the 5th metatarsal head which likely represents chronic osteomyelitis   No evidence of overlying cellulitis or infection   -continue to address [address]  In the setting of chronic wound with exposed bone and no plan/indication for surgical intervention, patients osteomyelitis is not treatable long-term  Likely to continue to have progression of wound despite local wound care given extensive contractures and inability to offload pressure  He remains at risk for recurrent infection, bacteremia, sepsis, and limb loss     -continue local wound care and offloading pressure      5  Acute respiratory failure with hypoxia   Patient emergently intubated in the setting of respiratory failure, aspiration and upper GI bleeding 11/29   Patient remained vented, sedated and on pressors   Now extubated to nasal cannula 12/3  S/p EGD which showed ulcerated area in stomach s/p clip   CT chest shows consolidative opacities in the right middle and lower lobes possibly due to pneumonia and atelectasis   Continues to have vomiting episodes   Consider possibility of aspiration pneumonitis versus pneumonia   Repeat chest x-ray shows improving opacities  Sputum cx while intubated shows growth of candida and Achromobacter    -monitor respiratory status and O2 requirements   -monitor hgb and transfuse PRN      6  Acute RUE DVT  Noted to have RUE pain, swelling, and warmth on exam  Patient had peripheral IV in RUE earlier in hospital stay that infiltrated  Also with bullous eruption of right arm  RUE US shows acute DVT in paired brachial vein and chronic non-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  Unlikely cellulitis given recent abx use  Swelling is improving    -started on Eliquis per primary team   -supportive care   -serial skin exams      7  Functional quadriplegia, developmental disability   Patient nonverbal and bed-bound at baseline on tube feeds, with chronic indwelling Marrufo catheter and colostomy  Unfortunately due to contractures, very difficult to reposition patient and offload pressure   -supportive measures per primary team     8  Antibiotic Allergy   Noted to have amoxicillin and Augmentin allergy without documented reaction   Per Care everywhere, has tolerated cefepime and ceftriaxone during prior admissions  -monitor for adverse drug reactions    Antibiotics:  D13 abx   D6 Ancef     I have discussed the above management plan in detail with patient  I have discussed the above management plan in detail with patient's RN, and the primary service, SL[facility]       Subjective:  Pt is nonverbal  Grimacing and shaking head back and forth especially when I attempt to assess his right arm  Objective:  Vitals:  Temp:  [98 9 °F (37 2 °C)-100 7 °F (38 2 °C)] 100 2 °F (37 9 °C)  HR:  [128-138] 133  Resp:  [20-22] 21  BP: (105-161)/(59-93) 145/93  SpO2:  [90 %-95 %] 91 %  Temp (24hrs), Av 8 °F (37 7 °C), Min:98 9 °F (37 2 °C), Max:100 7 °F (38 2 °C)  Current: Temperature: 100 2 °F (37 9 °C)    PHYSICAL EXAM:  General Appearance:  Appearing awake and alert but chronically ill, in mild distress   HEENT: Normocephalic, without obvious abnormality, atraumatic  Conjunctiva pink and sclera anicteric  Oropharynx dry without lesions  Lungs:   Clear to auscultation bilaterally, respirations unlabored   Heart:  Reg rhythm but tachycardic rate; no murmur, rub or gallop   Abdomen:   Soft, non-tender, non-distended, positive bowel sounds    Extremities: No cyanosis, clubbing  UE edema improving  Musculoskeletal: Extensive contractures of all 4 extremities  : No CVA or suprapubic tenderness  Marrufo patent  Skin: No rashes or lesions  No draining wounds noted  Peripheral IV intact without evidence of erythema, warmth, or exudate  LABS, IMAGING, & OTHER STUDIES:  Lab Results:  I have personally reviewed pertinent labs  Results from last 7 days   Lab Units 22  0455 22  0632 22  0610   WBC Thousand/uL 14 09* 13 73* 12 94*   HEMOGLOBIN g/dL 9 6* 9 1* 8 4*   PLATELETS Thousands/uL 661* 574* 542*     Results from last 7 days   Lab Units 22  0455 22  0632 22  0610   SODIUM mmol/L 143 142 144   POTASSIUM mmol/L 3 5 3 3* 3 3*   CHLORIDE mmol/L 108 107 108   CO2 mmol/L 27 29 30   BUN mg/dL 7 4* 5   CREATININE mg/dL 0 32* 0 27* 0 25*   EGFR ml/min/1 73sq m 136 146 151   CALCIUM mg/dL 8 6 8 4 8 2*     Results from last 7 days   Lab Units 22  0836 22  0825   BLOOD CULTURE  No Growth After 4 Days  No Growth After 4 Days       Results from last 7 days   Lab Units 22  0436 22  0444   PROCALCITONIN ng/ml 1 59* 0 67*

## 2022-12-08 NOTE — CASE MANAGEMENT
Case Management Discharge Planning Note    Patient name Fe Ferrer  Location [address] [id number] MRN [id number]  : 1955 Date 2022       Current Admission Date: 2022  Current Admission Diagnosis:Shock [facility])   Patient Active Problem List    Diagnosis Date Noted   • Acute deep vein thrombosis (DVT) of right upper extremity ([address] ) 2022   • GIB (gastrointestinal bleeding) 2022   • Aspiration pneumonia ([address] ) 2022   • Chronic indwelling Marrufo catheter 2022   • Hypernatremia 2022   • MSSA bacteremia 2022   • Hypokalemia 2022   • Pressure ulcer of ischium, right, stage III ([address] ) 2022   • Shock ([address] ) 2022   • Developmental disability 2022   • Pressure injury of sacral region, stage 4 ([address] ) 10/26/2022   • 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, stage 4 ([address] ) 10/26/2022   • Pressure ulcer of right lower back, stage 3 ([address] ) 10/26/2022   • Pressure ulcer of left foot, stage 4 ([address] ) 10/26/2022   • Pressure injury of right upper back, stage 4 ([address] ) 10/26/2022   • Open wound of right hand without foreign body 10/26/2022   • Pressure injury of left heel, stage 4 ([address] ) 10/26/2022      LOS (days): 13  Geometric Mean LOS (GMLOS) (days):   Days to GMLOS:     OBJECTIVE:  Risk of Unplanned Readmission Score: 16 42         Current admission status: Inpatient   Preferred Pharmacy: No Pharmacies Listed  Primary Care Provider: Zac Sanders MD    Primary Insurance: [address] [facility]  Secondary Insurance:     DISCHARGE DETAILS:    Discharge planning discussed with[de-identified] [facility] nursing supervisor [phone]  Freedom of Choice: Yes  Comments - Freedom of Choice: CM spoke with nursing supervisor Raul Sandhu at [facility]  CM reviewed pt current stauts  Per Raul Sandhu pt is able to return when stable for discharge as long as he does not require oxygen over 6L or need long term IV antibiotics   No indication for same per ID  Pt on 2L at present  Adenike aware  They will be able to resume pt wound care on return  Discussed with Nidia HERNANDEZ  Pt not yet stable for dischagre  She is also attmepting to change pt code status  CM to follow             Treatment Team Recommendation: SNF  Discharge Destination Plan[de-identified] SNF  Transport at Discharge : [facility]

## 2022-12-08 NOTE — ASSESSMENT & PLAN NOTE
· Right arm swelling and tenderness noted  · Venous duplex of right upper extremity was positive for DVT  · Oxygen saturation 95-96% on 2 L   Patient has been persistently tachycardic since admission  · Start Eliquis 10 mg twice daily for 7 days, transition to 5 mg twice daily thereafter  · Monitor vital signs, labs, serial assessments

## 2022-12-09 PROBLEM — E87.0 HYPERNATREMIA: Status: RESOLVED | Noted: 2022-11-28 | Resolved: 2022-12-09

## 2022-12-09 LAB
ANION GAP SERPL CALCULATED.3IONS-SCNC: 8 MMOL/L (ref 4–13)
BACTERIA BLD CULT: NORMAL
BACTERIA BLD CULT: NORMAL
BASOPHILS # BLD AUTO: 0.04 THOUSANDS/ÂΜL (ref 0–0.1)
BASOPHILS NFR BLD AUTO: 0 % (ref 0–1)
BUN SERPL-MCNC: 6 MG/DL (ref 5–25)
CALCIUM SERPL-MCNC: 8.4 MG/DL (ref 8.4–10.2)
CHLORIDE SERPL-SCNC: 108 MMOL/L (ref 96–108)
CO2 SERPL-SCNC: 26 MMOL/L (ref 21–32)
CREAT SERPL-MCNC: 0.29 MG/DL (ref 0.6–1.3)
EOSINOPHIL # BLD AUTO: 0.6 THOUSAND/ÂΜL (ref 0–0.61)
EOSINOPHIL NFR BLD AUTO: 4 % (ref 0–6)
ERYTHROCYTE [DISTWIDTH] IN BLOOD BY AUTOMATED COUNT: 21.7 % (ref 11.6–15.1)
GFR SERPL CREATININE-BSD FRML MDRD: 142 ML/MIN/1.73SQ M
GLUCOSE SERPL-MCNC: 105 MG/DL (ref 65–140)
GLUCOSE SERPL-MCNC: 114 MG/DL (ref 65–140)
GLUCOSE SERPL-MCNC: 119 MG/DL (ref 65–140)
GLUCOSE SERPL-MCNC: 123 MG/DL (ref 65–140)
GLUCOSE SERPL-MCNC: 133 MG/DL (ref 65–140)
GLUCOSE SERPL-MCNC: 138 MG/DL (ref 65–140)
HCT VFR BLD AUTO: 30.9 % (ref 36.5–49.3)
HGB BLD-MCNC: 9 G/DL (ref 12–17)
IMM GRANULOCYTES # BLD AUTO: 0.09 THOUSAND/UL (ref 0–0.2)
IMM GRANULOCYTES NFR BLD AUTO: 1 % (ref 0–2)
LYMPHOCYTES # BLD AUTO: 1.62 THOUSANDS/ÂΜL (ref 0.6–4.47)
LYMPHOCYTES NFR BLD AUTO: 11 % (ref 14–44)
MCH RBC QN AUTO: 24.7 PG (ref 26.8–34.3)
MCHC RBC AUTO-ENTMCNC: 29.1 G/DL (ref 31.4–37.4)
MCV RBC AUTO: 85 FL (ref 82–98)
MONOCYTES # BLD AUTO: 1.53 THOUSAND/ÂΜL (ref 0.17–1.22)
MONOCYTES NFR BLD AUTO: 10 % (ref 4–12)
NEUTROPHILS # BLD AUTO: 11.26 THOUSANDS/ÂΜL (ref 1.85–7.62)
NEUTS SEG NFR BLD AUTO: 74 % (ref 43–75)
NRBC BLD AUTO-RTO: 0 /100 WBCS
PLATELET # BLD AUTO: 672 THOUSANDS/UL (ref 149–390)
PMV BLD AUTO: 9.4 FL (ref 8.9–12.7)
POTASSIUM SERPL-SCNC: 3.6 MMOL/L (ref 3.5–5.3)
RBC # BLD AUTO: 3.64 MILLION/UL (ref 3.88–5.62)
SODIUM SERPL-SCNC: 142 MMOL/L (ref 135–147)
WBC # BLD AUTO: 15.14 THOUSAND/UL (ref 4.31–10.16)

## 2022-12-09 RX ADMIN — HYDROMORPHONE HYDROCHLORIDE 5 MG: 2 TABLET ORAL at 18:40

## 2022-12-09 RX ADMIN — HYDROMORPHONE HYDROCHLORIDE 5 MG: 2 TABLET ORAL at 06:18

## 2022-12-09 RX ADMIN — Medication 20 MG: at 11:16

## 2022-12-09 RX ADMIN — APIXABAN 10 MG: 5 TABLET, FILM COATED ORAL at 18:40

## 2022-12-09 RX ADMIN — APIXABAN 10 MG: 5 TABLET, FILM COATED ORAL at 10:01

## 2022-12-09 RX ADMIN — CHLORHEXIDINE GLUCONATE 0.12% ORAL RINSE 15 ML: 1.2 LIQUID ORAL at 21:47

## 2022-12-09 RX ADMIN — CEFAZOLIN SODIUM 2000 MG: 2 SOLUTION INTRAVENOUS at 14:57

## 2022-12-09 RX ADMIN — COLLAGENASE SANTYL: 250 OINTMENT TOPICAL at 15:02

## 2022-12-09 RX ADMIN — CEFAZOLIN SODIUM 2000 MG: 2 SOLUTION INTRAVENOUS at 21:48

## 2022-12-09 RX ADMIN — SENNOSIDES AND DOCUSATE SODIUM 1 TABLET: 50; 8.6 TABLET ORAL at 18:40

## 2022-12-09 RX ADMIN — CHLORHEXIDINE GLUCONATE 0.12% ORAL RINSE 15 ML: 1.2 LIQUID ORAL at 10:02

## 2022-12-09 RX ADMIN — CEFAZOLIN SODIUM 2000 MG: 2 SOLUTION INTRAVENOUS at 06:18

## 2022-12-09 RX ADMIN — HYDROMORPHONE HYDROCHLORIDE 5 MG: 2 TABLET ORAL at 21:47

## 2022-12-09 RX ADMIN — SENNOSIDES AND DOCUSATE SODIUM 1 TABLET: 50; 8.6 TABLET ORAL at 10:01

## 2022-12-09 RX ADMIN — HYDROMORPHONE HYDROCHLORIDE 5 MG: 2 TABLET ORAL at 10:00

## 2022-12-09 RX ADMIN — HYDROMORPHONE HYDROCHLORIDE 5 MG: 2 TABLET ORAL at 14:54

## 2022-12-09 RX ADMIN — POLYETHYLENE GLYCOL 3350 17 G: 17 POWDER, FOR SOLUTION ORAL at 10:02

## 2022-12-09 RX ADMIN — HYDROMORPHONE HYDROCHLORIDE 5 MG: 2 TABLET ORAL at 02:30

## 2022-12-09 NOTE — ASSESSMENT & PLAN NOTE
· S/p bedside debridement with surgery 11/26  · Continue cefazolin IV as above  · Wound care consultation appreciated

## 2022-12-09 NOTE — ASSESSMENT & PLAN NOTE
· Right arm swelling and tenderness noted  · Venous duplex of right upper extremity was positive for DVT  · Start Eliquis 10 mg twice daily for 7 days, transition to 5 mg twice daily thereafter  · Monitor vital signs, labs, serial assessments

## 2022-12-09 NOTE — PROGRESS NOTES
[address]  Progress Note [place] 1955, 74 y o  male MRN: [id number]  Unit/Bed#: -01 Encounter: [id number]  Primary Care Provider: Barbra Mccullough MD   Date and time admitted to hospital: 11/25/2022  2:30 PM    * Shock [facility])  Assessment & Plan  · Sepsis was present on admission as evidenced by tachycardia, leukocytosis, and fever  · Status post initial treatment with IV vancomycin, cefepime, which was transitioned to cefazolin 2g Q8 through 12/11/2022  · 1/2 blood cultures from 11/25/2022 revealed Staph aureus -Staph epidermidis, however a methicillin resistance gene was positive  Repeat cultures were negative to date  · Wound cultures- also positive for Proteus and Staph aureus as well as Psuedomonas  · Infectious Disease recommendations appreciated-they have signed off    GIB (gastrointestinal bleeding)  Assessment & Plan  · Originally admitted for shock, patient developed tachypnea, tachycardia, and hypotension and noted to be in respiratory distress possibly due to aspiration (patient has PEG tube, receives tube feeding)  · Patient was witnessed to have a large amount of coffee-ground emesis while being suctioned in preparation for intubation  Patient was intubated and upgraded to the ICU at that time  He was extubated on 12/03  · He received 3 units of PRBCs total and 1 unit of FFP  · GI recommendations appreciated  · Heparin SQ had been resumed on 12/01/2022   On 12/05, patient was noted to have a DVT of the right upper extremity and started on Eliquis  · Monitor for bleeding, monitor hemoglobin      Acute deep vein thrombosis (DVT) of right upper extremity (HCC)  Assessment & Plan  · Right arm swelling and tenderness noted  · Venous duplex of right upper extremity was positive for DVT  · Start Eliquis 10 mg twice daily for 7 days, transition to 5 mg twice daily thereafter  · Monitor vital signs, labs, serial assessments      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, stage 4 (HCC)  Assessment & Plan  · S/p bedside debridement with surgery   · Continue cefazolin IV as above  · Wound care consultation appreciated      Aspiration pneumonia [facility])  Assessment & Plan  · With witnessed aspiration event during intubation  · Completed cefepime per infectious disease  · Serial assessments      MSSA bacteremia  Assessment & Plan  · Management as outlined above    Developmental disability  Assessment & Plan  · Patient with profound development disability, nonverbal, bed bound  · Patient on tube feeds only with 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    Pressure injury of left heel, stage 4 ([address] )  Assessment & Plan  · S/p bedside debridement with surgery   · Continue management as outlined above    Hypernatremia-resolved as of 2022  Assessment & Plan  · Resolved  · Etiology unclear, possibly due to dehydration from high output from colostomy vs volume expansion  · Nephrology recommendations appreciated  · Continue to monitor sodium    VTE Pharmacologic Prophylaxis:   Pharmacologic: Apixaban (Eliquis)  Mechanical VTE Prophylaxis in Place: Yes    Patient Centered Rounds: I have performed bedside rounds with nursing staff today  Discussions with Specialists or Other Care Team Provider: CHLOÉ BEAVERS    Education and Discussions with Family / Patient: Caretaker    Time Spent for Care: 45 minutes  More than 50% of total time spent on counseling and coordination of care as described above  Current Length of Stay: 14 day(s)    Current Patient Status: Inpatient   Certification Statement: The patient will continue to require additional inpatient hospital stay due to per plan above  Discharge Plan: Return to facility, probably     Code Status: Level 1 - Full Code      Subjective:   Unable to obtain ROS      Objective:     Vitals:   Temp (24hrs), Av 8 °F (37 7 °C), Min:98 9 °F (37 2 °C), Max:100 5 °F (38 1 °C)    Temp:  [98 9 °F (37 2 °C)-100 5 °F (38 1 °C)] 100 °F (37 8 °C)  HR:  [121-129] 122  Resp:  [18-22] 20  BP: (149-155)/(72-88) 149/88  SpO2:  [96 %-97 %] 96 %  Body mass index is 20 58 kg/m²  Input and Output Summary (last 24 hours): Intake/Output Summary (Last 24 hours) at 12/9/2022 1428  Last data filed at 12/9/2022 0617  Gross per 24 hour   Intake 250 ml   Output 1250 ml   Net -1000 ml       Physical Exam:     Physical Exam  Vitals and nursing note reviewed  Constitutional:       General: He is not in acute distress  HENT:      Head: Normocephalic and atraumatic  Mouth/Throat:      Pharynx: Oropharynx is clear  Eyes:      Pupils: Pupils are equal, round, and reactive to light  Cardiovascular:      Rate and Rhythm: Normal rate  Pulmonary:      Effort: Pulmonary effort is normal  No respiratory distress  Breath sounds: Decreased breath sounds present  Abdominal:      General: Bowel sounds are normal       Palpations: Abdomen is soft  Tenderness: There is no abdominal tenderness  Musculoskeletal:      Cervical back: Neck supple  Comments: Contractures to extremities x 4   Skin:     General: Skin is warm and dry  Capillary Refill: Capillary refill takes less than 2 seconds  Neurological:      General: No focal deficit present  Mental Status: He is alert  Mental status is at baseline         Additional Data:     Labs:    Results from last 7 days   Lab Units 12/09/22  0635   WBC Thousand/uL 15 14*   HEMOGLOBIN g/dL 9 0*   HEMATOCRIT % 30 9*   PLATELETS Thousands/uL 672*   NEUTROS PCT % 74   LYMPHS PCT % 11*   MONOS PCT % 10   EOS PCT % 4     Results from last 7 days   Lab Units 12/09/22  0635 12/08/22  0455   SODIUM mmol/L 142 143   POTASSIUM mmol/L 3 6 3 5   CHLORIDE mmol/L 108 108   CO2 mmol/L 26 27   BUN mg/dL 6 7   CREATININE mg/dL 0 29* 0 32*   ANION GAP mmol/L 8 8   CALCIUM mg/dL 8 4 8 6   ALBUMIN g/dL  --  2 5*   TOTAL BILIRUBIN mg/dL  --  0 28   ALK PHOS U/L  --  145*   ALT U/L  -- 4*   AST U/L  --  20   GLUCOSE RANDOM mg/dL 123 113         Results from last 7 days   Lab Units 12/09/22  1204 12/09/22  0638 12/08/22  2355 12/08/22  1757 12/08/22  1222 12/08/22  0639 12/08/22  0051 12/07/22  1806 12/07/22  1202 12/07/22  0635 12/07/22  0030 12/06/22  2124   POC GLUCOSE mg/dl 105 119 102 135 138 136 112 93 118 96 115 125         Results from last 7 days   Lab Units 12/04/22  0436 12/03/22  0820 12/03/22  0444   LACTIC ACID mmol/L  --  1 7  --    PROCALCITONIN ng/ml 1 59*  --  0 67*           * I Have Reviewed All Lab Data Listed Above  * Additional Pertinent Lab Tests Reviewed: Majo Casey Admission Reviewed    Recent Cultures (last 7 days):     Results from last 7 days   Lab Units 12/03/22  0836 12/03/22  0825   BLOOD CULTURE  No Growth After 5 Days  No Growth After 5 Days         Last 24 Hours Medication List:   Current Facility-Administered Medications   Medication Dose Route Frequency Provider Last Rate   • acetaminophen  650 mg Per G Tube Q4H PRN Valentino Eugenio, CRNP     • apixaban  10 mg Oral BID AVANI Patterson     • [START ON 12/12/2022] apixaban  5 mg Oral BID AVANI Patterson     • cefazolin  2,000 mg Intravenous Q8H Valentino Eugenio, CRNP 2,000 mg (12/09/22 0618)   • chlorhexidine  15 mL Mouth/Throat Q12H [address] Valentino Eugenio, CRNP     • collagenase   Topical Daily Valentino Coffee, CRNP     • HYDROmorphone  1 mg Intravenous Q2H PRN Valentino Eugenio, CRNP     • HYDROmorphone  5 mg Oral Q4H Valentino Coffee, CRNP     • insulin lispro  1-5 Units Subcutaneous Q6H [address] Valentino Eugenio, CRNP     • omeprazole (PRILOSEC) suspension 2 mg/mL  20 mg Oral BID Valentino Coffee, CRNP     • ondansetron  4 mg Intravenous Q6H PRN Valentino Eugenio, CRNP     • polyethylene glycol  17 g Oral Daily Valentino Coffee, CRNP     • senna-docusate sodium  1 tablet Oral BID Valentino Coffee, AVANI          Today, Patient Was Seen By: AVANI Patterson    ** Please Note: Dictation voice to text software may have been used in the creation of this document   **

## 2022-12-09 NOTE — WOUND OSTOMY CARE
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, beefy red granulation tissue noted in the wound bed, foul smelling drainage, greenish-yellow, moderate amount  Wound appears to be smaller than on admission, decreased amount of undermining noted  2  POA-stage 4 to the sacrum and right buttock  Wounds communicated to the facial layer with an island of intact tissue, bone palpated in sacral wound  Sacral wound appears to be smaller than on admission  3  POA-unstageable wound to the right ischium, yellow brown slough in the wound bed, no drainage at this time  4  POA-two ruptured serous filled blisters to the right upper arm  Arm is edematous  Proximal wound bed is resolving, no drainage, epithelial tissue noted  Distal wound bed beefy red, no slough, not actively draining  [place] between wound beds intact dried yellow blister  5  Left lower quadrant colostomy, pouch changed today  Stoma pink, slightly budded, measures 7/8 X 1 1/8 inches, oval, peristomal skin intact, functioning for liquid brown stool  6  HA-deep tissue injury to the left outer ear, purple soft non-blanchable blistered skin, suspect stage 3 stage 4 or unstageable pressure injury when evolves  Ear lobe is thick with fibrotic tissue  7  POA-Stage 2 pressure injury to the right outer ear, dry beefy red wound bed, no drainage, thick fibrotic tissue to the periiwound  Patient had a stage 2 PI previously  Partial thickness wound noted to the upper ear and scalp junction, beefy red, no drainage   Purple non-blanchable tissue to the top of the ear, suspect deep tissue injury, suspect stage 3, stage 4 or unstageable when evolves  8  POA-left hand resolving wound, unknown etiology, appearance of resolving pressure wound  Wound bed is dry, mix of beefy red, pale red and dried yellow scaly skin, no open area or drainage  9  POA-stage 4 pressure injury to the left lateral foot, bone exposed, mix of yellow slough and beefy red tissue  Hyperpigmented skin to the periwound  10  HA-left lateral ankle unstageable pressure injury, approx 60% yellow slough and 40% beefy red with area of purple in the center  Suspect wound may have resulted from deep tissue injury  12  POA-right ischium-lower buttocks heavily scarred tissue with open area to the center with beefy red and yellow wound base, scant yellow-brown drainage  13  Plantar surface of the left great toe light purple, suspicious for developing deep tissue injury, at this time is it mix of blanchable and non-blanchable tissue  Suspect may evolve to a stage 3, stage 4 or unstageable wound  14  Right heel intact, Allevyn foam dressing in place for prevention  15  Right buttock skin tear from tape, small amount of bloody drainage, partial thickness  16  Right posterior thigh serous filled bulla, intact at this time      Skin, Ostomy and Wound Care Plan:  1  Change colostomy pouch every 3 days or PRN with signs of leakage  2  Apply skin nourishing cream to the skin daily  3  Turn patient Q2 hours to offload pressure points  4  Posterior chest/upper back, sacrum and right SI joint, right ischium  Remove dressings and packing  Clean skin with chlorhexidine wipes  Irrigate wounds with wound cleanser  Pack with WTD dressing  Silicone cream to shanon-wound  Cover with bulky dressing for draiange (4x4, abd, heavy drainage pad)  Secure with tape or with mesh underwear  Change dressings daily and PRN with saturation  Apply santyl to sacral ulcer daily    5  Left heel and lateral foot ulcer: please apply santyl to the heel and lateral foot ulcers cover with dry sterile dressings  6  Place Allevyn life silicone bordered dressing to the right and left outer ear wounds and left anterior hand, momo with T, date, change every three days and PRN  7  Right upper arm wounds, cleanse with NSS, place [place] on wound beds cut to size of wounds, top with 4X4 and wrap with gauze, change daily and PRN  8  Prevalon boots-left and right foot  9  P-500 low air loss therapy surface          Wound 05/16/22 Pressure Injury Back Right;Upper (Active)   Wound Image   12/05/22 1415   Wound Description BRIDGETTE 12/08/22 2000   Pressure Injury Stage 4 12/08/22 1030   Sybil-wound Assessment BRIDGETTE 12/08/22 2000   Wound Length (cm) 7 cm 12/05/22 1415   Wound Width (cm) 5 3 cm 12/05/22 1415   Wound Depth (cm) 1 2 cm 12/05/22 1415   Wound Surface Area (cm^2) 37 1 cm^2 12/05/22 1415   Wound Volume (cm^3) 44 52 cm^3 12/05/22 1415   Calculated Wound Volume (cm^3) 44 52 cm^3 12/05/22 1415   Change in Wound Size % -1404 05 12/05/22 1415   Tunneling 0 cm 12/05/22 1415   Undermining 1 7 12/05/22 1415   Undermining is depth extending from 12:00- 12/05/22 1415   Drainage Amount Moderate 12/08/22 1030   Drainage Description Yellow 12/08/22 1030   Treatments Cleansed;Irrigation with NSS;Site care 12/08/22 1030   Dressing Other (Comment) 12/08/22 1030   Wound packed? Yes 12/08/22 1030   Packing- # removed 1 12/08/22 1030   Packing- # inserted 1 12/08/22 1030   Dressing Changed Changed 12/08/22 1030   Patient Tolerance Tolerated poorly 12/08/22 1030   Dressing Status Dry; Intact 12/08/22 2000       Wound 05/16/22 Pressure Injury Back Lateral;Right; Lower (Active)   Wound Image   11/28/22 1101   Wound Description BRIDGETTE 12/08/22 2000   Pressure Injury Stage 1 12/08/22 1030   Sybil-wound Assessment BRIDGETTE 12/08/22 2000   Wound Length (cm) 0 cm 12/05/22 1415   Wound Width (cm) 0 cm 12/05/22 1415   Wound Depth (cm) 0 cm 12/05/22 1415   Wound Surface Area (cm^2) 0 cm^2 12/05/22 1415   Wound Volume (cm^3) 0 cm^3 12/05/22 1415   Calculated Wound Volume (cm^3) 0 cm^3 12/05/22 1415   Change in Wound Size % 100 12/05/22 1415   Tunneling 0 cm 11/28/22 1101   Undermining 0 11/28/22 1101   Drainage Amount None 12/08/22 1030   Treatments Cleansed;Site care;Irrigation with NSS 12/08/22 1030   Dressing ABD 12/08/22 1030   Wound packed? No 12/08/22 1030   Dressing Changed Changed 12/08/22 1030   Patient Tolerance Tolerated poorly 12/08/22 1030   Dressing Status Dry; Intact 12/08/22 2000       Wound 05/16/22 Pressure Injury Sacrum (Active)   Wound Image   12/05/22 1408   Wound Description BRIDGETTE 12/08/22 2000   Pressure Injury Stage 4 12/08/22 1030   Sybil-wound Assessment BRIDGETTE 12/08/22 2000   Wound Length (cm) 4 5 cm 12/05/22 1408   Wound Width (cm) 3 8 cm 12/05/22 1408   Wound Depth (cm) 2 2 cm 12/05/22 1408   Wound Surface Area (cm^2) 17 1 cm^2 12/05/22 1408   Wound Volume (cm^3) 37 62 cm^3 12/05/22 1408   Calculated Wound Volume (cm^3) 37 62 cm^3 12/05/22 1408   Change in Wound Size % 44 88 12/05/22 1408   Tunneling 0 cm 12/05/22 1408   Undermining 4 9 12/05/22 1408   Undermining is depth extending from 3:00 12/05/22 1408   Drainage Amount Small 12/08/22 1030   Drainage Description Yellow 12/08/22 1030   Treatments Cleansed;Irrigation with NSS;Site care 12/08/22 1030   Dressing Other (Comment) 12/08/22 1030   Wound packed? Yes 12/08/22 1030   Packing- # removed 1 12/08/22 1030   Packing- # inserted 1 12/08/22 1030   Dressing Changed Changed 12/08/22 1030   Patient Tolerance Tolerated poorly 12/08/22 1030   Dressing Status Clean;Dry; Intact 12/08/22 2000       Wound 05/16/22 Pressure Injury Buttocks Right (Active)   Wound Image   12/05/22 1413   Wound Description BRIDGETTE 12/08/22 2000   Pressure Injury Stage 3 12/08/22 1030   Sybil-wound Assessment BRIDGETTE 12/08/22 2000   Wound Length (cm) 1 6 cm 12/05/22 1413   Wound Width (cm) 1 2 cm 12/05/22 1413   Wound Depth (cm) 0 8 cm 12/05/22 1413   Wound Surface Area (cm^2) 1 92 cm^2 12/05/22 1413   Wound Volume (cm^3) 1 536 cm^3 12/05/22 1413   Calculated Wound Volume (cm^3) 1 54 cm^3 12/05/22 1413   Change in Wound Size % 97 45 12/05/22 1413   Tunneling 0 cm 12/05/22 1413   Undermining 0 12/05/22 1413   Drainage Amount None 12/08/22 1030   Treatments Cleansed;Site care 12/08/22 1030   Dressing Dry dressing 12/08/22 1030   Wound packed? No 12/08/22 1030   Dressing Changed Changed 12/08/22 1030   Patient Tolerance Tolerated poorly 12/08/22 1030   Dressing Status Clean;Dry; Intact 12/08/22 2000       Wound 05/16/22 Pressure Injury Foot Left;Lateral (Active)   Wound Image   12/05/22 1439   Wound Description BRIDGETTE 12/08/22 2000   Pressure Injury Stage 4 12/08/22 1030   Sybil-wound Assessment BRIDGETTE 12/08/22 2000   Wound Length (cm) 3 4 cm 12/05/22 1439   Wound Width (cm) 1 5 cm 12/05/22 1439   Wound Depth (cm) 0 3 cm 12/05/22 1439   Wound Surface Area (cm^2) 5 1 cm^2 12/05/22 1439   Wound Volume (cm^3) 1 53 cm^3 12/05/22 1439   Calculated Wound Volume (cm^3) 1 53 cm^3 12/05/22 1439   Change in Wound Size % -427 59 12/05/22 1439   Tunneling 0 cm 12/05/22 1439   Undermining 0 12/05/22 1439   Drainage Amount Small 12/08/22 1030   Drainage Description Bloody 12/08/22 1030   Treatments Cleansed;Irrigation with NSS;Site care;Elevated 12/08/22 1030   Dressing Dry dressing 12/08/22 1030   Dressing Changed Changed 12/08/22 1030   Patient Tolerance Tolerated poorly 12/08/22 1030   Dressing Status Clean;Dry; Intact 12/08/22 2000       Wound 08/29/22 Heel Left;Posterior (Active)   Wound Image   12/05/22 1436   Wound Description BRIDGETTE 12/08/22 2000   Pressure Injury Stage 4 12/08/22 1030   Sybil-wound Assessment BRIDGETTE 12/08/22 2000   Wound Length (cm) 1 6 cm 12/05/22 1436   Wound Width (cm) 1 5 cm 12/05/22 1436   Wound Depth (cm) 0 3 cm 12/05/22 1436   Wound Surface Area (cm^2) 2 4 cm^2 12/05/22 1436   Wound Volume (cm^3) 0 72 cm^3 12/05/22 1436   Calculated Wound Volume (cm^3) 0 72 cm^3 12/05/22 1436 Change in Wound Size % 1 37 12/05/22 1436   Tunneling 0 cm 11/28/22 1134   Undermining 0 11/28/22 1134   Drainage Amount Scant 12/08/22 1030   Drainage Description Serosanguineous 12/08/22 1030   Treatments Cleansed;Irrigation with NSS;Site care;Elevated 12/08/22 1030   Dressing Dry dressing 12/08/22 1030   Wound packed? No 12/05/22 1436   Dressing Changed Changed 12/08/22 1030   Patient Tolerance Tolerated poorly 12/08/22 1030   Dressing Status Clean;Dry; Intact 12/08/22 2000       Wound 10/26/22 Other (comment) Hand Posterior;Right (Active)   Wound Description Clean;Dry; Intact 12/08/22 2000   Dressing Open to air 12/08/22 2000       Wound 11/28/22 Buttocks Right (Active)   Wound Image   12/05/22 1411   Wound Description BRIDGETTE 12/08/22 2000   Pressure Injury Stage U 12/08/22 1030   Sybil-wound Assessment BRIDGETTE 12/08/22 2000   Wound Length (cm) 3 5 cm 12/05/22 1411   Wound Width (cm) 2 2 cm 12/05/22 1411   Wound Depth (cm) 0 2 cm 12/05/22 1411   Wound Surface Area (cm^2) 7 7 cm^2 12/05/22 1411   Wound Volume (cm^3) 1 54 cm^3 12/05/22 1411   Calculated Wound Volume (cm^3) 1 54 cm^3 12/05/22 1411   Tunneling 0 cm 12/05/22 1411   Undermining 1 12/05/22 1411   Undermining is depth extending from 2:00 12/05/22 1411   Drainage Amount Scant 12/08/22 1030   Drainage Description Serous 12/08/22 1030   Treatments Cleansed;Irrigation with NSS;Site care 12/08/22 1030   Dressing Dry dressing 12/08/22 1030   Wound packed? No 12/04/22 1200   Packing- # removed 1 12/07/22 1515   Packing- # inserted 1 12/07/22 1515   Dressing Changed Changed 12/08/22 1030   Patient Tolerance Tolerated poorly 12/08/22 1030   Dressing Status Clean;Dry; Intact 12/08/22 2000       Wound 11/28/22 Hand Left;Posterior (Active)   Wound Image   12/05/22 1513   Wound Description Dry;Pink;Fragile 12/08/22 2000   Sybil-wound Assessment Scar Tissue 12/08/22 2000   Wound Length (cm) 2 5 cm 12/05/22 1513   Wound Width (cm) 0 7 cm 12/05/22 1513   Wound Surface Area (cm^2) 1 75 cm^2 12/05/22 1513   Drainage Amount None 12/08/22 1030   Treatments Cleansed;Site care;Irrigation with NSS 12/08/22 1030   Dressing Foam, Silicon (eg  Allevyn, etc) 12/08/22 2000   Dressing Changed Changed 12/08/22 1030   Patient Tolerance Tolerated poorly 12/08/22 1030   Dressing Status Clean;Dry; Intact 12/08/22 1030       Wound 11/30/22 Ear Right (Active)   Wound Image   12/05/22 1430   Wound Description BRIDGETTE 12/08/22 2000   Pressure Injury Stage 2 12/08/22 1030   Sybil-wound Assessment BRIDGETTE 12/08/22 2000   Wound Length (cm) 2 cm 12/05/22 1430   Wound Width (cm) 1 7 cm 12/05/22 1430   Wound Depth (cm) 0 cm 12/05/22 1430   Wound Surface Area (cm^2) 3 4 cm^2 12/05/22 1430   Wound Volume (cm^3) 0 cm^3 12/05/22 1430   Calculated Wound Volume (cm^3) 0 cm^3 12/05/22 1430   Change in Wound Size % 100 12/05/22 1430   Drainage Amount BRIDGETTE 12/08/22 2000   Treatments Cleansed;Site care 12/08/22 1030   Dressing Foam, Silicon (eg  Allevyn, etc) 12/08/22 2000   Dressing Changed Changed 12/08/22 1030   Patient Tolerance Tolerated well 12/08/22 1030   Dressing Status Clean;Dry; Intact 12/08/22 2000       Wound 12/05/22 Pressure Injury Ankle Anterior; Left (Active)   Wound Image   12/05/22 1438   Wound Description BRIDGETTE 12/08/22 2000   Pressure Injury Stage U 12/08/22 1030   Sybil-wound Assessment BRIDGETTE 12/08/22 2000   Wound Length (cm) 1 2 cm 12/05/22 1438   Wound Width (cm) 1 cm 12/05/22 1438   Wound Depth (cm) 0 2 cm 12/05/22 1438   Wound Surface Area (cm^2) 1 2 cm^2 12/05/22 1438   Wound Volume (cm^3) 0 24 cm^3 12/05/22 1438   Calculated Wound Volume (cm^3) 0 24 cm^3 12/05/22 1438   Tunneling 0 cm 12/05/22 1438   Undermining 0 12/05/22 1438   Drainage Amount Scant 12/08/22 1030   Drainage Description Bloody 12/08/22 1030   Treatments Cleansed;Irrigation with NSS;Site care 12/08/22 1030   Dressing Dry dressing 12/08/22 1030   Dressing Changed Changed 12/08/22 1030   Patient Tolerance Tolerated poorly 12/08/22 1030   Dressing Status Clean;Dry; Intact 12/08/22 2000       Wound 12/05/22 Arm Posterior;Right;Upper (Active)   Wound Image   12/05/22 1515   Wound Description BRIDGETTE 12/08/22 2000   Sybil-wound Assessment BRIDGETTE 12/08/22 2000   Wound Length (cm) 2 3 cm 12/05/22 1515   Wound Width (cm) 1 1 cm 12/05/22 1515   Wound Surface Area (cm^2) 2 53 cm^2 12/05/22 1515   Tunneling 0 cm 12/05/22 1515   Undermining 0 12/05/22 1515   Drainage Amount Scant 12/08/22 1030   Drainage Description Serosanguineous 12/08/22 1030   Treatments Cleansed;Irrigation with NSS;Site care 12/08/22 1030   Dressing Cathyann Aloe gauze;Dry dressing 12/08/22 1030   Dressing Changed Changed 12/08/22 1030   Patient Tolerance Tolerated poorly 12/08/22 1030   Dressing Status Clean; Intact;Dry 12/08/22 2000       Wound 12/05/22 Shoulder Posterior;Right (Active)   Wound Image   12/05/22 1516   Wound Description BRIDGETTE 12/08/22 2000   Sybil-wound Assessment BRIDGETTE 12/08/22 2000   Wound Length (cm) 2 1 cm 12/05/22 1516   Wound Width (cm) 2 6 cm 12/05/22 1516   Wound Depth (cm) 0 1 cm 12/05/22 1516   Wound Surface Area (cm^2) 5 46 cm^2 12/05/22 1516   Wound Volume (cm^3) 0 546 cm^3 12/05/22 1516   Calculated Wound Volume (cm^3) 0 55 cm^3 12/05/22 1516   Tunneling 0 cm 12/05/22 1516   Undermining 0 12/05/22 1516   Drainage Amount None 12/08/22 1030   Drainage Description Serosanguineous 12/07/22 1515   Non-staged Wound Description Partial thickness 12/08/22 1030   Treatments Cleansed;Irrigation with NSS;Site care 12/08/22 1030   Dressing Cathyann Aloe gauze;Dry dressing 12/08/22 1030   Dressing Changed Changed 12/08/22 1030   Patient Tolerance Tolerated poorly 12/08/22 1030   Dressing Status Clean;Dry; Intact 12/08/22 2000       Wound 12/05/22 Pressure Injury Ear Left (Active)   Wound Image   12/05/22 1519   Wound Description BRIDGETTE 12/08/22 2000   Pressure Injury Stage DTPI 12/08/22 1030   Sybil-wound Assessment BRIDGETTE 12/08/22 2000   Wound Length (cm) 3 2 cm 12/05/22 1519   Wound Width (cm) 1 cm 12/05/22 1519   Wound Depth (cm) 0 cm 12/05/22 1519   Wound Surface Area (cm^2) 3 2 cm^2 12/05/22 1519   Wound Volume (cm^3) 0 cm^3 12/05/22 1519   Calculated Wound Volume (cm^3) 0 cm^3 12/05/22 1519   Tunneling 0 cm 12/05/22 1519   Undermining 0 12/05/22 1519   Drainage Amount None 12/08/22 1030   Treatments Cleansed;Site care 12/08/22 1030   Dressing Foam, Silicon (eg  Allevyn, etc) 12/08/22 2000   Dressing Changed Changed 12/08/22 1030   Patient Tolerance Tolerated poorly 12/08/22 1030   Dressing Status Clean; Intact;Dry 12/08/22 2000       Reviewed plan of care with primary RN Lolly  Recommendations written as orders  Wound care team to follow weekly while admitted  Questions or concerns [facility] Nurse    David LOOMISN, RN, [employer]

## 2022-12-09 NOTE — CASE MANAGEMENT
Case Management Discharge Planning Note    Patient name Martell Call  Location [phone] MRN [id number]  : 1955 Date 2022       Current Admission Date: 2022  Current Admission Diagnosis:Shock [facility])   Patient Active Problem List    Diagnosis Date Noted   • Acute deep vein thrombosis (DVT) of right upper extremity ([address] ) 2022   • GIB (gastrointestinal bleeding) 2022   • Aspiration pneumonia ([address] ) 2022   • Chronic indwelling Marrufo catheter 2022   • Hypernatremia 2022   • MSSA bacteremia 2022   • Hypokalemia 2022   • Pressure ulcer of ischium, right, stage III ([address] ) 2022   • Shock ([address] ) 2022   • Developmental disability 2022   • Pressure injury of sacral region, stage 4 ([address] ) 10/26/2022   • 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, stage 4 ([address] ) 10/26/2022   • Pressure ulcer of right lower back, stage 3 ([address] ) 10/26/2022   • Pressure ulcer of left foot, stage 4 ([address] ) 10/26/2022   • Pressure injury of right upper back, stage 4 ([address] ) 10/26/2022   • Open wound of right hand without foreign body 10/26/2022   • Pressure injury of left heel, stage 4 ([address] ) 10/26/2022      LOS (days): 14  Geometric Mean LOS (GMLOS) (days):   Days to GMLOS:     OBJECTIVE:  Risk of Unplanned Readmission Score: 16 67         Current admission status: Inpatient   Preferred Pharmacy: No Pharmacies Listed  Primary Care Provider: Tanesha Farnsworth MD    Primary Insurance: [address]  Secondary Insurance:     DISCHARGE DETAILS:    Discharge planning discussed with[de-identified] [address] [place] [address] nursing supervisor ph#[phone]  Freedom of Choice: Yes  Comments - Freedom of Choice: Discussed with Nidia from [address] [place] [address]  Pt will need to complete course of IV antibiotics on  per ID  Plan to discharge pt back to [facility] on   Nursing supervisor Rachel Hand aware   Message left for pt guardian Wilbur Cooper to update [phone]  Pt will require BLS transport back to facility on 12/12  CM to follow                  Contacts  Patient Contacts: Thea Guerrero - guardian  Relationship to Patient[de-identified] Family  Contact Method: Phone  Phone Number: [phone]  Reason/Outcome: Discharge Planning

## 2022-12-09 NOTE — ASSESSMENT & PLAN NOTE
· Resolved  · Etiology unclear, possibly due to dehydration from high output from colostomy vs volume expansion  · Nephrology recommendations appreciated  · Continue to monitor sodium

## 2022-12-09 NOTE — ASSESSMENT & PLAN NOTE
· Originally admitted for shock, patient developed tachypnea, tachycardia, and hypotension and noted to be in respiratory distress possibly due to aspiration (patient has PEG tube, receives tube feeding)  · Patient was witnessed to have a large amount of coffee-ground emesis while being suctioned in preparation for intubation  Patient was intubated and upgraded to the ICU at that time  He was extubated on 12/03  · He received 3 units of PRBCs total and 1 unit of FFP  · GI recommendations appreciated  · Heparin SQ had been resumed on 12/01/2022   On 12/05, patient was noted to have a DVT of the right upper extremity and started on Eliquis  · Monitor for bleeding, monitor hemoglobin

## 2022-12-09 NOTE — PROGRESS NOTES
Progress Note - [facility] Infectious Disease   Stefanie Potts 74 y o  male MRN: [id number]  Unit/Bed#: -01 Encounter: [id number]      IMPRESSION & RECOMMENDATIONS:   1  Sepsis, present on admission, evidenced by tachycardia and leukocytosis  Per chart review, leukocytosis is chronic since Dec 2021  1 of 2 admission bl cx positive  All sets of repeat blood cultures are negative  Suspect sepsis initially secondary to #2, #3, now complicated by #2  TO completed 7d course of IV cefepime now on IV ancef for MSSA bacteremia  CT C/A/P negative for deep seated abscess or osteomyelitis, but did show possible opacities in the right middle and lower lobes  Patient was extubated to NC with improved O2 sats, with intermittent low grade fevers  Unknown etiology for recurrent fevers; may be due to recurrent aspiration, RUE DVT, versus other occult etiology as patient is nonverbal    -continue antibiotics as below  -if patient decompensates and/or continues to spike fevers would recommend repeat CT C/A/P   -monitor temperature and hemodynamics  -management per primary team   -recheck CBC and BMP in a m       2  Polymicrobial bacteremia  One of 2 admission blood cultures positive for Gram-positive cocci in clusters, culture positive for MSSA and CoNS  Documented that it was drawn from patients peripheral line and pt known to be difficult stick with hx of extensive contractures  Set drawn peripherally is negative to date  Suspect CoNs is contaminant, however MSSA likely due to #3   2D echo is normal  No known intravascular devices  Repeat blood cultures all remain negative to date    -continue IV ancef 2g q8 to complete course of abx from first negative blood cx through 12/11/22  -not a candidate for PICC due to extensive contractures and new upper extremity DVT  -continue to follow-up blood cultures and adjust antibiotics as needed     3  Chronic, now infected, stage IV decubitus ulcers  POA   Despite outpatient wound care, found to have significant deterioration in wounds over the last few months, particularly the right upper back and sacral wound with increasing wound depth and necrotic tissue burden with evidence of purulence and malodor on admission  Patient also with unstageable sacral/right buttock ulcer, unstageable right ischial decubitus ulcer, and unstageable left heel pressure ulcer  CT C/A/P negative for deep seated abscesses  Wound cultures from initial bedside debridement upper back and sacrum are polymicrobial, positive for Citrobacter koseri, Proteus mirabilis, Pseudomonas aeruginosa, and Staph aureus  Surgery and wound care following for chemical and manual debridement  Unfortunately wounds are unlikely to heal and are at risk for reinfection due to protein calorie malnutrition, contractures, inability to offload pressure and inability for closure  Completed 7 day course of IV cefepime    -continue to address GOC  Although no OM noted on CT, with worsening wounds it is likely that patient will develop chronic infection  If unable to cover/secure wounds especially of sacrum and upper right back, OM not treatable long term      -daily wound care and surgery follow up      4  Left lateral foot stage IV pressure ulcer with suspect OM  POA   Now with left lateral foot ulcer and left heel ulcer both probing to bone   XR shows absence of the 5th metatarsal head which likely represents chronic osteomyelitis   No evidence of overlying cellulitis or infection   -continue to address [address]  In the setting of chronic wound with exposed bone and no plan/indication for surgical intervention, patients osteomyelitis is not treatable long-term  Likely to continue to have progression of wound despite local wound care given extensive contractures and inability to offload pressure  He remains at risk for recurrent infection, bacteremia, sepsis, and limb loss     -continue local wound care and offloading pressure      5  Acute respiratory failure with hypoxia   Patient emergently intubated in the setting of respiratory failure, aspiration and upper GI bleeding 11/29  Mone August remained vented, sedated and on pressors   Now extubated to nasal cannula 12/3  S/p EGD which showed ulcerated area in stomach s/p clip   CT chest shows consolidative opacities in the right middle and lower lobes possibly due to pneumonia and atelectasis   Continues to have vomiting episodes   Consider possibility of aspiration pneumonitis versus pneumonia   Repeat chest x-ray shows improving opacities  Sputum cx while intubated shows growth of candida and Achromobacter    -monitor respiratory status and O2 requirements   -monitor hgb and transfuse PRN      6  Acute RUE DVT  Noted to have RUE pain, swelling, and warmth on exam  Patient had peripheral IV in RUE earlier in hospital stay that infiltrated  Also with bullous eruption of right arm  RUE US shows acute DVT in paired brachial vein and chronic non-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  Unlikely cellulitis given recent abx use  Swelling is improving    -started on Eliquis per primary team   -supportive care   -serial skin exams      7  Functional quadriplegia, developmental disability   Patient nonverbal and bed-bound at baseline on tube feeds, with chronic indwelling Marrufo catheter and colostomy  Unfortunately due to contractures, very difficult to reposition patient and offload pressure   -supportive measures per primary team     8  Antibiotic Allergy   Noted to have amoxicillin and Augmentin allergy without documented reaction   Per Care everywhere, has tolerated cefepime and ceftriaxone during prior admissions  -monitor for adverse drug reactions    Antibiotics:  D14 abx   D7 ancef     We will see patient again 12/12/22 if here  Please call ID on call physician in meantime if questions  I have discussed the above management plan in detail with patient     I have discussed the above management plan in detail with patient's RN, and the primary service, SL[facility]  Subjective:  Patient is nonverbal  Smiling and grunting at his visitors  Objective:  Vitals:  Temp:  [98 9 °F (37 2 °C)-100 5 °F (38 1 °C)] 100 °F (37 8 °C)  HR:  [121-129] 122  Resp:  [18-22] 20  BP: (149-155)/(72-88) 149/88  SpO2:  [96 %-97 %] 96 %  Temp (24hrs), Av 8 °F (37 7 °C), Min:98 9 °F (37 2 °C), Max:100 5 °F (38 1 °C)  Current: Temperature: 100 °F (37 8 °C)    PHYSICAL EXAM:  General Appearance:  Appearing well, nontoxic, and in no distress  chronically ill  HEENT: Normocephalic, without obvious abnormality, atraumatic  Conjunctiva pink and sclera anicteric  Oropharynx moist without lesions  Lungs:   Clear to auscultation bilaterally, respirations unlabored   Heart:  eg rhythm but tachycardic; no murmur, rub or gallop   Abdomen:   Soft, non-tender, non-distended, positive bowel sounds, colostomy and PEG    Extremities: No cyanosis, clubbing or edema   Musculoskeletal: Quadriplegic  Extensive contractures x4 extrem  : No CVA or suprapubic tenderness  Marrufo patent  Skin: No rashes or lesions  No draining wounds noted on exposed skin  Multiple wounds to sacrum, upper back, right ear, left foot and left hand  RUE bullous eruption covered with dressing  Improving edema and warmth of RUE  Peripheral IV intact without evidence of erythema, warmth, or exudate  LABS, IMAGING, & OTHER STUDIES:  Lab Results:  I have personally reviewed pertinent labs    Results from last 7 days   Lab Units 22  0635 22  0455 22  0632   WBC Thousand/uL 15 14* 14 09* 13 73*   HEMOGLOBIN g/dL 9 0* 9 6* 9 1*   PLATELETS Thousands/uL 672* 661* 574*     Results from last 7 days   Lab Units 22  0635 22  0455 22  0632   SODIUM mmol/L 142 143 142   POTASSIUM mmol/L 3 6 3 5 3 3*   CHLORIDE mmol/L 108 108 107   CO2 mmol/L 26 27 29   BUN mg/dL 6 7 4*   CREATININE mg/dL 0 29* 0 32* 0 27*   EGFR ml/min/1 73sq m 142 136 146   CALCIUM mg/dL 8 4 8 6 8 4   AST U/L  --  20  --    ALT U/L  --  4*  --    ALK PHOS U/L  --  145*  --      Results from last 7 days   Lab Units 12/03/22  0836 12/03/22  0825   BLOOD CULTURE  No Growth After 5 Days  No Growth After 5 Days       Results from last 7 days   Lab Units 12/04/22  0436 12/03/22  0444   PROCALCITONIN ng/ml 1 59* 0 67*

## 2022-12-09 NOTE — ASSESSMENT & PLAN NOTE
· Sepsis was present on admission as evidenced by tachycardia, leukocytosis, and fever  · Status post initial treatment with IV vancomycin, cefepime, which was transitioned to cefazolin 2g Q8 through 12/11/2022  · 1/2 blood cultures from 11/25/2022 revealed Staph aureus -Staph epidermidis, however a methicillin resistance gene was positive   Repeat cultures were negative to date  · Wound cultures- also positive for Proteus and Staph aureus as well as Psuedomonas  · Infectious Disease recommendations appreciated-they have signed off

## 2022-12-10 LAB
ANION GAP SERPL CALCULATED.3IONS-SCNC: 10 MMOL/L (ref 4–13)
BASOPHILS # BLD AUTO: 0.03 THOUSANDS/ÂΜL (ref 0–0.1)
BASOPHILS NFR BLD AUTO: 0 % (ref 0–1)
BUN SERPL-MCNC: 7 MG/DL (ref 5–25)
CALCIUM SERPL-MCNC: 8.5 MG/DL (ref 8.4–10.2)
CHLORIDE SERPL-SCNC: 104 MMOL/L (ref 96–108)
CO2 SERPL-SCNC: 23 MMOL/L (ref 21–32)
CREAT SERPL-MCNC: 0.29 MG/DL (ref 0.6–1.3)
EOSINOPHIL # BLD AUTO: 0.39 THOUSAND/ÂΜL (ref 0–0.61)
EOSINOPHIL NFR BLD AUTO: 2 % (ref 0–6)
ERYTHROCYTE [DISTWIDTH] IN BLOOD BY AUTOMATED COUNT: 21.9 % (ref 11.6–15.1)
GFR SERPL CREATININE-BSD FRML MDRD: 142 ML/MIN/1.73SQ M
GLUCOSE SERPL-MCNC: 122 MG/DL (ref 65–140)
GLUCOSE SERPL-MCNC: 124 MG/DL (ref 65–140)
GLUCOSE SERPL-MCNC: 125 MG/DL (ref 65–140)
GLUCOSE SERPL-MCNC: 136 MG/DL (ref 65–140)
GLUCOSE SERPL-MCNC: 140 MG/DL (ref 65–140)
HCT VFR BLD AUTO: 30.4 % (ref 36.5–49.3)
HGB BLD-MCNC: 8.9 G/DL (ref 12–17)
IMM GRANULOCYTES # BLD AUTO: 0.08 THOUSAND/UL (ref 0–0.2)
IMM GRANULOCYTES NFR BLD AUTO: 1 % (ref 0–2)
LYMPHOCYTES # BLD AUTO: 1.59 THOUSANDS/ÂΜL (ref 0.6–4.47)
LYMPHOCYTES NFR BLD AUTO: 10 % (ref 14–44)
MCH RBC QN AUTO: 25.2 PG (ref 26.8–34.3)
MCHC RBC AUTO-ENTMCNC: 29.3 G/DL (ref 31.4–37.4)
MCV RBC AUTO: 86 FL (ref 82–98)
MONOCYTES # BLD AUTO: 1.51 THOUSAND/ÂΜL (ref 0.17–1.22)
MONOCYTES NFR BLD AUTO: 9 % (ref 4–12)
NEUTROPHILS # BLD AUTO: 13.2 THOUSANDS/ÂΜL (ref 1.85–7.62)
NEUTS SEG NFR BLD AUTO: 78 % (ref 43–75)
NRBC BLD AUTO-RTO: 0 /100 WBCS
PLATELET # BLD AUTO: 668 THOUSANDS/UL (ref 149–390)
PMV BLD AUTO: 9 FL (ref 8.9–12.7)
POTASSIUM SERPL-SCNC: 3.9 MMOL/L (ref 3.5–5.3)
RBC # BLD AUTO: 3.53 MILLION/UL (ref 3.88–5.62)
SODIUM SERPL-SCNC: 137 MMOL/L (ref 135–147)
WBC # BLD AUTO: 16.8 THOUSAND/UL (ref 4.31–10.16)

## 2022-12-10 RX ADMIN — HYDROMORPHONE HYDROCHLORIDE 5 MG: 2 TABLET ORAL at 09:45

## 2022-12-10 RX ADMIN — CHLORHEXIDINE GLUCONATE 0.12% ORAL RINSE 15 ML: 1.2 LIQUID ORAL at 09:45

## 2022-12-10 RX ADMIN — CEFAZOLIN SODIUM 2000 MG: 2 SOLUTION INTRAVENOUS at 21:36

## 2022-12-10 RX ADMIN — HYDROMORPHONE HYDROCHLORIDE 5 MG: 2 TABLET ORAL at 14:12

## 2022-12-10 RX ADMIN — Medication 20 MG: at 14:12

## 2022-12-10 RX ADMIN — SENNOSIDES AND DOCUSATE SODIUM 1 TABLET: 50; 8.6 TABLET ORAL at 09:45

## 2022-12-10 RX ADMIN — HYDROMORPHONE HYDROCHLORIDE 5 MG: 2 TABLET ORAL at 06:06

## 2022-12-10 RX ADMIN — HYDROMORPHONE HYDROCHLORIDE 5 MG: 2 TABLET ORAL at 18:14

## 2022-12-10 RX ADMIN — CEFAZOLIN SODIUM 2000 MG: 2 SOLUTION INTRAVENOUS at 06:06

## 2022-12-10 RX ADMIN — SENNOSIDES AND DOCUSATE SODIUM 1 TABLET: 50; 8.6 TABLET ORAL at 18:14

## 2022-12-10 RX ADMIN — HYDROMORPHONE HYDROCHLORIDE 5 MG: 2 TABLET ORAL at 01:55

## 2022-12-10 RX ADMIN — APIXABAN 10 MG: 5 TABLET, FILM COATED ORAL at 18:15

## 2022-12-10 RX ADMIN — CEFAZOLIN SODIUM 2000 MG: 2 SOLUTION INTRAVENOUS at 14:12

## 2022-12-10 RX ADMIN — COLLAGENASE SANTYL: 250 OINTMENT TOPICAL at 14:07

## 2022-12-10 RX ADMIN — APIXABAN 10 MG: 5 TABLET, FILM COATED ORAL at 09:45

## 2022-12-10 RX ADMIN — HYDROMORPHONE HYDROCHLORIDE 5 MG: 2 TABLET ORAL at 21:36

## 2022-12-10 RX ADMIN — POLYETHYLENE GLYCOL 3350 17 G: 17 POWDER, FOR SOLUTION ORAL at 09:45

## 2022-12-10 RX ADMIN — Medication 20 MG: at 21:36

## 2022-12-10 NOTE — PROGRESS NOTES
[place] [address]  Progress Note Martell Call 1955, 74 y o  male MRN: [id number]  Unit/Bed#: -01 Encounter: [id number]  Primary Care Provider: Tanesha Farnsworth MD   Date and time admitted to hospital: 11/25/2022  2:30 PM    * Shock [facility])  Assessment & Plan  · Sepsis was present on admission as evidenced by tachycardia, leukocytosis, and fever  · Status post initial treatment with IV vancomycin, cefepime, which was transitioned to cefazolin 2g Q8 through 12/11/2022  · 1/2 blood cultures from 11/25/2022 revealed Staph aureus -Staph epidermidis, however a methicillin resistance gene was positive  Repeat cultures were negative to date  · Wound cultures- also positive for Proteus and Staph aureus as well as Psuedomonas  · Infectious Disease recommendations appreciated-they have signed off    GIB (gastrointestinal bleeding)  Assessment & Plan  · Originally admitted for shock, patient developed tachypnea, tachycardia, and hypotension and noted to be in respiratory distress possibly due to aspiration (patient has PEG tube, receives tube feeding)  · Patient was witnessed to have a large amount of coffee-ground emesis while being suctioned in preparation for intubation  Patient was intubated and upgraded to the ICU at that time  He was extubated on 12/03  · He received 3 units of PRBCs total and 1 unit of FFP  · GI recommendations appreciated  · Heparin SQ had been resumed on 12/01/2022   On 12/05, patient was noted to have a DVT of the right upper extremity and started on Eliquis  · Monitor for bleeding, monitor hemoglobin      Acute deep vein thrombosis (DVT) of right upper extremity (HCC)  Assessment & Plan  · Right arm swelling and tenderness noted  · Venous duplex of right upper extremity was positive for DVT  · Start Eliquis 10 mg twice daily for 7 days, transition to 5 mg twice daily thereafter  · Monitor vital signs, labs, serial assessments      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, stage 4 (HCC)  Assessment & Plan  · S/p bedside debridement with surgery   · Continue cefazolin IV as above  · Wound care consultation appreciated      Aspiration pneumonia ([facility] [address] )  Assessment & Plan  · With witnessed aspiration event during intubation  · Completed cefepime per infectious disease  · Serial assessments      MSSA bacteremia  Assessment & Plan  · Management as outlined above    Developmental disability  Assessment & Plan  · Patient with profound development disability, nonverbal, bed bound  · Patient on tube feeds only with 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    Pressure injury of left heel, stage 4 ([facility] [address] )  Assessment & Plan  · S/p bedside debridement with surgery   · Continue management as outlined above    VTE Pharmacologic Prophylaxis:   Pharmacologic: Apixaban (Eliquis)  Mechanical VTE Prophylaxis in Place: Yes    Patient Centered Rounds: I have performed bedside rounds with nursing staff today  Education and Discussions with Family / Patient: Attempted to call caregiver  Time Spent for Care: 30 minutes  More than 50% of total time spent on counseling and coordination of care as described above  Current Length of Stay: 15 day(s)    Current Patient Status: Inpatient   Certification Statement: The patient will continue to require additional inpatient hospital stay due to per plan above  Discharge Plan: Return to facility, probably Monday  Code Status: Level 1 - Full Code      Subjective:   Unable to obtain ROS  Objective:     Vitals:   Temp (24hrs), Av 9 °F (37 7 °C), Min:99 9 °F (37 7 °C), Max:99 9 °F (37 7 °C)    Temp:  [99 9 °F (37 7 °C)] 99 9 °F (37 7 °C)  HR:  [125] 125  Resp:  [19] 19  BP: (112)/(70) 112/70  SpO2:  [97 %] 97 %  Body mass index is 20 4 kg/m²  Input and Output Summary (last 24 hours):        Intake/Output Summary (Last 24 hours) at 12/10/2022 1251  Last data filed at 12/10/2022 1050  Gross per 24 hour   Intake 250 ml   Output 2150 ml   Net -1900 ml       Physical Exam:     Physical Exam  Vitals and nursing note reviewed  HENT:      Head: Normocephalic and atraumatic  Mouth/Throat:      Pharynx: Oropharynx is clear  Eyes:      Pupils: Pupils are equal, round, and reactive to light  Cardiovascular:      Rate and Rhythm: Normal rate  Pulmonary:      Effort: Pulmonary effort is normal  No respiratory distress  Breath sounds: Normal breath sounds  Abdominal:      General: Bowel sounds are normal       Palpations: Abdomen is soft  Tenderness: There is no abdominal tenderness  Musculoskeletal:      Cervical back: Neck supple  Skin:     General: Skin is warm and dry  Capillary Refill: Capillary refill takes less than 2 seconds  Neurological:      General: No focal deficit present  Mental Status: He is alert         Additional Data:     Labs:    Results from last 7 days   Lab Units 12/09/22  0635   WBC Thousand/uL 15 14*   HEMOGLOBIN g/dL 9 0*   HEMATOCRIT % 30 9*   PLATELETS Thousands/uL 672*   NEUTROS PCT % 74   LYMPHS PCT % 11*   MONOS PCT % 10   EOS PCT % 4     Results from last 7 days   Lab Units 12/09/22  0635 12/08/22  0455   SODIUM mmol/L 142 143   POTASSIUM mmol/L 3 6 3 5   CHLORIDE mmol/L 108 108   CO2 mmol/L 26 27   BUN mg/dL 6 7   CREATININE mg/dL 0 29* 0 32*   ANION GAP mmol/L 8 8   CALCIUM mg/dL 8 4 8 6   ALBUMIN g/dL  --  2 5*   TOTAL BILIRUBIN mg/dL  --  0 28   ALK PHOS U/L  --  145*   ALT U/L  --  4*   AST U/L  --  20   GLUCOSE RANDOM mg/dL 123 113         Results from last 7 days   Lab Units 12/10/22  1123 12/10/22  0600 12/09/22  2355 12/09/22  2110 12/09/22  1601 12/09/22  1204 12/09/22  0638 12/08/22  2355 12/08/22  1757 12/08/22  1222 12/08/22  0639 12/08/22  0051   POC GLUCOSE mg/dl 136 125 133 114 138 105 119 102 135 138 136 112         Results from last 7 days   Lab Units 12/04/22  0436   PROCALCITONIN ng/ml 1 59*           * I Have Reviewed All Lab Data Listed Above  * Additional Pertinent Lab Tests Reviewed: [facility]    Recent Cultures (last 7 days):           Last 24 Hours Medication List:   Current Facility-Administered Medications   Medication Dose Route Frequency Provider Last Rate   • acetaminophen  650 mg Per G Tube Q4H PRN Gwyndolyn Fear, CRNP     • apixaban  10 mg Oral BID AVANI Brooks     • [START ON 12/12/2022] apixaban  5 mg Oral BID AVANI Brooks     • cefazolin  2,000 mg Intravenous Q8H Gwyndolyn Fear, CRNP 2,000 mg (12/10/22 0606)   • chlorhexidine  15 mL Mouth/Throat Q12H [facility] Gwyndolyprema Fear, CRNP     • collagenase   Topical Daily Gwyndolyn Fear, CRNP     • HYDROmorphone  1 mg Intravenous Q2H PRN Gwyndolyn Fear, CRNP     • HYDROmorphone  5 mg Oral Q4H Gwyndolyn Fear, CRNP     • insulin lispro  1-5 Units Subcutaneous Q6H [facility] Gwyndolyprema Fear, CRNP     • omeprazole (PRILOSEC) suspension 2 mg/mL  20 mg Oral BID Gwyndolyn Fear, CRNP     • ondansetron  4 mg Intravenous Q6H PRN Gwyndolyn Fear, CRNP     • polyethylene glycol  17 g Oral Daily Gwyndolyn Fear, CRNP     • senna-docusate sodium  1 tablet Oral BID Gwyndolyn Fear, CRNP          Today, Patient Was Seen By: AVANI Brooks    ** Please Note: Dictation voice to text software may have been used in the creation of this document   **

## 2022-12-10 NOTE — PLAN OF CARE
Problem: Prexisting or High Potential for Compromised Skin Integrity  Goal: Skin integrity is maintained or improved  Description: INTERVENTIONS:  - Identify patients at risk for skin breakdown  - Assess and monitor skin integrity  - Assess and monitor nutrition and hydration status  - Monitor labs   - Assess for incontinence   - Turn and reposition patient  - Assist with mobility/ambulation  - Relieve pressure over bony prominences  - Avoid friction and shearing  - Provide appropriate hygiene as needed including keeping skin clean and dry  - Evaluate need for skin moisturizer/barrier cream  - Collaborate with interdisciplinary team   - Patient/family teaching  - Consider wound care consult   Outcome: Progressing     Problem: Nutrition/Hydration-ADULT  Goal: Nutrient/Hydration intake appropriate for improving, restoring or maintaining nutritional needs  Description: Monitor and assess patient's nutrition/hydration status for malnutrition  Collaborate with interdisciplinary team and initiate plan and interventions as ordered  Monitor patient's weight and dietary intake as ordered or per policy  Utilize nutrition screening tool and intervene as necessary  Determine patient's food preferences and provide high-protein, high-caloric foods as appropriate       INTERVENTIONS:  - Monitor oral intake, urinary output, labs, and treatment plans  - Assess nutrition and hydration status and recommend course of action  - Evaluate amount of meals eaten  - Assist patient with eating if necessary   - Allow adequate time for meals  - Recommend/ encourage appropriate diets, oral nutritional supplements, and vitamin/mineral supplements  - Order, calculate, and assess calorie counts as needed  - Recommend, monitor, and adjust tube feedings and TPN/PPN based on assessed needs  - Assess need for intravenous fluids  - Provide specific nutrition/hydration education as appropriate  - Include patient/family/caregiver in decisions related to nutrition  Outcome: Progressing     Problem: PAIN - ADULT  Goal: Verbalizes/displays adequate comfort level or baseline comfort level  Description: Interventions:  - Encourage patient to monitor pain and request assistance  - Assess pain using appropriate pain scale  - Administer analgesics based on type and severity of pain and evaluate response  - Implement non-pharmacological measures as appropriate and evaluate response  - Consider cultural and social influences on pain and pain management  - Notify physician/advanced practitioner if interventions unsuccessful or patient reports new pain  Outcome: Progressing     Problem: INFECTION - ADULT  Goal: Absence or prevention of progression during hospitalization  Description: INTERVENTIONS:  - Assess and monitor for signs and symptoms of infection  - Monitor lab/diagnostic results  - Monitor all insertion sites, i e  indwelling lines, tubes, and drains  - Monitor endotracheal if appropriate and nasal secretions for changes in amount and color  - [place] appropriate cooling/warming therapies per order  - Administer medications as ordered  - Instruct and encourage patient and family to use good hand hygiene technique  - Identify and instruct in appropriate isolation precautions for identified infection/condition  Outcome: Progressing  Goal: Absence of fever/infection during neutropenic period  Description: INTERVENTIONS:  - Monitor WBC    Outcome: Progressing     Problem: DISCHARGE PLANNING  Goal: Discharge to home or other facility with appropriate resources  Description: INTERVENTIONS:  - Identify barriers to discharge w/patient and caregiver  - Arrange for needed discharge resources and transportation as appropriate  - Identify discharge learning needs (meds, wound care, etc )  - Arrange for interpretive services to assist at discharge as needed  - Refer to Case Management Department for coordinating discharge planning if the patient needs post-hospital services based on physician/advanced practitioner order or complex needs related to functional status, cognitive ability, or social support system  Outcome: Progressing     Problem: Knowledge Deficit  Goal: Patient/family/caregiver demonstrates understanding of disease process, treatment plan, medications, and discharge instructions  Description: Complete learning assessment and assess knowledge base    Interventions:  - Provide teaching at level of understanding  - Provide teaching via preferred learning methods  Outcome: Progressing     Problem: SAFETY,RESTRAINT: NV/NON-SELF DESTRUCTIVE BEHAVIOR  Goal: Remains free of harm/injury (restraint for non violent/non self-detsructive behavior)  Description: INTERVENTIONS:  - Instruct patient/family regarding restraint use   - Assess and monitor physiologic and psychological status   - Provide interventions and comfort measures to meet assessed patient needs   - Identify and implement measures to help patient regain control  - Assess readiness for release of restraint   Outcome: Progressing  Goal: Returns to optimal restraint-free functioning  Description: INTERVENTIONS:  - Assess the patient's behavior and symptoms that indicate continued need for restraint  - Identify and implement measures to help patient regain control  - Assess readiness for release of restraint   Outcome: Progressing     Problem: SAFETY ADULT  Goal: Patient will remain free of falls  Description: INTERVENTIONS:  - Educate patient/family on patient safety including physical limitations  - Instruct patient to call for assistance with activity   - Consult OT/PT to assist with strengthening/mobility   - Keep Call bell within reach  - Keep bed low and locked with side rails adjusted as appropriate  - Keep care items and personal belongings within reach  - Initiate and maintain comfort rounds  - Make Fall Risk Sign visible to staff  - Offer Toileting in advance of need  - Initiate/Maintain bed alarm  - Obtain necessary fall risk management equipment:   - Apply yellow socks and bracelet for high fall risk patients  - Consider moving patient to room near nurses station  Outcome: Progressing  Goal: Maintain or return to baseline ADL function  Description: INTERVENTIONS:  -  Assess patient's ability to carry out ADLs; assess patient's baseline for ADL function and identify physical deficits which impact ability to perform ADLs (bathing, care of mouth/teeth, toileting, grooming, dressing, etc )  - Assess/evaluate cause of self-care deficits   - Assess range of motion  - Assess patient's mobility; develop plan if impaired  - Assess patient's need for assistive devices and provide as appropriate  - Encourage maximum independence but intervene and supervise when necessary  - Involve family in performance of ADLs  - Assess for home care needs following discharge   - Consider OT consult to assist with ADL evaluation and planning for discharge  - Provide patient education as appropriate  Outcome: Progressing  Goal: Maintains/Returns to pre admission functional level  Description: INTERVENTIONS:  - Perform BMAT or MOVE assessment daily    - Set and communicate daily mobility goal to care team and patient/family/caregiver     - Collaborate with rehabilitation services on mobility goals if consulted  - Record patient progress and toleration of activity level   Outcome: Progressing

## 2022-12-11 LAB
ANION GAP SERPL CALCULATED.3IONS-SCNC: 6 MMOL/L (ref 4–13)
BASOPHILS # BLD AUTO: 0.03 THOUSANDS/ÂΜL (ref 0–0.1)
BASOPHILS NFR BLD AUTO: 0 % (ref 0–1)
BUN SERPL-MCNC: 7 MG/DL (ref 5–25)
CALCIUM SERPL-MCNC: 8.5 MG/DL (ref 8.4–10.2)
CHLORIDE SERPL-SCNC: 104 MMOL/L (ref 96–108)
CO2 SERPL-SCNC: 30 MMOL/L (ref 21–32)
CREAT SERPL-MCNC: 0.28 MG/DL (ref 0.6–1.3)
EOSINOPHIL # BLD AUTO: 0.46 THOUSAND/ÂΜL (ref 0–0.61)
EOSINOPHIL NFR BLD AUTO: 3 % (ref 0–6)
ERYTHROCYTE [DISTWIDTH] IN BLOOD BY AUTOMATED COUNT: 21.4 % (ref 11.6–15.1)
GFR SERPL CREATININE-BSD FRML MDRD: 144 ML/MIN/1.73SQ M
GLUCOSE SERPL-MCNC: 111 MG/DL (ref 65–140)
GLUCOSE SERPL-MCNC: 127 MG/DL (ref 65–140)
GLUCOSE SERPL-MCNC: 128 MG/DL (ref 65–140)
GLUCOSE SERPL-MCNC: 136 MG/DL (ref 65–140)
GLUCOSE SERPL-MCNC: 173 MG/DL (ref 65–140)
HCT VFR BLD AUTO: 31.5 % (ref 36.5–49.3)
HGB BLD-MCNC: 9.1 G/DL (ref 12–17)
IMM GRANULOCYTES # BLD AUTO: 0.08 THOUSAND/UL (ref 0–0.2)
IMM GRANULOCYTES NFR BLD AUTO: 1 % (ref 0–2)
LYMPHOCYTES # BLD AUTO: 1.62 THOUSANDS/ÂΜL (ref 0.6–4.47)
LYMPHOCYTES NFR BLD AUTO: 10 % (ref 14–44)
MCH RBC QN AUTO: 24.4 PG (ref 26.8–34.3)
MCHC RBC AUTO-ENTMCNC: 28.9 G/DL (ref 31.4–37.4)
MCV RBC AUTO: 85 FL (ref 82–98)
MONOCYTES # BLD AUTO: 1.68 THOUSAND/ÂΜL (ref 0.17–1.22)
MONOCYTES NFR BLD AUTO: 11 % (ref 4–12)
NEUTROPHILS # BLD AUTO: 11.78 THOUSANDS/ÂΜL (ref 1.85–7.62)
NEUTS SEG NFR BLD AUTO: 75 % (ref 43–75)
NRBC BLD AUTO-RTO: 0 /100 WBCS
PLATELET # BLD AUTO: 653 THOUSANDS/UL (ref 149–390)
PMV BLD AUTO: 8.9 FL (ref 8.9–12.7)
POTASSIUM SERPL-SCNC: 4 MMOL/L (ref 3.5–5.3)
RBC # BLD AUTO: 3.73 MILLION/UL (ref 3.88–5.62)
SODIUM SERPL-SCNC: 140 MMOL/L (ref 135–147)
WBC # BLD AUTO: 15.65 THOUSAND/UL (ref 4.31–10.16)

## 2022-12-11 RX ADMIN — HYDROMORPHONE HYDROCHLORIDE 5 MG: 2 TABLET ORAL at 10:25

## 2022-12-11 RX ADMIN — CHLORHEXIDINE GLUCONATE 0.12% ORAL RINSE 15 ML: 1.2 LIQUID ORAL at 04:40

## 2022-12-11 RX ADMIN — SENNOSIDES AND DOCUSATE SODIUM 1 TABLET: 50; 8.6 TABLET ORAL at 17:57

## 2022-12-11 RX ADMIN — HYDROMORPHONE HYDROCHLORIDE 5 MG: 2 TABLET ORAL at 06:07

## 2022-12-11 RX ADMIN — HYDROMORPHONE HYDROCHLORIDE 5 MG: 2 TABLET ORAL at 17:58

## 2022-12-11 RX ADMIN — CEFAZOLIN SODIUM 2000 MG: 2 SOLUTION INTRAVENOUS at 06:07

## 2022-12-11 RX ADMIN — COLLAGENASE SANTYL 1 APPLICATION: 250 OINTMENT TOPICAL at 14:30

## 2022-12-11 RX ADMIN — HYDROMORPHONE HYDROCHLORIDE 5 MG: 2 TABLET ORAL at 14:27

## 2022-12-11 RX ADMIN — APIXABAN 10 MG: 5 TABLET, FILM COATED ORAL at 08:12

## 2022-12-11 RX ADMIN — HYDROMORPHONE HYDROCHLORIDE 5 MG: 2 TABLET ORAL at 01:58

## 2022-12-11 RX ADMIN — CHLORHEXIDINE GLUCONATE 0.12% ORAL RINSE 15 ML: 1.2 LIQUID ORAL at 08:10

## 2022-12-11 RX ADMIN — Medication 20 MG: at 22:06

## 2022-12-11 RX ADMIN — CEFAZOLIN SODIUM 2000 MG: 2 SOLUTION INTRAVENOUS at 21:04

## 2022-12-11 RX ADMIN — APIXABAN 10 MG: 5 TABLET, FILM COATED ORAL at 17:57

## 2022-12-11 RX ADMIN — HYDROMORPHONE HYDROCHLORIDE 5 MG: 2 TABLET ORAL at 20:47

## 2022-12-11 RX ADMIN — CHLORHEXIDINE GLUCONATE 0.12% ORAL RINSE 15 ML: 1.2 LIQUID ORAL at 20:46

## 2022-12-11 RX ADMIN — SENNOSIDES AND DOCUSATE SODIUM 1 TABLET: 50; 8.6 TABLET ORAL at 08:11

## 2022-12-11 RX ADMIN — Medication 20 MG: at 08:16

## 2022-12-11 RX ADMIN — CEFAZOLIN SODIUM 2000 MG: 2 SOLUTION INTRAVENOUS at 14:27

## 2022-12-11 RX ADMIN — POLYETHYLENE GLYCOL 3350 17 G: 17 POWDER, FOR SOLUTION ORAL at 08:10

## 2022-12-11 NOTE — PROGRESS NOTES
[address]  Progress Note Jesu Mcintosh 1955, 74 y o  male MRN: [id number]  Unit/Bed#: -01 Encounter: [id number]  Primary Care Provider: Mary Mansfield MD   Date and time admitted to hospital: 11/25/2022  2:30 PM    * Shock [facility])  Assessment & Plan  · Sepsis was present on admission as evidenced by tachycardia, leukocytosis, and fever  · Status post initial treatment with IV vancomycin, cefepime, which was transitioned to cefazolin 2g Q8 through 12/11/2022  · 1/2 blood cultures from 11/25/2022 revealed Staph aureus -Staph epidermidis, however a methicillin resistance gene was positive  Repeat cultures were negative to date  · Wound cultures- also positive for Proteus and Staph aureus as well as Psuedomonas  · Infectious Disease recommendations appreciated-they have signed off    GIB (gastrointestinal bleeding)  Assessment & Plan  · Originally admitted for shock, patient developed tachypnea, tachycardia, and hypotension and noted to be in respiratory distress possibly due to aspiration (patient has PEG tube, receives tube feeding)  · Patient was witnessed to have a large amount of coffee-ground emesis while being suctioned in preparation for intubation  Patient was intubated and upgraded to the ICU at that time  He was extubated on 12/03  · He received 3 units of PRBCs total and 1 unit of FFP  · GI recommendations appreciated  · Heparin SQ had been resumed on 12/01/2022   On 12/05, patient was noted to have a DVT of the right upper extremity and started on Eliquis  · Monitor for bleeding, monitor hemoglobin      Acute deep vein thrombosis (DVT) of right upper extremity (HCC)  Assessment & Plan  · Right arm swelling and tenderness noted  · Venous duplex of right upper extremity was positive for DVT  · Start Eliquis 10 mg twice daily for 7 days, transition to 5 mg twice daily thereafter  · Monitor vital signs, labs, serial assessments      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, stage 4 ([facility])  Assessment & Plan  · S/p bedside debridement with surgery   · Continue cefazolin IV as above  · Wound care consultation appreciated      Aspiration pneumonia ([facility] [address] )  Assessment & Plan  · With witnessed aspiration event during intubation  · Completed cefepime per infectious disease  · Serial assessments      MSSA bacteremia  Assessment & Plan  · Management as outlined above    Developmental disability  Assessment & Plan  · Patient with profound development disability, nonverbal, bed bound  · Patient on tube feeds only with 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    Pressure injury of left heel, stage 4 ([facility] [address] )  Assessment & Plan  · S/p bedside debridement with surgery   · Continue management as outlined above    VTE Pharmacologic Prophylaxis:   Pharmacologic: Apixaban (Eliquis)  Mechanical VTE Prophylaxis in Place: Yes    Patient Centered Rounds: I have performed bedside rounds with nursing staff today  Education and Discussions with Family / Patient: Attempted to call facility  Time Spent for Care: 30 minutes  More than 50% of total time spent on counseling and coordination of care as described above  Current Length of Stay: 16 day(s)    Current Patient Status: Inpatient   Certification Statement: The patient will continue to require additional inpatient hospital stay due to IV antibiotics    Discharge Plan: Return to facility, probably     Code Status: Level 1 - Full Code      Subjective:   Unable to obtain ROS    Objective:     Vitals:   Temp (24hrs), Av 8 °F (37 7 °C), Min:99 2 °F (37 3 °C), Max:100 1 °F (37 8 °C)    Temp:  [99 2 °F (37 3 °C)-100 1 °F (37 8 °C)] 100 1 °F (37 8 °C)  HR:  [119-128] 121  Resp:  [20-21] 21  BP: (112-123)/(59-72) 115/72  SpO2:  [93 %-96 %] 96 %  Body mass index is 20 4 kg/m²  Input and Output Summary (last 24 hours):        Intake/Output Summary (Last 24 hours) at 2022 1722  Last data filed at 12/11/2022 1601  Gross per 24 hour   Intake 1911 ml   Output 1550 ml   Net 361 ml       Physical Exam:     Physical Exam  Vitals and nursing note reviewed  Constitutional:       General: He is not in acute distress  HENT:      Head: Normocephalic and atraumatic  Mouth/Throat:      Pharynx: Oropharynx is clear  Eyes:      Pupils: Pupils are equal, round, and reactive to light  Cardiovascular:      Rate and Rhythm: Normal rate and regular rhythm  Pulmonary:      Effort: Pulmonary effort is normal  No respiratory distress  Breath sounds: Normal breath sounds  No wheezing or rhonchi  Abdominal:      General: Bowel sounds are normal       Palpations: Abdomen is soft  Tenderness: There is no abdominal tenderness  Comments: G-tube in place   Genitourinary:     Comments: Chronic freire catheter  Musculoskeletal:      Cervical back: Neck supple  Right lower leg: No edema  Left lower leg: No edema  Comments: Multiple contractures   Skin:     General: Skin is warm and dry  Capillary Refill: Capillary refill takes less than 2 seconds  Comments: Dressings intact   Neurological:      General: No focal deficit present  Mental Status: He is alert  Mental status is at baseline         Additional Data:     Labs:    Results from last 7 days   Lab Units 12/11/22  0622   WBC Thousand/uL 15 65*   HEMOGLOBIN g/dL 9 1*   HEMATOCRIT % 31 5*   PLATELETS Thousands/uL 653*   NEUTROS PCT % 75   LYMPHS PCT % 10*   MONOS PCT % 11   EOS PCT % 3     Results from last 7 days   Lab Units 12/11/22  0622 12/09/22  0635 12/08/22  0455   SODIUM mmol/L 140   < > 143   POTASSIUM mmol/L 4 0   < > 3 5   CHLORIDE mmol/L 104   < > 108   CO2 mmol/L 30   < > 27   BUN mg/dL 7   < > 7   CREATININE mg/dL 0 28*   < > 0 32*   ANION GAP mmol/L 6   < > 8   CALCIUM mg/dL 8 5   < > 8 6   ALBUMIN g/dL  --   --  2 5*   TOTAL BILIRUBIN mg/dL  --   --  0 28   ALK PHOS U/L  --   --  145*   ALT U/L  --   --  4*   AST U/L  --   --  20   GLUCOSE RANDOM mg/dL 127   < > 113    < > = values in this interval not displayed  Results from last 7 days   Lab Units 12/11/22  1549 12/11/22  1110 12/11/22  0713 12/11/22  0629 12/10/22  2347 12/10/22  1807 12/10/22  1123 12/10/22  0600 12/09/22  2355 12/09/22  2110 12/09/22  1601 12/09/22  1204   POC GLUCOSE mg/dl 128 111 136 173* 140 124 136 125 133 114 138 105                   * I Have Reviewed All Lab Data Listed Above  * Additional Pertinent Lab Tests Reviewed: Majo Casey Admission Reviewed            Last 24 Hours Medication List:   Current Facility-Administered Medications   Medication Dose Route Frequency Provider Last Rate   • acetaminophen  650 mg Per G Tube Q4H PRN Niki Lighter, AVANI     • apixaban  10 mg Oral BID AVANI Pillai     • [START ON 12/12/2022] apixaban  5 mg Oral BID AVANI Pillai     • cefazolin  2,000 mg Intravenous Q8H Niki LighterAVANI Stopped (12/11/22 1542)   • chlorhexidine  15 mL Mouth/Throat Q12H [facility] Niki LighterAVANI     • collagenase   Topical Daily Niki Lighter, AVANI     • HYDROmorphone  1 mg Intravenous Q2H PRN Niki Lighter, AVANI     • HYDROmorphone  5 mg Oral Q4H Niki Lighter, AVANI     • insulin lispro  1-5 Units Subcutaneous Q6H [facility] Niki LighterAVANI     • omeprazole (PRILOSEC) suspension 2 mg/mL  20 mg Oral BID Niki LighterAVANI     • ondansetron  4 mg Intravenous Q6H PRN Niki Lighter, AVANI     • polyethylene glycol  17 g Oral Daily Niki LighterAVANI     • senna-docusate sodium  1 tablet Oral BID Niki LighterAVANI          Today, Patient Was Seen By: AVANI Pillai    ** Please Note: Dictation voice to text software may have been used in the creation of this document   **

## 2022-12-11 NOTE — NURSING NOTE
Pt is resting comfortably, no s/s of pain or distress  Tube feed running at 50/hr, no residual, meds given and flushed per protocol  Repositioned q 2 hours  Marrufo and colostomy patent

## 2022-12-11 NOTE — PLAN OF CARE
Problem: Prexisting or High Potential for Compromised Skin Integrity  Goal: Skin integrity is maintained or improved  Description: INTERVENTIONS:  - Identify patients at risk for skin breakdown  - Assess and monitor skin integrity  - Assess and monitor nutrition and hydration status  - Monitor labs   - Assess for incontinence   - Turn and reposition patient  - Assist with mobility/ambulation  - Relieve pressure over bony prominences  - Avoid friction and shearing  - Provide appropriate hygiene as needed including keeping skin clean and dry  - Evaluate need for skin moisturizer/barrier cream  - Collaborate with interdisciplinary team   - Patient/family teaching  - Consider wound care consult   Outcome: Progressing     Problem: Nutrition/Hydration-ADULT  Goal: Nutrient/Hydration intake appropriate for improving, restoring or maintaining nutritional needs  Description: Monitor and assess patient's nutrition/hydration status for malnutrition  Collaborate with interdisciplinary team and initiate plan and interventions as ordered  Monitor patient's weight and dietary intake as ordered or per policy  Utilize nutrition screening tool and intervene as necessary  Determine patient's food preferences and provide high-protein, high-caloric foods as appropriate       INTERVENTIONS:  - Monitor oral intake, urinary output, labs, and treatment plans  - Assess nutrition and hydration status and recommend course of action  - Evaluate amount of meals eaten  - Assist patient with eating if necessary   - Allow adequate time for meals  - Recommend/ encourage appropriate diets, oral nutritional supplements, and vitamin/mineral supplements  - Order, calculate, and assess calorie counts as needed  - Recommend, monitor, and adjust tube feedings and TPN/PPN based on assessed needs  - Assess need for intravenous fluids  - Provide specific nutrition/hydration education as appropriate  - Include patient/family/caregiver in decisions related to nutrition  Outcome: Progressing     Problem: PAIN - ADULT  Goal: Verbalizes/displays adequate comfort level or baseline comfort level  Description: Interventions:  - Encourage patient to monitor pain and request assistance  - Assess pain using appropriate pain scale  - Administer analgesics based on type and severity of pain and evaluate response  - Implement non-pharmacological measures as appropriate and evaluate response  - Consider cultural and social influences on pain and pain management  - Notify physician/advanced practitioner if interventions unsuccessful or patient reports new pain  Outcome: Progressing     Problem: INFECTION - ADULT  Goal: Absence or prevention of progression during hospitalization  Description: INTERVENTIONS:  - Assess and monitor for signs and symptoms of infection  - Monitor lab/diagnostic results  - Monitor all insertion sites, i e  indwelling lines, tubes, and drains  - Monitor endotracheal if appropriate and nasal secretions for changes in amount and color  - [place] appropriate cooling/warming therapies per order  - Administer medications as ordered  - Instruct and encourage patient and family to use good hand hygiene technique  - Identify and instruct in appropriate isolation precautions for identified infection/condition  Outcome: Progressing  Goal: Absence of fever/infection during neutropenic period  Description: INTERVENTIONS:  - Monitor WBC    Outcome: Progressing     Problem: DISCHARGE PLANNING  Goal: Discharge to home or other facility with appropriate resources  Description: INTERVENTIONS:  - Identify barriers to discharge w/patient and caregiver  - Arrange for needed discharge resources and transportation as appropriate  - Identify discharge learning needs (meds, wound care, etc )  - Arrange for interpretive services to assist at discharge as needed  - Refer to Case Management Department for coordinating discharge planning if the patient needs post-hospital services based on physician/advanced practitioner order or complex needs related to functional status, cognitive ability, or social support system  Outcome: Progressing     Problem: Knowledge Deficit  Goal: Patient/family/caregiver demonstrates understanding of disease process, treatment plan, medications, and discharge instructions  Description: Complete learning assessment and assess knowledge base    Interventions:  - Provide teaching at level of understanding  - Provide teaching via preferred learning methods  Outcome: Progressing     Problem: SAFETY,RESTRAINT: NV/NON-SELF DESTRUCTIVE BEHAVIOR  Goal: Remains free of harm/injury (restraint for non violent/non self-detsructive behavior)  Description: INTERVENTIONS:  - Instruct patient/family regarding restraint use   - Assess and monitor physiologic and psychological status   - Provide interventions and comfort measures to meet assessed patient needs   - Identify and implement measures to help patient regain control  - Assess readiness for release of restraint   Outcome: Progressing  Goal: Returns to optimal restraint-free functioning  Description: INTERVENTIONS:  - Assess the patient's behavior and symptoms that indicate continued need for restraint  - Identify and implement measures to help patient regain control  - Assess readiness for release of restraint   Outcome: Progressing     Problem: SAFETY ADULT  Goal: Patient will remain free of falls  Description: INTERVENTIONS:  - Educate patient/family on patient safety including physical limitations  - Instruct patient to call for assistance with activity   - Consult OT/PT to assist with strengthening/mobility   - Keep Call bell within reach  - Keep bed low and locked with side rails adjusted as appropriate  - Keep care items and personal belongings within reach  - Initiate and maintain comfort rounds  - Make Fall Risk Sign visible to staff  - Offer Toileting in advance of need  - Initiate/Maintain bed alarm  - Obtain necessary fall risk management equipment:   - Apply yellow socks and bracelet for high fall risk patients  - Consider moving patient to room near nurses station  Outcome: Progressing  Goal: Maintain or return to baseline ADL function  Description: INTERVENTIONS:  -  Assess patient's ability to carry out ADLs; assess patient's baseline for ADL function and identify physical deficits which impact ability to perform ADLs (bathing, care of mouth/teeth, toileting, grooming, dressing, etc )  - Assess/evaluate cause of self-care deficits   - Assess range of motion  - Assess patient's mobility; develop plan if impaired  - Assess patient's need for assistive devices and provide as appropriate  - Encourage maximum independence but intervene and supervise when necessary  - Involve family in performance of ADLs  - Assess for home care needs following discharge   - Consider OT consult to assist with ADL evaluation and planning for discharge  - Provide patient education as appropriate  Outcome: Progressing  Goal: Maintains/Returns to pre admission functional level  Description: INTERVENTIONS:  - Perform BMAT or MOVE assessment daily    - Set and communicate daily mobility goal to care team and patient/family/caregiver     - Collaborate with rehabilitation services on mobility goals if consulted  - Record patient progress and toleration of activity level   Outcome: Progressing

## 2022-12-12 ENCOUNTER — APPOINTMENT (INPATIENT)
Dept: CT IMAGING | Facility: HOSPITAL | Age: 67
End: 2022-12-12

## 2022-12-12 PROBLEM — K92.0 GASTROINTESTINAL HEMORRHAGE WITH HEMATEMESIS: Status: ACTIVE | Noted: 2022-11-29

## 2022-12-12 LAB
GLUCOSE SERPL-MCNC: 114 MG/DL (ref 65–140)
GLUCOSE SERPL-MCNC: 123 MG/DL (ref 65–140)
GLUCOSE SERPL-MCNC: 131 MG/DL (ref 65–140)
GLUCOSE SERPL-MCNC: 173 MG/DL (ref 65–140)

## 2022-12-12 RX ADMIN — COLLAGENASE SANTYL: 250 OINTMENT TOPICAL at 16:50

## 2022-12-12 RX ADMIN — HYDROMORPHONE HYDROCHLORIDE 5 MG: 2 TABLET ORAL at 15:53

## 2022-12-12 RX ADMIN — HYDROMORPHONE HYDROCHLORIDE 5 MG: 2 TABLET ORAL at 01:41

## 2022-12-12 RX ADMIN — POLYETHYLENE GLYCOL 3350 17 G: 17 POWDER, FOR SOLUTION ORAL at 11:01

## 2022-12-12 RX ADMIN — INSULIN LISPRO 1 UNITS: 100 INJECTION, SOLUTION INTRAVENOUS; SUBCUTANEOUS at 06:19

## 2022-12-12 RX ADMIN — IOHEXOL 100 ML: 350 INJECTION, SOLUTION INTRAVENOUS at 21:20

## 2022-12-12 RX ADMIN — APIXABAN 5 MG: 5 TABLET, FILM COATED ORAL at 18:47

## 2022-12-12 RX ADMIN — CEFAZOLIN SODIUM 2000 MG: 2 SOLUTION INTRAVENOUS at 06:09

## 2022-12-12 RX ADMIN — SENNOSIDES AND DOCUSATE SODIUM 1 TABLET: 50; 8.6 TABLET ORAL at 10:58

## 2022-12-12 RX ADMIN — Medication 20 MG: at 22:00

## 2022-12-12 RX ADMIN — CHLORHEXIDINE GLUCONATE 0.12% ORAL RINSE 15 ML: 1.2 LIQUID ORAL at 22:00

## 2022-12-12 RX ADMIN — HYDROMORPHONE HYDROCHLORIDE 5 MG: 2 TABLET ORAL at 06:09

## 2022-12-12 RX ADMIN — HYDROMORPHONE HYDROCHLORIDE 5 MG: 2 TABLET ORAL at 22:12

## 2022-12-12 RX ADMIN — HYDROMORPHONE HYDROCHLORIDE 5 MG: 2 TABLET ORAL at 10:58

## 2022-12-12 RX ADMIN — SENNOSIDES AND DOCUSATE SODIUM 1 TABLET: 50; 8.6 TABLET ORAL at 18:47

## 2022-12-12 RX ADMIN — APIXABAN 10 MG: 5 TABLET, FILM COATED ORAL at 10:58

## 2022-12-12 NOTE — PLAN OF CARE
Problem: Prexisting or High Potential for Compromised Skin Integrity  Goal: Skin integrity is maintained or improved  Description: INTERVENTIONS:  - Identify patients at risk for skin breakdown  - Assess and monitor skin integrity  - Assess and monitor nutrition and hydration status  - Monitor labs   - Assess for incontinence   - Turn and reposition patient  - Assist with mobility/ambulation  - Relieve pressure over bony prominences  - Avoid friction and shearing  - Provide appropriate hygiene as needed including keeping skin clean and dry  - Evaluate need for skin moisturizer/barrier cream  - Collaborate with interdisciplinary team   - Patient/family teaching  - Consider wound care consult   Outcome: Progressing     Problem: Nutrition/Hydration-ADULT  Goal: Nutrient/Hydration intake appropriate for improving, restoring or maintaining nutritional needs  Description: Monitor and assess patient's nutrition/hydration status for malnutrition  Collaborate with interdisciplinary team and initiate plan and interventions as ordered  Monitor patient's weight and dietary intake as ordered or per policy  Utilize nutrition screening tool and intervene as necessary  Determine patient's food preferences and provide high-protein, high-caloric foods as appropriate       INTERVENTIONS:  - Monitor oral intake, urinary output, labs, and treatment plans  - Assess nutrition and hydration status and recommend course of action  - Evaluate amount of meals eaten  - Assist patient with eating if necessary   - Allow adequate time for meals  - Recommend/ encourage appropriate diets, oral nutritional supplements, and vitamin/mineral supplements  - Order, calculate, and assess calorie counts as needed  - Recommend, monitor, and adjust tube feedings and TPN/PPN based on assessed needs  - Assess need for intravenous fluids  - Provide specific nutrition/hydration education as appropriate  - Include patient/family/caregiver in decisions related to nutrition  Outcome: Progressing     Problem: PAIN - ADULT  Goal: Verbalizes/displays adequate comfort level or baseline comfort level  Description: Interventions:  - Encourage patient to monitor pain and request assistance  - Assess pain using appropriate pain scale  - Administer analgesics based on type and severity of pain and evaluate response  - Implement non-pharmacological measures as appropriate and evaluate response  - Consider cultural and social influences on pain and pain management  - Notify physician/advanced practitioner if interventions unsuccessful or patient reports new pain  Outcome: Progressing     Problem: INFECTION - ADULT  Goal: Absence or prevention of progression during hospitalization  Description: INTERVENTIONS:  - Assess and monitor for signs and symptoms of infection  - Monitor lab/diagnostic results  - Monitor all insertion sites, i e  indwelling lines, tubes, and drains  - Monitor endotracheal if appropriate and nasal secretions for changes in amount and color  - [place] appropriate cooling/warming therapies per order  - Administer medications as ordered  - Instruct and encourage patient and family to use good hand hygiene technique  - Identify and instruct in appropriate isolation precautions for identified infection/condition  Outcome: Progressing  Goal: Absence of fever/infection during neutropenic period  Description: INTERVENTIONS:  - Monitor WBC    Outcome: Progressing     Problem: DISCHARGE PLANNING  Goal: Discharge to home or other facility with appropriate resources  Description: INTERVENTIONS:  - Identify barriers to discharge w/patient and caregiver  - Arrange for needed discharge resources and transportation as appropriate  - Identify discharge learning needs (meds, wound care, etc )  - Arrange for interpretive services to assist at discharge as needed  - Refer to Case Management Department for coordinating discharge planning if the patient needs post-hospital services based on physician/advanced practitioner order or complex needs related to functional status, cognitive ability, or social support system  Outcome: Progressing     Problem: Knowledge Deficit  Goal: Patient/family/caregiver demonstrates understanding of disease process, treatment plan, medications, and discharge instructions  Description: Complete learning assessment and assess knowledge base    Interventions:  - Provide teaching at level of understanding  - Provide teaching via preferred learning methods  Outcome: Progressing     Problem: SAFETY,RESTRAINT: NV/NON-SELF DESTRUCTIVE BEHAVIOR  Goal: Remains free of harm/injury (restraint for non violent/non self-detsructive behavior)  Description: INTERVENTIONS:  - Instruct patient/family regarding restraint use   - Assess and monitor physiologic and psychological status   - Provide interventions and comfort measures to meet assessed patient needs   - Identify and implement measures to help patient regain control  - Assess readiness for release of restraint   Outcome: Progressing  Goal: Returns to optimal restraint-free functioning  Description: INTERVENTIONS:  - Assess the patient's behavior and symptoms that indicate continued need for restraint  - Identify and implement measures to help patient regain control  - Assess readiness for release of restraint   Outcome: Progressing     Problem: SAFETY ADULT  Goal: Patient will remain free of falls  Description: INTERVENTIONS:  - Educate patient/family on patient safety including physical limitations  - Instruct patient to call for assistance with activity   - Consult OT/PT to assist with strengthening/mobility   - Keep Call bell within reach  - Keep bed low and locked with side rails adjusted as appropriate  - Keep care items and personal belongings within reach  - Initiate and maintain comfort rounds  - Make Fall Risk Sign visible to staff  - Offer Toileting in advance of need  - Initiate/Maintain bed alarm  - Obtain necessary fall risk management equipment:   - Apply yellow socks and bracelet for high fall risk patients  - Consider moving patient to room near nurses station  Outcome: Progressing  Goal: Maintain or return to baseline ADL function  Description: INTERVENTIONS:  -  Assess patient's ability to carry out ADLs; assess patient's baseline for ADL function and identify physical deficits which impact ability to perform ADLs (bathing, care of mouth/teeth, toileting, grooming, dressing, etc )  - Assess/evaluate cause of self-care deficits   - Assess range of motion  - Assess patient's mobility; develop plan if impaired  - Assess patient's need for assistive devices and provide as appropriate  - Encourage maximum independence but intervene and supervise when necessary  - Involve family in performance of ADLs  - Assess for home care needs following discharge   - Consider OT consult to assist with ADL evaluation and planning for discharge  - Provide patient education as appropriate  Outcome: Progressing  Goal: Maintains/Returns to pre admission functional level  Description: INTERVENTIONS:  - Perform BMAT or MOVE assessment daily    - Set and communicate daily mobility goal to care team and patient/family/caregiver     - Collaborate with rehabilitation services on mobility goals if consulted  - Record patient progress and toleration of activity level   Outcome: Progressing

## 2022-12-12 NOTE — NURSING NOTE
Per patient's caretaker from [facility]) upon discharge please call [facility] as they can send a Ronda Bishopul and caretaker for his transportation back to [facility]  Thank you

## 2022-12-12 NOTE — PROGRESS NOTES
[address]  Progress Note Corey Ross 1955, 74 y o  male MRN: [id number]  Unit/Bed#: -01 Encounter: [id number]  Primary Care Provider: Fady Briseno MD   Date and time admitted to hospital: 11/25/2022  2:30 PM    * Shock [facility])  Assessment & Plan  · Sepsis was present on admission as evidenced by tachycardia, leukocytosis, and fever with underlying multiple decubitus ulcers  · Status post initial treatment with IV vancomycin, cefepime, which was transitioned to cefazolin 2g Q8 through 12/11/2022  · 1/2 blood cultures from 11/25/2022 MSSA and CoNS (likely contaminant)  MSSA however felt to be due to infected sacral wound  Repeat cultures are negative to date  · Wound cultures- polymicrobial C koseri, Proteus, Pseudomonas and S aureus   · ID and surgery input appreciated   · Status post chemical and manual debridement   · Due to intermittent ongoing low-grade fever with persistent leukocytosis-ID recommended to repeat CAT scan of the chest, abdomen, and pelvis  · Monitor off antibiotics for now       Acute deep vein thrombosis (DVT) of right upper extremity (HCC)  Assessment & Plan  · Right arm swelling and tenderness noted  · Venous duplex of right upper extremity was positive for DVT  · Started Eliquis   · Monitor vital signs, labs, serial assessments       Aspiration pneumonia ([address] )  Assessment & Plan  · With witnessed aspiration event during intubation  · Completed cefepime per infectious disease        GIB (gastrointestinal bleeding)  Assessment & Plan  · Originally admitted for shock, patient developed tachypnea, tachycardia, and hypotension and noted to be in respiratory distress possibly due to aspiration (patient has PEG tube, receives tube feeding)  · Patient was witnessed to have a large amount of coffee-ground emesis while being suctioned in preparation for intubation  Patient was intubated and upgraded to the ICU at that time    He was extubated on   · He received 3 units of PRBCs total and 1 unit of FFP  · GI recommendations appreciated   · Heparin SQ had been resumed on 2022  On , patient was noted to have a DVT of the right upper extremity and started on Eliquis  · Monitor for bleeding, monitor hemoglobin      MSSA bacteremia  Assessment & Plan  · Management as outlined above   · Echocardiogram without any evidence of vegetation    Developmental disability  Assessment & Plan  · Patient with profound development disability, nonverbal, bed bound  · Patient on tube feeds only with 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, stage 4 (HCC)  Assessment & Plan  · S/p bedside debridement with surgery   · Wound care consultation appreciated  · Continue with local wound care, frequent turn and reposition          VTE Prophylaxis:  Apixaban (Eliquis)    Patient Centered Rounds: I have performed bedside rounds with nursing staff today  Discussions with Specialists or Other Care Team Provider: ID   Education and Discussions with Family / Patient: patient and care giver at bedside    Current Length of Stay: 17 day(s)    Current Patient Status: Inpatient   Certification Statement: The patient will continue to require additional inpatient hospital stay due to shock     Discharge Plan: pending hospital course     Code Status: Level 1 - Full Code    Subjective:   Unable to obtain  Per staff, no acute event overnight  The patient has been having low-grade temperature  T max 100 9F    Objective:     Vitals:   Temp (24hrs), Av 3 °F (37 9 °C), Min:99 9 °F (37 7 °C), Max:100 9 °F (38 3 °C)    Temp:  [99 9 °F (37 7 °C)-100 9 °F (38 3 °C)] 100 9 °F (38 3 °C)  HR:  [121-137] 137  Resp:  [18-21] 18  BP: (112-133)/(72-79) 133/79  SpO2:  [95 %-96 %] 95 %  Body mass index is 20 21 kg/m²  Input and Output Summary (last 24 hours):        Intake/Output Summary (Last 24 hours) at 12/12/2022 1412  Last data filed at 12/12/2022 0900  Gross per 24 hour   Intake 1150 ml   Output 2150 ml   Net -1000 ml       Physical Exam:   Physical Exam  Vitals and nursing note reviewed  Constitutional:       General: He is not in acute distress  Appearance: He is cachectic  HENT:      Head: Normocephalic and atraumatic  Right Ear: External ear normal       Left Ear: External ear normal       Nose: Nose normal       Mouth/Throat:      Mouth: Mucous membranes are moist       Pharynx: Oropharynx is clear  Eyes:      General:         Right eye: No discharge  Left eye: No discharge  Extraocular Movements: Extraocular movements intact  Pupils: Pupils are equal, round, and reactive to light  Cardiovascular:      Rate and Rhythm: Regular rhythm  Tachycardia present  Pulses: Normal pulses  Heart sounds: Normal heart sounds  No murmur heard  Pulmonary:      Effort: Pulmonary effort is normal  No respiratory distress  Breath sounds: No wheezing or rales  Comments: Decreased in bases    Abdominal:      General: Bowel sounds are normal  There is no distension  Palpations: Abdomen is soft  There is no mass  Tenderness: There is no abdominal tenderness  Comments: Peg tube and colostomy in place     Musculoskeletal:         General: Deformity present  No swelling or tenderness  Cervical back: Normal range of motion and neck supple  No rigidity  Skin:     General: Skin is warm and dry  Capillary Refill: Capillary refill takes less than 2 seconds  Coloration: Skin is not pale  Findings: No erythema  Neurological:      Mental Status: He is alert  Mental status is at baseline  Comments: Upper and lower extremities contractures  Non-verbal at baseline  Tracking with eyes      Psychiatric:      Comments: Unable to assess           Additional Data:     Labs:    Results from last 7 days   Lab Units 12/11/22  0622   WBC Thousand/uL 15 65*   HEMOGLOBIN g/dL 9 1*   HEMATOCRIT % 31 5*   PLATELETS Thousands/uL 653*   NEUTROS PCT % 75   LYMPHS PCT % 10*   MONOS PCT % 11   EOS PCT % 3     Results from last 7 days   Lab Units 12/11/22  0622 12/09/22  0635 12/08/22  0455   SODIUM mmol/L 140   < > 143   POTASSIUM mmol/L 4 0   < > 3 5   CHLORIDE mmol/L 104   < > 108   CO2 mmol/L 30   < > 27   BUN mg/dL 7   < > 7   CREATININE mg/dL 0 28*   < > 0 32*   CALCIUM mg/dL 8 5   < > 8 6   ALK PHOS U/L  --   --  145*   ALT U/L  --   --  4*   AST U/L  --   --  20    < > = values in this interval not displayed  Results from last 7 days   Lab Units 12/12/22  1202 12/12/22  0606 12/12/22  0015 12/11/22  1549 12/11/22  1110 12/11/22  0713 12/11/22  0629 12/10/22  2347 12/10/22  1807 12/10/22  1123 12/10/22  0600 12/09/22  2355   POC GLUCOSE mg/dl 114 173* 131 128 111 136 173* 140 124 136 125 133           * I Have Reviewed All Lab Data Listed Above  * Additional Pertinent Lab Tests Reviewed:  [address] Admission  Reviewed    Imaging:  Imaging Reports Reviewed Today Include: CT chest/a/p pending     Recent Cultures (last 7 days):           Last 24 Hours Medication List:   Current Facility-Administered Medications   Medication Dose Route Frequency Provider Last Rate   • acetaminophen  650 mg Per G Tube Q4H PRN AVANI Gould     • apixaban  5 mg Oral BID AVANI Child     • chlorhexidine  15 mL Mouth/Throat Q12H [address] AVANI Gould     • collagenase   Topical Daily AVANI Gould     • HYDROmorphone  1 mg Intravenous Q2H PRN AVANI Gould     • HYDROmorphone  5 mg Oral Q4H AVANI Gould     • insulin lispro  1-5 Units Subcutaneous Q6H [address] AVANI Gould     • omeprazole (PRILOSEC) suspension 2 mg/mL  20 mg Oral BID AVANI Gould     • ondansetron  4 mg Intravenous Q6H PRN AVANI Gould     • polyethylene glycol  17 g Oral Daily AVANI Gould     • senna-docusate sodium  1 tablet Oral BID Rosa Hermosillo Arianna Vaca          Today, Patient Was Seen By: Davis Halsted, CRNP    ** Please Note: Dictation voice to text software may have been used in the creation of this document   **

## 2022-12-12 NOTE — ASSESSMENT & PLAN NOTE
· Right arm swelling and tenderness noted  · Venous duplex of right upper extremity was positive for DVT  · Started Eliquis   · Monitor vital signs, labs, serial assessments

## 2022-12-12 NOTE — ASSESSMENT & PLAN NOTE
· S/p bedside debridement with surgery 11/26  · Wound care consultation appreciated  · Continue with local wound care, frequent turn and reposition

## 2022-12-12 NOTE — ASSESSMENT & PLAN NOTE
· Sepsis was present on admission as evidenced by tachycardia, leukocytosis, and fever with underlying multiple decubitus ulcers  · Status post initial treatment with IV vancomycin, cefepime, which was transitioned to cefazolin 2g Q8 through 12/11/2022  · 1/2 blood cultures from 11/25/2022 MSSA and CoNS (likely contaminant)  MSSA however felt to be due to infected sacral wound  Repeat cultures are negative to date  · Wound cultures- polymicrobial C koseri, Proteus, Pseudomonas and S aureus   · ID and surgery input appreciated   · Status post chemical and manual debridement   · Due to intermittent ongoing low-grade fever with persistent leukocytosis-ID recommended to repeat CAT scan of the chest, abdomen, and pelvis    · Monitor off antibiotics for now

## 2022-12-12 NOTE — PROGRESS NOTES
Progress Note - [facility]   Familia Dash 74 y o  male MRN: [id number]  Unit/Bed#: -01 Encounter: [id number]      IMPRESSION & RECOMMENDATIONS:   1  Sepsis, present on admission, evidenced by tachycardia and leukocytosis  Per chart review, leukocytosis is chronic since Dec 2021  1 of 2 admission bl cx positive  All sets of repeat blood cultures are negative  Suspect sepsis initially secondary to #2, #3, now complicated by #2  PY completed 7d course of IV cefepime and transitioned to IV ancef for MSSA bacteremia and completed total 14d of abx  CT C/A/P negative for deep seated abscess or osteomyelitis, but did show possible opacities in the right middle and lower lobes  Patient was extubated to NC with improved O2 sats, and continues to have intermittent low grade fevers  Unknown etiology for recurrent fevers; may be due to recurrent aspiration, RUE DVT, versus other occult etiology as patient is nonverbal    -continue antibiotics as below  -recommend repeat CT C/A/P   -monitor temperature and hemodynamics  -management per primary team   -recheck CBC and BMP in a m       2  Polymicrobial bacteremia  One of 2 admission blood cultures positive for Gram-positive cocci in clusters, culture positive for MSSA and CoNS  Documented that it was drawn from patients peripheral line and pt known to be difficult stick with hx of extensive contractures  Suspect CoNs is contaminant, however MSSA likely due to #3  2D echo is normal  No known intravascular devices  Repeat blood cultures all remain negative to date  Completed 14d course of IV cefazolin on 12/11/22     -no further directed treatment required      3  Chronic, now infected, stage IV decubitus ulcers  POA   Despite outpatient wound care, found to have significant deterioration in wounds over the last few months, particularly the right upper back and sacral wound with increasing wound depth and necrotic tissue burden with evidence of purulence and malodor on admission  Patient also with unstageable sacral/right buttock ulcer, unstageable right ischial decubitus ulcer, and unstageable left heel pressure ulcer  CT C/A/P negative for deep seated abscesses  Wound cultures from initial bedside debridement upper back and sacrum are polymicrobial, positive for Citrobacter koseri, Proteus mirabilis, Pseudomonas aeruginosa, and Staph aureus  Surgery and wound care following for chemical and manual debridement  Unfortunately wounds are unlikely to heal and are at risk for reinfection due to protein calorie malnutrition, contractures, inability to offload pressure and inability for closure  Completed 7 day course of IV cefepime    -continue to address GOC  Although no OM noted on CT, with worsening wounds it is likely that patient will develop chronic infection  If unable to cover/secure wounds especially of sacrum and upper right back, OM not treatable long term      -daily wound care and surgery follow up      4  Left lateral foot stage IV pressure ulcer with suspect OM  POA   Now with left lateral foot ulcer and left heel ulcer both probing to bone   XR shows absence of the 5th metatarsal head which likely represents chronic osteomyelitis   No evidence of overlying cellulitis or infection   -continue to address [address]  In the setting of chronic wound with exposed bone and no plan/indication for surgical intervention, patients osteomyelitis is not treatable long-term  Likely to continue to have progression of wound despite local wound care given extensive contractures and inability to offload pressure  He remains at risk for recurrent infection, bacteremia, sepsis, and limb loss  -continue local wound care and offloading pressure      5  Acute respiratory failure with hypoxia   Patient emergently intubated in the setting of respiratory failure, aspiration and upper GI bleeding 11/29   Now extubated to nasal cannula 12/3   S/p EGD which showed ulcerated area in stomach s/p clip   CT chest shows consolidative opacities in the right middle and lower lobes possibly due to pneumonia and atelectasis, and repeat CXR showed improving opacities  Suspect patient continues to have aspiration episodes with development of pneumonitis vs pneumonia  Sputum cx while intubated shows growth of candida and Achromobacter  Remains on room air    -monitor respiratory status and O2 requirements   -monitor hgb and transfuse PRN      6  Acute RUE DVT  Noted to have RUE pain, swelling, and warmth on exam  Patient had peripheral IV in RUE earlier in hospital stay that infiltrated  Also with bullous eruption of right arm  RUE US shows acute DVT in paired brachial vein and chronic non-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  Unlikely cellulitis given recent abx use  Swelling is improving    -started on Eliquis per primary team   -supportive care   -serial skin exams      7  Functional quadriplegia, developmental disability   Patient nonverbal and bed-bound at baseline on tube feeds, with chronic indwelling Marrufo catheter and colostomy  Unfortunately due to contractures, very difficult to reposition patient and offload pressure   -supportive measures per primary team     8  Antibiotic Allergy   Noted to have amoxicillin and Augmentin allergy without documented reaction   Per Care everywhere, has tolerated cefepime and ceftriaxone during prior admissions  -monitor for adverse drug reactions    Antibiotics:  D1 off systemic abx     I have discussed the above management plan in detail with patient  I have discussed the above management plan in detail with patient's RN, and the primary service, SLIM       Subjective:  Patient non verbal      Objective:  Vitals:  Temp:  [99 9 °F (37 7 °C)-100 9 °F (38 3 °C)] 100 9 °F (38 3 °C)  HR:  [121-137] 137  Resp:  [18-21] 18  BP: (112-133)/(72-79) 133/79  SpO2:  [95 %-96 %] 95 %  Temp (24hrs), Av 3 °F (37 9 °C), Min:99 9 °F (37 7 °C), Max:100 9 °F (38 3 °C)  Current: Temperature: (!) 100 9 °F (38 3 °C)    PHYSICAL EXAM:  General Appearance:  Appearing awake and alert, chronically ill, nonverbal   HEENT: Normocephalic, without obvious abnormality, atraumatic  Conjunctiva pink and sclera anicteric  Oropharynx moist without lesions  Lungs:   Clear to auscultation bilaterally, respirations unlabored   Heart:  RRR; no murmur, rub or gallop   Abdomen:   Soft, non-tender, non-distended, positive bowel sounds; colostomy and PEG in place    Extremities: Extensive contractures x4 extremities    Musculoskeletal: quadriplegic  : Freire patent with some urinary sediment in freire tubing   Skin: No rashes or lesions  Multiple wounds to sacrum, upper back, right ear, left foot and left hand  RUE bullous eruption covered with dressing  Improving edema and warmth of RUE  Peripheral IV intact without evidence of erythema, warmth, or exudate  LABS, IMAGING, & OTHER STUDIES:  Lab Results:  I have personally reviewed pertinent labs    Results from last 7 days   Lab Units 12/11/22  0622 12/10/22  1251 12/09/22  0635   WBC Thousand/uL 15 65* 16 80* 15 14*   HEMOGLOBIN g/dL 9 1* 8 9* 9 0*   PLATELETS Thousands/uL 653* 668* 672*     Results from last 7 days   Lab Units 12/11/22  0622 12/10/22  1251 12/09/22  0635 12/08/22  0455   SODIUM mmol/L 140 137 142 143   POTASSIUM mmol/L 4 0 3 9 3 6 3 5   CHLORIDE mmol/L 104 104 108 108   CO2 mmol/L 30 23 26 27   BUN mg/dL 7 7 6 7   CREATININE mg/dL 0 28* 0 29* 0 29* 0 32*   EGFR ml/min/1 73sq m 144 142 142 136   CALCIUM mg/dL 8 5 8 5 8 4 8 6   AST U/L  --   --   --  20   ALT U/L  --   --   --  4*   ALK PHOS U/L  --   --   --  145*

## 2022-12-13 PROBLEM — L89.90 PRESSURE ULCERS OF SKIN OF MULTIPLE TOPOGRAPHIC SITES: Status: ACTIVE | Noted: 2022-10-26

## 2022-12-13 LAB
ANION GAP SERPL CALCULATED.3IONS-SCNC: 8 MMOL/L (ref 4–13)
BUN SERPL-MCNC: 11 MG/DL (ref 5–25)
CALCIUM SERPL-MCNC: 9.1 MG/DL (ref 8.4–10.2)
CHLORIDE SERPL-SCNC: 102 MMOL/L (ref 96–108)
CO2 SERPL-SCNC: 27 MMOL/L (ref 21–32)
CREAT SERPL-MCNC: 0.39 MG/DL (ref 0.6–1.3)
ERYTHROCYTE [DISTWIDTH] IN BLOOD BY AUTOMATED COUNT: 21.3 % (ref 11.6–15.1)
FLUAV RNA RESP QL NAA+PROBE: NEGATIVE
FLUBV RNA RESP QL NAA+PROBE: NEGATIVE
GFR SERPL CREATININE-BSD FRML MDRD: 125 ML/MIN/1.73SQ M
GLUCOSE SERPL-MCNC: 120 MG/DL (ref 65–140)
GLUCOSE SERPL-MCNC: 125 MG/DL (ref 65–140)
GLUCOSE SERPL-MCNC: 127 MG/DL (ref 65–140)
GLUCOSE SERPL-MCNC: 137 MG/DL (ref 65–140)
GLUCOSE SERPL-MCNC: 139 MG/DL (ref 65–140)
HCT VFR BLD AUTO: 33.5 % (ref 36.5–49.3)
HGB BLD-MCNC: 9.6 G/DL (ref 12–17)
MCH RBC QN AUTO: 24.4 PG (ref 26.8–34.3)
MCHC RBC AUTO-ENTMCNC: 28.7 G/DL (ref 31.4–37.4)
MCV RBC AUTO: 85 FL (ref 82–98)
PLATELET # BLD AUTO: 753 THOUSANDS/UL (ref 149–390)
PMV BLD AUTO: 8.9 FL (ref 8.9–12.7)
POTASSIUM SERPL-SCNC: 4.3 MMOL/L (ref 3.5–5.3)
RBC # BLD AUTO: 3.93 MILLION/UL (ref 3.88–5.62)
RSV RNA RESP QL NAA+PROBE: NEGATIVE
SARS-COV-2 RNA RESP QL NAA+PROBE: NEGATIVE
SODIUM SERPL-SCNC: 137 MMOL/L (ref 135–147)
WBC # BLD AUTO: 22.49 THOUSAND/UL (ref 4.31–10.16)

## 2022-12-13 PROCEDURE — 0HD6XZZ EXTRACTION OF BACK SKIN, EXTERNAL APPROACH: ICD-10-PCS | Performed by: SURGERY

## 2022-12-13 RX ADMIN — POLYETHYLENE GLYCOL 3350 17 G: 17 POWDER, FOR SOLUTION ORAL at 11:19

## 2022-12-13 RX ADMIN — APIXABAN 5 MG: 5 TABLET, FILM COATED ORAL at 18:34

## 2022-12-13 RX ADMIN — HYDROMORPHONE HYDROCHLORIDE 5 MG: 2 TABLET ORAL at 15:48

## 2022-12-13 RX ADMIN — HYDROMORPHONE HYDROCHLORIDE 5 MG: 2 TABLET ORAL at 06:05

## 2022-12-13 RX ADMIN — CHLORHEXIDINE GLUCONATE 0.12% ORAL RINSE 15 ML: 1.2 LIQUID ORAL at 11:19

## 2022-12-13 RX ADMIN — COLLAGENASE SANTYL: 250 OINTMENT TOPICAL at 13:00

## 2022-12-13 RX ADMIN — SENNOSIDES AND DOCUSATE SODIUM 1 TABLET: 50; 8.6 TABLET ORAL at 11:19

## 2022-12-13 RX ADMIN — HYDROMORPHONE HYDROCHLORIDE 5 MG: 2 TABLET ORAL at 18:34

## 2022-12-13 RX ADMIN — APIXABAN 5 MG: 5 TABLET, FILM COATED ORAL at 11:19

## 2022-12-13 RX ADMIN — Medication 20 MG: at 11:20

## 2022-12-13 RX ADMIN — HYDROMORPHONE HYDROCHLORIDE 5 MG: 2 TABLET ORAL at 11:19

## 2022-12-13 RX ADMIN — ACETAMINOPHEN 650 MG: 325 TABLET ORAL at 02:37

## 2022-12-13 RX ADMIN — CHLORHEXIDINE GLUCONATE 0.12% ORAL RINSE 15 ML: 1.2 LIQUID ORAL at 20:54

## 2022-12-13 RX ADMIN — SENNOSIDES AND DOCUSATE SODIUM 1 TABLET: 50; 8.6 TABLET ORAL at 18:34

## 2022-12-13 RX ADMIN — HYDROMORPHONE HYDROCHLORIDE 5 MG: 2 TABLET ORAL at 02:26

## 2022-12-13 RX ADMIN — Medication 20 MG: at 20:53

## 2022-12-13 RX ADMIN — HYDROMORPHONE HYDROCHLORIDE 5 MG: 2 TABLET ORAL at 20:54

## 2022-12-13 NOTE — PROGRESS NOTES
[address]  Progress Note Marisol Bowman 1955, 74 y o  male MRN: [id number]  Unit/Bed#: -[facility] Encounter: [id number]  Primary Care Provider: Roxana Thacker MD   Date and time admitted to hospital: 11/25/2022  2:30 PM    Pressure ulcers of skin of multiple topographic sites  Assessment & Plan  Assessment:  Deep stage III ulcer to upper back  Deep stage III ulcer to sacrum  Deep stage III ulcer to right ischium  Plan:  Wounds clean, debrided of residual tissue necrosis today, no signs of infection  Wet to dry dressing reapplied today with wound cleanser soaked cling wrap, bulky ABD  Will pursue transition to [facility] therapy if approved by his accepting facility  Will plan foam to upper back, sacrum, ischium wounds to be bridged for 1 unit if possible  Subjective/Objective   Chief Complaint: nonverbal    Subjective: nonverbal    Objective: nothing in report from RN    Blood pressure 93/61, pulse (!) 128, temperature 99 9 °F (37 7 °C), resp  rate 18, height 5' 5" (1 651 m), weight 55 9 kg (123 lb 3 8 oz), SpO2 92 %  ,Body mass index is 20 51 kg/m²  Intake/Output Summary (Last 24 hours) at 12/13/2022 1403  Last data filed at 12/12/2022 1700  Gross per 24 hour   Intake --   Output 400 ml   Net -400 ml       Invasive Devices     Peripheral Intravenous Line  Duration           Peripheral IV 12/06/22 Distal;Left;Ventral (anterior) Forearm 6 days          Drain  Duration           Colostomy -- days    Urethral Catheter Temperature probe 14 days    NG/OG/Enteral Tube Enteral Feeding Tube 8 days                Physical Exam  Vitals and nursing note reviewed  Constitutional:       General: He is not in acute distress  Appearance: He is well-developed  He is not diaphoretic  HENT:      Head: Normocephalic  Eyes:      Conjunctiva/sclera: Conjunctivae normal    Pulmonary:      Effort: No respiratory distress  Skin:     General: Skin is warm and dry  Capillary Refill: Capillary refill takes less than 2 seconds  Comments: Numerous skin wounds/ulcers/blisters in various locations on body  Deep stage III ulcers of upper back, sacrum, and right ischium  Small amount of slough debrided with scissors at bedside  Appears is clean, without infection, and fascia intact without exposed bone  Neurological:      Mental Status: He is alert  Lab, Imaging and other studies:  I have personally reviewed pertinent lab results    , CBC:   Lab Results   Component Value Date    WBC 22 49 (H) 12/13/2022    HGB 9 6 (L) 12/13/2022    HCT 33 5 (L) 12/13/2022    MCV 85 12/13/2022     (H) 12/13/2022    MCH 24 4 (L) 12/13/2022    MCHC 28 7 (L) 12/13/2022    RDW 21 3 (H) 12/13/2022    MPV 8 9 12/13/2022   , CMP:   Lab Results   Component Value Date    SODIUM 137 12/13/2022    K 4 3 12/13/2022     12/13/2022    CO2 27 12/13/2022    BUN 11 12/13/2022    CREATININE 0 39 (L) 12/13/2022    CALCIUM 9 1 12/13/2022    EGFR 125 12/13/2022     VTE Pharmacologic Prophylaxis: Reason for no pharmacologic prophylaxis Eliquis  VTE Mechanical Prophylaxis: sequential compression device

## 2022-12-13 NOTE — PROGRESS NOTES
Progress Note - Podiatry  Torrey Howe 74 y o  male MRN: [id number]  Unit/Bed#: -01 Encounter: [id number]    Assessment:  1  Left lateral foot pressure ulcer probes to bone - OM   2  Left heel pressure ulcer probes to bone clinical OM   3  Sepsis - POA  4  Polymicrobial bacteremia    Plan:    - Left lateral foot and posterior heel ulcers with underlying chronic osteomyelitis are clinically stable without acute sings of infection  I recommend continue local wound care to the left foot ulcers however given ongoing fevers and increased WBC, foot as a source of infection is not eliminated  I do not recommend any podiatric surgical procedure considering patient condition/contact pressure due to contracture to the LLE  If foot is the source of infection I recommend proximal amputation BKA vs  AKA  - Left foot xrays reviewed: Soft tissue ulcer overlying the posterior calcaneus without osseous destruction  Left 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 head  This appears chronic and possibly postsurgical though there is no reported surgical history  Chronic osteomyelitis is likely in the absence of surgical history  - Continue ABx as per ID recs   - Continue appropriate offloading to the LLE   - Rest of care per primary service         Lab Results   Component Value Date    HGBA1C 5 8 (H) 04/04/2022         Subjective/Objective   Chief Complaint:   Chief Complaint   Patient presents with   • Evaluation of Abnormal Diagnostic Test     Patient brought in for evaluation of wounds that possibly have developed into osteomyelitis of left foot, sacrum, and right ribs        Subjective: 74 y o  y/o male was seen and evaluated at bedside  Patient evaluated at bedside  Blood pressure 120/66, pulse (!) 126, temperature 99 9 °F (37 7 °C), resp  rate 18, height 5' 5" (1 651 m), weight 55 9 kg (123 lb 3 8 oz), SpO2 98 %  ,Body mass index is 20 51 kg/m²      Invasive Devices Peripheral Intravenous Line  Duration           Peripheral IV 12/06/22 Distal;Left;Ventral (anterior) Forearm 7 days          Drain  Duration           Colostomy -- days    Urethral Catheter Temperature probe 14 days    NG/OG/Enteral Tube Enteral Feeding Tube 8 days                Physical Exam:   General: Alert, cooperative and no distress  Lungs: Non labored breathing  Heart: Positive S1, S2  Abdomen: Soft, non-tender  Extremity:    Left posterior heel ulcer with eschar and necrotic slough fibrin tissue noted to the wound  Probes to bone  No drainage noted to the heel ulcer  No malodor noted  Left lateral foot ulcer with exposed metatarsal and granular wound base, no malodor present or active purulent drainage present  Clinically ulcers are stable  Foot is warm to touch and edema present                        Lab, Imaging and other studies:   I have personally reviewed pertinent lab results  , CBC:   Lab Results   Component Value Date    WBC 22 49 (H) 12/13/2022    HGB 9 6 (L) 12/13/2022    HCT 33 5 (L) 12/13/2022    MCV 85 12/13/2022     (H) 12/13/2022    MCH 24 4 (L) 12/13/2022    MCHC 28 7 (L) 12/13/2022    RDW 21 3 (H) 12/13/2022    MPV 8 9 12/13/2022   , CMP:   Lab Results   Component Value Date    SODIUM 137 12/13/2022    K 4 3 12/13/2022     12/13/2022    CO2 27 12/13/2022    BUN 11 12/13/2022    CREATININE 0 39 (L) 12/13/2022    CALCIUM 9 1 12/13/2022    EGFR 125 12/13/2022       Imaging: I have personally reviewed pertinent films in PACS  EKG, Pathology, and Other Studies: I have personally reviewed pertinent reports    VTE Pharmacologic Prophylaxis: Sequential compression device (Venodyne)

## 2022-12-13 NOTE — PLAN OF CARE
Problem: Prexisting or High Potential for Compromised Skin Integrity  Goal: Skin integrity is maintained or improved  Description: INTERVENTIONS:  - Identify patients at risk for skin breakdown  - Assess and monitor skin integrity  - Assess and monitor nutrition and hydration status  - Monitor labs   - Assess for incontinence   - Turn and reposition patient  - Assist with mobility/ambulation  - Relieve pressure over bony prominences  - Avoid friction and shearing  - Provide appropriate hygiene as needed including keeping skin clean and dry  - Evaluate need for skin moisturizer/barrier cream  - Collaborate with interdisciplinary team   - Patient/family teaching  - Consider wound care consult   Outcome: Progressing     Problem: Nutrition/Hydration-ADULT  Goal: Nutrient/Hydration intake appropriate for improving, restoring or maintaining nutritional needs  Description: Monitor and assess patient's nutrition/hydration status for malnutrition  Collaborate with interdisciplinary team and initiate plan and interventions as ordered  Monitor patient's weight and dietary intake as ordered or per policy  Utilize nutrition screening tool and intervene as necessary  Determine patient's food preferences and provide high-protein, high-caloric foods as appropriate       INTERVENTIONS:  - Monitor oral intake, urinary output, labs, and treatment plans  - Assess nutrition and hydration status and recommend course of action  - Evaluate amount of meals eaten  - Assist patient with eating if necessary   - Allow adequate time for meals  - Recommend/ encourage appropriate diets, oral nutritional supplements, and vitamin/mineral supplements  - Order, calculate, and assess calorie counts as needed  - Recommend, monitor, and adjust tube feedings and TPN/PPN based on assessed needs  - Assess need for intravenous fluids  - Provide specific nutrition/hydration education as appropriate  - Include patient/family/caregiver in decisions related to nutrition  Outcome: Progressing     Problem: PAIN - ADULT  Goal: Verbalizes/displays adequate comfort level or baseline comfort level  Description: Interventions:  - Encourage patient to monitor pain and request assistance  - Assess pain using appropriate pain scale  - Administer analgesics based on type and severity of pain and evaluate response  - Implement non-pharmacological measures as appropriate and evaluate response  - Consider cultural and social influences on pain and pain management  - Notify physician/advanced practitioner if interventions unsuccessful or patient reports new pain  Outcome: Progressing     Problem: INFECTION - ADULT  Goal: Absence or prevention of progression during hospitalization  Description: INTERVENTIONS:  - Assess and monitor for signs and symptoms of infection  - Monitor lab/diagnostic results  - Monitor all insertion sites, i e  indwelling lines, tubes, and drains  - Monitor endotracheal if appropriate and nasal secretions for changes in amount and color  - [place] appropriate cooling/warming therapies per order  - Administer medications as ordered  - Instruct and encourage patient and family to use good hand hygiene technique  - Identify and instruct in appropriate isolation precautions for identified infection/condition  Outcome: Progressing  Goal: Absence of fever/infection during neutropenic period  Description: INTERVENTIONS:  - Monitor WBC    Outcome: Progressing     Problem: DISCHARGE PLANNING  Goal: Discharge to home or other facility with appropriate resources  Description: INTERVENTIONS:  - Identify barriers to discharge w/patient and caregiver  - Arrange for needed discharge resources and transportation as appropriate  - Identify discharge learning needs (meds, wound care, etc )  - Arrange for interpretive services to assist at discharge as needed  - Refer to Case Management Department for coordinating discharge planning if the patient needs post-hospital services based on physician/advanced practitioner order or complex needs related to functional status, cognitive ability, or social support system  Outcome: Progressing     Problem: Knowledge Deficit  Goal: Patient/family/caregiver demonstrates understanding of disease process, treatment plan, medications, and discharge instructions  Description: Complete learning assessment and assess knowledge base    Interventions:  - Provide teaching at level of understanding  - Provide teaching via preferred learning methods  Outcome: Progressing     Problem: SAFETY,RESTRAINT: NV/NON-SELF DESTRUCTIVE BEHAVIOR  Goal: Remains free of harm/injury (restraint for non violent/non self-detsructive behavior)  Description: INTERVENTIONS:  - Instruct patient/family regarding restraint use   - Assess and monitor physiologic and psychological status   - Provide interventions and comfort measures to meet assessed patient needs   - Identify and implement measures to help patient regain control  - Assess readiness for release of restraint   Outcome: Progressing  Goal: Returns to optimal restraint-free functioning  Description: INTERVENTIONS:  - Assess the patient's behavior and symptoms that indicate continued need for restraint  - Identify and implement measures to help patient regain control  - Assess readiness for release of restraint   Outcome: Progressing     Problem: SAFETY ADULT  Goal: Patient will remain free of falls  Description: INTERVENTIONS:  - Educate patient/family on patient safety including physical limitations  - Instruct patient to call for assistance with activity   - Consult OT/PT to assist with strengthening/mobility   - Keep Call bell within reach  - Keep bed low and locked with side rails adjusted as appropriate  - Keep care items and personal belongings within reach  - Initiate and maintain comfort rounds  - Make Fall Risk Sign visible to staff  - Offer Toileting in advance of need  - Initiate/Maintain bed alarm  - Obtain necessary fall risk management equipment:   - Apply yellow socks and bracelet for high fall risk patients  - Consider moving patient to room near nurses station  Outcome: Progressing  Goal: Maintain or return to baseline ADL function  Description: INTERVENTIONS:  -  Assess patient's ability to carry out ADLs; assess patient's baseline for ADL function and identify physical deficits which impact ability to perform ADLs (bathing, care of mouth/teeth, toileting, grooming, dressing, etc )  - Assess/evaluate cause of self-care deficits   - Assess range of motion  - Assess patient's mobility; develop plan if impaired  - Assess patient's need for assistive devices and provide as appropriate  - Encourage maximum independence but intervene and supervise when necessary  - Involve family in performance of ADLs  - Assess for home care needs following discharge   - Consider OT consult to assist with ADL evaluation and planning for discharge  - Provide patient education as appropriate  Outcome: Progressing  Goal: Maintains/Returns to pre admission functional level  Description: INTERVENTIONS:  - Perform BMAT or MOVE assessment daily    - Set and communicate daily mobility goal to care team and patient/family/caregiver     - Collaborate with rehabilitation services on mobility goals if consulted  - Record patient progress and toleration of activity level   Outcome: Progressing

## 2022-12-13 NOTE — ASSESSMENT & PLAN NOTE
Spoke to mother and she reports she would like an appointment for her son for 4 day history of sore throat, headache, sinus congestion, and general malaise as he has missed a few days of school and needs a note also. She is not aware of fever, vomiting or diarrhea,  or swollen glands or stiff neck. She asked for today with  or Gail Camargo if possible. No further appointments with Dr. Noel Leos and Gail Camargo is not here on Mondays. Did instruct that Urgent care hours until 8pm today but she declined an preferred to see Gail Camargo tomorrow, as Dr. Noel Leos out of office tomorrow.  Appointment made for Gail Camargo at 9:30 per request. 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

## 2022-12-13 NOTE — PLAN OF CARE
Problem: INFECTION - ADULT  Goal: Absence or prevention of progression during hospitalization  Description: INTERVENTIONS:  - Assess and monitor for signs and symptoms of infection  - Monitor lab/diagnostic results  - Monitor all insertion sites, i e  indwelling lines, tubes, and drains  - Monitor endotracheal if appropriate and nasal secretions for changes in amount and color  - [place] appropriate cooling/warming therapies per order  - Administer medications as ordered  - Instruct and encourage patient and family to use good hand hygiene technique  - Identify and instruct in appropriate isolation precautions for identified infection/condition  Outcome: Progressing  Goal: Absence of fever/infection during neutropenic period  Description: INTERVENTIONS:  - Monitor WBC    Outcome: Progressing

## 2022-12-13 NOTE — PROGRESS NOTES
[address]  Progress Note Ghazala Tam 1955, 74 y o  male MRN: [id number]  Unit/Bed#: -01 Encounter: [id number]  Primary Care Provider: Mariangel Kyle MD   Date and time admitted to hospital: 11/25/2022  2:30 PM    * Shock [facility])  Assessment & Plan  · Sepsis was present on admission as evidenced by tachycardia, leukocytosis, and fever with underlying multiple decubitus ulcers  · Status post initial treatment with IV vancomycin, cefepime, which was transitioned to cefazolin 2g Q8 through 12/11/2022  · 1/2 blood cultures from 11/25/2022 MSSA and CoNS (likely contaminant)  MSSA however felt to be due to infected sacral wound  Repeat cultures are negative to date  · Wound cultures- polymicrobial C koseri, Proteus, Pseudomonas and S aureus   · ID/surgery/podiatry input appreciated   · Despite completion of antibiotics, leukocytosis persists with low grade fever  Possible related to ongoing aspiration vs new VTE process  · CT chest/a/p (12/12) : no evidence of acute or new infectious etiology  · Status post chemical and manual debridement   · Surgery recommends VAC therapy if approved by his facility   · Repeat blood cultures   Obtain COVID/RSV/flu (visitors from his facility)  · Monitor off antibiotics for now       Acute deep vein thrombosis (DVT) of right upper extremity (HCC)  Assessment & Plan  · Right arm swelling and tenderness noted  · Venous duplex of right upper extremity was positive for DVT  · Started Eliquis   · Monitor vital signs, labs, serial assessments        Aspiration pneumonia ([address] )  Assessment & Plan  · With witnessed aspiration event during intubation  · Completed cefepime per infectious disease          Gastrointestinal hemorrhage with hematemesis  Assessment & Plan  · Originally admitted for shock, patient developed tachypnea, tachycardia, and hypotension and noted to be in respiratory distress possibly due to aspiration (patient has PEG tube, receives tube feeding)  · Patient was witnessed to have a large amount of coffee-ground emesis while being suctioned in preparation for intubation  Patient was intubated and upgraded to the ICU at that time  He was extubated on   · He received 3 units of PRBCs total and 1 unit of FFP  · GI recommendations appreciated   · Heparin SQ had been resumed on 2022  On , patient was noted to have a DVT of the right upper extremity and started on Eliquis  · Monitor for bleeding, monitor hemoglobin       MSSA bacteremia  Assessment & Plan  · Management as outlined above   · Echocardiogram without any evidence of vegetation     Developmental disability  Assessment & Plan  · Patient with profound development disability, nonverbal, bed bound  · Patient on tube feeds only with 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      Pressure ulcers of skin of multiple topographic sites  Assessment & Plan  · S/p bedside debridement with surgery   · Wound care consultation appreciated  · Continue with local wound care, frequent turn and reposition           VTE Prophylaxis:  Apixaban (Eliquis)    Patient Centered Rounds: I have performed bedside rounds with nursing staff today  Discussions with Specialists or Other Care Team Provider: surgery, ID, podiatry   Education and Discussions with Family / Patient: patient     Current Length of Stay: 18 day(s)    Current Patient Status: Inpatient   Certification Statement: The patient will continue to require additional inpatient hospital stay due to shock    Discharge Plan: pending hospital course     Code Status: Level 1 - Full Code    Subjective:   No acute event overnight  The patient has a T-max of 102 for the past 24 hours      Objective:     Vitals:   Temp (24hrs), Av °F (38 3 °C), Min:99 9 °F (37 7 °C), Max:102 °F (38 9 °C)    Temp:  [99 9 °F (37 7 °C)-102 °F (38 9 °C)] 99 9 °F (37 7 °C)  HR:  [128-140] 128  Resp:  [18-20] 18  BP: ()/(56-67) 93/61  SpO2:  [92 %-96 %] 92 %  Body mass index is 20 51 kg/m²  Input and Output Summary (last 24 hours): Intake/Output Summary (Last 24 hours) at 12/13/2022 1420  Last data filed at 12/12/2022 1700  Gross per 24 hour   Intake --   Output 400 ml   Net -400 ml       Physical Exam:   Physical Exam  Vitals and nursing note reviewed  Constitutional:       General: He is not in acute distress  HENT:      Head: Normocephalic and atraumatic  Right Ear: External ear normal       Left Ear: External ear normal       Nose: Nose normal       Mouth/Throat:      Mouth: Mucous membranes are moist       Pharynx: Oropharynx is clear  Eyes:      General:         Right eye: No discharge  Left eye: No discharge  Extraocular Movements: Extraocular movements intact  Pupils: Pupils are equal, round, and reactive to light  Cardiovascular:      Rate and Rhythm: Normal rate and regular rhythm  Pulses: Normal pulses  Heart sounds: Normal heart sounds  No murmur heard  Pulmonary:      Effort: Pulmonary effort is normal  No respiratory distress  Breath sounds: Normal breath sounds  No wheezing or rales  Abdominal:      General: Bowel sounds are normal  There is no distension  Palpations: Abdomen is soft  There is no mass  Tenderness: There is no abdominal tenderness  Comments: Peg tube and colostomy tube in place     Musculoskeletal:         General: Deformity present  No swelling or tenderness  Cervical back: Normal range of motion and neck supple  No rigidity  Skin:     General: Skin is warm and dry  Capillary Refill: Capillary refill takes less than 2 seconds  Coloration: Skin is not pale  Findings: No erythema  Comments: Multiple wounds on back, left hand, buttocks    Neurological:      Mental Status: He is alert  Mental status is at baseline  Comments: Contractures in upper and lower extremities  Non-verbal at baseline      Psychiatric:      Comments: Unable to access           Additional Data:     Labs:    Results from last 7 days   Lab Units 12/13/22  0622 12/11/22  0622   WBC Thousand/uL 22 49* 15 65*   HEMOGLOBIN g/dL 9 6* 9 1*   HEMATOCRIT % 33 5* 31 5*   PLATELETS Thousands/uL 753* 653*   NEUTROS PCT %  --  75   LYMPHS PCT %  --  10*   MONOS PCT %  --  11   EOS PCT %  --  3     Results from last 7 days   Lab Units 12/13/22  0622 12/09/22  0635 12/08/22  0455   SODIUM mmol/L 137   < > 143   POTASSIUM mmol/L 4 3   < > 3 5   CHLORIDE mmol/L 102   < > 108   CO2 mmol/L 27   < > 27   BUN mg/dL 11   < > 7   CREATININE mg/dL 0 39*   < > 0 32*   CALCIUM mg/dL 9 1   < > 8 6   ALK PHOS U/L  --   --  145*   ALT U/L  --   --  4*   AST U/L  --   --  20    < > = values in this interval not displayed  Results from last 7 days   Lab Units 12/13/22  1202 12/13/22  1230 12/13/22  0013 12/12/22  1753 12/12/22  1202 12/12/22  0606 12/12/22  0015 12/11/22  1549 12/11/22  1110 12/11/22  0713 12/11/22  0629 12/10/22  2347   POC GLUCOSE mg/dl 125 127 120 123 114 173* 131 128 111 136 173* 140           * I Have Reviewed All Lab Data Listed Above  * Additional Pertinent Lab Tests Reviewed:  [address] Admission  Reviewed    Imaging:  Imaging Reports Reviewed Today Include: CT chest/a/p    Recent Cultures (last 7 days):           Last 24 Hours Medication List:   Current Facility-Administered Medications   Medication Dose Route Frequency Provider Last Rate   • acetaminophen  650 mg Per G Tube Q4H PRN Niki Lighter, CRNP     • apixaban  5 mg Oral BID AVANI Pillai     • chlorhexidine  15 mL Mouth/Throat Q12H [facility] Niki Lighter, CRNP     • collagenase   Topical Daily Niki Lighter, CRNP     • HYDROmorphone  1 mg Intravenous Q2H PRN Niki Lighter, CRNP     • HYDROmorphone  5 mg Oral Q4H Niki Lighter, CRNP     • insulin lispro  1-5 Units Subcutaneous Q6H [facility] AVANI Petty     • omeprazole (PRILOSEC) suspension 2 mg/mL  20 mg Oral BID AVANI Petty • ondansetron  4 mg Intravenous Q6H PRN Valentino Coffee, CRNP     • polyethylene glycol  17 g Oral Daily Valentino Coffee, CRNP     • senna-docusate sodium  1 tablet Oral BID Valentino Coffee, CRNP          Today, Patient Was Seen By: AVANI Chappell    ** Please Note: Dictation voice to text software may have been used in the creation of this document   **

## 2022-12-13 NOTE — PROGRESS NOTES
Progress Note - [facility] Infectious Disease   Alver Betty 74 y o  male MRN: [id number]  Unit/Bed#: -01 Encounter: [id number]      IMPRESSION & RECOMMENDATIONS:   1  Sepsis, present on admission, evidenced by tachycardia and leukocytosis  Per chart review, leukocytosis is chronic since Dec 2021  1 of 2 admission bl cx positive  All sets of repeat blood cultures are negative  Suspect sepsis initially secondary to #2, #3, and complicated by #0  MD completed 7d course of IV cefepime and transitioned to IV ancef for MSSA bacteremia and completed total 14d of abx  CT C/A/P negative for deep seated abscess or osteomyelitis, but did show possible opacities in the right middle and lower lobes  Patient was extubated to NC with improved O2 sats, and continues to have intermittent low grade fevers  Unknown etiology for recurrent fevers; may be due to recurrent aspiration, viral illness, recurrent wounds, or noninfectious or central etiology to include stroke or VTE  Repeat CT C/A/P negative    -continue to monitor off abx   -recommend surgery/podiatry to re-assess wounds and rule out recurrent infection  -recommend COVID-19/influenza/RSV PCR  -monitor temperature and hemodynamics  -management per primary team   -recheck CBC and BMP in a m       2  Polymicrobial bacteremia  One of 2 admission blood cultures positive for Gram-positive cocci in clusters, culture positive for MSSA and CoNS  Documented that it was drawn from patients peripheral line and pt known to be difficult stick with hx of extensive contractures  Suspect CoNs is contaminant, however MSSA likely due to #3  2D echo is normal  No known intravascular devices  Repeat blood cultures all remain negative to date  Completed 14d course of IV cefazolin on 12/11/22     -no further directed treatment required      3  Chronic, now infected, stage IV decubitus ulcers  POA   Despite outpatient wound care, found to have significant deterioration in wounds over the last few months, particularly the right upper back and sacral wound with increasing wound depth and necrotic tissue burden with evidence of purulence and malodor on admission  Patient also with unstageable sacral/right buttock ulcer, unstageable right ischial decubitus ulcer, and unstageable left heel pressure ulcer  CT C/A/P negative for deep seated abscesses  Wound cultures from initial bedside debridement upper back and sacrum are polymicrobial  Surgery and wound care following for chemical and manual debridement  Unfortunately wounds are unlikely to heal and are at risk for reinfection due to protein calorie malnutrition, contractures, inability to offload pressure and inability to aschieve adequate closure  Completed 7 day course of IV cefepime    -continue to address GOC  Although no OM noted on CT, with worsening wounds it is likely that patient will develop chronic infection  If unable to cover/secure wounds especially of sacrum and upper right back, OM not treatable long term  In addition, prolonged abx can lead to harmful antibiotic side effects such a C  Diff, nephrotoxicity, bacterial resistance      -daily wound care and surgery follow up   -no further directed treatment required      4  Left lateral foot stage IV pressure ulcer with suspect OM  POA   Now with left lateral foot ulcer and left heel ulcer both probing to bone   XR shows absence of the 5th metatarsal head which likely represents chronic osteomyelitis   No evidence of overlying cellulitis or infection   -continue to address [address]  In the setting of chronic wound with exposed bone and no plan/indication for surgical intervention, patients osteomyelitis is not treatable long-term  Likely to continue to have progression of wound despite local wound care given extensive contractures and inability to offload pressure  He remains at risk for recurrent infection, bacteremia, sepsis, and limb loss     -continue local wound care and offloading pressure      5  Acute respiratory failure with hypoxia   Patient emergently intubated in the setting of respiratory failure, aspiration and upper GI bleeding 11/29  Now extubated to nasal cannula 12/3  S/p EGD which showed ulcerated area in stomach s/p clip  CT chest shows consolidative opacities in the right middle and lower lobes possibly due to pneumonia and atelectasis, and repeat imaging showed improving opacities  Sputum cx while intubated shows growth of candida and Achromobacter  Remains on room air    -monitor respiratory status and O2 requirements   -monitor hgb and transfuse PRN      6  Acute RUE DVT  Noted to have RUE pain, swelling, and warmth on exam  Patient had peripheral IV in RUE earlier in hospital stay that infiltrated  Also with bullous eruption of right arm  RUE US shows acute DVT in paired brachial vein and chronic non-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  Unlikely cellulitis given recent abx use  Swelling is improved  -started on Eliquis per primary team   -supportive care   -serial skin exams      7  Functional quadriplegia, developmental disability   Patient nonverbal and bed-bound at baseline on tube feeds, with chronic indwelling Marrufo catheter and colostomy  Unfortunately due to contractures, very difficult to reposition patient and offload pressure   -supportive measures per primary team     8  Antibiotic Allergy   Noted to have amoxicillin and Augmentin allergy without documented reaction   Per Care everywhere, has tolerated cefepime and ceftriaxone during prior admissions  -monitor for adverse drug reactions    Antibiotics:  Off systemic abx x2 days    I have discussed the above management plan in detail with patient  I have discussed the above management plan in detail with patient's RN, and the primary service, SL[facility]  Subjective:  Patient nonverbal  Continued to be febrile overnight       Objective:  Vitals:  Temp:  [99 9 °F (37 7 °C)-102 °F (38 9 °C)] 99 9 °F (37 7 °C)  HR:  [128-140] 128  Resp:  [18-20] 18  BP: ()/(56-79) 93/61  SpO2:  [92 %-96 %] 92 %  Temp (24hrs), Av °F (38 3 °C), Min:99 9 °F (37 7 °C), Max:102 °F (38 9 °C)  Current: Temperature: 99 9 °F (37 7 °C)    PHYSICAL EXAM:  General Appearance:  Awake and alert, chronically ill appearing, nontoxic, and in no distress  Nonverbal     HEENT: Normocephalic, without obvious abnormality, atraumatic  Conjunctiva pink and sclera anicteric  Oropharynx moist without lesions  Lungs:   Clear to auscultation bilaterally, respirations unlabored on room air    Heart:  Tachycardia; no murmur, rub or gallop   Abdomen:   Soft, non-tender, non-distended, positive bowel sounds, PEG and colostomy in place     Extremities: Extensive contractures of x4 extremities    Musculoskeletal: Paraplegic  : No CVA or suprapubic tenderness  Marrufo patent with sediment in tubing  Skin: No rashes or lesions  Multiple wounds to sacrum, upper back, right ear, left foot and left hand  RUE bullous eruption covered with dressing  Improving edema and warmth of RUE  Peripheral IV intact without evidence of erythema, warmth, or exudate  LABS, IMAGING, & OTHER STUDIES:  Lab Results:  I have personally reviewed pertinent labs    Results from last 7 days   Lab Units 22  0622 22  0622 12/10/22  1251   WBC Thousand/uL 22 49* 15 65* 16 80*   HEMOGLOBIN g/dL 9 6* 9 1* 8 9*   PLATELETS Thousands/uL 753* 653* 668*     Results from last 7 days   Lab Units 22  0622 22  0622 12/10/22  1251 22  0635 22  0455   SODIUM mmol/L 137 140 137   < > 143   POTASSIUM mmol/L 4 3 4 0 3 9   < > 3 5   CHLORIDE mmol/L 102 104 104   < > 108   CO2 mmol/L 27 30 23   < > 27   BUN mg/dL 11 7 7   < > 7   CREATININE mg/dL 0 39* 0 28* 0 29*   < > 0 32*   EGFR ml/min/1 73sq m 125 144 142   < > 136   CALCIUM mg/dL 9 1 8 5 8 5   < > 8 6   AST U/L  --   --   --   --  20   ALT U/L  --   --   --   --  4*   ALK PHOS U/L  --   --   --   --  145*    < > = values in this interval not displayed

## 2022-12-13 NOTE — ASSESSMENT & PLAN NOTE
· Sepsis was present on admission as evidenced by tachycardia, leukocytosis, and fever with underlying multiple decubitus ulcers  · Status post initial treatment with IV vancomycin, cefepime, which was transitioned to cefazolin 2g Q8 through 12/11/2022  · 1/2 blood cultures from 11/25/2022 MSSA and CoNS (likely contaminant)  MSSA however felt to be due to infected sacral wound  Repeat cultures are negative to date  · Wound cultures- polymicrobial C koseri, Proteus, Pseudomonas and S aureus   · ID/surgery/podiatry input appreciated   · Despite completion of antibiotics, leukocytosis persists with low grade fever  Possible related to ongoing aspiration vs new VTE process  · CT chest/a/p (12/12) : no evidence of acute or new infectious etiology  · Status post chemical and manual debridement   · Surgery recommends VAC therapy if approved by his facility   · Repeat blood cultures   Obtain COVID/RSV/flu (visitors from his facility)  · Monitor off antibiotics for now

## 2022-12-13 NOTE — ASSESSMENT & PLAN NOTE
Assessment:  Deep stage III ulcer to upper back  Deep stage III ulcer to sacrum  Deep stage III ulcer to right ischium  Plan:  Wounds clean, debrided of residual tissue necrosis today, no signs of infection  Wet to dry dressing reapplied today with wound cleanser soaked cling wrap, bulky ABD  Will pursue transition to [facility] therapy if approved by his accepting facility  Will plan foam to upper back, sacrum, ischium wounds to be bridged for 1 unit if possible

## 2022-12-14 PROBLEM — R00.0 TACHYCARDIA: Status: ACTIVE | Noted: 2022-12-14

## 2022-12-14 LAB
ALBUMIN SERPL BCP-MCNC: 2.6 G/DL (ref 3.5–5)
ALP SERPL-CCNC: 124 U/L (ref 34–104)
ALT SERPL W P-5'-P-CCNC: 5 U/L (ref 7–52)
ANION GAP SERPL CALCULATED.3IONS-SCNC: 8 MMOL/L (ref 4–13)
AST SERPL W P-5'-P-CCNC: 17 U/L (ref 13–39)
BILIRUB SERPL-MCNC: 0.33 MG/DL (ref 0.2–1)
BUN SERPL-MCNC: 8 MG/DL (ref 5–25)
CALCIUM ALBUM COR SERPL-MCNC: 10.1 MG/DL (ref 8.3–10.1)
CALCIUM SERPL-MCNC: 9 MG/DL (ref 8.4–10.2)
CHLORIDE SERPL-SCNC: 101 MMOL/L (ref 96–108)
CO2 SERPL-SCNC: 29 MMOL/L (ref 21–32)
CREAT SERPL-MCNC: 0.3 MG/DL (ref 0.6–1.3)
ERYTHROCYTE [DISTWIDTH] IN BLOOD BY AUTOMATED COUNT: 21.3 % (ref 11.6–15.1)
GFR SERPL CREATININE-BSD FRML MDRD: 140 ML/MIN/1.73SQ M
GLUCOSE SERPL-MCNC: 100 MG/DL (ref 65–140)
GLUCOSE SERPL-MCNC: 105 MG/DL (ref 65–140)
GLUCOSE SERPL-MCNC: 116 MG/DL (ref 65–140)
GLUCOSE SERPL-MCNC: 123 MG/DL (ref 65–140)
GLUCOSE SERPL-MCNC: 130 MG/DL (ref 65–140)
GLUCOSE SERPL-MCNC: 133 MG/DL (ref 65–140)
GLUCOSE SERPL-MCNC: 75 MG/DL (ref 65–140)
HCT VFR BLD AUTO: 29.5 % (ref 36.5–49.3)
HGB BLD-MCNC: 8.6 G/DL (ref 12–17)
MCH RBC QN AUTO: 24.5 PG (ref 26.8–34.3)
MCHC RBC AUTO-ENTMCNC: 29.2 G/DL (ref 31.4–37.4)
MCV RBC AUTO: 84 FL (ref 82–98)
PLATELET # BLD AUTO: 669 THOUSANDS/UL (ref 149–390)
PMV BLD AUTO: 8.8 FL (ref 8.9–12.7)
POTASSIUM SERPL-SCNC: 4.1 MMOL/L (ref 3.5–5.3)
PROT SERPL-MCNC: 7.5 G/DL (ref 6.4–8.4)
RBC # BLD AUTO: 3.51 MILLION/UL (ref 3.88–5.62)
SODIUM SERPL-SCNC: 138 MMOL/L (ref 135–147)
WBC # BLD AUTO: 15.48 THOUSAND/UL (ref 4.31–10.16)

## 2022-12-14 RX ORDER — ACETAMINOPHEN 325 MG/1
975 TABLET ORAL EVERY 8 HOURS SCHEDULED
Status: DISCONTINUED | OUTPATIENT
Start: 2022-12-14 | End: 2022-12-20 | Stop reason: HOSPADM

## 2022-12-14 RX ADMIN — HYDROMORPHONE HYDROCHLORIDE 5 MG: 2 TABLET ORAL at 05:52

## 2022-12-14 RX ADMIN — HYDROMORPHONE HYDROCHLORIDE 5 MG: 2 TABLET ORAL at 18:01

## 2022-12-14 RX ADMIN — CHLORHEXIDINE GLUCONATE 0.12% ORAL RINSE 15 ML: 1.2 LIQUID ORAL at 21:20

## 2022-12-14 RX ADMIN — ACETAMINOPHEN 975 MG: 325 TABLET ORAL at 22:21

## 2022-12-14 RX ADMIN — POLYETHYLENE GLYCOL 3350 17 G: 17 POWDER, FOR SOLUTION ORAL at 09:55

## 2022-12-14 RX ADMIN — Medication 20 MG: at 09:55

## 2022-12-14 RX ADMIN — ACETAMINOPHEN 975 MG: 325 TABLET ORAL at 13:02

## 2022-12-14 RX ADMIN — COLLAGENASE SANTYL: 250 OINTMENT TOPICAL at 15:42

## 2022-12-14 RX ADMIN — APIXABAN 5 MG: 5 TABLET, FILM COATED ORAL at 09:44

## 2022-12-14 RX ADMIN — SENNOSIDES AND DOCUSATE SODIUM 1 TABLET: 50; 8.6 TABLET ORAL at 09:44

## 2022-12-14 RX ADMIN — HYDROMORPHONE HYDROCHLORIDE 5 MG: 2 TABLET ORAL at 02:06

## 2022-12-14 RX ADMIN — Medication 20 MG: at 22:21

## 2022-12-14 RX ADMIN — HYDROMORPHONE HYDROCHLORIDE 5 MG: 2 TABLET ORAL at 09:44

## 2022-12-14 RX ADMIN — APIXABAN 5 MG: 5 TABLET, FILM COATED ORAL at 18:01

## 2022-12-14 RX ADMIN — Medication 12.5 MG: at 09:43

## 2022-12-14 RX ADMIN — CHLORHEXIDINE GLUCONATE 0.12% ORAL RINSE 15 ML: 1.2 LIQUID ORAL at 09:44

## 2022-12-14 RX ADMIN — SENNOSIDES AND DOCUSATE SODIUM 1 TABLET: 50; 8.6 TABLET ORAL at 18:01

## 2022-12-14 RX ADMIN — HYDROMORPHONE HYDROCHLORIDE 5 MG: 2 TABLET ORAL at 22:21

## 2022-12-14 RX ADMIN — HYDROMORPHONE HYDROCHLORIDE 5 MG: 2 TABLET ORAL at 13:02

## 2022-12-14 NOTE — ASSESSMENT & PLAN NOTE
· Surgery/podiatry/wound care input appreciated   · Continue with local wound care, frequent turn and reposition

## 2022-12-14 NOTE — CASE MANAGEMENT
Case Management Discharge Planning Note    Patient name Britney Paiz  Location [address] 216/-[id number] MRN [id number]  : 1955 Date 2022       Current Admission Date: 2022  Current Admission Diagnosis:Sepsis [facility])   Patient Active Problem List    Diagnosis Date Noted   • Tachycardia 2022   • Acute deep vein thrombosis (DVT) of right upper extremity ([address] ) 2022   • Gastrointestinal hemorrhage with hematemesis 2022   • Aspiration pneumonia ([address] ) 2022   • Chronic indwelling Marrufo catheter 2022   • MSSA bacteremia 2022   • Hypokalemia 2022   • Pressure ulcer of ischium, right, stage III ([address] ) 2022   • Sepsis ([address] ) 2022   • Developmental disability 2022   • Pressure injury of sacral region, stage 4 ([address] ) 10/26/2022   • Pressure ulcers of skin of multiple topographic sites 10/26/2022   • Pressure ulcer of right lower back, stage 3 ([address] ) 10/26/2022   • Pressure ulcer of left foot, stage 4 ([address] ) 10/26/2022   • Pressure injury of right upper back, stage 4 ([address] ) 10/26/2022   • Open wound of right hand without foreign body 10/26/2022   • Pressure injury of left heel, stage 4 ([address] ) 10/26/2022      LOS (days): 19  Geometric Mean LOS (GMLOS) (days):   Days to GMLOS:     OBJECTIVE:  Risk of Unplanned Readmission Score: 17 78         Current admission status: Inpatient   Preferred Pharmacy: No Pharmacies Listed  Primary Care Provider: Fady Briseno MD    Primary Insurance: [address]  Secondary Insurance:     DISCHARGE DETAILS:    Discharge planning discussed with[de-identified] cm spoke with Mira Rangel today @13:41 [phone] and 16:08pm I spoke with Betty Freire and made her aware a [employer] rep will be calling to help with to obtian a wound vac  Freedom of Choice: Yes         doctor was made aware the supervisor called cm and I was made  aware they need a note from 2 doctors stating the pt has a terminal dx and then their doctor can change his DNR & DNI status                         Other Referral/Resources/Interventions Provided:  Interventions: Wound Vac  Referral Comments: cm had called Ck Yen at [facility]  # [phone] and I made her aware that the facility is requesting that cm set up a deliver of a wound vac to their facility- I explained we have not done this and we only make [facility] snf aware luli need to obtain one from their [address] stated she will reach out to her  that handles out of hopsital accounts and she will reach out to them and she will let cm know the results

## 2022-12-14 NOTE — PLAN OF CARE
Problem: Prexisting or High Potential for Compromised Skin Integrity  Goal: Skin integrity is maintained or improved  Description: INTERVENTIONS:  - Identify patients at risk for skin breakdown  - Assess and monitor skin integrity  - Assess and monitor nutrition and hydration status  - Monitor labs   - Assess for incontinence   - Turn and reposition patient  - Assist with mobility/ambulation  - Relieve pressure over bony prominences  - Avoid friction and shearing  - Provide appropriate hygiene as needed including keeping skin clean and dry  - Evaluate need for skin moisturizer/barrier cream  - Collaborate with interdisciplinary team   - Patient/family teaching  - Consider wound care consult   Outcome: Progressing     Problem: Nutrition/Hydration-ADULT  Goal: Nutrient/Hydration intake appropriate for improving, restoring or maintaining nutritional needs  Description: Monitor and assess patient's nutrition/hydration status for malnutrition  Collaborate with interdisciplinary team and initiate plan and interventions as ordered  Monitor patient's weight and dietary intake as ordered or per policy  Utilize nutrition screening tool and intervene as necessary  Determine patient's food preferences and provide high-protein, high-caloric foods as appropriate       INTERVENTIONS:  - Monitor oral intake, urinary output, labs, and treatment plans  - Assess nutrition and hydration status and recommend course of action  - Evaluate amount of meals eaten  - Assist patient with eating if necessary   - Allow adequate time for meals  - Recommend/ encourage appropriate diets, oral nutritional supplements, and vitamin/mineral supplements  - Order, calculate, and assess calorie counts as needed  - Recommend, monitor, and adjust tube feedings and TPN/PPN based on assessed needs  - Assess need for intravenous fluids  - Provide specific nutrition/hydration education as appropriate  - Include patient/family/caregiver in decisions related to nutrition  Outcome: Progressing     Problem: PAIN - ADULT  Goal: Verbalizes/displays adequate comfort level or baseline comfort level  Description: Interventions:  - Encourage patient to monitor pain and request assistance  - Assess pain using appropriate pain scale  - Administer analgesics based on type and severity of pain and evaluate response  - Implement non-pharmacological measures as appropriate and evaluate response  - Consider cultural and social influences on pain and pain management  - Notify physician/advanced practitioner if interventions unsuccessful or patient reports new pain  Outcome: Progressing     Problem: INFECTION - ADULT  Goal: Absence or prevention of progression during hospitalization  Description: INTERVENTIONS:  - Assess and monitor for signs and symptoms of infection  - Monitor lab/diagnostic results  - Monitor all insertion sites, i e  indwelling lines, tubes, and drains  - Monitor endotracheal if appropriate and nasal secretions for changes in amount and color  - [place] appropriate cooling/warming therapies per order  - Administer medications as ordered  - Instruct and encourage patient and family to use good hand hygiene technique  - Identify and instruct in appropriate isolation precautions for identified infection/condition  Outcome: Progressing  Goal: Absence of fever/infection during neutropenic period  Description: INTERVENTIONS:  - Monitor WBC    Outcome: Progressing     Problem: DISCHARGE PLANNING  Goal: Discharge to home or other facility with appropriate resources  Description: INTERVENTIONS:  - Identify barriers to discharge w/patient and caregiver  - Arrange for needed discharge resources and transportation as appropriate  - Identify discharge learning needs (meds, wound care, etc )  - Arrange for interpretive services to assist at discharge as needed  - Refer to Case Management Department for coordinating discharge planning if the patient needs post-hospital services based on physician/advanced practitioner order or complex needs related to functional status, cognitive ability, or social support system  Outcome: Progressing     Problem: Knowledge Deficit  Goal: Patient/family/caregiver demonstrates understanding of disease process, treatment plan, medications, and discharge instructions  Description: Complete learning assessment and assess knowledge base    Interventions:  - Provide teaching at level of understanding  - Provide teaching via preferred learning methods  Outcome: Progressing     Problem: SAFETY,RESTRAINT: NV/NON-SELF DESTRUCTIVE BEHAVIOR  Goal: Remains free of harm/injury (restraint for non violent/non self-detsructive behavior)  Description: INTERVENTIONS:  - Instruct patient/family regarding restraint use   - Assess and monitor physiologic and psychological status   - Provide interventions and comfort measures to meet assessed patient needs   - Identify and implement measures to help patient regain control  - Assess readiness for release of restraint   Outcome: Progressing  Goal: Returns to optimal restraint-free functioning  Description: INTERVENTIONS:  - Assess the patient's behavior and symptoms that indicate continued need for restraint  - Identify and implement measures to help patient regain control  - Assess readiness for release of restraint   Outcome: Progressing     Problem: SAFETY ADULT  Goal: Patient will remain free of falls  Description: INTERVENTIONS:  - Educate patient/family on patient safety including physical limitations  - Instruct patient to call for assistance with activity   - Consult OT/PT to assist with strengthening/mobility   - Keep Call bell within reach  - Keep bed low and locked with side rails adjusted as appropriate  - Keep care items and personal belongings within reach  - Initiate and maintain comfort rounds  - Make Fall Risk Sign visible to staff  - Offer Toileting in advance of need  - Initiate/Maintain bed alarm  - Obtain necessary fall risk management equipment:   - Apply yellow socks and bracelet for high fall risk patients  - Consider moving patient to room near nurses station  Outcome: Progressing  Goal: Maintain or return to baseline ADL function  Description: INTERVENTIONS:  -  Assess patient's ability to carry out ADLs; assess patient's baseline for ADL function and identify physical deficits which impact ability to perform ADLs (bathing, care of mouth/teeth, toileting, grooming, dressing, etc )  - Assess/evaluate cause of self-care deficits   - Assess range of motion  - Assess patient's mobility; develop plan if impaired  - Assess patient's need for assistive devices and provide as appropriate  - Encourage maximum independence but intervene and supervise when necessary  - Involve family in performance of ADLs  - Assess for home care needs following discharge   - Consider OT consult to assist with ADL evaluation and planning for discharge  - Provide patient education as appropriate  Outcome: Progressing  Goal: Maintains/Returns to pre admission functional level  Description: INTERVENTIONS:  - Perform BMAT or MOVE assessment daily    - Set and communicate daily mobility goal to care team and patient/family/caregiver     - Collaborate with rehabilitation services on mobility goals if consulted  - Record patient progress and toleration of activity level   Outcome: Progressing

## 2022-12-14 NOTE — PROGRESS NOTES
[place] 47  Progress Note Theoplis Postin 1955, 74 y o  male MRN: [id number]  Unit/Bed#: -01 Encounter: [id number]  Primary Care Provider: Enrique Terry MD   Date and time admitted to hospital: 11/25/2022  2:30 PM    * Sepsis [facility])  Assessment & Plan  · Initially presented with shock state  Now BP stabilized  Sepsis, POA, as evidenced by tachycardia, leukocytosis, and fever with underlying multiple decubitus ulcers  · Status post initial treatment with IV vancomycin, cefepime, which was transitioned to cefazolin 2g Q8 through 12/11/2022  · 1/2 blood cultures from 11/25/2022 MSSA and CoNS (likely contaminant)  MSSA however felt to be due to infected sacral wound  Repeat cultures are negative to date  · Wound cultures- polymicrobial C koseri, Proteus, Pseudomonas and S aureus   · CT chest/a/p (12/12) : no evidence of acute or new infectious etiology  · ID/surgery/podiatry input appreciated   · Despite completion of antibiotics, leukocytosis persists with low grade fever  Possible related to ongoing aspiration vs new VTE process  · Chronic osteomyelitis of left foot and heel ulcers without evidence of acute infection- no podiatric surgical procedure is recommended  · Surgery recommends VAC therapy if approved by his facility   · Status post chemical and manual debridement   · Repeat blood cultures are unable to obtain due to difficulty stick  COVID/RSV/flu (visitors from his facility)- negative   · Monitor off antibiotics for now       Tachycardia  Assessment & Plan  · Appears to be chronic  However could also be related to possible on-going infection/inflammatory process   · Does not appear to be volume depleted     · Will start on low dose metoprolol 12 5 mg BID   · Continue to monitor on telemetry       Acute deep vein thrombosis (DVT) of right upper extremity (HCC)  Assessment & Plan  · Right arm swelling and tenderness noted  · Venous duplex of right upper extremity was positive for DVT  · Started on Eliquis      Aspiration pneumonia ([address] )  Assessment & Plan  · With witnessed aspiration event during intubation  · Completed cefepime per infectious disease           Gastrointestinal hemorrhage with hematemesis  Assessment & Plan  · Originally admitted for shock, patient developed tachypnea, tachycardia, and hypotension and noted to be in respiratory distress possibly due to aspiration (patient has PEG tube, receives tube feeding)  · Patient was witnessed to have a large amount of coffee-ground emesis while being suctioned in preparation for intubation  Patient was intubated and upgraded to the ICU at that time  He was extubated on 12/03  · He received 3 units of PRBCs total and 1 unit of FFP  · GI recommendations appreciated   · Heparin SQ had been resumed on 12/01/2022  On 12/05, patient was noted to have a DVT of the right upper extremity and started on Eliquis  · Monitor for bleeding, monitor hemoglobin        MSSA bacteremia  Assessment & Plan  · Management as outlined above    · Echocardiogram without any evidence of vegetation     Developmental disability  Assessment & Plan  · Patient with profound development disability, nonverbal, bed bound  · Patient on tube feeds/strict NPO with 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      Pressure ulcers of skin of multiple topographic sites  Assessment & Plan  · Surgery/podiatry/wound care input appreciated   · Continue with local wound care, frequent turn and reposition         VTE Prophylaxis:  Apixaban (Eliquis)    Patient Centered Rounds: I have performed bedside rounds with nursing staff today      Discussions with Specialists or Other Care Team Provider: ID, surgery, podiatry  Education and Discussions with Family / Patient: patient and staff at his facility     Current Length of Stay: 19 day(s)    Current Patient Status: Inpatient   Certification Statement: The patient will continue to require additional inpatient hospital stay due to sepsis     Discharge Plan: pending hospital course     Code Status: Level 1 - Full Code    Subjective:   No acute event overnight  Tmax 100F  Objective:     Vitals:   Temp (24hrs), Av 3 °F (37 4 °C), Min:97 8 °F (36 6 °C), Max:100 °F (37 8 °C)    Temp:  [97 8 °F (36 6 °C)-100 °F (37 8 °C)] 100 °F (37 8 °C)  HR:  [124-134] 127  Resp:  [18] 18  BP: (120-127)/(66-73) 122/71  SpO2:  [97 %-100 %] 97 %  Body mass index is 19 88 kg/m²  Input and Output Summary (last 24 hours): Intake/Output Summary (Last 24 hours) at 2022 0752  Last data filed at 2022 1917  Gross per 24 hour   Intake 900 ml   Output 1300 ml   Net -400 ml       Physical Exam:   Physical Exam  Vitals and nursing note reviewed  Constitutional:       General: He is not in acute distress  Appearance: He is cachectic  Comments: Frail      HENT:      Head: Normocephalic and atraumatic  Right Ear: External ear normal       Left Ear: External ear normal       Nose: Nose normal       Mouth/Throat:      Mouth: Mucous membranes are moist       Pharynx: Oropharynx is clear  Eyes:      General:         Right eye: No discharge  Left eye: No discharge  Extraocular Movements: Extraocular movements intact  Pupils: Pupils are equal, round, and reactive to light  Cardiovascular:      Rate and Rhythm: Regular rhythm  Tachycardia present  Pulses: Normal pulses  Heart sounds: Normal heart sounds  No murmur heard  Pulmonary:      Effort: Pulmonary effort is normal  No respiratory distress  Breath sounds: Normal breath sounds  No wheezing or rales  Abdominal:      General: Bowel sounds are normal  There is no distension  Palpations: Abdomen is soft  There is no mass  Tenderness: There is no abdominal tenderness  Comments: Colostomy and peg tube in place      Musculoskeletal:         General: No swelling, tenderness or deformity  Normal range of motion  Cervical back: Normal range of motion and neck supple  No rigidity  Skin:     General: Skin is warm and dry  Capillary Refill: Capillary refill takes less than 2 seconds  Coloration: Skin is not pale  Findings: No erythema  Comments: Multiple wounds through out the body      Neurological:      Mental Status: He is alert  Mental status is at baseline  Comments: Non-verbal, contractures of extremities  Open eyes to verbal commands  Does not follow commands  Psychiatric:      Comments: Unable to assess           Additional Data:     Labs:    Results from last 7 days   Lab Units 12/14/22  0549 12/13/22  0622 12/11/22  0622   WBC Thousand/uL 15 48*   < > 15 65*   HEMOGLOBIN g/dL 8 6*   < > 9 1*   HEMATOCRIT % 29 5*   < > 31 5*   PLATELETS Thousands/uL 669*   < > 653*   NEUTROS PCT %  --   --  75   LYMPHS PCT %  --   --  10*   MONOS PCT %  --   --  11   EOS PCT %  --   --  3    < > = values in this interval not displayed  Results from last 7 days   Lab Units 12/14/22  0549   SODIUM mmol/L 138   POTASSIUM mmol/L 4 1   CHLORIDE mmol/L 101   CO2 mmol/L 29   BUN mg/dL 8   CREATININE mg/dL 0 30*   CALCIUM mg/dL 9 0   ALK PHOS U/L 124*   ALT U/L 5*   AST U/L 17         Results from last 7 days   Lab Units 12/14/22  0654 12/14/22  0020 12/13/22  1800 12/13/22  1202 12/13/22  0643 12/13/22  0013 12/12/22  1753 12/12/22  1202 12/12/22  0606 12/12/22  0015 12/11/22  1549 12/11/22  1110   POC GLUCOSE mg/dl 116 133 139 125 127 120 123 114 173* 131 128 111           * I Have Reviewed All Lab Data Listed Above  * Additional Pertinent Lab Tests Reviewed:  [address] Admission  Reviewed    Imaging:  Imaging Reports Reviewed Today Include: n/a     Recent Cultures (last 7 days):           Last 24 Hours Medication List:   Current Facility-Administered Medications   Medication Dose Route Frequency Provider Last Rate   • acetaminophen  650 mg Per G Tube Q4H PRN AVANI Caal     • apixaban  5 mg Oral BID AVANI Hall     • chlorhexidine  15 mL Mouth/Throat Q12H [address] Max AVANI Pride     • collagenase   Topical Daily Max AVANI Pride     • HYDROmorphone  1 mg Intravenous Q2H PRN Max AVANI Pride     • HYDROmorphone  5 mg Oral Q4H Max AVANI Pride     • insulin lispro  1-5 Units Subcutaneous Q6H [address] Max AVANI Pride     • metoprolol tartrate  12 5 mg Per G Tube Q12H [address] Corbin Cabot, CRNP     • omeprazole (PRILOSEC) suspension 2 mg/mL  20 mg Oral BID Max AVANI Pride     • ondansetron  4 mg Intravenous Q6H PRN AVANI Jeter     • polyethylene glycol  17 g Oral Daily Max AVANI Pride     • senna-docusate sodium  1 tablet Oral BID Max AVANI Pride          Today, Patient Was Seen By: Corbin Cabot, CRNP    ** Please Note: Dictation voice to text software may have been used in the creation of this document   **

## 2022-12-14 NOTE — ASSESSMENT & PLAN NOTE
· Appears to be chronic  However could also be related to possible on-going infection/inflammatory process   · Does not appear to be volume depleted     · Will start on low dose metoprolol 12 5 mg BID   · Continue to monitor on telemetry

## 2022-12-14 NOTE — PROGRESS NOTES
Progress Note - [facility] Infectious Disease   Nuzhat Kaufman 74 y o  male MRN: [id number]  Unit/Bed#: -01 Encounter: [id number]      IMPRESSION & RECOMMENDATIONS:   1  Sepsis, present on admission, evidenced by tachycardia and leukocytosis  Per chart review, leukocytosis is chronic since Dec 2021  1 of 2 admission bl cx positive  All sets of repeat blood cultures are negative  Suspect sepsis initially secondary to #2, #3, and complicated by #6  RI completed 7d course of IV cefepime and transitioned to IV ancef for MSSA bacteremia and completed total 14d of abx  CT C/A/P negative for deep seated abscess or osteomyelitis, but did show possible opacities in the right middle and lower lobes  Unknown etiology for recurrent fevers; may be due to recurrent aspiration  Wound reassessed and were stable; COVID-19 negative; repeat CT C/A/P negative  Consider noninfectious or central etiology for fevers  -continue to monitor off abx   -repeat blood cultures x2 sets requested yesterday and pending   -monitor temperature and hemodynamics  -management per primary team      2  Polymicrobial bacteremia  One of 2 admission blood cultures positive for Gram-positive cocci in clusters, culture positive for MSSA and CoNS  Documented that it was drawn from patients peripheral line and pt known to be difficult stick with hx of extensive contractures  Suspect CoNs is contaminant, however MSSA likely due to #3   2D echo is normal  No known intravascular devices  Repeat blood cultures all remain negative to date  Completed 14d course of IV cefazolin on 12/11/22    -repeat blood cultures requested due to persistent fevers      3  Chronic, now infected, stage IV decubitus ulcers  POA   Despite outpatient wound care, found to have significant deterioration in wounds over the last few months, particularly the right upper back and sacral wound with increasing wound depth and necrotic tissue burden with evidence of purulence and malodor on admission  CT C/A/P negative for deep seated abscesses  Wound cultures from initial bedside debridement were polymicrobial  Surgery and wound care following for chemical and manual debridement  Unfortunately wounds are unlikely to heal and are at risk for reinfection due to protein calorie malnutrition, contractures, inability to offload pressure and inability to aschieve adequate closure  Completed 7 day course of IV cefepime    -Although no OM noted on CT, with worsening wounds it is likely that patient will develop chronic infection  If unable to cover/secure wounds especially of sacrum and upper right back, OM not treatable long term  In addition, prolonged abx can lead to harmful antibiotic side effects such a C  Diff, nephrotoxicity, bacterial resistance, etc      -daily wound care and surgery follow up   -no further directed treatment required      4  Left lateral foot stage IV pressure ulcer with chronic OM  POA   Wounds are stable without infection   XR shows absence of the 5th metatarsal head which likely represents chronic osteomyelitis  In the setting of chronic wound with exposed bone and no plan/indication for surgical intervention, patients osteomyelitis is not treatable long-term  Likely to continue to have progression of wound despite local wound care given extensive contractures and inability to offload pressure  He remains at risk for recurrent infection, bacteremia, sepsis, and limb loss  -continue local wound care and offloading pressure      5  Acute respiratory failure with hypoxia   Patient emergently intubated in the setting of respiratory failure, aspiration and upper GI bleeding 11/29  Now extubated to nasal cannula 12/3   S/p EGD which showed ulcerated area in stomach s/p clip  CT chest shows consolidative opacities in the right middle and lower lobes possibly due to pneumonia and atelectasis, and repeat imaging showed improving opacities  Sputum cx while intubated shows growth of candida and Achromobacter    -monitor respiratory status and O2 requirements   -monitor hgb and transfuse PRN      6  Acute RUE DVT  Noted to have RUE pain, swelling, and warmth on exam  Patient had peripheral IV in RUE earlier in hospital stay that infiltrated  Also with bullous eruption of right arm  RUE US shows acute DVT in paired brachial vein and chronic non-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  Unlikely cellulitis given recent abx use  Swelling is improved  -started on Eliquis per primary team   -supportive care   -serial skin exams      7  Functional quadriplegia, developmental disability   Patient nonverbal and bed-bound at baseline on tube feeds, with chronic indwelling Marrufo catheter and colostomy  Unfortunately due to contractures, very difficult to reposition patient and offload pressure   -supportive measures per primary team     8  Antibiotic Allergy   Noted to have amoxicillin and Augmentin allergy without documented reaction   Per Care everywhere, has tolerated cefepime and ceftriaxone during prior admissions  -monitor for adverse drug reactions    Antibiotics:  Off systemic abx x3 days     I have discussed the above management plan in detail with patient  I have discussed the above management plan in detail with patient's RN, and the primary service, [facility]  Subjective:  Patient nonverbal  Smiles and open eyes to name  No grimacing or moaning during my visit  Objective:  Vitals:  Temp:  [97 8 °F (36 6 °C)-100 °F (37 8 °C)] 100 °F (37 8 °C)  HR:  [124-134] 127  Resp:  [18] 18  BP: (120-127)/(66-73) 122/71  SpO2:  [97 %-100 %] 97 %  Temp (24hrs), Av 3 °F (37 4 °C), Min:97 8 °F (36 6 °C), Max:100 °F (37 8 °C)  Current: Temperature: 100 °F (37 8 °C)    PHYSICAL EXAM:  General Appearance:  Awake and alert, chronically ill appearing, nontoxic, and in no distress   HEENT: Normocephalic, without obvious abnormality, atraumatic  Conjunctiva pink and sclera anicteric   Oropharynx moist without lesions  Lungs:   Clear to auscultation bilaterally, respirations unlabored   Heart:  Tachycardia; no murmur, rub or gallop   Abdomen:   Soft, non-tender, non-distended, positive bowel sounds, PEG tube and colostomy in place    Extremities: Extensive contractures x4 extrem   Musculoskeletal: Paraplegic    : Marrufo patent  Skin: No rashes or lesions  Multiple wounds to sacrum, upper back, right ear, left foot and left hand  RUE bullous eruption covered with dressing  Improving edema and warmth of RUE  Peripheral IV intact without evidence of erythema, warmth, or exudate  LABS, IMAGING, & OTHER STUDIES:  Lab Results:  I have personally reviewed pertinent labs  Results from last 7 days   Lab Units 12/14/22  0549 12/13/22  0622 12/11/22  0622   WBC Thousand/uL 15 48* 22 49* 15 65*   HEMOGLOBIN g/dL 8 6* 9 6* 9 1*   PLATELETS Thousands/uL 669* 753* 653*     Results from last 7 days   Lab Units 12/14/22  0549 12/13/22  0622 12/11/22  0622 12/09/22  0635 12/08/22  0455   SODIUM mmol/L 138 137 140   < > 143   POTASSIUM mmol/L 4 1 4 3 4 0   < > 3 5   CHLORIDE mmol/L 101 102 104   < > 108   CO2 mmol/L 29 27 30   < > 27   BUN mg/dL 8 11 7   < > 7   CREATININE mg/dL 0 30* 0 39* 0 28*   < > 0 32*   EGFR ml/min/1 73sq m 140 125 144   < > 136   CALCIUM mg/dL 9 0 9 1 8 5   < > 8 6   AST U/L 17  --   --   --  20   ALT U/L 5*  --   --   --  4*   ALK PHOS U/L 124*  --   --   --  145*    < > = values in this interval not displayed

## 2022-12-14 NOTE — ASSESSMENT & PLAN NOTE
· Initially presented with shock state  Now BP stabilized  Sepsis, POA, as evidenced by tachycardia, leukocytosis, and fever with underlying multiple decubitus ulcers  · Status post initial treatment with IV vancomycin, cefepime, which was transitioned to cefazolin 2g Q8 through 12/11/2022  · 1/2 blood cultures from 11/25/2022 MSSA and CoNS (likely contaminant)  MSSA however felt to be due to infected sacral wound  Repeat cultures are negative to date  · Wound cultures- polymicrobial C koseri, Proteus, Pseudomonas and S aureus   · CT chest/a/p (12/12) : no evidence of acute or new infectious etiology  · ID/surgery/podiatry input appreciated   · Despite completion of antibiotics, leukocytosis persists with low grade fever  Possible related to ongoing aspiration vs new VTE process  · Chronic osteomyelitis of left foot and heel ulcers without evidence of acute infection- no podiatric surgical procedure is recommended  · Surgery recommends VAC therapy if approved by his facility   · Status post chemical and manual debridement   · Repeat blood cultures are unable to obtain due to difficulty stick   COVID/RSV/flu (visitors from his facility)- negative   · Monitor off antibiotics for now

## 2022-12-14 NOTE — ASSESSMENT & PLAN NOTE
· Right arm swelling and tenderness noted  · Venous duplex of right upper extremity was positive for DVT  · Started on Eliquis

## 2022-12-14 NOTE — ASSESSMENT & PLAN NOTE
· Patient with profound development disability, nonverbal, bed bound  · Patient on tube feeds/strict NPO with 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

## 2022-12-15 ENCOUNTER — APPOINTMENT (INPATIENT)
Dept: INTERVENTIONAL RADIOLOGY/VASCULAR | Facility: HOSPITAL | Age: 67
End: 2022-12-15

## 2022-12-15 LAB
ANION GAP SERPL CALCULATED.3IONS-SCNC: 7 MMOL/L (ref 4–13)
BUN SERPL-MCNC: 10 MG/DL (ref 5–25)
CALCIUM SERPL-MCNC: 9.1 MG/DL (ref 8.4–10.2)
CHLORIDE SERPL-SCNC: 99 MMOL/L (ref 96–108)
CO2 SERPL-SCNC: 28 MMOL/L (ref 21–32)
CREAT SERPL-MCNC: 0.25 MG/DL (ref 0.6–1.3)
ERYTHROCYTE [DISTWIDTH] IN BLOOD BY AUTOMATED COUNT: 21 % (ref 11.6–15.1)
GFR SERPL CREATININE-BSD FRML MDRD: 151 ML/MIN/1.73SQ M
GLUCOSE SERPL-MCNC: 112 MG/DL (ref 65–140)
GLUCOSE SERPL-MCNC: 118 MG/DL (ref 65–140)
GLUCOSE SERPL-MCNC: 119 MG/DL (ref 65–140)
GLUCOSE SERPL-MCNC: 143 MG/DL (ref 65–140)
HCT VFR BLD AUTO: 29.9 % (ref 36.5–49.3)
HGB BLD-MCNC: 8.8 G/DL (ref 12–17)
MCH RBC QN AUTO: 24.6 PG (ref 26.8–34.3)
MCHC RBC AUTO-ENTMCNC: 29.4 G/DL (ref 31.4–37.4)
MCV RBC AUTO: 84 FL (ref 82–98)
PLATELET # BLD AUTO: 680 THOUSANDS/UL (ref 149–390)
PMV BLD AUTO: 9.1 FL (ref 8.9–12.7)
POTASSIUM SERPL-SCNC: 3.7 MMOL/L (ref 3.5–5.3)
RBC # BLD AUTO: 3.57 MILLION/UL (ref 3.88–5.62)
SODIUM SERPL-SCNC: 134 MMOL/L (ref 135–147)
WBC # BLD AUTO: 13.58 THOUSAND/UL (ref 4.31–10.16)

## 2022-12-15 PROCEDURE — 05HA33Z INSERTION OF INFUSION DEVICE INTO LEFT BRACHIAL VEIN, PERCUTANEOUS APPROACH: ICD-10-PCS | Performed by: RADIOLOGY

## 2022-12-15 PROCEDURE — 2W15X6Z COMPRESSION OF BACK USING PRESSURE DRESSING: ICD-10-PCS | Performed by: SURGERY

## 2022-12-15 RX ORDER — FAMOTIDINE 40 MG/5ML
20 POWDER, FOR SUSPENSION ORAL 2 TIMES DAILY
Status: DISCONTINUED | OUTPATIENT
Start: 2022-12-15 | End: 2022-12-20 | Stop reason: HOSPADM

## 2022-12-15 RX ORDER — LIDOCAINE HYDROCHLORIDE 10 MG/ML
INJECTION, SOLUTION EPIDURAL; INFILTRATION; INTRACAUDAL; PERINEURAL AS NEEDED
Status: COMPLETED | OUTPATIENT
Start: 2022-12-15 | End: 2022-12-15

## 2022-12-15 RX ADMIN — CHLORHEXIDINE GLUCONATE 0.12% ORAL RINSE 15 ML: 1.2 LIQUID ORAL at 08:59

## 2022-12-15 RX ADMIN — Medication 20 MG: at 10:25

## 2022-12-15 RX ADMIN — HYDROMORPHONE HYDROCHLORIDE 5 MG: 2 TABLET ORAL at 15:17

## 2022-12-15 RX ADMIN — FAMOTIDINE 20 MG: 40 POWDER, FOR SUSPENSION ORAL at 18:17

## 2022-12-15 RX ADMIN — HYDROMORPHONE HYDROCHLORIDE 5 MG: 2 TABLET ORAL at 06:16

## 2022-12-15 RX ADMIN — APIXABAN 5 MG: 5 TABLET, FILM COATED ORAL at 08:59

## 2022-12-15 RX ADMIN — HYDROMORPHONE HYDROCHLORIDE 5 MG: 2 TABLET ORAL at 01:32

## 2022-12-15 RX ADMIN — HYDROMORPHONE HYDROCHLORIDE 5 MG: 2 TABLET ORAL at 18:18

## 2022-12-15 RX ADMIN — HYDROMORPHONE HYDROCHLORIDE 5 MG: 2 TABLET ORAL at 10:19

## 2022-12-15 RX ADMIN — ACETAMINOPHEN 975 MG: 325 TABLET ORAL at 06:16

## 2022-12-15 RX ADMIN — SENNOSIDES AND DOCUSATE SODIUM 1 TABLET: 50; 8.6 TABLET ORAL at 08:59

## 2022-12-15 RX ADMIN — SENNOSIDES AND DOCUSATE SODIUM 1 TABLET: 50; 8.6 TABLET ORAL at 18:18

## 2022-12-15 RX ADMIN — Medication 12.5 MG: at 10:19

## 2022-12-15 RX ADMIN — LIDOCAINE HYDROCHLORIDE 10 ML: 10 INJECTION, SOLUTION EPIDURAL; INFILTRATION; INTRACAUDAL; PERINEURAL at 16:43

## 2022-12-15 RX ADMIN — COLLAGENASE SANTYL: 250 OINTMENT TOPICAL at 09:00

## 2022-12-15 RX ADMIN — CHLORHEXIDINE GLUCONATE 0.12% ORAL RINSE 15 ML: 1.2 LIQUID ORAL at 21:57

## 2022-12-15 RX ADMIN — POLYETHYLENE GLYCOL 3350 17 G: 17 POWDER, FOR SOLUTION ORAL at 08:59

## 2022-12-15 RX ADMIN — HYDROMORPHONE HYDROCHLORIDE 5 MG: 2 TABLET ORAL at 21:57

## 2022-12-15 RX ADMIN — Medication 12.5 MG: at 22:02

## 2022-12-15 RX ADMIN — APIXABAN 5 MG: 5 TABLET, FILM COATED ORAL at 18:18

## 2022-12-15 RX ADMIN — ACETAMINOPHEN 975 MG: 325 TABLET ORAL at 21:57

## 2022-12-15 RX ADMIN — ACETAMINOPHEN 975 MG: 325 TABLET ORAL at 15:17

## 2022-12-15 NOTE — PROGRESS NOTES
[place] 47  Progress Note Chalino Harding 1955, 74 y o  male MRN: [id number]  Unit/Bed#: -01 Encounter: [id number]  Primary Care Provider: Owen Pa MD   Date and time admitted to hospital: 11/25/2022  2:30 PM    * Sepsis [facility])  Assessment & Plan  · Initially presented with shock state  Now BP stabilized  Sepsis, POA, as evidenced by tachycardia, leukocytosis, and fever with underlying multiple decubitus ulcers  · Status post initial treatment with IV vancomycin, cefepime, which was transitioned to cefazolin 2g Q8 through 12/11/2022  · 1/2 blood cultures from 11/25/2022 MSSA and CoNS (likely contaminant)  MSSA however felt to be due to infected sacral wound  Repeat cultures are negative to date  · Wound cultures- polymicrobial C koseri, Proteus, Pseudomonas and S aureus   · CT chest/a/p (12/12) : no evidence of acute or new infectious etiology  · ID/surgery/podiatry input appreciated   · Despite completion of antibiotics, leukocytosis persists with low grade fever  · Possible related to ongoing aspiration vs new VTE process (no erythema/swelling of extremities) vs central fever/CVA (no change in mental status/neuro status)  No new/ongoing infectious etiology is suspected  · Chronic osteomyelitis of left foot and heel ulcers without evidence of acute infection- no podiatric surgical procedure is recommended  · Surgery recommends VAC therapy; this is approved by his facility   · Status post chemical and manual debridement   · Repeat blood cultures are unable to obtain due to difficulty stick  COVID/RSV/flu (visitors from his facility)- negative   · Monitor off antibiotics for now   · Tylenol       Tachycardia  Assessment & Plan  · Appears to be chronic  However could also be related to possible on-going infection/inflammatory process   · Does not appear to be volume depleted     · Started on low dose metoprolol 12 5 mg BID (12/14)  · Continue to monitor closely       Acute deep vein thrombosis (DVT) of right upper extremity (HCC)  Assessment & Plan  · Right arm swelling and tenderness noted  · Venous duplex of right upper extremity was positive for DVT  · Started on Eliquis       Aspiration pneumonia ([address] )  Assessment & Plan  · With witnessed aspiration event during intubation  · Completed cefepime per infectious disease            Gastrointestinal hemorrhage with hematemesis  Assessment & Plan  · Originally admitted for shock, patient developed tachypnea, tachycardia, and hypotension and noted to be in respiratory distress possibly due to aspiration (patient has PEG tube, receives tube feeding)  · Patient was witnessed to have a large amount of coffee-ground emesis while being suctioned in preparation for intubation  Patient was intubated and upgraded to the ICU at that time  He was extubated on 12/03  · He received 3 units of PRBCs total and 1 unit of FFP  · GI recommendations appreciated   · Heparin SQ had been resumed on 12/01/2022  On 12/05, patient was noted to have a DVT of the right upper extremity and started on Eliquis  · Monitor for bleeding, monitor hemoglobin    · Trial pepcid instead of PPI due to on-going fever       MSSA bacteremia  Assessment & Plan  · Management as outlined above    · Echocardiogram without any evidence of vegetation      Developmental disability  Assessment & Plan  · Patient with profound development disability, nonverbal, bed bound  · Patient on tube feeds/strict NPO with 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       Pressure ulcers of skin of multiple topographic sites  Assessment & Plan  · Surgery/podiatry/wound care input appreciated   · Continue with local wound care, frequent turn and reposition          VTE Prophylaxis:  Apixaban (Eliquis)    Patient Centered Rounds: I have performed bedside rounds with nursing staff today      Discussions with Specialists or Other Care Team Provider: ID, surgery   Education and Discussions with Family / Patient: his facility     Current Length of Stay: 20 day(s)    Current Patient Status: Inpatient   Certification Statement: The patient will continue to require additional inpatient hospital stay due to shock     Discharge Plan: pending hospital course  As there is no new/ongoing infectious cause requiring IV abx  Planning for discharge in 24-48 hours  Code Status: Level 1 - Full Code    Subjective:   Continues to have fever overnight  Tmax 101 5F     Objective:     Vitals:   Temp (24hrs), Av 2 °F (37 9 °C), Min:98 6 °F (37 °C), Max:101 5 °F (38 6 °C)    Temp:  [98 6 °F (37 °C)-101 5 °F (38 6 °C)] 100 °F (37 8 °C)  HR:  [110-130] 121  Resp:  [18] 18  BP: ()/(60-78) 108/66  SpO2:  [95 %-98 %] 96 %  Body mass index is 20 18 kg/m²  Input and Output Summary (last 24 hours): Intake/Output Summary (Last 24 hours) at 12/15/2022 1044  Last data filed at 12/15/2022 0606  Gross per 24 hour   Intake --   Output 500 ml   Net -500 ml       Physical Exam:   Physical Exam  Vitals and nursing note reviewed  Constitutional:       General: He is not in acute distress  Comments: Cachetic and malnourishment    HENT:      Head: Normocephalic and atraumatic  Right Ear: External ear normal       Left Ear: External ear normal       Nose: Nose normal       Mouth/Throat:      Mouth: Mucous membranes are moist       Pharynx: Oropharynx is clear  Eyes:      General:         Right eye: No discharge  Left eye: No discharge  Extraocular Movements: Extraocular movements intact  Pupils: Pupils are equal, round, and reactive to light  Cardiovascular:      Rate and Rhythm: Regular rhythm  Tachycardia present  Pulses: Normal pulses  Heart sounds: Normal heart sounds  No murmur heard  Pulmonary:      Effort: Pulmonary effort is normal  No respiratory distress  Breath sounds: Normal breath sounds  No wheezing or rales     Abdominal:      General: Bowel sounds are normal  There is no distension  Palpations: Abdomen is soft  There is no mass  Tenderness: There is no abdominal tenderness  Musculoskeletal:         General: No swelling, tenderness or deformity  Normal range of motion  Cervical back: Normal range of motion and neck supple  No rigidity  Comments: Muscle dystrophy    Skin:     General: Skin is warm and dry  Capillary Refill: Capillary refill takes less than 2 seconds  Coloration: Skin is not pale  Findings: No erythema  Neurological:      Mental Status: He is alert  Mental status is at baseline  Comments: Non-verbal at baseline  Extremities contractures  Psychiatric:      Comments: Unable to assess           Additional Data:     Labs:    Results from last 7 days   Lab Units 12/14/22  0549 12/13/22  0622 12/11/22  0622   WBC Thousand/uL 15 48*   < > 15 65*   HEMOGLOBIN g/dL 8 6*   < > 9 1*   HEMATOCRIT % 29 5*   < > 31 5*   PLATELETS Thousands/uL 669*   < > 653*   NEUTROS PCT %  --   --  75   LYMPHS PCT %  --   --  10*   MONOS PCT %  --   --  11   EOS PCT %  --   --  3    < > = values in this interval not displayed  Results from last 7 days   Lab Units 12/14/22  0549   SODIUM mmol/L 138   POTASSIUM mmol/L 4 1   CHLORIDE mmol/L 101   CO2 mmol/L 29   BUN mg/dL 8   CREATININE mg/dL 0 30*   CALCIUM mg/dL 9 0   ALK PHOS U/L 124*   ALT U/L 5*   AST U/L 17         Results from last 7 days   Lab Units 12/15/22  0630 12/14/22  2354 12/14/22  1804 12/14/22  1054 12/14/22  0747 12/14/22  0654 12/14/22  0020 12/13/22  1800 12/13/22  1202 12/13/22  0643 12/13/22  0013 12/12/22  1753   POC GLUCOSE mg/dl 143* 75 100 123 130 116 133 139 125 127 120 123           * I Have Reviewed All Lab Data Listed Above  * Additional Pertinent Lab Tests Reviewed:  [address] Admission  Reviewed    Imaging:  Imaging Reports Reviewed Today Include: n/a     Recent Cultures (last 7 days):           Last 24 Hours Medication List:   Current Facility-Administered Medications   Medication Dose Route Frequency Provider Last Rate   • acetaminophen  975 mg Per G Tube Q8H [address] AVANI Duenas     • apixaban  5 mg Oral BID AVANI Child     • chlorhexidine  15 mL Mouth/Throat Q12H [address] AVANI Gould     • collagenase   Topical Daily AVANI Gould     • famotidine  20 mg Oral BID AVANI Duenas     • HYDROmorphone  1 mg Intravenous Q2H PRN AVANI Gould     • HYDROmorphone  5 mg Oral Q4H AVANI Gould     • insulin lispro  1-5 Units Subcutaneous Q6H [address] AVANI Gould     • metoprolol tartrate  12 5 mg Per G Tube Q12H [address] AVANI Duenas     • ondansetron  4 mg Intravenous Q6H PRN AVANI Gould     • polyethylene glycol  17 g Oral Daily AVANI Gould     • senna-docusate sodium  1 tablet Oral BID AVANI Gould          Today, Patient Was Seen By: AVANI Duenas    ** Please Note: Dictation voice to text software may have been used in the creation of this document   **

## 2022-12-15 NOTE — QUICK NOTE
1  Patient tube feeds discontinued prior, layed flat supine then rolled onto left side  2  Existing dressings from upper back, sacrum, right ischium taken down  3  Wounds inspected, measured, then irrigated with wound cleanser and patted dry  4  Barrier film applied to periwound areas and all areas where bridging will occur  5  XL granufoam kit used, intra-wound foam cut to size x 3 (upper back, sacrum, ischium)  6  Foam placed into wound then drape applied over foam x 3   7  Under drape (sheet cut in half length wise) applied to bridge upper back to the sacrum and ischium to the sacrum, then from sacrum to the right hip (lateral/anterior)  8   At each wound a 3 cm wide holes cut over top to allow air flow  9  Bridge strips of foam (3 cm wide) cut to connect upper back to just lateral to sacrum (#1), right ischium to just lateral to sacrum (#2) and from sacrum to left hip overlapping with first two (#3)  10  Bridging foam placed to overlap underdrap holes at the wounds and to connect lateral to the sacrum and secured with multiple pieces of drape to cover (sheets cut in half)  11  Cut opening for track pad where foam ends on right hip  12  Connect tubing, start vac therapy, ensure good seal/no leak, maintain suction at -125 mmHG  13  Change dressings three times weekly (either M/W/F or T/Th/Sat)  14  Can call Dr Stephen Purcell office ([phone]) with questions or follow-up with wound care center for follow-up every 1-2 weeks      (instructions for discharge)

## 2022-12-15 NOTE — PROCEDURES
INTERVENTIONAL RADIOLOGY PROCEDURE NOTE    Date: 12/15/2022    Procedure: IR MIDLINE PLACEMENT     Preoperative diagnosis:   1  Fever    2  Pressure injury of deep tissue of back    3  Decubitus ulcer of sacral region, stage 4 (HCC)    4  Cellulitis of right foot    5  UTI (urinary tract infection)    6  Sinus tachycardia    7  Anemia    8  Pressure injury of sacral region, stage 4 ([address] )    9  Sepsis without acute organ dysfunction, due to unspecified organism ([address] )    10  Developmental disability    11  Bacteremia    12  Hypernatremia    13  GI bleed    14  Pressure injury of right upper back, stage 4 (HCC)    15  Upper GI bleed    16  Coffee ground emesis    17  Shock ([address] )    18  Acute respiratory failure ([address] )    19  Acute respiratory failure with hypoxia ([facility])    20  Pressure injury of left heel, stage 4 ([facility])         Postoperative diagnosis: Same  Surgeon: Yuli Cabello MD     Assistant: None  No qualified resident was available  Blood loss: Minimal    Specimens: None    Findings: Contracted  Midline placed  I sutured in place  It does draw blood  This is not a picc  Once the sutures are out this can be withdrawn like any other IV  Complications: None immediate      Anesthesia: local Pt aware of script.

## 2022-12-15 NOTE — NURSING NOTE
Pt went down for midline placement, tolerated well, IR department got all bloodwork and 1 set of bloodcultures out of the 2 ordered, lilli rodriguez made aware, ok with just the 1 set at this time

## 2022-12-15 NOTE — ASSESSMENT & PLAN NOTE
Assessment:  Deep stage III ulcer to upper back  Deep stage III ulcer to sacrum  Deep stage III ulcer to right ischium  Plan:  VAC intact with good seal   Plan for bedside change tomorrow 12/17  Stable for d/c surgically

## 2022-12-15 NOTE — PROGRESS NOTES
Progress Note - [facility] Infectious Disease   Crow Dempsey 74 y o  male MRN: [id number]  Unit/Bed#: -01 Encounter: [id number]      IMPRESSION & RECOMMENDATIONS:   1  Sepsis, present on admission, evidenced by tachycardia and leukocytosis  Per chart review, leukocytosis is chronic since Dec 2021  1 of 2 admission bl cx positive  All sets of repeat blood cultures are negative  Suspect sepsis initially secondary to #2, #3, and complicated by #3  UN completed 7d course of IV cefepime and transitioned to IV ancef for MSSA bacteremia and completed total 14d of abx  CT C/A/P negative for deep seated abscess or osteomyelitis, but did show possible opacities in the right middle and lower lobes  Unknown etiology for recurrent fevers; may be due to recurrent aspiration  Wounds reassessed and were stable; COVID-19 negative; repeat CT C/A/P negative  Consider noninfectious or central etiology for fevers  -continue to monitor off abx   -repeat blood cultures x2 sets requested and pending   -monitor temperature and hemodynamics  -management per primary team      2  Polymicrobial bacteremia  One of 2 admission blood cultures positive for Gram-positive cocci in clusters, culture positive for MSSA and CoNS  Documented that it was drawn from patients peripheral line and pt known to be difficult stick with hx of extensive contractures  Suspect CoNs is contaminant, however MSSA likely due to #3   2D echo is normal  No known intravascular devices  Repeat blood cultures all remain negative to date  Completed 14d course of IV cefazolin on 12/11/22    -repeat blood cultures requested due to persistent fevers      3  Chronic, now infected, stage IV decubitus ulcers  POA   Despite outpatient wound care, found to have significant deterioration in wounds over the last few months, particularly the right upper back and sacral wound with increasing wound depth and necrotic tissue burden with evidence of purulence and malodor on admission  CT C/A/P negative for deep seated abscesses  Wound cultures from initial bedside debridement were polymicrobial  Surgery and wound care following for chemical and manual debridement  Unfortunately wounds are unlikely to heal and are at risk for reinfection due to protein calorie malnutrition, contractures, inability to offload pressure and inability to aschieve adequate closure  Completed 7 day course of IV cefepime    -Although no OM noted on CT, with worsening wounds it is likely that patient will develop chronic infection  If unable to cover/secure wounds especially of sacrum and upper right back, OM not treatable long term  In addition, prolonged abx can lead to harmful antibiotic side effects such a C  Diff, nephrotoxicity, bacterial resistance, etc      -daily wound care and surgery follow up   -no further directed treatment required      4  Left lateral foot stage IV pressure ulcer with chronic OM  POA   Wounds are stable without infection   XR shows absence of the 5th metatarsal head which likely represents chronic osteomyelitis  In the setting of chronic wound with exposed bone and no plan/indication for surgical intervention, patients osteomyelitis is not treatable long-term  Likely to continue to have progression of wound despite local wound care given extensive contractures and inability to offload pressure  He remains at risk for recurrent infection, bacteremia, sepsis, and limb loss  -continue local wound care and offloading pressure      5  Acute respiratory failure with hypoxia   Patient emergently intubated in the setting of respiratory failure, aspiration and upper GI bleeding 11/29  Now extubated to nasal cannula 12/3   S/p EGD which showed ulcerated area in stomach s/p clip  CT chest shows consolidative opacities in the right middle and lower lobes possibly due to pneumonia and atelectasis, and repeat imaging showed improving opacities  Sputum cx while intubated shows growth of mendoza and Achromobacter    -monitor respiratory status and O2 requirements   -monitor hgb and transfuse PRN      6  Acute RUE DVT  Noted to have RUE pain, swelling, and warmth on exam  Patient had peripheral IV in RUE earlier in hospital stay that infiltrated  Also with bullous eruption of right arm  RUE US shows acute DVT in paired brachial vein and chronic non-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  Unlikely cellulitis given recent abx use  Swelling is improved  -started on Eliquis per primary team   -supportive care   -serial skin exams      7  Functional quadriplegia, developmental disability   Patient nonverbal and bed-bound at baseline on tube feeds, with chronic indwelling Marrufo catheter and colostomy  Unfortunately due to contractures, very difficult to reposition patient and offload pressure   -supportive measures per primary team     8  Antibiotic Allergy   Noted to have amoxicillin and Augmentin allergy without documented reaction   Per Care everywhere, has tolerated cefepime and ceftriaxone during prior admissions  -monitor for adverse drug reactions    Antibiotics:  Off systemic abx x4 d    I have discussed the above management plan in detail with patient  I have discussed the above management plan in detail with patient's RN, and the primary service, [facility]  Subjective:  Patient nonverbal      Objective:  Vitals:  Temp:  [98 6 °F (37 °C)-101 5 °F (38 6 °C)] 100 °F (37 8 °C)  HR:  [110-130] 121  Resp:  [18] 18  BP: ()/(60-78) 108/66  SpO2:  [95 %-98 %] 96 %  Temp (24hrs), Av 2 °F (37 9 °C), Min:98 6 °F (37 °C), Max:101 5 °F (38 6 °C)  Current: Temperature: 100 °F (37 8 °C)    PHYSICAL EXAM:  General Appearance:  Awake and alert, chronically ill appearing, nontoxic, and in no distress   HEENT: Normocephalic, without obvious abnormality, atraumatic  Conjunctiva pink and sclera anicteric  Oropharynx moist without lesions       Lungs:   Clear to auscultation bilaterally, respirations unlabored, on room air    Heart:  Tachycardic; no murmur, rub or gallop    Abdomen:   Soft, non-tender, non-distended, positive bowel sounds; PEG tube and colostomy in place    Extremities: Extensive contractures of all 4 extremities    Musculoskeletal: Functional quadriplegic    : Marrufo patent with straw colored urine with sediment   Skin: No rashes or lesions  Multiple wounds to sacrum, upper back, right ear, left foot and left hand  RUE bullous eruption covered with dressing  Improving edema and warmth of RUE  Peripheral IV intact without evidence of erythema, warmth, or exudate  LABS, IMAGING, & OTHER STUDIES:  Lab Results:  I have personally reviewed pertinent labs    Results from last 7 days   Lab Units 12/14/22  0549 12/13/22 0622 12/11/22 0622   WBC Thousand/uL 15 48* 22 49* 15 65*   HEMOGLOBIN g/dL 8 6* 9 6* 9 1*   PLATELETS Thousands/uL 669* 753* 653*     Results from last 7 days   Lab Units 12/14/22 0549 12/13/22 0622 12/11/22 0622   SODIUM mmol/L 138 137 140   POTASSIUM mmol/L 4 1 4 3 4 0   CHLORIDE mmol/L 101 102 104   CO2 mmol/L 29 27 30   BUN mg/dL 8 11 7   CREATININE mg/dL 0 30* 0 39* 0 28*   EGFR ml/min/1 73sq m 140 125 144   CALCIUM mg/dL 9 0 9 1 8 5   AST U/L 17  --   --    ALT U/L 5*  --   --    ALK PHOS U/L 124*  --   --

## 2022-12-15 NOTE — ASSESSMENT & PLAN NOTE
· Initially presented with shock state  Now BP stabilized  Sepsis, POA, as evidenced by tachycardia, leukocytosis, and fever with underlying multiple decubitus ulcers  · Status post initial treatment with IV vancomycin, cefepime, which was transitioned to cefazolin 2g Q8 through 12/11/2022  · 1/2 blood cultures from 11/25/2022 MSSA and CoNS (likely contaminant)  MSSA however felt to be due to infected sacral wound  Repeat cultures are negative to date  · Wound cultures- polymicrobial C koseri, Proteus, Pseudomonas and S aureus   · CT chest/a/p (12/12) : no evidence of acute or new infectious etiology  · ID/surgery/podiatry input appreciated   · Despite completion of antibiotics, leukocytosis persists with low grade fever  · Possible related to ongoing aspiration vs new VTE process (no erythema/swelling of extremities) vs central fever/CVA (no change in mental status/neuro status)  No new/ongoing infectious etiology is suspected  · Chronic osteomyelitis of left foot and heel ulcers without evidence of acute infection- no podiatric surgical procedure is recommended  · Surgery recommends VAC therapy; this is approved by his facility   · Status post chemical and manual debridement   · Repeat blood cultures are unable to obtain due to difficulty stick   COVID/RSV/flu (visitors from his facility)- negative   · Monitor off antibiotics for now   · Tylenol

## 2022-12-15 NOTE — NURSING NOTE
Multiple attempts made to get new iv and bloodwork including blood cultures, unable to obtain at this time, Bo Alejo np made aware , orders received, surgery in earlier this am and applies wound vac to all back and sacral buttock wounds

## 2022-12-15 NOTE — PLAN OF CARE
Problem: Prexisting or High Potential for Compromised Skin Integrity  Goal: Skin integrity is maintained or improved  Description: INTERVENTIONS:  - Identify patients at risk for skin breakdown  - Assess and monitor skin integrity  - Assess and monitor nutrition and hydration status  - Monitor labs   - Assess for incontinence   - Turn and reposition patient  - Assist with mobility/ambulation  - Relieve pressure over bony prominences  - Avoid friction and shearing  - Provide appropriate hygiene as needed including keeping skin clean and dry  - Evaluate need for skin moisturizer/barrier cream  - Collaborate with interdisciplinary team   - Patient/family teaching  - Consider wound care consult   Outcome: Not Progressing     Problem: Nutrition/Hydration-ADULT  Goal: Nutrient/Hydration intake appropriate for improving, restoring or maintaining nutritional needs  Description: Monitor and assess patient's nutrition/hydration status for malnutrition  Collaborate with interdisciplinary team and initiate plan and interventions as ordered  Monitor patient's weight and dietary intake as ordered or per policy  Utilize nutrition screening tool and intervene as necessary  Determine patient's food preferences and provide high-protein, high-caloric foods as appropriate       INTERVENTIONS:  - Monitor oral intake, urinary output, labs, and treatment plans  - Assess nutrition and hydration status and recommend course of action  - Evaluate amount of meals eaten  - Assist patient with eating if necessary   - Allow adequate time for meals  - Recommend/ encourage appropriate diets, oral nutritional supplements, and vitamin/mineral supplements  - Order, calculate, and assess calorie counts as needed  - Recommend, monitor, and adjust tube feedings and TPN/PPN based on assessed needs  - Assess need for intravenous fluids  - Provide specific nutrition/hydration education as appropriate  - Include patient/family/caregiver in decisions related to nutrition  Outcome: Not Progressing     Problem: PAIN - ADULT  Goal: Verbalizes/displays adequate comfort level or baseline comfort level  Description: Interventions:  - Encourage patient to monitor pain and request assistance  - Assess pain using appropriate pain scale  - Administer analgesics based on type and severity of pain and evaluate response  - Implement non-pharmacological measures as appropriate and evaluate response  - Consider cultural and social influences on pain and pain management  - Notify physician/advanced practitioner if interventions unsuccessful or patient reports new pain  Outcome: Not Progressing     Problem: INFECTION - ADULT  Goal: Absence or prevention of progression during hospitalization  Description: INTERVENTIONS:  - Assess and monitor for signs and symptoms of infection  - Monitor lab/diagnostic results  - Monitor all insertion sites, i e  indwelling lines, tubes, and drains  - Monitor endotracheal if appropriate and nasal secretions for changes in amount and color  - [place] appropriate cooling/warming therapies per order  - Administer medications as ordered  - Instruct and encourage patient and family to use good hand hygiene technique  - Identify and instruct in appropriate isolation precautions for identified infection/condition  Outcome: Not Progressing  Goal: Absence of fever/infection during neutropenic period  Description: INTERVENTIONS:  - Monitor WBC    Outcome: Not Progressing     Problem: DISCHARGE PLANNING  Goal: Discharge to home or other facility with appropriate resources  Description: INTERVENTIONS:  - Identify barriers to discharge w/patient and caregiver  - Arrange for needed discharge resources and transportation as appropriate  - Identify discharge learning needs (meds, wound care, etc )  - Arrange for interpretive services to assist at discharge as needed  - Refer to Case Management Department for coordinating discharge planning if the patient needs post-hospital services based on physician/advanced practitioner order or complex needs related to functional status, cognitive ability, or social support system  Outcome: Not Progressing     Problem: Knowledge Deficit  Goal: Patient/family/caregiver demonstrates understanding of disease process, treatment plan, medications, and discharge instructions  Description: Complete learning assessment and assess knowledge base    Interventions:  - Provide teaching at level of understanding  - Provide teaching via preferred learning methods  Outcome: Not Progressing     Problem: SAFETY,RESTRAINT: NV/NON-SELF DESTRUCTIVE BEHAVIOR  Goal: Remains free of harm/injury (restraint for non violent/non self-detsructive behavior)  Description: INTERVENTIONS:  - Instruct patient/family regarding restraint use   - Assess and monitor physiologic and psychological status   - Provide interventions and comfort measures to meet assessed patient needs   - Identify and implement measures to help patient regain control  - Assess readiness for release of restraint   Outcome: Not Progressing  Goal: Returns to optimal restraint-free functioning  Description: INTERVENTIONS:  - Assess the patient's behavior and symptoms that indicate continued need for restraint  - Identify and implement measures to help patient regain control  - Assess readiness for release of restraint   Outcome: Not Progressing     Problem: SAFETY ADULT  Goal: Patient will remain free of falls  Description: INTERVENTIONS:  - Educate patient/family on patient safety including physical limitations  - Instruct patient to call for assistance with activity   - Consult OT/PT to assist with strengthening/mobility   - Keep Call bell within reach  - Keep bed low and locked with side rails adjusted as appropriate  - Keep care items and personal belongings within reach  - Initiate and maintain comfort rounds  - Make Fall Risk Sign visible to staff  - Offer Toileting in advance of need  - Initiate/Maintain bed alarm  - Obtain necessary fall risk management equipment:   - Apply yellow socks and bracelet for high fall risk patients  - Consider moving patient to room near nurses station  Outcome: Not Progressing  Goal: Maintain or return to baseline ADL function  Description: INTERVENTIONS:  -  Assess patient's ability to carry out ADLs; assess patient's baseline for ADL function and identify physical deficits which impact ability to perform ADLs (bathing, care of mouth/teeth, toileting, grooming, dressing, etc )  - Assess/evaluate cause of self-care deficits   - Assess range of motion  - Assess patient's mobility; develop plan if impaired  - Assess patient's need for assistive devices and provide as appropriate  - Encourage maximum independence but intervene and supervise when necessary  - Involve family in performance of ADLs  - Assess for home care needs following discharge   - Consider OT consult to assist with ADL evaluation and planning for discharge  - Provide patient education as appropriate  Outcome: Not Progressing  Goal: Maintains/Returns to pre admission functional level  Description: INTERVENTIONS:  - Perform BMAT or MOVE assessment daily    - Set and communicate daily mobility goal to care team and patient/family/caregiver     - Collaborate with rehabilitation services on mobility goals if consulted  - Record patient progress and toleration of activity level   Outcome: Not Progressing

## 2022-12-15 NOTE — ASSESSMENT & PLAN NOTE
Assessment:  Deep stage III ulcer to upper back  Deep stage III ulcer to sacrum  Deep stage III ulcer to right ischium  Plan:  VAC applied to all three ulcers today  Maintain, next change by surgery team on Saturday then M/W/F  CHLOÉ working on outpatient unit acquisition for his residence facility  Added instructions to use wet to dry if it dislodges  Add Isai twice daily to promote healing

## 2022-12-15 NOTE — ASSESSMENT & PLAN NOTE
· Originally admitted for shock, patient developed tachypnea, tachycardia, and hypotension and noted to be in respiratory distress possibly due to aspiration (patient has PEG tube, receives tube feeding)  · Patient was witnessed to have a large amount of coffee-ground emesis while being suctioned in preparation for intubation  Patient was intubated and upgraded to the ICU at that time  He was extubated on 12/03  · He received 3 units of PRBCs total and 1 unit of FFP  · GI recommendations appreciated   · Heparin SQ had been resumed on 12/01/2022   On 12/05, patient was noted to have a DVT of the right upper extremity and started on Eliquis  · Monitor for bleeding, monitor hemoglobin    · Trial pepcid instead of PPI due to on-going fever

## 2022-12-15 NOTE — WOUND OSTOMY CARE
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  Patient seen with primary RN for wound care and treatments  Patient is a max assist X3 to perform wound care  Marrufo catheter in place, LLQ colostomy pouch with liquid brown effluent, PEG tube with tube feeds infusing, Patient is bed-bound, contractures to b/l legs and arms  Patient seen by podiatry for left foot wounds, and surgery has seen patient initially to debride wounds  Patient has multiple areas of scarring and hyperpigmented skin from prior wounds  Additional Allevyn foam dressings applied to the shoulders and left lateral thigh for prevention  Prevalon boots applied to bilateral feet  Primary RN assisted with wound treatments and positioning patient  Wound care, imaging and measuring wounds approx 1 75 hours to complete due to contractures of patient and difficulty in positioning for wound treatments  Findings  1  Left lower quadrant colostomy, pouch changed today  Stoma pink, slightly budded, measures 7/8 X 1 1/8 inches, oval, peristomal skin intact, functioning for liquid brown stool  2  POA-Stage 2 pressure injury to the right outer ear, dry beefy red wound bed, no drainage, thick fibrotic and scar tissue to the periiwound  Deep tissue injury noted to the outer edge of the ear which is dry, brown and resolving  3  HA-deep tissue injury to the left outer ear, dry brown, resolving  Ear lobe is thick with fibrotic and scar tissue  4  POA-Resolving wounds to the distal and upper right arm  Arm is less edematous than on prior assessment  Proximal and distal wound beds are epithelialized, dry no drainage  Bellflower between wound beds to medial arm 100% yellow slough from ruptured blister  5   POA-left hand resolving wound, dry, beefy red, no drainage  6  POA-stage 4 pressure injury to the left lateral foot, bone palpated, beefy red, granulation tissue, small amount of serosanguineous drainage on dressing when removed, hyperpigmented skin to the periwound  7  HA-left lateral ankle unstageable pressure injury resolving, dry brown scaly, no drainage  8  POA-unstageable pressure injury to the left posterior heel, mix of yellow slough and necrotic tissue in the wound bed, hyperpigmented periwound, rolled edges, small amount of tan drainage in wound bed  9  POA-stage 4 pressure injury to the upper right back, wound is beefy red, granulation tissue to the wound base, scattered yellow slough to the outer edge of the wound  Moderate amount of  serosanguineous drainage, undermining noted, no tunneling, no odor  10  POA-right ischium-lower buttocks heavily scarred tissue with dried yellow wound base, no drainage  11  POA-right buttock unstageable wound bed, brown and yellow eschar to the wound base, beefy red granulation tissue to the outer wound, no drainage, no fluctuance  12  POA-stage 4 sacral wound, wound bed beefy red granulation tissue, yellow slough to outer edges of wound undermining noted, moderate yellow and serosanguineous drainage, wound communicated with unstagable wound on the right buttock, joined by TXU Nanette of intact tissue, measured as one wound  13  Plantar surface of the left great toe light purple, blanchable  14  Right heel intact, Allevyn foam dressing in place for prevention  15  Right lateral buttock skin tear from tape, yellow wound bed, no drainage    Surgery had completed wound care with debridement of wounds on 12/13/2022    See flowsheet for further assessments and measurements of wounds    Skin, Ostomy and Wound Care Plan:   1  Apply hydraguard to right heel and keep heels offloaded  2  Apply skin nourishing cream to the skin daily  3  Turn patient Q2 hours to offload pressure points  4   Posterior chest/upper back, sacrum and right SI joint, right ischium  Remove dressings and packing  Clean skin with chlorhexidine wipes  Irrigate wounds with wound cleanser  Pack with WTD dressing  Silicone cream to shanon-wound  Cover with bulky dressing for draiange (4x4, abd, heavy drainage pad)  Secure with tape or with mesh underwear  Apply santyl to sacral ulcer daily  5  Left heel and lateral foot ulcer: please apply santyl to the heel and lateral foot ulcers cover with dry sterile dressings  6  Place Allevyn life silicone bordered dressing to the right and left outer ear wounds and left anterior hand, momo with T, date, change every three days and PRN  7  Right upper arm wounds, cleanse with NSS, place Dermagran on wound beds cut to size of wounds, top with 4X4 and wrap with gauze, change daily and PRN  8  Prevalon boots-left and right foot  9  P-500 low air loss therapy surface  10  Place Maxorb between 4th and 5th toes of the left foot    Wound:  Wound 05/16/22 Pressure Injury Back Right;Upper (Active)   Wound Image   12/14/22 1507   Wound Description Beefy red;Granulation tissue 12/14/22 1507   Pressure Injury Stage 4 12/14/22 1507   Shanon-wound Assessment Intact 12/14/22 1507   Wound Length (cm) 8 cm 12/14/22 1507   Wound Width (cm) 5 4 cm 12/14/22 1507   Wound Depth (cm) 1 2 cm 12/14/22 1507   Wound Surface Area (cm^2) 43 2 cm^2 12/14/22 1507   Wound Volume (cm^3) 51 84 cm^3 12/14/22 1507   Calculated Wound Volume (cm^3) 51 84 cm^3 12/14/22 1507   Change in Wound Size % -1651 35 12/14/22 1507   Tunneling 0 cm 12/14/22 1507   Undermining 1 6 12/14/22 1507   Undermining is depth extending from 12-8 8-12 12/14/22 1507   Drainage Amount Moderate 12/14/22 1507   Drainage Description Serosanguineous 12/14/22 1507   Treatments Cleansed 12/14/22 1507   Dressing Moist to Moist;Foam, Silicon (eg  Allevyn, etc) 12/14/22 1507   Wound packed?  Yes 12/14/22 1507   Packing- # removed 1 12/14/22 1507   Packing- # inserted 1 12/14/22 1507 Dressing Changed Changed 12/14/22 1507   Patient Tolerance Tolerated poorly 12/14/22 1507   Dressing Status Clean;Dry; Intact 12/13/22 2054       Wound 05/16/22 Pressure Injury Sacrum (Active)   Wound Image   12/14/22 1500   Wound Description Beefy red;Granulation tissue 12/14/22 1500   Pressure Injury Stage 4 12/14/22 1500   Sybil-wound Assessment Hyperpigmented 12/14/22 1500   Wound Length (cm) 7 cm 12/14/22 1500   Wound Width (cm) 5 cm 12/14/22 1500   Wound Depth (cm) 1 5 cm 12/14/22 1500   Wound Surface Area (cm^2) 35 cm^2 12/14/22 1500   Wound Volume (cm^3) 52 5 cm^3 12/14/22 1500   Calculated Wound Volume (cm^3) 52 5 cm^3 12/14/22 1500   Change in Wound Size % 23 08 12/14/22 1500   Tunneling 0 cm 12/14/22 1500   Undermining 1 12/14/22 1500   Undermining is depth extending from 12-12 12/14/22 1500   Drainage Amount Moderate 12/14/22 1500   Drainage Description Serosanguineous 12/14/22 1500   Treatments Cleansed 12/14/22 1500   Dressing Enzymatic debrider;Moist to Moist;Foam, Silicon (eg  Allevyn, etc) 12/14/22 1500   Wound packed? Yes 12/14/22 1500   Packing- # removed 1 12/14/22 1500   Packing- # inserted 1 12/14/22 1500   Dressing Changed Changed 12/14/22 1500   Patient Tolerance Tolerated poorly 12/14/22 1500   Dressing Status Clean;Dry; Intact 12/13/22 2054       Wound 05/16/22 Pressure Injury Buttocks Right (Active)   Wound Image   12/14/22 1501   Wound Description Beefy red;Yellow;Slough;Brown;Fragile; Necrotic 12/14/22 1501   Pressure Injury Stage 3 12/14/22 1501   Sybil-wound Assessment Hyperpigmented 12/14/22 1501   Wound Length (cm) 4 9 cm 12/14/22 1501   Wound Width (cm) 3 5 cm 12/14/22 1501   Wound Depth (cm) 1 5 cm 12/14/22 1501   Wound Surface Area (cm^2) 17 15 cm^2 12/14/22 1501   Wound Volume (cm^3) 25 725 cm^3 12/14/22 1501   Calculated Wound Volume (cm^3) 25 73 cm^3 12/14/22 1501   Change in Wound Size % 57 46 12/14/22 1501   Tunneling 0 cm 12/14/22 1501   Undermining 0 12/14/22 1501   Drainage Amount Small 12/14/22 1501   Drainage Description Serosanguineous 12/14/22 1501   Treatments Cleansed 12/14/22 1501   Dressing Moist to Moist 12/14/22 1501   Wound packed? Yes 12/14/22 1501   Packing- # removed 1 12/14/22 1501   Packing- # inserted 1 12/14/22 1501   Dressing Changed Changed 12/14/22 1501   Patient Tolerance Tolerated poorly 12/14/22 1501   Dressing Status Clean;Dry; Intact 12/13/22 2054       Wound 05/16/22 Pressure Injury Foot Left;Lateral (Active)   Wound Image   12/14/22 1529   Wound Description Beefy red;Granulation tissue 12/14/22 1529   Pressure Injury Stage 4 12/14/22 1529   Sybil-wound Assessment Scar Tissue 12/14/22 1529   Wound Length (cm) 3 cm 12/14/22 1529   Wound Width (cm) 1 3 cm 12/14/22 1529   Wound Depth (cm) 0 2 cm 12/14/22 1529   Wound Surface Area (cm^2) 3 9 cm^2 12/14/22 1529   Wound Volume (cm^3) 0 78 cm^3 12/14/22 1529   Calculated Wound Volume (cm^3) 0 78 cm^3 12/14/22 1529   Change in Wound Size % -168 97 12/14/22 1529   Tunneling 0 cm 12/14/22 1529   Undermining 0 12/14/22 1529   Drainage Amount Small 12/14/22 1529   Drainage Description Serosanguineous 12/14/22 1529   Treatments Cleansed 12/14/22 1529   Dressing Enzymatic debrider;ABD;Gauze 12/14/22 1529   Wound packed? No 12/14/22 1529   Dressing Changed Changed 12/14/22 1529   Patient Tolerance Tolerated poorly 12/14/22 1529   Dressing Status Clean;Dry; Intact 12/13/22 2054       Wound 08/29/22 Heel Left;Posterior (Active)   Wound Image   12/14/22 1522   Wound Description Yellow;Slough; Necrotic 12/14/22 1522   Pressure Injury Stage 4 12/14/22 1522   Sybil-wound Assessment Hyperpigmented 12/14/22 1522   Wound Length (cm) 1 cm 12/14/22 1522   Wound Width (cm) 1 5 cm 12/14/22 1522   Wound Depth (cm) 0 7 cm 12/14/22 1522   Wound Surface Area (cm^2) 1 5 cm^2 12/14/22 1522   Wound Volume (cm^3) 1 05 cm^3 12/14/22 1522   Calculated Wound Volume (cm^3) 1 05 cm^3 12/14/22 1522   Change in Wound Size % -43 84 12/14/22 1522   Tunneling 0 cm 11/28/22 1134   Tunneling in depth located at 600 12/14/22 1522   Undermining 0 8 12/14/22 1522   Undermining is depth extending from 6:00 12/14/22 1522   Drainage Amount Small 12/14/22 1522   Drainage Description Tan 12/14/22 1522   Treatments Cleansed 12/14/22 1522   Dressing Enzymatic debrider;Dry dressing;Gauze;ABD 12/14/22 1522   Wound packed? No 12/05/22 1436   Dressing Changed Changed by provider 12/13/22 2054   Patient Tolerance Tolerated poorly 12/10/22 1500   Dressing Status Clean;Dry; Intact 12/13/22 2054       Wound 11/28/22 Buttocks Right (Active)   Wound Image   12/05/22 1411   Wound Description BRIDGETTE 12/13/22 2054   Pressure Injury Stage U 12/13/22 2054   Sybil-wound Assessment BRIDGETTE 12/13/22 2054   Wound Length (cm) 3 5 cm 12/05/22 1411   Wound Width (cm) 2 2 cm 12/05/22 1411   Wound Depth (cm) 0 2 cm 12/05/22 1411   Wound Surface Area (cm^2) 7 7 cm^2 12/05/22 1411   Wound Volume (cm^3) 1 54 cm^3 12/05/22 1411   Calculated Wound Volume (cm^3) 1 54 cm^3 12/05/22 1411   Tunneling 0 cm 12/05/22 1411   Undermining 1 12/05/22 1411   Undermining is depth extending from 2:00 12/05/22 1411   Drainage Amount Small 12/10/22 1500   Drainage Description Serosanguineous 12/10/22 1500   Treatments Site care 12/11/22 0810   Dressing Dry dressing 12/13/22 2054   Wound packed? No 12/04/22 1200   Packing- # removed 1 12/07/22 1515   Packing- # inserted 1 12/07/22 1515   Dressing Changed Changed 12/10/22 1500   Patient Tolerance Tolerated well 12/10/22 1500   Dressing Status Clean;Dry; Intact 12/13/22 2054       Wound 11/28/22 Hand Left;Posterior (Active)   Wound Image   12/14/22 1436   Wound Description Dry;Fragile 12/14/22 2000   Sybil-wound Assessment Scar Tissue 12/13/22 2054   Wound Length (cm) 1 cm 12/14/22 2000   Wound Width (cm) 0 3 cm 12/14/22 2000   Wound Depth (cm) 0 cm 12/14/22 2000   Wound Surface Area (cm^2) 0 3 cm^2 12/14/22 2000   Wound Volume (cm^3) 0 cm^3 12/14/22 2000   Calculated Wound Volume (cm^3) 0 cm^3 12/14/22 2000   Change in Wound Size % 100 12/14/22 2000   Drainage Amount None 12/10/22 1500   Treatments Site care 12/11/22 0810   Dressing Protective barrier 12/13/22 2054   Dressing Changed Changed 12/10/22 1500   Patient Tolerance Tolerated well 12/10/22 1500   Dressing Status Clean;Dry; Intact 12/11/22 2109       Wound 11/30/22 Ear Right (Active)   Wound Image   12/14/22 1422   Wound Description Dry;Brown;Beefy red 12/14/22 1422   Pressure Injury Stage DTPI 12/14/22 1422   Sybil-wound Assessment Dry;Scar Tissue 12/14/22 1422   Wound Length (cm) 1 8 cm 12/14/22 1422   Wound Width (cm) 1 2 cm 12/14/22 1422   Wound Depth (cm) 0 1 cm 12/14/22 1422   Wound Surface Area (cm^2) 2 16 cm^2 12/14/22 1422   Wound Volume (cm^3) 0 216 cm^3 12/14/22 1422   Calculated Wound Volume (cm^3) 0 22 cm^3 12/14/22 1422   Change in Wound Size % -10 12/14/22 1422   Drainage Amount None 12/14/22 1422   Treatments Cleansed 12/14/22 1422   Dressing Foam, Silicon (eg  Allevyn, etc) 12/14/22 1422   Dressing Changed Changed 12/10/22 1500   Patient Tolerance Tolerated well 12/14/22 1422   Dressing Status Other (Comment) 12/11/22 2109       Wound 12/05/22 Pressure Injury Ankle Anterior; Left (Active)   Wound Image   12/14/22 1528   Wound Description Dry;Brown;Eschar 12/14/22 1528   Pressure Injury Stage U 12/14/22 1528   Sybil-wound Assessment Dry;Scaly 12/14/22 1528   Wound Length (cm) 1 2 cm 12/05/22 1438   Wound Width (cm) 1 cm 12/05/22 1438   Wound Depth (cm) 0 2 cm 12/05/22 1438   Wound Surface Area (cm^2) 1 2 cm^2 12/05/22 1438   Wound Volume (cm^3) 0 24 cm^3 12/05/22 1438   Calculated Wound Volume (cm^3) 0 24 cm^3 12/05/22 1438   Tunneling 0 cm 12/05/22 1438   Undermining 0 12/05/22 1438   Drainage Amount None 12/14/22 1528   Drainage Description Moore;Yellow 12/10/22 1500   Treatments Cleansed 12/14/22 1528   Dressing Dry dressing 12/14/22 1528   Dressing Changed Changed 12/14/22 1528   Patient Tolerance Tolerated well 12/14/22 1528 Dressing Status Clean;Dry; Intact 12/13/22 2054       Wound 12/05/22 Arm Posterior;Right;Upper (Active)   Wound Image   12/05/22 1515   Wound Description Dry; Intact 12/14/22 1528   Sybil-wound Assessment BRIDGETTE 12/13/22 2054   Wound Length (cm) 0 cm 12/14/22 1528   Wound Width (cm) 0 cm 12/14/22 1528   Wound Depth (cm) 0 cm 12/14/22 1528   Wound Surface Area (cm^2) 0 cm^2 12/14/22 1528   Wound Volume (cm^3) 0 cm^3 12/14/22 1528   Calculated Wound Volume (cm^3) 0 cm^3 12/14/22 1528   Tunneling 0 cm 12/05/22 1515   Undermining 0 12/05/22 1515   Drainage Amount None 12/14/22 1528   Drainage Description Yellow 12/10/22 1500   Treatments Cleansed 12/14/22 1528   Dressing Dry dressing 12/14/22 1528   Dressing Changed Changed 12/14/22 1528   Patient Tolerance Tolerated well 12/14/22 1528   Dressing Status Clean;Dry; Intact 12/13/22 2054       Wound 12/05/22 Shoulder Posterior;Right (Active)   Wound Image   12/05/22 1516   Wound Description Clean;Dry; Intact 12/14/22 1528   Sybil-wound Assessment Intact 12/14/22 1528   Wound Length (cm) 0 cm 12/14/22 1528   Wound Width (cm) 0 cm 12/14/22 1528   Wound Depth (cm) 0 cm 12/14/22 1528   Wound Surface Area (cm^2) 0 cm^2 12/14/22 1528   Wound Volume (cm^3) 0 cm^3 12/14/22 1528   Calculated Wound Volume (cm^3) 0 cm^3 12/14/22 1528   Change in Wound Size % 100 12/14/22 1528   Tunneling 0 cm 12/05/22 1516   Undermining 0 12/05/22 1516   Drainage Amount None 12/14/22 1528   Drainage Description BRIDGETTE 12/13/22 2054   Non-staged Wound Description Partial thickness 12/13/22 2054   Treatments Cleansed 12/14/22 1528   Dressing Dry dressing 12/14/22 1528   Dressing Changed Changed 12/14/22 1528   Patient Tolerance Tolerated well 12/14/22 1528   Dressing Status Clean;Dry; Intact 12/13/22 2054       Wound 12/05/22 Pressure Injury Ear Left (Active)   Wound Image   12/14/22 1419   Wound Description Dry;Brown 12/14/22 1419   Pressure Injury Stage DTPI 12/14/22 1419   Sybil-wound Assessment Scar Tissue 12/14/22 1419   Wound Length (cm) 0 3 cm 12/14/22 1419   Wound Width (cm) 2 1 cm 12/14/22 1419   Wound Depth (cm) 0 cm 12/14/22 1419   Wound Surface Area (cm^2) 0 63 cm^2 12/14/22 1419   Wound Volume (cm^3) 0 cm^3 12/14/22 1419   Calculated Wound Volume (cm^3) 0 cm^3 12/14/22 1419   Tunneling 0 cm 12/05/22 1519   Undermining 0 12/05/22 1519   Drainage Amount None 12/10/22 1500   Treatments Site care 12/11/22 0810   Dressing Foam, Silicon (eg  Allevyn, etc) 12/13/22 2054   Dressing Changed Changed 12/10/22 1500   Patient Tolerance Tolerated poorly 12/10/22 1500   Dressing Status Clean;Dry; Intact 12/13/22 2054       Wound 12/14/22 Knee Anterior; Left (Active)   Wound Image   12/14/22 1414   Wound Description Pink;Pale 12/14/22 1534   Sybil-wound Assessment Intact; Hyperpigmented;Fragile 12/14/22 1534   Wound Length (cm) 0 5 cm 12/14/22 1414   Wound Width (cm) 0 8 cm 12/14/22 1414   Wound Depth (cm) 0 1 cm 12/14/22 1414   Wound Surface Area (cm^2) 0 4 cm^2 12/14/22 1414   Wound Volume (cm^3) 0 04 cm^3 12/14/22 1414   Calculated Wound Volume (cm^3) 0 04 cm^3 12/14/22 1414   Drainage Amount None 12/14/22 1534   Non-staged Wound Description Partial thickness 12/14/22 1534   Treatments Cleansed 12/14/22 1534   Dressing Foam, Silicon (eg  Allevyn, etc) 12/14/22 1534   Dressing Changed Changed 12/14/22 1534   Patient Tolerance Tolerated well 12/14/22 1534       Wound 12/14/22 Toe (Comment  which one) Anterior; Left (Active)   Wound Image   12/14/22 1534   Wound Description Beefy red;Drainage 12/14/22 1534   Drainage Amount Small 12/14/22 1534   Drainage Description Serosanguineous 12/14/22 1534   Treatments Cleansed 12/14/22 1534   Dressing Calcium Alginate;Dry dressing;Gauze 12/14/22 1534   Wound packed?  No 12/14/22 1534   Dressing Changed New 12/14/22 1534   Patient Tolerance Tolerated poorly 12/14/22 1534     Reviewed plan of care with primary RN  Recommendations written as orders  Wound care team to follow weekly while admitted  Questions or concerns [address] BSN, RN, [facility]

## 2022-12-15 NOTE — PROGRESS NOTES
[place] [address]  Progress Note Yanet Vega 1955, 74 y o  male MRN: [id number]  Unit/Bed#: -01 Encounter: [id number]  Primary Care Provider: Charlene Milligan MD   Date and time admitted to hospital: 11/25/2022  2:30 PM    Pressure ulcers of skin of multiple topographic sites  Assessment & Plan  Assessment:  Deep stage III ulcer to upper back  Deep stage III ulcer to sacrum  Deep stage III ulcer to right ischium  Plan:  VAC applied to all three ulcers today  Maintain, next change by surgery team on Saturday then M/W/F  CHLOÉ working on outpatient unit acquisition for his residence facility  Added instructions to use wet to dry if it dislodges  Add Isai twice daily to promote healing  Surgically stable, can be discharged at any time  Supplies needed for accepting facility: XL granufoam kit, barrier film swabs, will write up detailed wound VAC dressing change instructions  Subjective/Objective   Chief Complaint: nonverbal    Subjective: nonverbal    Objective: none    Blood pressure 108/66, pulse (!) 121, temperature 100 °F (37 8 °C), resp  rate 18, height 5' 5" (1 651 m), weight 55 kg (121 lb 4 1 oz), SpO2 96 %  ,Body mass index is 20 18 kg/m²  Intake/Output Summary (Last 24 hours) at 12/15/2022 1056  Last data filed at 12/15/2022 0606  Gross per 24 hour   Intake --   Output 500 ml   Net -500 ml       Invasive Devices     Peripheral Intravenous Line  Duration           Peripheral IV 12/06/22 Distal;Left;Ventral (anterior) Forearm 8 days          Drain  Duration           Colostomy -- days    Urethral Catheter Temperature probe 16 days    NG/OG/Enteral Tube Enteral Feeding Tube 10 days                Physical Exam  Constitutional:       General: He is not in acute distress  Appearance: He is not toxic-appearing  Eyes:      Conjunctiva/sclera: Conjunctivae normal    Musculoskeletal:      Comments: Contracted upper and lower limbs     Skin:     General: Skin is warm and dry  Comments: Numerous ulcers, deep stage III affecting upper back/sacrum/ischium  Neurological:      Mental Status: He is alert  Lab, Imaging and other studies:I have personally reviewed pertinent lab results      VTE Pharmacologic Prophylaxis: Reason for no pharmacologic prophylaxis Eliquis  VTE Mechanical Prophylaxis: sequential compression device

## 2022-12-15 NOTE — PLAN OF CARE
Problem: Prexisting or High Potential for Compromised Skin Integrity  Goal: Skin integrity is maintained or improved  Description: INTERVENTIONS:  - Identify patients at risk for skin breakdown  - Assess and monitor skin integrity  - Assess and monitor nutrition and hydration status  - Monitor labs   - Assess for incontinence   - Turn and reposition patient  - Assist with mobility/ambulation  - Relieve pressure over bony prominences  - Avoid friction and shearing  - Provide appropriate hygiene as needed including keeping skin clean and dry  - Evaluate need for skin moisturizer/barrier cream  - Collaborate with interdisciplinary team   - Patient/family teaching  - Consider wound care consult   Outcome: Progressing     Problem: Nutrition/Hydration-ADULT  Goal: Nutrient/Hydration intake appropriate for improving, restoring or maintaining nutritional needs  Description: Monitor and assess patient's nutrition/hydration status for malnutrition  Collaborate with interdisciplinary team and initiate plan and interventions as ordered  Monitor patient's weight and dietary intake as ordered or per policy  Utilize nutrition screening tool and intervene as necessary  Determine patient's food preferences and provide high-protein, high-caloric foods as appropriate       INTERVENTIONS:  - Monitor oral intake, urinary output, labs, and treatment plans  - Assess nutrition and hydration status and recommend course of action  - Evaluate amount of meals eaten  - Assist patient with eating if necessary   - Allow adequate time for meals  - Recommend/ encourage appropriate diets, oral nutritional supplements, and vitamin/mineral supplements  - Order, calculate, and assess calorie counts as needed  - Recommend, monitor, and adjust tube feedings and TPN/PPN based on assessed needs  - Assess need for intravenous fluids  - Provide specific nutrition/hydration education as appropriate  - Include patient/family/caregiver in decisions related to nutrition  Outcome: Progressing     Problem: PAIN - ADULT  Goal: Verbalizes/displays adequate comfort level or baseline comfort level  Description: Interventions:  - Encourage patient to monitor pain and request assistance  - Assess pain using appropriate pain scale  - Administer analgesics based on type and severity of pain and evaluate response  - Implement non-pharmacological measures as appropriate and evaluate response  - Consider cultural and social influences on pain and pain management  - Notify physician/advanced practitioner if interventions unsuccessful or patient reports new pain  Outcome: Progressing     Problem: INFECTION - ADULT  Goal: Absence or prevention of progression during hospitalization  Description: INTERVENTIONS:  - Assess and monitor for signs and symptoms of infection  - Monitor lab/diagnostic results  - Monitor all insertion sites, i e  indwelling lines, tubes, and drains  - Monitor endotracheal if appropriate and nasal secretions for changes in amount and color  - [place] appropriate cooling/warming therapies per order  - Administer medications as ordered  - Instruct and encourage patient and family to use good hand hygiene technique  - Identify and instruct in appropriate isolation precautions for identified infection/condition  Outcome: Progressing  Goal: Absence of fever/infection during neutropenic period  Description: INTERVENTIONS:  - Monitor WBC    Outcome: Progressing     Problem: DISCHARGE PLANNING  Goal: Discharge to home or other facility with appropriate resources  Description: INTERVENTIONS:  - Identify barriers to discharge w/patient and caregiver  - Arrange for needed discharge resources and transportation as appropriate  - Identify discharge learning needs (meds, wound care, etc )  - Arrange for interpretive services to assist at discharge as needed  - Refer to Case Management Department for coordinating discharge planning if the patient needs post-hospital services based on physician/advanced practitioner order or complex needs related to functional status, cognitive ability, or social support system  Outcome: Progressing     Problem: Knowledge Deficit  Goal: Patient/family/caregiver demonstrates understanding of disease process, treatment plan, medications, and discharge instructions  Description: Complete learning assessment and assess knowledge base    Interventions:  - Provide teaching at level of understanding  - Provide teaching via preferred learning methods  Outcome: Progressing     Problem: SAFETY,RESTRAINT: NV/NON-SELF DESTRUCTIVE BEHAVIOR  Goal: Remains free of harm/injury (restraint for non violent/non self-detsructive behavior)  Description: INTERVENTIONS:  - Instruct patient/family regarding restraint use   - Assess and monitor physiologic and psychological status   - Provide interventions and comfort measures to meet assessed patient needs   - Identify and implement measures to help patient regain control  - Assess readiness for release of restraint   Outcome: Progressing  Goal: Returns to optimal restraint-free functioning  Description: INTERVENTIONS:  - Assess the patient's behavior and symptoms that indicate continued need for restraint  - Identify and implement measures to help patient regain control  - Assess readiness for release of restraint   Outcome: Progressing     Problem: SAFETY ADULT  Goal: Patient will remain free of falls  Description: INTERVENTIONS:  - Educate patient/family on patient safety including physical limitations  - Instruct patient to call for assistance with activity   - Consult OT/PT to assist with strengthening/mobility   - Keep Call bell within reach  - Keep bed low and locked with side rails adjusted as appropriate  - Keep care items and personal belongings within reach  - Initiate and maintain comfort rounds  - Make Fall Risk Sign visible to staff  - Offer Toileting in advance of need  - Initiate/Maintain bed alarm  - Obtain necessary fall risk management equipment:   - Apply yellow socks and bracelet for high fall risk patients  - Consider moving patient to room near nurses station  Outcome: Progressing  Goal: Maintain or return to baseline ADL function  Description: INTERVENTIONS:  -  Assess patient's ability to carry out ADLs; assess patient's baseline for ADL function and identify physical deficits which impact ability to perform ADLs (bathing, care of mouth/teeth, toileting, grooming, dressing, etc )  - Assess/evaluate cause of self-care deficits   - Assess range of motion  - Assess patient's mobility; develop plan if impaired  - Assess patient's need for assistive devices and provide as appropriate  - Encourage maximum independence but intervene and supervise when necessary  - Involve family in performance of ADLs  - Assess for home care needs following discharge   - Consider OT consult to assist with ADL evaluation and planning for discharge  - Provide patient education as appropriate  Outcome: Progressing  Goal: Maintains/Returns to pre admission functional level  Description: INTERVENTIONS:  - Perform BMAT or MOVE assessment daily    - Set and communicate daily mobility goal to care team and patient/family/caregiver     - Collaborate with rehabilitation services on mobility goals if consulted  - Record patient progress and toleration of activity level   Outcome: Progressing

## 2022-12-15 NOTE — ASSESSMENT & PLAN NOTE
· Appears to be chronic  However could also be related to possible on-going infection/inflammatory process   · Does not appear to be volume depleted     · Started on low dose metoprolol 12 5 mg BID (12/14)  · Continue to monitor closely

## 2022-12-15 NOTE — CASE MANAGEMENT
Case Management Discharge Planning Note    Patient name Alethea Gandhi  Location [address] [id number] MRN [id number]  : 1955 Date 12/15/2022       Current Admission Date: 2022  Current Admission Diagnosis:Sepsis [facility])   Patient Active Problem List    Diagnosis Date Noted   • Tachycardia 2022   • Acute deep vein thrombosis (DVT) of right upper extremity ([address] ) 2022   • Gastrointestinal hemorrhage with hematemesis 2022   • Aspiration pneumonia ([address] ) 2022   • Chronic indwelling Marrufo catheter 2022   • MSSA bacteremia 2022   • Hypokalemia 2022   • Pressure ulcer of ischium, right, stage III ([address] ) 2022   • Sepsis ([address] ) 2022   • Developmental disability 2022   • Pressure injury of sacral region, stage 4 ([address] ) 10/26/2022   • Pressure ulcers of skin of multiple topographic sites 10/26/2022   • Pressure ulcer of right lower back, stage 3 ([address] ) 10/26/2022   • Pressure ulcer of left foot, stage 4 ([address] ) 10/26/2022   • Pressure injury of right upper back, stage 4 ([address] ) 10/26/2022   • Open wound of right hand without foreign body 10/26/2022   • Pressure injury of left heel, stage 4 ([address] ) 10/26/2022      LOS (days): 20  Geometric Mean LOS (GMLOS) (days):   Days to GMLOS:     OBJECTIVE:  Risk of Unplanned Readmission Score: 17 95     Current admission status: Inpatient   Preferred Pharmacy: No Pharmacies Listed  Primary Care Provider: Braulio Duverney, MD    Primary Insurance: [place] [employer]  Secondary Insurance:     DISCHARGE DETAILS:  Received a TC from [facility] the wound vac has been delivered to the [facility] facility

## 2022-12-16 LAB
FLUAV RNA RESP QL NAA+PROBE: NEGATIVE
FLUBV RNA RESP QL NAA+PROBE: NEGATIVE
GLUCOSE SERPL-MCNC: 104 MG/DL (ref 65–140)
GLUCOSE SERPL-MCNC: 110 MG/DL (ref 65–140)
GLUCOSE SERPL-MCNC: 119 MG/DL (ref 65–140)
GLUCOSE SERPL-MCNC: 141 MG/DL (ref 65–140)
RSV RNA RESP QL NAA+PROBE: NEGATIVE
SARS-COV-2 RNA RESP QL NAA+PROBE: NEGATIVE

## 2022-12-16 RX ADMIN — HYDROMORPHONE HYDROCHLORIDE 5 MG: 2 TABLET ORAL at 10:00

## 2022-12-16 RX ADMIN — CHLORHEXIDINE GLUCONATE 0.12% ORAL RINSE 15 ML: 1.2 LIQUID ORAL at 10:00

## 2022-12-16 RX ADMIN — FAMOTIDINE 20 MG: 40 POWDER, FOR SUSPENSION ORAL at 10:00

## 2022-12-16 RX ADMIN — HYDROMORPHONE HYDROCHLORIDE 5 MG: 2 TABLET ORAL at 18:37

## 2022-12-16 RX ADMIN — SENNOSIDES AND DOCUSATE SODIUM 1 TABLET: 50; 8.6 TABLET ORAL at 10:00

## 2022-12-16 RX ADMIN — FAMOTIDINE 20 MG: 40 POWDER, FOR SUSPENSION ORAL at 18:38

## 2022-12-16 RX ADMIN — HYDROMORPHONE HYDROCHLORIDE 5 MG: 2 TABLET ORAL at 14:50

## 2022-12-16 RX ADMIN — ACETAMINOPHEN 975 MG: 325 TABLET ORAL at 05:47

## 2022-12-16 RX ADMIN — HYDROMORPHONE HYDROCHLORIDE 5 MG: 2 TABLET ORAL at 01:38

## 2022-12-16 RX ADMIN — HYDROMORPHONE HYDROCHLORIDE 5 MG: 2 TABLET ORAL at 05:46

## 2022-12-16 RX ADMIN — ACETAMINOPHEN 975 MG: 325 TABLET ORAL at 14:50

## 2022-12-16 RX ADMIN — APIXABAN 5 MG: 5 TABLET, FILM COATED ORAL at 10:00

## 2022-12-16 RX ADMIN — ACETAMINOPHEN 975 MG: 325 TABLET ORAL at 21:49

## 2022-12-16 RX ADMIN — POLYETHYLENE GLYCOL 3350 17 G: 17 POWDER, FOR SOLUTION ORAL at 10:00

## 2022-12-16 RX ADMIN — APIXABAN 5 MG: 5 TABLET, FILM COATED ORAL at 18:37

## 2022-12-16 RX ADMIN — METOPROLOL TARTRATE 25 MG: 25 TABLET, FILM COATED ORAL at 21:49

## 2022-12-16 RX ADMIN — Medication 12.5 MG: at 10:00

## 2022-12-16 RX ADMIN — CHLORHEXIDINE GLUCONATE 0.12% ORAL RINSE 15 ML: 1.2 LIQUID ORAL at 21:49

## 2022-12-16 RX ADMIN — SENNOSIDES AND DOCUSATE SODIUM 1 TABLET: 50; 8.6 TABLET ORAL at 18:37

## 2022-12-16 RX ADMIN — HYDROMORPHONE HYDROCHLORIDE 5 MG: 2 TABLET ORAL at 21:49

## 2022-12-16 NOTE — ASSESSMENT & PLAN NOTE
· Appears to be chronic  However could also be related to possible on-going infection/inflammatory process   · Does not appear to be volume depleted     · Started on low dose metoprolol increased to 25 mg BID   · Continue to monitor closely

## 2022-12-16 NOTE — NUTRITION
12/16/22 1453   Feeding Route   Tube Feeding PEG tube   Formula Jevity 1 2   Formula rate 50 mL/hr   Continuous Continuous via Pump   Free Water From  mL  (mL from TF received in 24 hours per pump)   Total Free Water  1864 mL  (mL from TF, IV, flushes)   Total EN Volume 1015 mL  (mL)   Total Kcal intake 1218   Protein Intake (g) 56 g   Current PO Intake   Estimated calorie intake compared to estimated need Improving, nutrition needs not met at this time  Recommendations/Interventions   Summary Per TF pump, pt received 1015 mL TF in 24 hours - improving since last assessment  Significant weight loss noted in 10 days  Recommend adding Prosource NoCarb Liquid Protein BID to provide additional protein  Recommend discontinuing Isai as nitric oxide in Isai can be contraindicated with sepsis in regards to wound healing  Interventions/Recommendations Supplement adjust;Continue EN as ordered   Recommendations to Provider Recommend adding Prosource NoCarb Liquid Protein BID to provide additional protein  Recommend discontinuing Isai as nitric oxide in Isai can be contraindicated with sepsis in regards to wound healing

## 2022-12-16 NOTE — PLAN OF CARE
Problem: Prexisting or High Potential for Compromised Skin Integrity  Goal: Skin integrity is maintained or improved  Description: INTERVENTIONS:  - Identify patients at risk for skin breakdown  - Assess and monitor skin integrity  - Assess and monitor nutrition and hydration status  - Monitor labs   - Assess for incontinence   - Turn and reposition patient  - Assist with mobility/ambulation  - Relieve pressure over bony prominences  - Avoid friction and shearing  - Provide appropriate hygiene as needed including keeping skin clean and dry  - Evaluate need for skin moisturizer/barrier cream  - Collaborate with interdisciplinary team   - Patient/family teaching  - Consider wound care consult   Outcome: Progressing     Problem: Nutrition/Hydration-ADULT  Goal: Nutrient/Hydration intake appropriate for improving, restoring or maintaining nutritional needs  Description: Monitor and assess patient's nutrition/hydration status for malnutrition  Collaborate with interdisciplinary team and initiate plan and interventions as ordered  Monitor patient's weight and dietary intake as ordered or per policy  Utilize nutrition screening tool and intervene as necessary  Determine patient's food preferences and provide high-protein, high-caloric foods as appropriate       INTERVENTIONS:  - Monitor oral intake, urinary output, labs, and treatment plans  - Assess nutrition and hydration status and recommend course of action  - Evaluate amount of meals eaten  - Assist patient with eating if necessary   - Allow adequate time for meals  - Recommend/ encourage appropriate diets, oral nutritional supplements, and vitamin/mineral supplements  - Order, calculate, and assess calorie counts as needed  - Recommend, monitor, and adjust tube feedings and TPN/PPN based on assessed needs  - Assess need for intravenous fluids  - Provide specific nutrition/hydration education as appropriate  - Include patient/family/caregiver in decisions related to nutrition  Outcome: Progressing     Problem: PAIN - ADULT  Goal: Verbalizes/displays adequate comfort level or baseline comfort level  Description: Interventions:  - Encourage patient to monitor pain and request assistance  - Assess pain using appropriate pain scale  - Administer analgesics based on type and severity of pain and evaluate response  - Implement non-pharmacological measures as appropriate and evaluate response  - Consider cultural and social influences on pain and pain management  - Notify physician/advanced practitioner if interventions unsuccessful or patient reports new pain  Outcome: Progressing     Problem: INFECTION - ADULT  Goal: Absence or prevention of progression during hospitalization  Description: INTERVENTIONS:  - Assess and monitor for signs and symptoms of infection  - Monitor lab/diagnostic results  - Monitor all insertion sites, i e  indwelling lines, tubes, and drains  - Monitor endotracheal if appropriate and nasal secretions for changes in amount and color  - [place] appropriate cooling/warming therapies per order  - Administer medications as ordered  - Instruct and encourage patient and family to use good hand hygiene technique  - Identify and instruct in appropriate isolation precautions for identified infection/condition  Outcome: Progressing  Goal: Absence of fever/infection during neutropenic period  Description: INTERVENTIONS:  - Monitor WBC    Outcome: Progressing     Problem: DISCHARGE PLANNING  Goal: Discharge to home or other facility with appropriate resources  Description: INTERVENTIONS:  - Identify barriers to discharge w/patient and caregiver  - Arrange for needed discharge resources and transportation as appropriate  - Identify discharge learning needs (meds, wound care, etc )  - Arrange for interpretive services to assist at discharge as needed  - Refer to Case Management Department for coordinating discharge planning if the patient needs post-hospital services based on physician/advanced practitioner order or complex needs related to functional status, cognitive ability, or social support system  Outcome: Progressing     Problem: Knowledge Deficit  Goal: Patient/family/caregiver demonstrates understanding of disease process, treatment plan, medications, and discharge instructions  Description: Complete learning assessment and assess knowledge base    Interventions:  - Provide teaching at level of understanding  - Provide teaching via preferred learning methods  Outcome: Progressing     Problem: SAFETY,RESTRAINT: NV/NON-SELF DESTRUCTIVE BEHAVIOR  Goal: Remains free of harm/injury (restraint for non violent/non self-detsructive behavior)  Description: INTERVENTIONS:  - Instruct patient/family regarding restraint use   - Assess and monitor physiologic and psychological status   - Provide interventions and comfort measures to meet assessed patient needs   - Identify and implement measures to help patient regain control  - Assess readiness for release of restraint   Outcome: Progressing  Goal: Returns to optimal restraint-free functioning  Description: INTERVENTIONS:  - Assess the patient's behavior and symptoms that indicate continued need for restraint  - Identify and implement measures to help patient regain control  - Assess readiness for release of restraint   Outcome: Progressing     Problem: SAFETY ADULT  Goal: Patient will remain free of falls  Description: INTERVENTIONS:  - Educate patient/family on patient safety including physical limitations  - Instruct patient to call for assistance with activity   - Consult OT/PT to assist with strengthening/mobility   - Keep Call bell within reach  - Keep bed low and locked with side rails adjusted as appropriate  - Keep care items and personal belongings within reach  - Initiate and maintain comfort rounds  - Make Fall Risk Sign visible to staff  - Offer Toileting in advance of need  - Initiate/Maintain bed alarm  - Obtain necessary fall risk management equipment:   - Apply yellow socks and bracelet for high fall risk patients  - Consider moving patient to room near nurses station  Outcome: Progressing  Goal: Maintain or return to baseline ADL function  Description: INTERVENTIONS:  -  Assess patient's ability to carry out ADLs; assess patient's baseline for ADL function and identify physical deficits which impact ability to perform ADLs (bathing, care of mouth/teeth, toileting, grooming, dressing, etc )  - Assess/evaluate cause of self-care deficits   - Assess range of motion  - Assess patient's mobility; develop plan if impaired  - Assess patient's need for assistive devices and provide as appropriate  - Encourage maximum independence but intervene and supervise when necessary  - Involve family in performance of ADLs  - Assess for home care needs following discharge   - Consider OT consult to assist with ADL evaluation and planning for discharge  - Provide patient education as appropriate  Outcome: Progressing  Goal: Maintains/Returns to pre admission functional level  Description: INTERVENTIONS:  - Perform BMAT or MOVE assessment daily    - Set and communicate daily mobility goal to care team and patient/family/caregiver     - Collaborate with rehabilitation services on mobility goals if consulted  - Record patient progress and toleration of activity level   Outcome: Progressing

## 2022-12-16 NOTE — PROGRESS NOTES
[place] 47  Progress Note Dayron Wolff 1955, 74 y o  male MRN: [id number]  Unit/Bed#: -01 Encounter: [id number]  Primary Care Provider: Jaylene Christian MD   Date and time admitted to hospital: 11/25/2022  2:30 PM    * Sepsis [facility])  Assessment & Plan  · Initially presented with shock state  Now BP stabilized  Sepsis, POA, as evidenced by tachycardia, leukocytosis, and fever with underlying multiple decubitus ulcers  · Status post initial treatment with IV vancomycin, cefepime, which was transitioned to cefazolin 2g Q8 through 12/11/2022  · 1/2 blood cultures from 11/25/2022 MSSA and CoNS (likely contaminant)  MSSA however felt to be due to infected sacral wound  Repeat cultures are negative to date  · Wound cultures- polymicrobial C koseri, Proteus, Pseudomonas and S aureus   · CT chest/a/p (12/12) : no evidence of acute or new infectious etiology  · ID/surgery/podiatry input appreciated   · Despite completion of antibiotics, leukocytosis persists with low grade fever  · Possible related to ongoing aspiration vs new VTE process (no erythema/swelling of extremities) vs central fever/CVA (no change in mental status/neuro status)  No new/ongoing infectious etiology is suspected  · Chronic osteomyelitis of left foot and heel ulcers without evidence of acute infection- no podiatric surgical procedure is recommended  · Surgery recommends VAC therapy; this is approved by his facility   · Status post chemical and manual debridement   · Repeat blood cultures are unable to obtain due to difficulty stick  COVID/RSV/flu (visitors from his facility)- negative   · Monitor off antibiotics for now   · Tylenol        Tachycardia  Assessment & Plan  · Appears to be chronic  However could also be related to possible on-going infection/inflammatory process   · Does not appear to be volume depleted     · Started on low dose metoprolol increased to 25 mg BID   · Continue to monitor closely        Acute deep vein thrombosis (DVT) of right upper extremity (HCC)  Assessment & Plan  · Right arm swelling and tenderness noted  · Venous duplex of right upper extremity was positive for DVT  · Started on Eliquis        Aspiration pneumonia ([address] )  Assessment & Plan  · With witnessed aspiration event during intubation  · Completed cefepime per infectious disease             Gastrointestinal hemorrhage with hematemesis  Assessment & Plan  · Originally admitted for shock, patient developed tachypnea, tachycardia, and hypotension and noted to be in respiratory distress possibly due to aspiration (patient has PEG tube, receives tube feeding)  · Patient was witnessed to have a large amount of coffee-ground emesis while being suctioned in preparation for intubation  Patient was intubated and upgraded to the ICU at that time  He was extubated on 12/03  · He received 3 units of PRBCs total and 1 unit of FFP  · GI recommendations appreciated   · Heparin SQ had been resumed on 12/01/2022  On 12/05, patient was noted to have a DVT of the right upper extremity and started on Eliquis  · Monitor for bleeding, monitor hemoglobin    · Trial pepcid instead of PPI due to on-going fever        MSSA bacteremia  Assessment & Plan  · Management as outlined above    · Echocardiogram without any evidence of vegetation       Developmental disability  Assessment & Plan  · Patient with profound development disability, nonverbal, bed bound  · Patient on tube feeds/strict NPO with 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        Pressure ulcers of skin of multiple topographic sites  Assessment & Plan  · Surgery/podiatry/wound care input appreciated   · Continue with local wound care, frequent turn and reposition             VTE Prophylaxis:  Apixaban (Eliquis)    Patient Centered Rounds: I have performed bedside rounds with nursing staff today      Discussions with Specialists or Other Care Team Provider: ID and surgery   Education and Discussions with Family / Patient: patient and his facility  I discussed the patient's clinical status  Updates given to the physical assistance via phone  The facility is concerned about his fever and being discharged over the weekend as there is coverage at the facility  Requested discharge on Monday  Current Length of Stay: 21 day(s)    Current Patient Status: Inpatient   Certification Statement: The patient will continue to require additional inpatient hospital stay due to sepsis     Discharge Plan: pending hospital course     Code Status: Level 1 - Full Code    Subjective:   No acute event overnight  T max 99 6F  Objective:     Vitals:   Temp (24hrs), Av 6 °F (37 °C), Min:97 3 °F (36 3 °C), Max:99 6 °F (37 6 °C)    Temp:  [97 3 °F (36 3 °C)-99 6 °F (37 6 °C)] 99 6 °F (37 6 °C)  HR:  [111-118] 111  Resp:  [17-20] 20  BP: (111-133)/(67-78) 133/76  SpO2:  [97 %] 97 %  Body mass index is 19 96 kg/m²  Input and Output Summary (last 24 hours): Intake/Output Summary (Last 24 hours) at 2022 1549  Last data filed at 2022 0547  Gross per 24 hour   Intake 2220 ml   Output 1225 ml   Net 995 ml       Physical Exam:   Physical Exam  Vitals and nursing note reviewed  Constitutional:       General: He is not in acute distress  Appearance: Normal appearance  He is cachectic  He is ill-appearing  HENT:      Head: Normocephalic and atraumatic  Right Ear: External ear normal       Left Ear: External ear normal       Nose: Nose normal       Mouth/Throat:      Mouth: Mucous membranes are moist       Pharynx: Oropharynx is clear  Eyes:      General:         Right eye: No discharge  Left eye: No discharge  Extraocular Movements: Extraocular movements intact  Pupils: Pupils are equal, round, and reactive to light  Cardiovascular:      Rate and Rhythm: Regular rhythm  Tachycardia present  Pulses: Normal pulses  Heart sounds: Normal heart sounds   No murmur heard   Pulmonary:      Effort: Pulmonary effort is normal  No respiratory distress  Breath sounds: No wheezing or rales  Comments: Unable to assess    Abdominal:      General: Bowel sounds are normal  There is no distension  Palpations: Abdomen is soft  There is no mass  Tenderness: There is no abdominal tenderness  Comments: Peg tube in place; colostomy bag with small amount of brown soft stool     Musculoskeletal:         General: No swelling or tenderness  Cervical back: Normal range of motion and neck supple  No rigidity  Comments: Contractures      Skin:     General: Skin is warm and dry  Capillary Refill: Capillary refill takes less than 2 seconds  Coloration: Skin is not pale  Findings: No erythema  Comments: Multiple wounds  Wound VAC in place  Neurological:      Mental Status: He is alert  Mental status is at baseline  Comments: Non-verbal at baseline  Upper and lower extremities contractions   Psychiatric:      Comments: Unable to assess           Additional Data:     Labs:    Results from last 7 days   Lab Units 12/15/22  1655 12/13/22  0622 12/11/22  0622   WBC Thousand/uL 13 58*   < > 15 65*   HEMOGLOBIN g/dL 8 8*   < > 9 1*   HEMATOCRIT % 29 9*   < > 31 5*   PLATELETS Thousands/uL 680*   < > 653*   NEUTROS PCT %  --   --  75   LYMPHS PCT %  --   --  10*   MONOS PCT %  --   --  11   EOS PCT %  --   --  3    < > = values in this interval not displayed       Results from last 7 days   Lab Units 12/15/22  1655 12/14/22  0549   SODIUM mmol/L 134* 138   POTASSIUM mmol/L 3 7 4 1   CHLORIDE mmol/L 99 101   CO2 mmol/L 28 29   BUN mg/dL 10 8   CREATININE mg/dL 0 25* 0 30*   CALCIUM mg/dL 9 1 9 0   ALK PHOS U/L  --  124*   ALT U/L  --  5*   AST U/L  --  17         Results from last 7 days   Lab Units 12/16/22  1203 12/16/22  0544 12/16/22  0017 12/15/22  1809 12/15/22  1210 12/15/22  0630 12/14/22  2354 12/14/22  1804 12/14/22  1054 12/14/22  0747 12/14/22  0654 12/14/22  0020   POC GLUCOSE mg/dl 119 104 110 118 119 143* 75 100 123 130 116 133           * I Have Reviewed All Lab Data Listed Above  * Additional Pertinent Lab Tests Reviewed: [address] Admission  Reviewed    Imaging:  Imaging Reports Reviewed Today Include: n/a     Recent Cultures (last 7 days):     Results from last 7 days   Lab Units 12/15/22  1654   BLOOD CULTURE  Received in Microbiology Lab  Culture in Progress  Last 24 Hours Medication List:   Current Facility-Administered Medications   Medication Dose Route Frequency Provider Last Rate   • acetaminophen  975 mg Per G Tube Q8H [address] AVANI Hanks     • apixaban  5 mg Oral BID AVANI Garay     • chlorhexidine  15 mL Mouth/Throat Q12H [address] AVANI Yang     • famotidine  20 mg Oral BID AVANI Hanks     • HYDROmorphone  1 mg Intravenous Q2H PRN AVANI Yang     • HYDROmorphone  5 mg Oral Q4H AVANI Yang     • insulin lispro  1-5 Units Subcutaneous Q6H [address] AVANI Yang     • metoprolol tartrate  25 mg Per G Tube Q12H [address] AVANI Hanks     • ondansetron  4 mg Intravenous Q6H PRN AVANI Yang     • polyethylene glycol  17 g Oral Daily AVANI Yang     • senna-docusate sodium  1 tablet Oral BID AVANI Yang          Today, Patient Was Seen By: AVANI Hanks    ** Please Note: Dictation voice to text software may have been used in the creation of this document   **

## 2022-12-16 NOTE — PROGRESS NOTES
Progress Note - [facility] Infectious Disease   Bhavani Gonzalez 74 y o  male MRN: [id number]  Unit/Bed#: -01 Encounter: [id number]      IMPRESSION & RECOMMENDATIONS:   1  Sepsis, present on admission, evidenced by tachycardia and leukocytosis  Per chart review, leukocytosis is chronic since Dec 2021  1 of 2 admission bl cx positive  All sets of repeat blood cultures are negative  Suspect sepsis initially secondary to #2, #3, and complicated by #3  JF completed 7d course of IV cefepime and transitioned to IV ancef for MSSA bacteremia and completed total 14d of abx  CT C/A/P negative for deep seated abscess or osteomyelitis, but did show possible opacities in the right middle and lower lobes  Unknown etiology for recurrent fevers; may be due to recurrent aspiration or chronic open wounds  Wounds reassessed, now with wound vacs in place; COVID-19 negative; repeat CT C/A/P negative  Repeat blood cx pending  Pt has now finally defervesced    -continue to monitor off abx   -follow up repeat blood cultures drawn 12/15  -monitor temperature and hemodynamics  -management per primary team      2  Polymicrobial bacteremia  One of 2 admission blood cultures positive for Gram-positive cocci in clusters, culture positive for MSSA and CoNS  Documented that it was drawn from patients peripheral line and pt known to be difficult stick with hx of extensive contractures  Suspect CoNs is contaminant, however MSSA likely due to #3   2D echo is normal  No known intravascular devices  Repeat blood cultures all remain negative to date  Completed 14d course of IV cefazolin on 12/11/22    -follow up repeat blood cx drawn 12/15     3  Chronic, now infected, stage IV decubitus ulcers  POA   Despite outpatient wound care, found to have significant deterioration in wounds over the last few months, particularly the right upper back and sacral wound with increasing wound depth and necrotic tissue burden with evidence of purulence and malodor on admission  CT C/A/P negative for deep seated abscesses  Wound cultures from initial bedside debridement were polymicrobial  Surgery and wound care following for chemical and manual debridement  Unfortunately wounds are unlikely to heal and are at risk for reinfection due to protein calorie malnutrition, contractures, inability to offload pressure and inability to aschieve adequate closure  Completed 7 day course of IV cefepime  Now has wound vacs in place    -Although no OM noted on CT, with worsening wounds it is likely that patient will develop chronic infection  If unable to cover/secure wounds especially of sacrum and upper right back, OM not treatable long term  In addition, prolonged abx can lead to harmful antibiotic side effects such a C  Diff, nephrotoxicity, bacterial resistance, etc      -daily wound care and surgery follow up   -ongoing wound vac management per surgery   -no further directed treatment required      4  Left lateral foot stage IV pressure ulcer with chronic OM  POA   Wounds are stable without infection   XR shows absence of the 5th metatarsal head which likely represents chronic osteomyelitis  In the setting of chronic wound with exposed bone and no plan/indication for surgical intervention, patients osteomyelitis is not treatable long-term  Likely to continue to have progression of wound despite local wound care given extensive contractures and inability to offload pressure  He remains at risk for recurrent infection, bacteremia, sepsis, and limb loss  -continue local wound care and offloading pressure      5  Acute respiratory failure with hypoxia   Patient emergently intubated in the setting of respiratory failure, aspiration and upper GI bleeding 11/29  Now extubated to nasal cannula 12/3   S/p EGD which showed ulcerated area in stomach s/p clip  CT chest shows consolidative opacities in the right middle and lower lobes possibly due to pneumonia and atelectasis, and repeat imaging showed improving opacities  Sputum cx while intubated shows growth of candida and Achromobacter    -monitor respiratory status and O2 requirements   -monitor hgb and transfuse PRN      6  Acute RUE DVT  Noted to have RUE pain, swelling, and warmth on exam  Patient had peripheral IV in RUE earlier in hospital stay that infiltrated  Also with bullous eruption of right arm  RUE US shows acute DVT in paired brachial vein and chronic non-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  Unlikely cellulitis given recent abx use  Swelling is improved  -started on Eliquis per primary team   -supportive care   -serial skin exams      7  Functional quadriplegia, developmental disability   Patient nonverbal and bed-bound at baseline on tube feeds, with chronic indwelling Marrufo catheter and colostomy  Unfortunately due to contractures, very difficult to reposition patient and offload pressure   -supportive measures per primary team     8  Antibiotic Allergy   Noted to have amoxicillin and Augmentin allergy without documented reaction   Per Care everywhere, has tolerated cefepime and ceftriaxone during prior admissions  -monitor for adverse drug reactions    Antibiotics:  Off systemic abx x5 d    We will see patient again 22 if here  Please call ID on call physician in meantime if questions  I have discussed the above management plan in detail with patient  I have discussed the above management plan in detail with patient's RN, and the primary service, SLIM  Subjective:  Patient is nonverbal  No overnight events       Objective:  Vitals:  Temp:  [98 7 °F (37 1 °C)-100 1 °F (37 8 °C)] 98 8 °F (37 1 °C)  HR:  [111-120] 111  Resp:  [17-18] 17  BP: (111-119)/(67-78) 114/67  SpO2:  [97 %] 97 %  Temp (24hrs), Av 2 °F (37 3 °C), Min:98 7 °F (37 1 °C), Max:100 1 °F (37 8 °C)  Current: Temperature: 98 8 °F (37 1 °C)    PHYSICAL EXAM:  General Appearance:  Appearing well, nontoxic, and in no distress   HEENT: Normocephalic, without obvious abnormality, atraumatic  Conjunctiva pink and sclera anicteric  Oropharynx moist without lesions  Poor dentition  Lungs:   Clear to auscultation bilaterally, respirations unlabored, on room air    Heart:  Tachycardic ; no murmur, rub or gallop   Abdomen:   Soft, non-tender, non-distended, positive bowel sounds    Extremities: Extensive contractures of all 4 extremities    Musculoskeletal: Functional quadriplegia    : Marrufo patent with straw colored urine w/ sediment    Skin: No rashes or lesions  Multiple wounds to sacrum, upper back, right ear, left foot and left hand  RUE bullous eruption covered with dressing  Improving edema and warmth of RUE  Wound vacs in place  Peripheral IV intact without evidence of erythema, warmth, or exudate         LABS, IMAGING, & OTHER STUDIES:  Lab Results:  I have personally reviewed pertinent labs  Results from last 7 days   Lab Units 12/15/22  1655 12/14/22  0549 12/13/22  0622   WBC Thousand/uL 13 58* 15 48* 22 49*   HEMOGLOBIN g/dL 8 8* 8 6* 9 6*   PLATELETS Thousands/uL 680* 669* 753*     Results from last 7 days   Lab Units 12/15/22  1655 12/14/22  0549 12/13/22  0622   SODIUM mmol/L 134* 138 137   POTASSIUM mmol/L 3 7 4 1 4 3   CHLORIDE mmol/L 99 101 102   CO2 mmol/L 28 29 27   BUN mg/dL 10 8 11   CREATININE mg/dL 0 25* 0 30* 0 39*   EGFR ml/min/1 73sq m 151 140 125   CALCIUM mg/dL 9 1 9 0 9 1   AST U/L  --  17  --    ALT U/L  --  5*  --    ALK PHOS U/L  --  124*  --      Results from last 7 days   Lab Units 12/15/22  1654   BLOOD CULTURE  Received in Microbiology Lab  Culture in Progress

## 2022-12-16 NOTE — PLAN OF CARE
Problem: Nutrition/Hydration-ADULT  Goal: Nutrient/Hydration intake appropriate for improving, restoring or maintaining nutritional needs  Description: Monitor and assess patient's nutrition/hydration status for malnutrition  Collaborate with interdisciplinary team and initiate plan and interventions as ordered  Monitor patient's weight and dietary intake as ordered or per policy  Utilize nutrition screening tool and intervene as necessary  Determine patient's food preferences and provide high-protein, high-caloric foods as appropriate       INTERVENTIONS:  - Monitor oral intake, urinary output, labs, and treatment plans  - Assess nutrition and hydration status and recommend course of action  - Evaluate amount of meals eaten  - Assist patient with eating if necessary   - Allow adequate time for meals  - Recommend/ encourage appropriate diets, oral nutritional supplements, and vitamin/mineral supplements  - Order, calculate, and assess calorie counts as needed  - Recommend, monitor, and adjust tube feedings and TPN/PPN based on assessed needs  - Assess need for intravenous fluids  - Provide specific nutrition/hydration education as appropriate  - Include patient/family/caregiver in decisions related to nutrition  Outcome: Progressing     Problem: INFECTION - ADULT  Goal: Absence or prevention of progression during hospitalization  Description: INTERVENTIONS:  - Assess and monitor for signs and symptoms of infection  - Monitor lab/diagnostic results  - Monitor all insertion sites, i e  indwelling lines, tubes, and drains  - Monitor endotracheal if appropriate and nasal secretions for changes in amount and color  - Byers appropriate cooling/warming therapies per order  - Administer medications as ordered  - Instruct and encourage patient and family to use good hand hygiene technique  - Identify and instruct in appropriate isolation precautions for identified infection/condition  Outcome: Progressing     Problem: SAFETY,RESTRAINT: NV/NON-SELF DESTRUCTIVE BEHAVIOR  Goal: Remains free of harm/injury (restraint for non violent/non self-detsructive behavior)  Description: INTERVENTIONS:  - Instruct patient/family regarding restraint use   - Assess and monitor physiologic and psychological status   - Provide interventions and comfort measures to meet assessed patient needs   - Identify and implement measures to help patient regain control  - Assess readiness for release of restraint   Outcome: Progressing

## 2022-12-16 NOTE — PROGRESS NOTES
[address]  Progress Note Loulou Bowden 1955, 74 y o  male MRN: [id number]  Unit/Bed#: -01 Encounter: [id number]  Primary Care Provider: Pedro Batista MD   Date and time admitted to hospital: 11/25/2022  2:30 PM    Pressure ulcers of skin of multiple topographic sites  Assessment & Plan  Assessment:  Deep stage III ulcer to upper back  Deep stage III ulcer to sacrum  Deep stage III ulcer to right ischium  Plan:  VAC intact with good seal   Plan for bedside change tomorrow 12/17  Stable for d/c surgically  Subjective/Objective   Chief Complaint: nonverbal    Subjective: nonverbal    Objective:     Blood pressure 114/67, pulse (!) 111, temperature 98 8 °F (37 1 °C), resp  rate 17, height 5' 5" (1 651 m), weight 54 4 kg (119 lb 14 9 oz), SpO2 97 %  ,Body mass index is 19 96 kg/m²  Intake/Output Summary (Last 24 hours) at 12/16/2022 1156  Last data filed at 12/16/2022 0547  Gross per 24 hour   Intake 2470 ml   Output 1675 ml   Net 795 ml       Invasive Devices     Peripheral Intravenous Line  Duration           Long-Dwell Peripheral IV (Midline) 12/15/22 Left Brachial <1 day          Drain  Duration           Colostomy -- days    Urethral Catheter Temperature probe 17 days    NG/OG/Enteral Tube Enteral Feeding Tube 11 days                Physical Exam  Constitutional:       General: He is not in acute distress  HENT:      Head: Normocephalic  Mouth/Throat:      Mouth: Mucous membranes are moist    Skin:     General: Skin is warm and dry  Comments: VAC in place with good seal to upper back, sacrum, ischium on right  Neurological:      Mental Status: He is alert  Lab, Imaging and other studies:  I have personally reviewed pertinent lab results    , CBC:   Lab Results   Component Value Date    WBC 13 58 (H) 12/15/2022    HGB 8 8 (L) 12/15/2022    HCT 29 9 (L) 12/15/2022    MCV 84 12/15/2022     (H) 12/15/2022    MCH 24 6 (L) 12/15/2022    MCHC 29 4 (L) 12/15/2022    RDW 21 0 (H) 12/15/2022    MPV 9 1 12/15/2022   , CMP:   Lab Results   Component Value Date    SODIUM 134 (L) 12/15/2022    K 3 7 12/15/2022    CL 99 12/15/2022    CO2 28 12/15/2022    BUN 10 12/15/2022    CREATININE 0 25 (L) 12/15/2022    CALCIUM 9 1 12/15/2022    EGFR 151 12/15/2022     VTE Pharmacologic Prophylaxis: Reason for no pharmacologic prophylaxis Eliquis  VTE Mechanical Prophylaxis: sequential compression device

## 2022-12-17 LAB
GLUCOSE SERPL-MCNC: 103 MG/DL (ref 65–140)
GLUCOSE SERPL-MCNC: 109 MG/DL (ref 65–140)
GLUCOSE SERPL-MCNC: 110 MG/DL (ref 65–140)
GLUCOSE SERPL-MCNC: 117 MG/DL (ref 65–140)

## 2022-12-17 PROCEDURE — 2W05X6Z CHANGE PRESSURE DRESSING ON BACK: ICD-10-PCS | Performed by: SURGERY

## 2022-12-17 RX ADMIN — APIXABAN 5 MG: 5 TABLET, FILM COATED ORAL at 18:12

## 2022-12-17 RX ADMIN — HYDROMORPHONE HYDROCHLORIDE 5 MG: 2 TABLET ORAL at 22:04

## 2022-12-17 RX ADMIN — HYDROMORPHONE HYDROCHLORIDE 5 MG: 2 TABLET ORAL at 09:55

## 2022-12-17 RX ADMIN — POLYETHYLENE GLYCOL 3350 17 G: 17 POWDER, FOR SOLUTION ORAL at 09:49

## 2022-12-17 RX ADMIN — SENNOSIDES AND DOCUSATE SODIUM 1 TABLET: 50; 8.6 TABLET ORAL at 18:12

## 2022-12-17 RX ADMIN — METOPROLOL TARTRATE 25 MG: 25 TABLET, FILM COATED ORAL at 09:56

## 2022-12-17 RX ADMIN — ACETAMINOPHEN 975 MG: 325 TABLET ORAL at 05:58

## 2022-12-17 RX ADMIN — ACETAMINOPHEN 975 MG: 325 TABLET ORAL at 14:12

## 2022-12-17 RX ADMIN — CHLORHEXIDINE GLUCONATE 0.12% ORAL RINSE 15 ML: 1.2 LIQUID ORAL at 22:00

## 2022-12-17 RX ADMIN — APIXABAN 5 MG: 5 TABLET, FILM COATED ORAL at 09:57

## 2022-12-17 RX ADMIN — HYDROMORPHONE HYDROCHLORIDE 5 MG: 2 TABLET ORAL at 18:11

## 2022-12-17 RX ADMIN — SENNOSIDES AND DOCUSATE SODIUM 1 TABLET: 50; 8.6 TABLET ORAL at 09:57

## 2022-12-17 RX ADMIN — METOPROLOL TARTRATE 25 MG: 25 TABLET, FILM COATED ORAL at 22:07

## 2022-12-17 RX ADMIN — CHLORHEXIDINE GLUCONATE 0.12% ORAL RINSE 15 ML: 1.2 LIQUID ORAL at 09:49

## 2022-12-17 RX ADMIN — FAMOTIDINE 20 MG: 40 POWDER, FOR SUSPENSION ORAL at 09:57

## 2022-12-17 RX ADMIN — HYDROMORPHONE HYDROCHLORIDE 5 MG: 2 TABLET ORAL at 05:58

## 2022-12-17 RX ADMIN — HYDROMORPHONE HYDROCHLORIDE 5 MG: 2 TABLET ORAL at 14:12

## 2022-12-17 RX ADMIN — HYDROMORPHONE HYDROCHLORIDE 5 MG: 2 TABLET ORAL at 02:10

## 2022-12-17 RX ADMIN — FAMOTIDINE 20 MG: 40 POWDER, FOR SUSPENSION ORAL at 18:12

## 2022-12-17 RX ADMIN — ACETAMINOPHEN 975 MG: 325 TABLET ORAL at 22:05

## 2022-12-17 NOTE — QUICK NOTE
Scheduled VAC change completed at the bedside by myself and Dr Ruthie Laurent as well as patient's nurse  He was rolled into the left lateral decubitus position and the existing dressing taken down  There is mild odor to the wounds however the wounds were all cleaned open and with early granulation tissue  Minimal slough noted at the right ischial wound not yet amenable for debridement  Skin of his entire back cleansed with chlorhexidine wipes then skin prep barrier film applied to all periwound and bridging areas  The wounds themselves were irrigated with wound cleanser  All original black foam was removed from each wound in its entirety  New pieces of wound foam cut to fit x3 then placed into the wound and under drape applied to allow for bridging  Holes cut into these drapes to allow airflow than bridging completed with strips of black foam extending from the upper back to the sacrum and then from the right ischium to the sacrum and then from the sacrum overlapping these to extend to the right hip to allow for track pad application  Suction applied at -125 mmHg with no evidence of leak or problems with the seal   Procedure completed tolerated well by the patient with no acute complications  Tube feeds were held during this process

## 2022-12-17 NOTE — PROGRESS NOTES
[address]  Progress Note Noé Fey 1955, 74 y o  male MRN: [id number]  Unit/Bed#: -[facility]iza Encounter: [id number]  Primary Care Provider: Adilia Wilson MD   Date and time admitted to hospital: 11/25/2022  2:30 PM    * Sepsis [facility])  Assessment & Plan  · Initially presented with shock state  Now BP stabilized  Sepsis, POA, as evidenced by tachycardia, leukocytosis, and fever with underlying multiple decubitus ulcers  · Status post initial treatment with IV vancomycin, cefepime, which was transitioned to cefazolin 2g Q8 through 12/11/2022  · 1/2 blood cultures from 11/25/2022 MSSA and CoNS (likely contaminant)  MSSA however felt to be due to infected sacral wound  Repeat cultures are negative to date  · Wound cultures- polymicrobial C koseri, Proteus, Pseudomonas and S aureus   · CT chest/a/p (12/12) : no evidence of acute or new infectious etiology  · ID/surgery/podiatry input appreciated   · Despite completion of antibiotics, he was having low grade fever  Leukocytosis persists but stabilizes  · Possible related to ongoing aspiration vs new VTE process (no erythema/swelling of extremities) vs central fever/CVA (no change in mental status/neuro status)  No new/ongoing infectious etiology is suspected  Fever is improving  · Chronic osteomyelitis of left foot and heel ulcers without evidence of acute infection- no podiatric surgical procedure is recommended  · Surgery has placed VAC therapy on back and buttock wounds; this is approved by his facility   · Status post chemical and manual debridement   · COVID/RSV/flu (visitors from his facility)- negative   · 1 set of blood culture (12/15) was able to obtain by IR with midline placement- NGTD  · Monitor off antibiotics for now   · Tylenol        Tachycardia  Assessment & Plan  · Appears to be chronic   However could also be related to possible on-going infection/inflammatory process   · Does not appear to be volume depleted  · Started on low dose metoprolol increased to 25 mg BID   · Continue to monitor closely         Acute deep vein thrombosis (DVT) of right upper extremity (HCC)  Assessment & Plan  · Right arm swelling and tenderness noted  · Venous duplex of right upper extremity was positive for DVT  · Started on Eliquis         Aspiration pneumonia ([address] )  Assessment & Plan  · With witnessed aspiration event during intubation  · Completed cefepime per infectious disease              Gastrointestinal hemorrhage with hematemesis  Assessment & Plan  · Originally admitted for shock, patient developed tachypnea, tachycardia, and hypotension and noted to be in respiratory distress possibly due to aspiration (patient has PEG tube, receives tube feeding)  · Patient was witnessed to have a large amount of coffee-ground emesis while being suctioned in preparation for intubation  Patient was intubated and upgraded to the ICU at that time  He was extubated on 12/03  · He received 3 units of PRBCs total and 1 unit of FFP  · GI recommendations appreciated   · Heparin SQ had been resumed on 12/01/2022   On 12/05, patient was noted to have a DVT of the right upper extremity and started on Eliquis   · Monitor for bleeding, monitor hemoglobin    · Trial pepcid instead of PPI due to on-going fever        MSSA bacteremia  Assessment & Plan  · Management as outlined above    · Echocardiogram without any evidence of vegetation       Developmental disability  Assessment & Plan  · Patient with profound development disability, nonverbal, bed bound  · Patient on tube feeds/strict NPO with 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        Pressure ulcers of skin of multiple topographic sites  Assessment & Plan  · Surgery/podiatry/wound care input appreciated   · Continue with local wound care, frequent turn and reposition              VTE Prophylaxis:  Apixaban (Eliquis)    Patient Centered Rounds: I have performed bedside rounds with nursing staff today     Discussions with Specialists or Other Care Team Provider: CM   Education and Discussions with Family / Patient: patient     Current Length of Stay: 22 day(s)    Current Patient Status: Inpatient   Certification Statement: The patient will continue to require additional inpatient hospital stay due to sepsis     Discharge Plan: discharge back to his facility on Monday     Code Status: Level 1 - Full Code    Subjective:   No acute event overnight  No fever! T max 99 6F    Objective:     Vitals:   Temp (24hrs), Av 3 °F (36 8 °C), Min:97 1 °F (36 2 °C), Max:99 6 °F (37 6 °C)    Temp:  [97 1 °F (36 2 °C)-99 6 °F (37 6 °C)] 97 1 °F (36 2 °C)  HR:  [111-124] 114  Resp:  [18-20] 20  BP: (108-133)/(66-76) 108/66  SpO2:  [97 %-98 %] 97 %  Body mass index is 19 55 kg/m²  Input and Output Summary (last 24 hours): Intake/Output Summary (Last 24 hours) at 2022 0954  Last data filed at 2022 1556  Gross per 24 hour   Intake 2045 ml   Output 450 ml   Net 1595 ml       Physical Exam:   Physical Exam  Vitals and nursing note reviewed  Constitutional:       General: He is not in acute distress  Appearance: Normal appearance  He is cachectic  HENT:      Head: Normocephalic and atraumatic  Right Ear: External ear normal       Left Ear: External ear normal       Nose: Nose normal       Mouth/Throat:      Mouth: Mucous membranes are moist       Pharynx: Oropharynx is clear  Eyes:      General:         Right eye: No discharge  Left eye: No discharge  Extraocular Movements: Extraocular movements intact  Pupils: Pupils are equal, round, and reactive to light  Cardiovascular:      Rate and Rhythm: Regular rhythm  Tachycardia present  Pulses: Normal pulses  Heart sounds: Normal heart sounds  No murmur heard  Pulmonary:      Effort: Pulmonary effort is normal  No respiratory distress  Breath sounds: Normal breath sounds  No wheezing or rales     Abdominal: General: Bowel sounds are normal  There is no distension  Palpations: Abdomen is soft  There is no mass  Tenderness: There is no abdominal tenderness  Musculoskeletal:         General: No swelling, tenderness or deformity  Normal range of motion  Cervical back: Normal range of motion and neck supple  No rigidity  Skin:     General: Skin is warm and dry  Capillary Refill: Capillary refill takes less than 2 seconds  Coloration: Skin is not pale  Findings: No erythema  Neurological:      Mental Status: He is alert  Mental status is at baseline  Comments: Non-verbal at baseline  Contractures of all extremities  Psychiatric:      Comments: Unable to assess           Additional Data:     Labs:    Results from last 7 days   Lab Units 12/15/22  1655 12/13/22  0622 12/11/22  0622   WBC Thousand/uL 13 58*   < > 15 65*   HEMOGLOBIN g/dL 8 8*   < > 9 1*   HEMATOCRIT % 29 9*   < > 31 5*   PLATELETS Thousands/uL 680*   < > 653*   NEUTROS PCT %  --   --  75   LYMPHS PCT %  --   --  10*   MONOS PCT %  --   --  11   EOS PCT %  --   --  3    < > = values in this interval not displayed  Results from last 7 days   Lab Units 12/15/22  1655 12/14/22  0549   SODIUM mmol/L 134* 138   POTASSIUM mmol/L 3 7 4 1   CHLORIDE mmol/L 99 101   CO2 mmol/L 28 29   BUN mg/dL 10 8   CREATININE mg/dL 0 25* 0 30*   CALCIUM mg/dL 9 1 9 0   ALK PHOS U/L  --  124*   ALT U/L  --  5*   AST U/L  --  17         Results from last 7 days   Lab Units 12/17/22  0606 12/17/22  0000 12/16/22  1828 12/16/22  1203 12/16/22  0544 12/16/22  0017 12/15/22  1809 12/15/22  1210 12/15/22  0630 12/14/22  2354 12/14/22  1804 12/14/22  1054   POC GLUCOSE mg/dl 117 109 141* 119 104 110 118 119 143* 75 100 123           * I Have Reviewed All Lab Data Listed Above  * Additional Pertinent Lab Tests Reviewed:  All Labs For Current Hospital Admission  Reviewed    Imaging:  Imaging Reports Reviewed Today Include: n/a     Recent Cultures (last 7 days):     Results from last 7 days   Lab Units 12/15/22  5561   BLOOD CULTURE  No Growth at 24 hrs  Last 24 Hours Medication List:   Current Facility-Administered Medications   Medication Dose Route Frequency Provider Last Rate   • acetaminophen  975 mg Per G Tube Q8H [address] AVANI Aragon     • apixaban  5 mg Oral BID AVANI Pillai     • chlorhexidine  15 mL Mouth/Throat Q12H [address] AVANI Petty     • famotidine  20 mg Oral BID AVANI Aragon     • HYDROmorphone  1 mg Intravenous Q2H PRN AVANI Petty     • HYDROmorphone  5 mg Oral Q4H AVANI Petty     • insulin lispro  1-5 Units Subcutaneous Q6H [address] AVANI Petty     • metoprolol tartrate  25 mg Per G Tube Q12H [address] AVANI Aragon     • ondansetron  4 mg Intravenous Q6H PRN AVANI Petty     • polyethylene glycol  17 g Oral Daily AVANI Petty     • senna-docusate sodium  1 tablet Oral BID AVANI Petty          Today, Patient Was Seen By: AVANI Aragon    ** Please Note: Dictation voice to text software may have been used in the creation of this document   **

## 2022-12-17 NOTE — PLAN OF CARE
Problem: Prexisting or High Potential for Compromised Skin Integrity  Goal: Skin integrity is maintained or improved  Description: INTERVENTIONS:  - Identify patients at risk for skin breakdown  - Assess and monitor skin integrity  - Assess and monitor nutrition and hydration status  - Monitor labs   - Assess for incontinence   - Turn and reposition patient  - Assist with mobility/ambulation  - Relieve pressure over bony prominences  - Avoid friction and shearing  - Provide appropriate hygiene as needed including keeping skin clean and dry  - Evaluate need for skin moisturizer/barrier cream  - Collaborate with interdisciplinary team   - Patient/family teaching  - Consider wound care consult   Outcome: Progressing     Problem: Nutrition/Hydration-ADULT  Goal: Nutrient/Hydration intake appropriate for improving, restoring or maintaining nutritional needs  Description: Monitor and assess patient's nutrition/hydration status for malnutrition  Collaborate with interdisciplinary team and initiate plan and interventions as ordered  Monitor patient's weight and dietary intake as ordered or per policy  Utilize nutrition screening tool and intervene as necessary  Determine patient's food preferences and provide high-protein, high-caloric foods as appropriate       INTERVENTIONS:  - Monitor oral intake, urinary output, labs, and treatment plans  - Assess nutrition and hydration status and recommend course of action  - Evaluate amount of meals eaten  - Assist patient with eating if necessary   - Allow adequate time for meals  - Recommend/ encourage appropriate diets, oral nutritional supplements, and vitamin/mineral supplements  - Order, calculate, and assess calorie counts as needed  - Recommend, monitor, and adjust tube feedings and TPN/PPN based on assessed needs  - Assess need for intravenous fluids  - Provide specific nutrition/hydration education as appropriate  - Include patient/family/caregiver in decisions related to nutrition  Outcome: Progressing     Problem: PAIN - ADULT  Goal: Verbalizes/displays adequate comfort level or baseline comfort level  Description: Interventions:  - Encourage patient to monitor pain and request assistance  - Assess pain using appropriate pain scale  - Administer analgesics based on type and severity of pain and evaluate response  - Implement non-pharmacological measures as appropriate and evaluate response  - Consider cultural and social influences on pain and pain management  - Notify physician/advanced practitioner if interventions unsuccessful or patient reports new pain  Outcome: Progressing     Problem: INFECTION - ADULT  Goal: Absence or prevention of progression during hospitalization  Description: INTERVENTIONS:  - Assess and monitor for signs and symptoms of infection  - Monitor lab/diagnostic results  - Monitor all insertion sites, i e  indwelling lines, tubes, and drains  - Monitor endotracheal if appropriate and nasal secretions for changes in amount and color  - [place] appropriate cooling/warming therapies per order  - Administer medications as ordered  - Instruct and encourage patient and family to use good hand hygiene technique  - Identify and instruct in appropriate isolation precautions for identified infection/condition  Outcome: Progressing  Goal: Absence of fever/infection during neutropenic period  Description: INTERVENTIONS:  - Monitor WBC    Outcome: Progressing     Problem: DISCHARGE PLANNING  Goal: Discharge to home or other facility with appropriate resources  Description: INTERVENTIONS:  - Identify barriers to discharge w/patient and caregiver  - Arrange for needed discharge resources and transportation as appropriate  - Identify discharge learning needs (meds, wound care, etc )  - Arrange for interpretive services to assist at discharge as needed  - Refer to Case Management Department for coordinating discharge planning if the patient needs post-hospital services based on physician/advanced practitioner order or complex needs related to functional status, cognitive ability, or social support system  Outcome: Progressing     Problem: Knowledge Deficit  Goal: Patient/family/caregiver demonstrates understanding of disease process, treatment plan, medications, and discharge instructions  Description: Complete learning assessment and assess knowledge base    Interventions:  - Provide teaching at level of understanding  - Provide teaching via preferred learning methods  Outcome: Progressing     Problem: SAFETY,RESTRAINT: NV/NON-SELF DESTRUCTIVE BEHAVIOR  Goal: Remains free of harm/injury (restraint for non violent/non self-detsructive behavior)  Description: INTERVENTIONS:  - Instruct patient/family regarding restraint use   - Assess and monitor physiologic and psychological status   - Provide interventions and comfort measures to meet assessed patient needs   - Identify and implement measures to help patient regain control  - Assess readiness for release of restraint   Outcome: Progressing  Goal: Returns to optimal restraint-free functioning  Description: INTERVENTIONS:  - Assess the patient's behavior and symptoms that indicate continued need for restraint  - Identify and implement measures to help patient regain control  - Assess readiness for release of restraint   Outcome: Progressing     Problem: SAFETY ADULT  Goal: Patient will remain free of falls  Description: INTERVENTIONS:  - Educate patient/family on patient safety including physical limitations  - Instruct patient to call for assistance with activity   - Consult OT/PT to assist with strengthening/mobility   - Keep Call bell within reach  - Keep bed low and locked with side rails adjusted as appropriate  - Keep care items and personal belongings within reach  - Initiate and maintain comfort rounds  - Make Fall Risk Sign visible to staff  - Offer Toileting in advance of need  - Initiate/Maintain bed alarm  - Obtain necessary fall risk management equipment:   - Apply yellow socks and bracelet for high fall risk patients  - Consider moving patient to room near nurses station  Outcome: Progressing  Goal: Maintain or return to baseline ADL function  Description: INTERVENTIONS:  -  Assess patient's ability to carry out ADLs; assess patient's baseline for ADL function and identify physical deficits which impact ability to perform ADLs (bathing, care of mouth/teeth, toileting, grooming, dressing, etc )  - Assess/evaluate cause of self-care deficits   - Assess range of motion  - Assess patient's mobility; develop plan if impaired  - Assess patient's need for assistive devices and provide as appropriate  - Encourage maximum independence but intervene and supervise when necessary  - Involve family in performance of ADLs  - Assess for home care needs following discharge   - Consider OT consult to assist with ADL evaluation and planning for discharge  - Provide patient education as appropriate  Outcome: Progressing  Goal: Maintains/Returns to pre admission functional level  Description: INTERVENTIONS:  - Perform BMAT or MOVE assessment daily    - Set and communicate daily mobility goal to care team and patient/family/caregiver     - Collaborate with rehabilitation services on mobility goals if consulted  - Record patient progress and toleration of activity level   Outcome: Progressing

## 2022-12-17 NOTE — ASSESSMENT & PLAN NOTE
· Initially presented with shock state  Now BP stabilized  Sepsis, POA, as evidenced by tachycardia, leukocytosis, and fever with underlying multiple decubitus ulcers  · Status post initial treatment with IV vancomycin, cefepime, which was transitioned to cefazolin 2g Q8 through 12/11/2022  · 1/2 blood cultures from 11/25/2022 MSSA and CoNS (likely contaminant)  MSSA however felt to be due to infected sacral wound  Repeat cultures are negative to date  · Wound cultures- polymicrobial C koseri, Proteus, Pseudomonas and S aureus   · CT chest/a/p (12/12) : no evidence of acute or new infectious etiology  · ID/surgery/podiatry input appreciated   · Despite completion of antibiotics, he was having low grade fever  Leukocytosis persists but stabilizes  · Possible related to ongoing aspiration vs new VTE process (no erythema/swelling of extremities) vs central fever/CVA (no change in mental status/neuro status)  No new/ongoing infectious etiology is suspected  Fever is improving  · Chronic osteomyelitis of left foot and heel ulcers without evidence of acute infection- no podiatric surgical procedure is recommended      · Surgery has placed VAC therapy on back and buttock wounds; this is approved by his facility   · Status post chemical and manual debridement   · COVID/RSV/flu (visitors from his facility)- negative   · 1 set of blood culture (12/15) was able to obtain by IR with midline placement- NGTD  · Monitor off antibiotics for now   · Tylenol

## 2022-12-17 NOTE — PLAN OF CARE
Problem: Prexisting or High Potential for Compromised Skin Integrity  Goal: Skin integrity is maintained or improved  Description: INTERVENTIONS:  - Identify patients at risk for skin breakdown  - Assess and monitor skin integrity  - Assess and monitor nutrition and hydration status  - Monitor labs   - Assess for incontinence   - Turn and reposition patient  - Assist with mobility/ambulation  - Relieve pressure over bony prominences  - Avoid friction and shearing  - Provide appropriate hygiene as needed including keeping skin clean and dry  - Evaluate need for skin moisturizer/barrier cream  - Collaborate with interdisciplinary team   - Patient/family teaching  - Consider wound care consult   Outcome: Progressing     Problem: Nutrition/Hydration-ADULT  Goal: Nutrient/Hydration intake appropriate for improving, restoring or maintaining nutritional needs  Description: Monitor and assess patient's nutrition/hydration status for malnutrition  Collaborate with interdisciplinary team and initiate plan and interventions as ordered  Monitor patient's weight and dietary intake as ordered or per policy  Utilize nutrition screening tool and intervene as necessary  Determine patient's food preferences and provide high-protein, high-caloric foods as appropriate       INTERVENTIONS:  - Monitor oral intake, urinary output, labs, and treatment plans  - Assess nutrition and hydration status and recommend course of action  - Evaluate amount of meals eaten  - Assist patient with eating if necessary   - Allow adequate time for meals  - Recommend/ encourage appropriate diets, oral nutritional supplements, and vitamin/mineral supplements  - Order, calculate, and assess calorie counts as needed  - Recommend, monitor, and adjust tube feedings and TPN/PPN based on assessed needs  - Assess need for intravenous fluids  - Provide specific nutrition/hydration education as appropriate  - Include patient/family/caregiver in decisions related to nutrition  Outcome: Progressing     Problem: PAIN - ADULT  Goal: Verbalizes/displays adequate comfort level or baseline comfort level  Description: Interventions:  - Encourage patient to monitor pain and request assistance  - Assess pain using appropriate pain scale  - Administer analgesics based on type and severity of pain and evaluate response  - Implement non-pharmacological measures as appropriate and evaluate response  - Consider cultural and social influences on pain and pain management  - Notify physician/advanced practitioner if interventions unsuccessful or patient reports new pain  Outcome: Progressing     Problem: INFECTION - ADULT  Goal: Absence or prevention of progression during hospitalization  Description: INTERVENTIONS:  - Assess and monitor for signs and symptoms of infection  - Monitor lab/diagnostic results  - Monitor all insertion sites, i e  indwelling lines, tubes, and drains  - Monitor endotracheal if appropriate and nasal secretions for changes in amount and color  - Gore appropriate cooling/warming therapies per order  - Administer medications as ordered  - Instruct and encourage patient and family to use good hand hygiene technique  - Identify and instruct in appropriate isolation precautions for identified infection/condition  Outcome: Progressing  Goal: Absence of fever/infection during neutropenic period  Description: INTERVENTIONS:  - Monitor WBC    Outcome: Progressing     Problem: DISCHARGE PLANNING  Goal: Discharge to home or other facility with appropriate resources  Description: INTERVENTIONS:  - Identify barriers to discharge w/patient and caregiver  - Arrange for needed discharge resources and transportation as appropriate  - Identify discharge learning needs (meds, wound care, etc )  - Arrange for interpretive services to assist at discharge as needed  - Refer to Case Management Department for coordinating discharge planning if the patient needs post-hospital services based on physician/advanced practitioner order or complex needs related to functional status, cognitive ability, or social support system  Outcome: Progressing     Problem: Knowledge Deficit  Goal: Patient/family/caregiver demonstrates understanding of disease process, treatment plan, medications, and discharge instructions  Description: Complete learning assessment and assess knowledge base    Interventions:  - Provide teaching at level of understanding  - Provide teaching via preferred learning methods  Outcome: Progressing     Problem: SAFETY,RESTRAINT: NV/NON-SELF DESTRUCTIVE BEHAVIOR  Goal: Remains free of harm/injury (restraint for non violent/non self-detsructive behavior)  Description: INTERVENTIONS:  - Instruct patient/family regarding restraint use   - Assess and monitor physiologic and psychological status   - Provide interventions and comfort measures to meet assessed patient needs   - Identify and implement measures to help patient regain control  - Assess readiness for release of restraint   Outcome: Progressing  Goal: Returns to optimal restraint-free functioning  Description: INTERVENTIONS:  - Assess the patient's behavior and symptoms that indicate continued need for restraint  - Identify and implement measures to help patient regain control  - Assess readiness for release of restraint   Outcome: Progressing     Problem: SAFETY ADULT  Goal: Patient will remain free of falls  Description: INTERVENTIONS:  - Educate patient/family on patient safety including physical limitations  - Instruct patient to call for assistance with activity   - Consult OT/PT to assist with strengthening/mobility   - Keep Call bell within reach  - Keep bed low and locked with side rails adjusted as appropriate  - Keep care items and personal belongings within reach  - Initiate and maintain comfort rounds  - Make Fall Risk Sign visible to staff  - Offer Toileting in advance of need  - Initiate/Maintain bed alarm  - Obtain necessary fall risk management equipment:   - Apply yellow socks and bracelet for high fall risk patients  - Consider moving patient to room near nurses station  Outcome: Progressing  Goal: Maintain or return to baseline ADL function  Description: INTERVENTIONS:  -  Assess patient's ability to carry out ADLs; assess patient's baseline for ADL function and identify physical deficits which impact ability to perform ADLs (bathing, care of mouth/teeth, toileting, grooming, dressing, etc )  - Assess/evaluate cause of self-care deficits   - Assess range of motion  - Assess patient's mobility; develop plan if impaired  - Assess patient's need for assistive devices and provide as appropriate  - Encourage maximum independence but intervene and supervise when necessary  - Involve family in performance of ADLs  - Assess for home care needs following discharge   - Consider OT consult to assist with ADL evaluation and planning for discharge  - Provide patient education as appropriate  Outcome: Progressing  Goal: Maintains/Returns to pre admission functional level  Description: INTERVENTIONS:  - Perform BMAT or MOVE assessment daily    - Set and communicate daily mobility goal to care team and patient/family/caregiver     - Collaborate with rehabilitation services on mobility goals if consulted  - Record patient progress and toleration of activity level   Outcome: Progressing

## 2022-12-17 NOTE — CASE MANAGEMENT
Case Management Discharge Planning Note    Patient name Sandy Covarrubias  Location [address] [id number]/-[id number] MRN [id number]  : 1955 Date 2022       Current Admission Date: 2022  Current Admission Diagnosis:Sepsis [facility])   Patient Active Problem List    Diagnosis Date Noted   • Tachycardia 2022   • Acute deep vein thrombosis (DVT) of right upper extremity ([address] ) 2022   • Gastrointestinal hemorrhage with hematemesis 2022   • Aspiration pneumonia ([address] ) 2022   • Chronic indwelling Marrufo catheter 2022   • MSSA bacteremia 2022   • Hypokalemia 2022   • Pressure ulcer of ischium, right, stage III ([address] ) 2022   • Sepsis ([address] ) 2022   • Developmental disability 2022   • Pressure injury of sacral region, stage 4 ([address] ) 10/26/2022   • Pressure ulcers of skin of multiple topographic sites 10/26/2022   • Pressure ulcer of right lower back, stage 3 ([address] ) 10/26/2022   • Pressure ulcer of left foot, stage 4 ([address] ) 10/26/2022   • Pressure injury of right upper back, stage 4 ([address] ) 10/26/2022   • Open wound of right hand without foreign body 10/26/2022   • Pressure injury of left heel, stage 4 ([address] ) 10/26/2022      LOS (days): 21  Geometric Mean LOS (GMLOS) (days):   Days to GMLOS:     OBJECTIVE:  Risk of Unplanned Readmission Score: 18 15         Current admission status: Inpatient   Preferred Pharmacy: No Pharmacies Listed  Primary Care Provider: Antwan Pretty MD    Primary Insurance: [address] [facility]  Secondary Insurance:     DISCHARGE DETAILS:    Discharge planning discussed with[de-identified] ching received a call from Margaux Mitchell @ 11:57 am and I made her aware that the pt is stable to return to Health system as long as we can get transportation today due to the weather and the wound vac is delivered, cm placed a call to Misha Berg at 12:19 #  [phone]  and I made her aware the pt is stable for d/c - she stated before the pt can return the rn needs to given report and the doctor needs to given report to their doctor- cm made the rn and doctor aware of this information- cm called back at 13:31pm to make her aware I was able to get a 4pm transport- she stated I could not send until the doctor calls, Dionte Kirkland did call their doctor and she gave report as requested- cm called again at 14:15pm and I was told due to staffing and clay[place] weekend and that there is no doctor ther on clayton weekend they are not able to accept- I made aware the pt will be d/c on Monday and I will call with the transport time- cm was told he cannot come until they get report from the doctor and rn before he can go back- covid test will be needed  Freedom of Choice: Yes  Comments - Freedom of Choice: pt is a bed hold at [address] [place] [address] contacted family/caregiver?: Yes             Contacts  Patient Contacts:  Jordi Joseph  Relationship to Patient[de-identified] Family  Contact Method: Phone  Phone Number: [phone]  Reason/Outcome: Discharge [address]         Is the patient interested in [facility] at discharge?: No    DME Referral Provided  Referral made for DME?: Yes (kci vac)    Other Referral/Resources/Interventions Provided:  Interventions: Wound Vac  Referral Comments: wound vac has been delivered to the center as per Prasad Zhang the supervisor    Would you like to participate in our [address] service program?  : No - Declined    Treatment Team Recommendation: SNF ([address])

## 2022-12-17 NOTE — ASSESSMENT & PLAN NOTE
· Initially presented with shock state  Now BP stabilized  Sepsis, POA, as evidenced by tachycardia, leukocytosis, and fever with underlying multiple decubitus ulcers  · Status post initial treatment with IV vancomycin, cefepime, which was transitioned to cefazolin 2g Q8 through 12/11/2022  · 1/2 blood cultures from 11/25/2022 MSSA and CoNS (likely contaminant)  MSSA however felt to be due to infected sacral wound  Repeat cultures are negative to date  · Wound cultures- polymicrobial C koseri, Proteus, Pseudomonas and S aureus   · CT chest/a/p (12/12) : no evidence of acute or new infectious etiology  · ID/surgery/podiatry input appreciated   · Despite completion of antibiotics, he was having low grade fever  Leukocytosis persists but stabilizes  · Possible related to ongoing aspiration vs new VTE process (no erythema/swelling of extremities) vs central fever/CVA (no change in mental status/neuro status)  No new/ongoing infectious etiology is suspected  Fever is improving  · Chronic osteomyelitis of left foot and heel ulcers without evidence of acute infection- no podiatric surgical procedure is recommended  · Surgery has placed VAC therapy on back and buttock wounds   Plan for another change prior to d/c in AM    · Status post chemical and manual debridement   · COVID/RSV/flu (visitors from his facility)- negative   · 1 set of blood culture (12/15) was able to obtain by IR with midline placement- NGTD  · Monitor off antibiotics for now   · Tylenol

## 2022-12-17 NOTE — PROGRESS NOTES
[address]  Progress Note Gale Tejeda 1955, 74 y o  male MRN: [id number]  Unit/Bed#: -01 Encounter: [id number]  Primary Care Provider: Bob Gunn MD   Date and time admitted to hospital: 11/25/2022  2:30 PM    Pressure ulcers of skin of multiple topographic sites  Assessment & Plan  Assessment:  Deep stage III ulcer to upper back 7 5 x 5 5 cm  Deep stage III ulcer to sacrum 6 5 x 4 0 cm  Deep stage III ulcer to right ischium 4 0 x 4 5 cm  Plan:  VAC replaced at bedside with good seal, wounds clean dry and without infection, mild slough to the ischium  Continue negative pressure therapy at -125 mmHg  Surgery will change VAC Monday morning prior to discharge  Subjective/Objective   Chief Complaint: nonverbal    Subjective: nonverbal    Objective: no problems with vac    Blood pressure 108/66, pulse (!) 114, temperature (!) 97 1 °F (36 2 °C), temperature source Temporal, resp  rate 20, height 5' 5" (1 651 m), weight 53 3 kg (117 lb 8 1 oz), SpO2 97 %  ,Body mass index is 19 55 kg/m²  Intake/Output Summary (Last 24 hours) at 12/17/2022 1129  Last data filed at 12/17/2022 3730  Gross per 24 hour   Intake 2045 ml   Output 450 ml   Net 1595 ml       Invasive Devices     Peripheral Intravenous Line  Duration           Long-Dwell Peripheral IV (Midline) 12/15/22 Left Brachial 1 day          Drain  Duration           Colostomy -- days    Urethral Catheter Temperature probe 18 days    NG/OG/Enteral Tube Enteral Feeding Tube 12 days                Physical Exam  Constitutional:       Comments: Awake, no apparent distress  Musculoskeletal:      Comments: Contracted  Skin:     Comments: Upper back wound is clean open with early granulation tissue no slough or signs of infection  Sacral wound is clean open with early granulation tissue no slough or signs of infection    Right ischial wound is clean open with thin layer of slough at the wound base no signs of infection  Mild odor to the wound which is expected with the [facility] therapy  Wounds measured irrigated and then prepped for new VAC application which was completed with good seal            Lab, Imaging and other studies:I have personally reviewed pertinent lab results      VTE Pharmacologic Prophylaxis: Reason for no pharmacologic prophylaxis per SLIM  VTE Mechanical Prophylaxis: sequential compression device

## 2022-12-17 NOTE — ASSESSMENT & PLAN NOTE
Assessment:  Deep stage III ulcer to upper back 7 5 x 5 5 cm  Deep stage III ulcer to sacrum 6 5 x 4 0 cm  Deep stage III ulcer to right ischium 4 0 x 4 5 cm  Plan:  VAC replaced at bedside with good seal, wounds clean dry and without infection, mild slough to the ischium  Continue negative pressure therapy at -125 mmHg  Surgery will change VAC Monday morning prior to discharge

## 2022-12-18 LAB
GLUCOSE SERPL-MCNC: 108 MG/DL (ref 65–140)
GLUCOSE SERPL-MCNC: 127 MG/DL (ref 65–140)
GLUCOSE SERPL-MCNC: 153 MG/DL (ref 65–140)
GLUCOSE SERPL-MCNC: 93 MG/DL (ref 65–140)

## 2022-12-18 RX ORDER — METOPROLOL TARTRATE 50 MG/1
50 TABLET, FILM COATED ORAL EVERY 12 HOURS SCHEDULED
Status: DISCONTINUED | OUTPATIENT
Start: 2022-12-18 | End: 2022-12-20 | Stop reason: HOSPADM

## 2022-12-18 RX ADMIN — HYDROMORPHONE HYDROCHLORIDE 5 MG: 2 TABLET ORAL at 21:05

## 2022-12-18 RX ADMIN — HYDROMORPHONE HYDROCHLORIDE 5 MG: 2 TABLET ORAL at 05:48

## 2022-12-18 RX ADMIN — HYDROMORPHONE HYDROCHLORIDE 5 MG: 2 TABLET ORAL at 12:52

## 2022-12-18 RX ADMIN — CHLORHEXIDINE GLUCONATE 0.12% ORAL RINSE 15 ML: 1.2 LIQUID ORAL at 21:05

## 2022-12-18 RX ADMIN — ACETAMINOPHEN 975 MG: 325 TABLET ORAL at 13:07

## 2022-12-18 RX ADMIN — SENNOSIDES AND DOCUSATE SODIUM 1 TABLET: 50; 8.6 TABLET ORAL at 09:05

## 2022-12-18 RX ADMIN — INSULIN LISPRO 1 UNITS: 100 INJECTION, SOLUTION INTRAVENOUS; SUBCUTANEOUS at 12:52

## 2022-12-18 RX ADMIN — METOPROLOL TARTRATE 50 MG: 50 TABLET ORAL at 21:04

## 2022-12-18 RX ADMIN — HYDROMORPHONE HYDROCHLORIDE 5 MG: 2 TABLET ORAL at 17:29

## 2022-12-18 RX ADMIN — ACETAMINOPHEN 975 MG: 325 TABLET ORAL at 21:04

## 2022-12-18 RX ADMIN — CHLORHEXIDINE GLUCONATE 0.12% ORAL RINSE 15 ML: 1.2 LIQUID ORAL at 09:05

## 2022-12-18 RX ADMIN — HYDROMORPHONE HYDROCHLORIDE 5 MG: 2 TABLET ORAL at 02:10

## 2022-12-18 RX ADMIN — FAMOTIDINE 20 MG: 40 POWDER, FOR SUSPENSION ORAL at 17:29

## 2022-12-18 RX ADMIN — ACETAMINOPHEN 975 MG: 325 TABLET ORAL at 05:48

## 2022-12-18 RX ADMIN — APIXABAN 5 MG: 5 TABLET, FILM COATED ORAL at 17:29

## 2022-12-18 RX ADMIN — APIXABAN 5 MG: 5 TABLET, FILM COATED ORAL at 09:05

## 2022-12-18 RX ADMIN — HYDROMORPHONE HYDROCHLORIDE 5 MG: 2 TABLET ORAL at 09:05

## 2022-12-18 RX ADMIN — FAMOTIDINE 20 MG: 40 POWDER, FOR SUSPENSION ORAL at 09:20

## 2022-12-18 RX ADMIN — SENNOSIDES AND DOCUSATE SODIUM 1 TABLET: 50; 8.6 TABLET ORAL at 17:29

## 2022-12-18 RX ADMIN — METOPROLOL TARTRATE 50 MG: 50 TABLET ORAL at 09:05

## 2022-12-18 RX ADMIN — POLYETHYLENE GLYCOL 3350 17 G: 17 POWDER, FOR SOLUTION ORAL at 09:05

## 2022-12-18 NOTE — PROGRESS NOTES
[address]  Progress Note Theoplis Postin 1955, 74 y o  male MRN: [id number]  Unit/Bed#: -01 Encounter: [id number]  Primary Care Provider: Enrique Terry MD   Date and time admitted to hospital: 11/25/2022  2:30 PM    * Sepsis [facility])  Assessment & Plan  · Initially presented with shock state  Now BP stabilized  Sepsis, POA, as evidenced by tachycardia, leukocytosis, and fever with underlying multiple decubitus ulcers  · Status post initial treatment with IV vancomycin, cefepime, which was transitioned to cefazolin 2g Q8 through 12/11/2022  · 1/2 blood cultures from 11/25/2022 MSSA and CoNS (likely contaminant)  MSSA however felt to be due to infected sacral wound  Repeat cultures are negative to date  · Wound cultures- polymicrobial C koseri, Proteus, Pseudomonas and S aureus   · CT chest/a/p (12/12) : no evidence of acute or new infectious etiology  · ID/surgery/podiatry input appreciated   · Despite completion of antibiotics, he was having low grade fever  Leukocytosis persists but stabilizes  · Possible related to ongoing aspiration vs new VTE process (no erythema/swelling of extremities) vs central fever/CVA (no change in mental status/neuro status)  No new/ongoing infectious etiology is suspected  Fever is improving  · Chronic osteomyelitis of left foot and heel ulcers without evidence of acute infection- no podiatric surgical procedure is recommended  · Surgery has placed VAC therapy on back and buttock wounds  Plan for another change prior to d/c in AM    · Status post chemical and manual debridement   · COVID/RSV/flu (visitors from his facility)- negative   · 1 set of blood culture (12/15) was able to obtain by IR with midline placement- NGTD  · Monitor off antibiotics for now   · Tylenol         Tachycardia  Assessment & Plan  · Appears to be chronic   However could also be related to possible on-going infection/inflammatory process   · Does not appear to be volume depleted  · Started on low dose metoprolol increased to 50 mg BID   · Continue to monitor closely          Acute deep vein thrombosis (DVT) of right upper extremity (HCC)  Assessment & Plan  · Right arm swelling and tenderness noted  · Venous duplex of right upper extremity was positive for DVT  · Started on Eliquis          Aspiration pneumonia ([facility] )  Assessment & Plan  · With witnessed aspiration event during intubation  · Completed cefepime per infectious disease               Gastrointestinal hemorrhage with hematemesis  Assessment & Plan  · Originally admitted for shock, patient developed tachypnea, tachycardia, and hypotension and noted to be in respiratory distress possibly due to aspiration (patient has PEG tube, receives tube feeding)  · Patient was witnessed to have a large amount of coffee-ground emesis while being suctioned in preparation for intubation  Patient was intubated and upgraded to the ICU at that time  He was extubated on 12/03  · He received 3 units of PRBCs total and 1 unit of FFP  · GI recommendations appreciated   · Heparin SQ had been resumed on 12/01/2022   On 12/05, patient was noted to have a DVT of the right upper extremity and started on Eliquis   · Monitor for bleeding, monitor hemoglobin    · Trial pepcid instead of PPI due to on-going fever         MSSA bacteremia  Assessment & Plan  · Management as outlined above    · Echocardiogram without any evidence of vegetation        Developmental disability  Assessment & Plan  · Patient with profound development disability, nonverbal, bed bound  · Patient on tube feeds/strict NPO with 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         Pressure ulcers of skin of multiple topographic sites  Assessment & Plan  · Surgery/podiatry/wound care input appreciated    · Continue with local wound care, frequent turn and reposition            VTE Prophylaxis:  Apixaban (Eliquis)    Patient Centered Rounds: I have performed bedside rounds with nursing staff today  Discussions with Specialists or Other Care Team Provider: surgery   Education and Discussions with Family / Patient: patient     Current Length of Stay: 23 day(s)    Current Patient Status: Inpatient   Certification Statement: The patient will continue to require additional inpatient hospital stay due to sepsis     Discharge Plan: discharge in AM     Code Status: Level 1 - Full Code    Subjective:   No acute event overnight  Tmax 100F    Objective:     Vitals:   Temp (24hrs), Av 4 °F (37 4 °C), Min:98 9 °F (37 2 °C), Max:100 °F (37 8 °C)    Temp:  [98 9 °F (37 2 °C)-100 °F (37 8 °C)] 98 9 °F (37 2 °C)  HR:  [120-134] 132  Resp:  [18-19] 19  BP: (119-124)/(66-81) 121/66  SpO2:  [98 %-99 %] 99 %  Body mass index is 19 92 kg/m²  Input and Output Summary (last 24 hours): Intake/Output Summary (Last 24 hours) at 2022 1256  Last data filed at 2022 1230  Gross per 24 hour   Intake 1350 ml   Output 1185 ml   Net 165 ml       Physical Exam:   Physical Exam  Vitals and nursing note reviewed  Constitutional:       General: He is not in acute distress  Appearance: Normal appearance  HENT:      Head: Normocephalic and atraumatic  Right Ear: External ear normal       Left Ear: External ear normal       Nose: Nose normal       Mouth/Throat:      Mouth: Mucous membranes are moist       Pharynx: Oropharynx is clear  Eyes:      General:         Right eye: No discharge  Left eye: No discharge  Extraocular Movements: Extraocular movements intact  Pupils: Pupils are equal, round, and reactive to light  Cardiovascular:      Rate and Rhythm: Regular rhythm  Tachycardia present  Pulses: Normal pulses  Heart sounds: Normal heart sounds  No murmur heard  Pulmonary:      Effort: Pulmonary effort is normal  No respiratory distress  Breath sounds: No wheezing or rales        Comments: Decreased in bases    Abdominal:      General: Bowel sounds are normal  There is no distension  Palpations: Abdomen is soft  There is no mass  Tenderness: There is no abdominal tenderness  Musculoskeletal:         General: No swelling, tenderness or deformity  Normal range of motion  Cervical back: Normal range of motion and neck supple  No rigidity  Skin:     General: Skin is warm and dry  Capillary Refill: Capillary refill takes less than 2 seconds  Coloration: Skin is not pale  Findings: No erythema  Neurological:      Mental Status: He is alert  Mental status is at baseline  Comments: Non-verbal at baseline  Extremities contracture  Psychiatric:      Comments: Unable to assess            Additional Data:     Labs:    Results from last 7 days   Lab Units 12/15/22  1655   WBC Thousand/uL 13 58*   HEMOGLOBIN g/dL 8 8*   HEMATOCRIT % 29 9*   PLATELETS Thousands/uL 680*     Results from last 7 days   Lab Units 12/15/22  1655 12/14/22  0549   SODIUM mmol/L 134* 138   POTASSIUM mmol/L 3 7 4 1   CHLORIDE mmol/L 99 101   CO2 mmol/L 28 29   BUN mg/dL 10 8   CREATININE mg/dL 0 25* 0 30*   CALCIUM mg/dL 9 1 9 0   ALK PHOS U/L  --  124*   ALT U/L  --  5*   AST U/L  --  17         Results from last 7 days   Lab Units 12/18/22  1153 12/18/22  0606 12/17/22  2353 12/17/22  1818 12/17/22  1115 12/17/22  0606 12/17/22  0000 12/16/22  1828 12/16/22  1203 12/16/22  0544 12/16/22  0017 12/15/22  1809   POC GLUCOSE mg/dl 153* 108 127 103 110 117 109 141* 119 104 110 118           * I Have Reviewed All Lab Data Listed Above  * Additional Pertinent Lab Tests Reviewed: [facility]  Reviewed    Imaging:  Imaging Reports Reviewed Today Include: n/a     Recent Cultures (last 7 days):     Results from last 7 days   Lab Units 12/15/22  1654   BLOOD CULTURE  No Growth at 48 hrs         Last 24 Hours Medication List:   Current Facility-Administered Medications   Medication Dose Route Frequency Provider Last Rate   • acetaminophen  975 mg Per G Tube Q8H [address] AVANI Chappell     • apixaban  5 mg Oral BID AVANI Patterson     • chlorhexidine  15 mL Mouth/Throat Q12H [address] Valentino Eugenio, CRNP     • famotidine  20 mg Oral BID AVANI Chappell     • HYDROmorphone  1 mg Intravenous Q2H PRN Valentino Coffee, YAHIRNP     • HYDROmorphone  5 mg Oral Q4H Valentino Eugenio, YAHIRNP     • insulin lispro  1-5 Units Subcutaneous Q6H [address] Valentino Eugenio, CRNP     • metoprolol tartrate  50 mg Per G Tube Q12H [address] AVANI Chappell     • ondansetron  4 mg Intravenous Q6H PRN Valentino Eugenio, CRNP     • polyethylene glycol  17 g Oral Daily Valentino Eugenio, CRNP     • senna-docusate sodium  1 tablet Oral BID Valentino Eugenio, AVANI          Today, Patient Was Seen By: AVANI Chappell    ** Please Note: Dictation voice to text software may have been used in the creation of this document   **

## 2022-12-18 NOTE — PLAN OF CARE
Problem: Prexisting or High Potential for Compromised Skin Integrity  Goal: Skin integrity is maintained or improved  Description: INTERVENTIONS:  - Identify patients at risk for skin breakdown  - Assess and monitor skin integrity  - Assess and monitor nutrition and hydration status  - Monitor labs   - Assess for incontinence   - Turn and reposition patient  - Assist with mobility/ambulation  - Relieve pressure over bony prominences  - Avoid friction and shearing  - Provide appropriate hygiene as needed including keeping skin clean and dry  - Evaluate need for skin moisturizer/barrier cream  - Collaborate with interdisciplinary team   - Patient/family teaching  - Consider wound care consult   Outcome: Progressing     Problem: Nutrition/Hydration-ADULT  Goal: Nutrient/Hydration intake appropriate for improving, restoring or maintaining nutritional needs  Description: Monitor and assess patient's nutrition/hydration status for malnutrition  Collaborate with interdisciplinary team and initiate plan and interventions as ordered  Monitor patient's weight and dietary intake as ordered or per policy  Utilize nutrition screening tool and intervene as necessary  Determine patient's food preferences and provide high-protein, high-caloric foods as appropriate       INTERVENTIONS:  - Monitor oral intake, urinary output, labs, and treatment plans  - Assess nutrition and hydration status and recommend course of action  - Evaluate amount of meals eaten  - Assist patient with eating if necessary   - Allow adequate time for meals  - Recommend/ encourage appropriate diets, oral nutritional supplements, and vitamin/mineral supplements  - Order, calculate, and assess calorie counts as needed  - Recommend, monitor, and adjust tube feedings and TPN/PPN based on assessed needs  - Assess need for intravenous fluids  - Provide specific nutrition/hydration education as appropriate  - Include patient/family/caregiver in decisions related to nutrition  Outcome: Progressing     Problem: PAIN - ADULT  Goal: Verbalizes/displays adequate comfort level or baseline comfort level  Description: Interventions:  - Encourage patient to monitor pain and request assistance  - Assess pain using appropriate pain scale  - Administer analgesics based on type and severity of pain and evaluate response  - Implement non-pharmacological measures as appropriate and evaluate response  - Consider cultural and social influences on pain and pain management  - Notify physician/advanced practitioner if interventions unsuccessful or patient reports new pain  Outcome: Progressing     Problem: INFECTION - ADULT  Goal: Absence or prevention of progression during hospitalization  Description: INTERVENTIONS:  - Assess and monitor for signs and symptoms of infection  - Monitor lab/diagnostic results  - Monitor all insertion sites, i e  indwelling lines, tubes, and drains  - Monitor endotracheal if appropriate and nasal secretions for changes in amount and color  - [place] appropriate cooling/warming therapies per order  - Administer medications as ordered  - Instruct and encourage patient and family to use good hand hygiene technique  - Identify and instruct in appropriate isolation precautions for identified infection/condition  Outcome: Progressing  Goal: Absence of fever/infection during neutropenic period  Description: INTERVENTIONS:  - Monitor WBC    Outcome: Progressing     Problem: DISCHARGE PLANNING  Goal: Discharge to home or other facility with appropriate resources  Description: INTERVENTIONS:  - Identify barriers to discharge w/patient and caregiver  - Arrange for needed discharge resources and transportation as appropriate  - Identify discharge learning needs (meds, wound care, etc )  - Arrange for interpretive services to assist at discharge as needed  - Refer to Case Management Department for coordinating discharge planning if the patient needs post-hospital services based on physician/advanced practitioner order or complex needs related to functional status, cognitive ability, or social support system  Outcome: Progressing     Problem: Knowledge Deficit  Goal: Patient/family/caregiver demonstrates understanding of disease process, treatment plan, medications, and discharge instructions  Description: Complete learning assessment and assess knowledge base    Interventions:  - Provide teaching at level of understanding  - Provide teaching via preferred learning methods  Outcome: Progressing     Problem: SAFETY,RESTRAINT: NV/NON-SELF DESTRUCTIVE BEHAVIOR  Goal: Remains free of harm/injury (restraint for non violent/non self-detsructive behavior)  Description: INTERVENTIONS:  - Instruct patient/family regarding restraint use   - Assess and monitor physiologic and psychological status   - Provide interventions and comfort measures to meet assessed patient needs   - Identify and implement measures to help patient regain control  - Assess readiness for release of restraint   Outcome: Progressing  Goal: Returns to optimal restraint-free functioning  Description: INTERVENTIONS:  - Assess the patient's behavior and symptoms that indicate continued need for restraint  - Identify and implement measures to help patient regain control  - Assess readiness for release of restraint   Outcome: Progressing     Problem: SAFETY ADULT  Goal: Patient will remain free of falls  Description: INTERVENTIONS:  - Educate patient/family on patient safety including physical limitations  - Instruct patient to call for assistance with activity   - Consult OT/PT to assist with strengthening/mobility   - Keep Call bell within reach  - Keep bed low and locked with side rails adjusted as appropriate  - Keep care items and personal belongings within reach  - Initiate and maintain comfort rounds  - Make Fall Risk Sign visible to staff  - Offer Toileting in advance of need  - Initiate/Maintain bed alarm  - Obtain necessary fall risk management equipment:   - Apply yellow socks and bracelet for high fall risk patients  - Consider moving patient to room near nurses station  Outcome: Progressing  Goal: Maintain or return to baseline ADL function  Description: INTERVENTIONS:  -  Assess patient's ability to carry out ADLs; assess patient's baseline for ADL function and identify physical deficits which impact ability to perform ADLs (bathing, care of mouth/teeth, toileting, grooming, dressing, etc )  - Assess/evaluate cause of self-care deficits   - Assess range of motion  - Assess patient's mobility; develop plan if impaired  - Assess patient's need for assistive devices and provide as appropriate  - Encourage maximum independence but intervene and supervise when necessary  - Involve family in performance of ADLs  - Assess for home care needs following discharge   - Consider OT consult to assist with ADL evaluation and planning for discharge  - Provide patient education as appropriate  Outcome: Progressing  Goal: Maintains/Returns to pre admission functional level  Description: INTERVENTIONS:  - Perform BMAT or MOVE assessment daily    - Set and communicate daily mobility goal to care team and patient/family/caregiver     - Collaborate with rehabilitation services on mobility goals if consulted  - Record patient progress and toleration of activity level   Outcome: Progressing

## 2022-12-18 NOTE — CASE MANAGEMENT
Case Management Progress Note    Patient name Domenico Julio  Location [address] [id number]/-[id number] MRN [id number]  : 1955 Date 2022       LOS (days): 23  Geometric Mean LOS (GMLOS) (days):   Days to GMLOS:        OBJECTIVE:        Current admission status: Inpatient  Preferred Pharmacy: No Pharmacies Listed  Primary Care Provider: Airam Mcmanus MD    Primary Insurance: [place] [employer]  Secondary Insurance:     PROGRESS NOTE:  Cm was able to get a transport for 3 pm tomorrow, cm received a call from [address] [place] [address]- she was made aware the doctor has change the rx for the wound vac dressing, she stated they do have 2 vacs and supplies, cm was told we need a new coviod test and she stated they would like the patient before 1 pm  Because their doctor leaves at 1pm, she stated we do not need to change the dressing because they will need to take it off to measure wounds,cm will reach out to [facility] with the new dressing order, ching was instructed to call the supervisor in the morning they may be able take him tomorrow at 3pm, ching did make Marii pass aware to put is on the list to have Kathleen earlier transport if there is a cancellation, rn needs to give report and the doctor needs to give report to the doctor before the pt can be accepted back

## 2022-12-18 NOTE — ASSESSMENT & PLAN NOTE
· Appears to be chronic  However could also be related to possible on-going infection/inflammatory process   · Does not appear to be volume depleted     · Started on low dose metoprolol increased to 50 mg BID   · Continue to monitor closely

## 2022-12-18 NOTE — QUICK NOTE
Discussed with RN  VAC functioning, no leak  Plan for [facility] change tomorrow prior to d/c

## 2022-12-19 LAB
GLUCOSE SERPL-MCNC: 116 MG/DL (ref 65–140)
GLUCOSE SERPL-MCNC: 121 MG/DL (ref 65–140)
GLUCOSE SERPL-MCNC: 122 MG/DL (ref 65–140)
GLUCOSE SERPL-MCNC: 131 MG/DL (ref 65–140)
GLUCOSE SERPL-MCNC: 131 MG/DL (ref 65–140)
SARS-COV-2 RNA RESP QL NAA+PROBE: NEGATIVE

## 2022-12-19 RX ADMIN — HYDROMORPHONE HYDROCHLORIDE 5 MG: 2 TABLET ORAL at 13:06

## 2022-12-19 RX ADMIN — FAMOTIDINE 20 MG: 40 POWDER, FOR SUSPENSION ORAL at 17:12

## 2022-12-19 RX ADMIN — HYDROMORPHONE HYDROCHLORIDE 5 MG: 2 TABLET ORAL at 09:34

## 2022-12-19 RX ADMIN — ACETAMINOPHEN 975 MG: 325 TABLET ORAL at 05:48

## 2022-12-19 RX ADMIN — APIXABAN 5 MG: 5 TABLET, FILM COATED ORAL at 09:34

## 2022-12-19 RX ADMIN — METOPROLOL TARTRATE 50 MG: 50 TABLET ORAL at 21:08

## 2022-12-19 RX ADMIN — SENNOSIDES AND DOCUSATE SODIUM 1 TABLET: 50; 8.6 TABLET ORAL at 17:11

## 2022-12-19 RX ADMIN — ACETAMINOPHEN 975 MG: 325 TABLET ORAL at 21:07

## 2022-12-19 RX ADMIN — APIXABAN 5 MG: 5 TABLET, FILM COATED ORAL at 17:11

## 2022-12-19 RX ADMIN — HYDROMORPHONE HYDROCHLORIDE 5 MG: 2 TABLET ORAL at 05:47

## 2022-12-19 RX ADMIN — CHLORHEXIDINE GLUCONATE 0.12% ORAL RINSE 15 ML: 1.2 LIQUID ORAL at 09:33

## 2022-12-19 RX ADMIN — HYDROMORPHONE HYDROCHLORIDE 5 MG: 2 TABLET ORAL at 01:49

## 2022-12-19 RX ADMIN — HYDROMORPHONE HYDROCHLORIDE 5 MG: 2 TABLET ORAL at 21:08

## 2022-12-19 RX ADMIN — CHLORHEXIDINE GLUCONATE 0.12% ORAL RINSE 15 ML: 1.2 LIQUID ORAL at 21:08

## 2022-12-19 RX ADMIN — ACETAMINOPHEN 975 MG: 325 TABLET ORAL at 13:06

## 2022-12-19 RX ADMIN — SENNOSIDES AND DOCUSATE SODIUM 1 TABLET: 50; 8.6 TABLET ORAL at 09:34

## 2022-12-19 RX ADMIN — POLYETHYLENE GLYCOL 3350 17 G: 17 POWDER, FOR SOLUTION ORAL at 09:34

## 2022-12-19 RX ADMIN — HYDROMORPHONE HYDROCHLORIDE 5 MG: 2 TABLET ORAL at 17:11

## 2022-12-19 RX ADMIN — METOPROLOL TARTRATE 50 MG: 50 TABLET ORAL at 09:34

## 2022-12-19 RX ADMIN — FAMOTIDINE 20 MG: 40 POWDER, FOR SUSPENSION ORAL at 09:35

## 2022-12-19 NOTE — PROGRESS NOTES
[address]  68   Progress Note Eloisa Nuñez 1955, 74 y o  male MRN: [id number]  Unit/Bed#: -01 Encounter: [id number]  Primary Care Provider: Sherlyn Salcedo MD   Date and time admitted to hospital: 11/25/2022  2:30 PM    * Sepsis [facility])  Assessment & Plan  · Initially presented with shock state  Now BP is stabilized  Sepsis, POA, as evidenced by tachycardia, leukocytosis, and fever with underlying multiple decubitus ulcers  · Status post initial treatment with IV vancomycin, cefepime, which was transitioned to cefazolin 2g Q8 through 12/11/2022  · 1/2 blood cultures from 11/25/2022 MSSA and CoNS (likely contaminant)  MSSA however felt to be due to infected sacral wound  Repeat cultures are negative to date  · Wound cultures- polymicrobial C koseri, Proteus, Pseudomonas and S aureus   · CT chest/a/p (12/12) : no evidence of acute or new infectious etiology  · ID/surgery/podiatry input appreciated   · Despite completion of antibiotics, he was having low grade fever  Leukocytosis persists but is stable  · Possible related to ongoing aspiration vs new VTE process (no erythema/swelling of extremities) vs central fever/CVA (no change in mental status/neuro status)  · No new/ongoing infectious etiology is suspected  Fever is improving  · Chronic osteomyelitis of left foot and heel ulcers without evidence of acute infection- no podiatric surgical procedure is recommended  · Surgery has placed VAC therapy on back and buttock wounds   Plan for another change prior to discharge  · Status post chemical and manual debridement   · COVID/RSV/flu (visitors from his facility)- negative   · One set of blood cultures (12/15) was able to obtained by IR with midline placement- NGTD  · Monitor off antibiotics for now   · Tylenol         Gastrointestinal hemorrhage with hematemesis  Assessment & Plan  · Originally admitted for shock, patient developed tachypnea, tachycardia, and hypotension and noted to be in respiratory distress possibly due to aspiration (patient has PEG tube, receives tube feeding)  · Patient was witnessed to have a large amount of coffee-ground emesis while being suctioned in preparation for intubation  Patient was intubated and upgraded to the ICU at that time  He was extubated on 12/03  · He received 3 units of PRBCs total and 1 unit of FFP  · GI recommendations appreciated   · Heparin SQ had been resumed on 12/01/2022  On 12/05, patient was noted to have a DVT of the right upper extremity and started on Eliquis   · Monitor for bleeding, monitor hemoglobin    · Trial pepcid instead of PPI due to on-going fever         Acute deep vein thrombosis (DVT) of right upper extremity (HCC)  Assessment & Plan  · Right arm swelling and tenderness noted  · Venous duplex of right upper extremity was positive for DVT  · Started on Eliquis          Pressure ulcers of skin of multiple topographic sites  Assessment & Plan  · Surgery/podiatry/wound care input appreciated    · Continue with local wound care, frequent turn and reposition          Tachycardia  Assessment & Plan  · Appears to be chronic  However could also be related to possible on-going infection/inflammatory process   · Does not appear to be volume depleted     · Started on low dose metoprolol increased to 50 mg BID   · Continue to monitor closely          Aspiration pneumonia ([facility] [address] )  Assessment & Plan  · With witnessed aspiration event during intubation  · Completed cefepime per infectious disease               MSSA bacteremia  Assessment & Plan  · Management as outlined above    · Echocardiogram without any evidence of vegetation        Developmental disability  Assessment & Plan  · Patient with profound development disability, nonverbal, bed bound  · Patient on tube feeds/strict NPO with 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           VTE Pharmacologic Prophylaxis:   Pharmacologic: Apixaban (Eliquis)    Patient Centered Rounds: I have performed bedside rounds with nursing staff today  Discussions with Specialists or Other Care Team Provider: CM    Time Spent for Care: 30 minutes  More than 50% of total time spent on counseling and coordination of care as described above  Current Length of Stay: 24 day(s)    Current Patient Status: Inpatient   Certification Statement: The patient will continue to require additional inpatient hospital stay due to per plan above  Discharge Plan: Return to facility, probably tomorrow  Code Status: Level 1 - Full Code      Subjective:   Unable to obtain review of systems  Objective:     Vitals:   Temp (24hrs), Av 8 °F (37 1 °C), Min:98 4 °F (36 9 °C), Max:99 2 °F (37 3 °C)    Temp:  [98 4 °F (36 9 °C)-99 2 °F (37 3 °C)] 98 4 °F (36 9 °C)  HR:  [] 124  Resp:  [17-19] 18  BP: (107-126)/(67-72) 116/72  SpO2:  [91 %-99 %] 99 %  Body mass index is 19 48 kg/m²  Input and Output Summary (last 24 hours): Intake/Output Summary (Last 24 hours) at 2022 1432  Last data filed at 2022 0947  Gross per 24 hour   Intake 1600 ml   Output 1590 ml   Net 10 ml       Physical Exam:     Physical Exam  Constitutional:       General: He is not in acute distress  HENT:      Mouth/Throat:      Mouth: Mucous membranes are moist       Pharynx: Oropharynx is clear  Eyes:      Pupils: Pupils are equal, round, and reactive to light  Cardiovascular:      Rate and Rhythm: Regular rhythm  Tachycardia present  Pulses: Normal pulses  Pulmonary:      Effort: Pulmonary effort is normal    Abdominal:      General: Abdomen is flat  Palpations: Abdomen is soft  Musculoskeletal:      Right lower leg: No edema  Left lower leg: No edema  Comments: Contracted extremities   Skin:     General: Skin is warm and dry  Neurological:      Mental Status: Mental status is at baseline           Additional Data:     Labs:    Results from last 7 days   Lab Units 12/15/22  1655   WBC Thousand/uL 13 58*   HEMOGLOBIN g/dL 8 8*   HEMATOCRIT % 29 9*   PLATELETS Thousands/uL 680*     Results from last 7 days   Lab Units 12/15/22  1655 12/14/22  0549   SODIUM mmol/L 134* 138   POTASSIUM mmol/L 3 7 4 1   CHLORIDE mmol/L 99 101   CO2 mmol/L 28 29   BUN mg/dL 10 8   CREATININE mg/dL 0 25* 0 30*   ANION GAP mmol/L 7 8   CALCIUM mg/dL 9 1 9 0   ALBUMIN g/dL  --  2 6*   TOTAL BILIRUBIN mg/dL  --  0 33   ALK PHOS U/L  --  124*   ALT U/L  --  5*   AST U/L  --  17   GLUCOSE RANDOM mg/dL 112 105         Results from last 7 days   Lab Units 12/19/22  1201 12/19/22  0616 12/19/22  0015 12/18/22  1747 12/18/22  1153 12/18/22  0606 12/17/22  2353 12/17/22  1818 12/17/22  1115 12/17/22  0606 12/17/22  0000 12/16/22  1828   POC GLUCOSE mg/dl 131 116 122 93 153* 108 127 103 110 117 109 141*                   * I Have Reviewed All Lab Data Listed Above  * Additional Pertinent Lab Tests Reviewed: [address] Admission Reviewed    Recent Cultures (last 7 days):     Results from last 7 days   Lab Units 12/15/22  1654   BLOOD CULTURE  No Growth at 72 hrs         Last 24 Hours Medication List:   Current Facility-Administered Medications   Medication Dose Route Frequency Provider Last Rate   • acetaminophen  975 mg Per G Tube Q8H [address] [place], CRNP     • apixaban  5 mg Oral BID AVANI Hall     • chlorhexidine  15 mL Mouth/Throat Q12H [address] Max Shannen, CRNP     • famotidine  20 mg Oral BID [place], CRNP     • HYDROmorphone  1 mg Intravenous Q2H PRN Max Shannen, CRNP     • HYDROmorphone  5 mg Oral Q4H Max Shannen, CRNP     • insulin lispro  1-5 Units Subcutaneous Q6H [address] Max Shannen, CRNP     • metoprolol tartrate  50 mg Per G Tube Q12H [address] [place], CRNP     • ondansetron  4 mg Intravenous Q6H PRN Max AVANI Pride     • polyethylene glycol  17 g Oral Daily Max AVANI Pride     • senna-docusate sodium  1 tablet Oral BID Max AVANI Pride Today, Patient Was Seen By: AVANI Child    ** Please Note: Dictation voice to text software may have been used in the creation of this document   **

## 2022-12-19 NOTE — ASSESSMENT & PLAN NOTE
· Initially presented with shock state  Now BP is stabilized  Sepsis, POA, as evidenced by tachycardia, leukocytosis, and fever with underlying multiple decubitus ulcers  · Status post initial treatment with IV vancomycin, cefepime, which was transitioned to cefazolin 2g Q8 through 12/11/2022  · 1/2 blood cultures from 11/25/2022 MSSA and CoNS (likely contaminant)  MSSA however felt to be due to infected sacral wound  Repeat cultures are negative to date  · Wound cultures- polymicrobial C koseri, Proteus, Pseudomonas and S aureus   · CT chest/a/p (12/12) : no evidence of acute or new infectious etiology  · ID/surgery/podiatry input appreciated   · Despite completion of antibiotics, he was having low grade fever  Leukocytosis persists but is stable  · Possible related to ongoing aspiration vs new VTE process (no erythema/swelling of extremities) vs central fever/CVA (no change in mental status/neuro status)  · No new/ongoing infectious etiology is suspected  Fever is improving  · Chronic osteomyelitis of left foot and heel ulcers without evidence of acute infection- no podiatric surgical procedure is recommended  · Surgery has placed VAC therapy on back and buttock wounds   Plan for another change prior to discharge  · Status post chemical and manual debridement   · COVID/RSV/flu (visitors from his facility)- negative   · One set of blood cultures (12/15) was able to obtained by IR with midline placement- NGTD  · Monitor off antibiotics for now   · Tylenol

## 2022-12-19 NOTE — PROGRESS NOTES
Progress Note - Infectious Disease   Liberty Burns 74 y o  male MRN: [id number]  Unit/Bed#: -01 Encounter: [id number]      Impression/Plan:  1  Sepsis, present on admission, evidenced by tachycardia and leukocytosis:  - Patient presented with infected sacral wound s/p debridement with polymicrobial culture with multiple gram negatives as well as MSSA  He also had blood cx, as below with MSSA  Completed 7 day course of Cefepime from debridement and then additional 7 days of Cefazolin, for total 14 days for MSSA bacteremia from likely sacral wound infection  Now monitoring off antibiotics, management of additional medical issues as below   -continue to monitor off abx   -monitor temperature and hemodynamics  -management per primary team      2  Intermittent Fevers/Leukocytosis:  - Patient had intermittent fever with persistent leukocytosis despite completion of antibiotics  Workup included negative CT chest/abd/pelvis 12/12, negative repeat blood cultures, negative UA, and evaluation of his wounds by surgery/podiatry/wound care without ongoing evidence of active infection  Per Care Everywhere review has had chronic WBC elevation for last 1 year and thus that may not be new  Also found to have acute RUE DVT, on therapeutic AC but still had fever  Suspect fevers were possibly aspiration related and most likely related to inflammation from his chronic wounds   Fevers resolved after surgery applied wound VACs 12/15 to ulcer on right back, ulcer to sacrum and ulcer to right ischium   - continue to monitor off antibiotics  - supportive care  - trend fever curve     3  Chronic stage IV decubitus ulcers  POA   Despite outpatient wound care, found to have significant deterioration in wounds over the last few months, particularly the right upper back and sacral wound with increasing wound depth and necrotic tissue burden with evidence of purulence and malodor on admission  CT C/A/P negative for deep seated abscesses  Wound cultures from initial bedside debridement were polymicrobial  Surgery and wound care following for chemical and manual debridement  Unfortunately wounds are unlikely to heal and are at risk for reinfection due to protein calorie malnutrition, contractures, inability to offload pressure and inability to aschieve adequate closure  Now has wound vacs in place placed 12/15 by surgery  Of note, the back wound and sacral wounds do go to bone   -Although no OM noted on CT, with worsening wounds it is likely that patient will develop chronic infection  If unable to cover/secure wounds especially of sacrum and upper right back, OM not treatable long term  In addition, prolonged abx can lead to harmful antibiotic side effects such a C  Diff, nephrotoxicity, bacterial resistance, etc      -daily wound care and surgery follow up   -ongoing wound vac management per surgery   -no further directed treatment required      4  Left lateral foot stage IV pressure ulcer with chronic OM  POA   Wounds are stable without infection   XR shows absence of the 5th metatarsal head which likely represents chronic osteomyelitis  In the setting of chronic wound with exposed bone and no plan/indication for surgical intervention, patients osteomyelitis is not treatable long-term  Prolonged course of antibiotics would just lead to increased risk of C  Diff, nephrotoxicity and resistance without prevention of ongoing development of osteomyelitis  Likely to continue to have progression of wound despite local wound care given extensive contractures and inability to offload pressure  He remains at risk for recurrent infection, bacteremia, sepsis, and limb loss     -continue local wound care and offloading pressure   - podiatry follow up as indicated     5  Acute respiratory failure with hypoxia   Patient emergently intubated in the setting of respiratory failure, aspiration and upper GI bleeding 11/29  Now extubated to nasal cannula /3  S/p EGD which showed ulcerated area in stomach s/p clip  Sputum cx while intubated shows growth of candida and Achromobacter, likely colonizers  Patient overall improved without treatment of isolates, now stable on room air   -monitor respiratory status and O2 requirements      6  Acute RUE DVT  Noted to have RUE pain, swelling, and warmth on exam  Patient had peripheral IV in RUE earlier in hospital stay that infiltrated  Also with bullous eruption of right arm  RUE US shows acute DVT in paired brachial vein and chronic non-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  Unlikely cellulitis given recent abx use  Swelling is improved  -started on Eliquis per primary team   -supportive care   -serial skin exams      7  Functional quadriplegia, developmental disability   Patient nonverbal and bed-bound at baseline on tube feeds, with chronic indwelling Marrufo catheter and colostomy  Unfortunately due to contractures, very difficult to reposition patient and offload pressure   -supportive measures per primary team     8  Antibiotic Allergy   Noted to have amoxicillin and Augmentin allergy without documented reaction   Per Care everywhere, has tolerated cefepime and ceftriaxone during prior admissions  -monitor for adverse drug reactions    Antibiotics:  Off antibiotics day: 8    Subjective:  Patient unable to participate in interview given baseline mental status  Objective:  Vitals:  Temp:  [98 4 °F (36 9 °C)-99 2 °F (37 3 °C)] 98 4 °F (36 9 °C)  HR:  [] 124  Resp:  [17-19] 18  BP: (107-126)/(67-72) 116/72  SpO2:  [91 %-99 %] 99 %  Temp (24hrs), Av 8 °F (37 1 °C), Min:98 4 °F (36 9 °C), Max:99 2 °F (37 3 °C)  Current: Temperature: 98 4 °F (36 9 °C)    Physical Exam:   General Appearance:  Alert, chronically ill, nontoxic, no acute distress  Throat: Oropharynx moist without lesions      Lungs:   Clear to auscultation bilaterally; no wheezes, rhonchi or rales; respirations unlabored   Heart:  RRR; no murmur, rub or gallop   Abdomen:   Soft, non-tender, non-distended, positive bowel sounds  Extremities: No clubbing, cyanosis or edema   Skin: No new rashes or lesions  No draining wounds noted  Labs: All pertinent labs and imaging studies were personally reviewed  Results from last 7 days   Lab Units 12/15/22  1655 12/14/22  0549 12/13/22  0622   WBC Thousand/uL 13 58* 15 48* 22 49*   HEMOGLOBIN g/dL 8 8* 8 6* 9 6*   PLATELETS Thousands/uL 680* 669* 753*     Results from last 7 days   Lab Units 12/15/22  1655 12/14/22  0549 12/13/22  0622   SODIUM mmol/L 134* 138 137   POTASSIUM mmol/L 3 7 4 1 4 3   CHLORIDE mmol/L 99 101 102   CO2 mmol/L 28 29 27   BUN mg/dL 10 8 11   CREATININE mg/dL 0 25* 0 30* 0 39*   EGFR ml/min/1 73sq m 151 140 125   CALCIUM mg/dL 9 1 9 0 9 1   AST U/L  --  17  --    ALT U/L  --  5*  --    ALK PHOS U/L  --  124*  --                        Micro:  Results from last 7 days   Lab Units 12/15/22  1654   BLOOD CULTURE  No Growth at 72 hrs         Imaging:          Danielle Kerr MD  [facility]

## 2022-12-19 NOTE — PLAN OF CARE
Problem: Nutrition/Hydration-ADULT  Goal: Nutrient/Hydration intake appropriate for improving, restoring or maintaining nutritional needs  Description: Monitor and assess patient's nutrition/hydration status for malnutrition  Collaborate with interdisciplinary team and initiate plan and interventions as ordered  Monitor patient's weight and dietary intake as ordered or per policy  Utilize nutrition screening tool and intervene as necessary  Determine patient's food preferences and provide high-protein, high-caloric foods as appropriate       INTERVENTIONS:  - Monitor oral intake, urinary output, labs, and treatment plans  - Assess nutrition and hydration status and recommend course of action  - Evaluate amount of meals eaten  - Assist patient with eating if necessary   - Allow adequate time for meals  - Recommend/ encourage appropriate diets, oral nutritional supplements, and vitamin/mineral supplements  - Order, calculate, and assess calorie counts as needed  - Recommend, monitor, and adjust tube feedings and TPN/PPN based on assessed needs  - Assess need for intravenous fluids  - Provide specific nutrition/hydration education as appropriate  - Include patient/family/caregiver in decisions related to nutrition  12/19/2022 0947 by Noelle Kelly RN  Outcome: Progressing  12/19/2022 0947 by Noelle Kelly RN  Outcome: Progressing     Problem: INFECTION - ADULT  Goal: Absence or prevention of progression during hospitalization  Description: INTERVENTIONS:  - Assess and monitor for signs and symptoms of infection  - Monitor lab/diagnostic results  - Monitor all insertion sites, i e  indwelling lines, tubes, and drains  - Monitor endotracheal if appropriate and nasal secretions for changes in amount and color  - [place] appropriate cooling/warming therapies per order  - Administer medications as ordered  - Instruct and encourage patient and family to use good hand hygiene technique  - Identify and instruct in appropriate isolation precautions for identified infection/condition  12/19/2022 0947 by Sonia Moncada, RN  Outcome: Progressing  12/19/2022 0947 by Sonia Moncada, RN  Outcome: Progressing

## 2022-12-19 NOTE — PLAN OF CARE
Problem: Prexisting or High Potential for Compromised Skin Integrity  Goal: Skin integrity is maintained or improved  Description: INTERVENTIONS:  - Identify patients at risk for skin breakdown  - Assess and monitor skin integrity  - Assess and monitor nutrition and hydration status  - Monitor labs   - Assess for incontinence   - Turn and reposition patient  - Assist with mobility/ambulation  - Relieve pressure over bony prominences  - Avoid friction and shearing  - Provide appropriate hygiene as needed including keeping skin clean and dry  - Evaluate need for skin moisturizer/barrier cream  - Collaborate with interdisciplinary team   - Patient/family teaching  - Consider wound care consult   Outcome: Progressing     Problem: Nutrition/Hydration-ADULT  Goal: Nutrient/Hydration intake appropriate for improving, restoring or maintaining nutritional needs  Description: Monitor and assess patient's nutrition/hydration status for malnutrition  Collaborate with interdisciplinary team and initiate plan and interventions as ordered  Monitor patient's weight and dietary intake as ordered or per policy  Utilize nutrition screening tool and intervene as necessary  Determine patient's food preferences and provide high-protein, high-caloric foods as appropriate       INTERVENTIONS:  - Monitor oral intake, urinary output, labs, and treatment plans  - Assess nutrition and hydration status and recommend course of action  - Evaluate amount of meals eaten  - Assist patient with eating if necessary   - Allow adequate time for meals  - Recommend/ encourage appropriate diets, oral nutritional supplements, and vitamin/mineral supplements  - Order, calculate, and assess calorie counts as needed  - Recommend, monitor, and adjust tube feedings and TPN/PPN based on assessed needs  - Assess need for intravenous fluids  - Provide specific nutrition/hydration education as appropriate  - Include patient/family/caregiver in decisions related to nutrition  Outcome: Progressing     Problem: PAIN - ADULT  Goal: Verbalizes/displays adequate comfort level or baseline comfort level  Description: Interventions:  - Encourage patient to monitor pain and request assistance  - Assess pain using appropriate pain scale  - Administer analgesics based on type and severity of pain and evaluate response  - Implement non-pharmacological measures as appropriate and evaluate response  - Consider cultural and social influences on pain and pain management  - Notify physician/advanced practitioner if interventions unsuccessful or patient reports new pain  Outcome: Progressing     Problem: INFECTION - ADULT  Goal: Absence or prevention of progression during hospitalization  Description: INTERVENTIONS:  - Assess and monitor for signs and symptoms of infection  - Monitor lab/diagnostic results  - Monitor all insertion sites, i e  indwelling lines, tubes, and drains  - Monitor endotracheal if appropriate and nasal secretions for changes in amount and color  - [place] appropriate cooling/warming therapies per order  - Administer medications as ordered  - Instruct and encourage patient and family to use good hand hygiene technique  - Identify and instruct in appropriate isolation precautions for identified infection/condition  Outcome: Progressing  Goal: Absence of fever/infection during neutropenic period  Description: INTERVENTIONS:  - Monitor WBC    Outcome: Progressing     Problem: DISCHARGE PLANNING  Goal: Discharge to home or other facility with appropriate resources  Description: INTERVENTIONS:  - Identify barriers to discharge w/patient and caregiver  - Arrange for needed discharge resources and transportation as appropriate  - Identify discharge learning needs (meds, wound care, etc )  - Arrange for interpretive services to assist at discharge as needed  - Refer to Case Management Department for coordinating discharge planning if the patient needs post-hospital services based on physician/advanced practitioner order or complex needs related to functional status, cognitive ability, or social support system  Outcome: Progressing     Problem: Knowledge Deficit  Goal: Patient/family/caregiver demonstrates understanding of disease process, treatment plan, medications, and discharge instructions  Description: Complete learning assessment and assess knowledge base    Interventions:  - Provide teaching at level of understanding  - Provide teaching via preferred learning methods  Outcome: Progressing     Problem: SAFETY,RESTRAINT: NV/NON-SELF DESTRUCTIVE BEHAVIOR  Goal: Remains free of harm/injury (restraint for non violent/non self-detsructive behavior)  Description: INTERVENTIONS:  - Instruct patient/family regarding restraint use   - Assess and monitor physiologic and psychological status   - Provide interventions and comfort measures to meet assessed patient needs   - Identify and implement measures to help patient regain control  - Assess readiness for release of restraint   Outcome: Progressing  Goal: Returns to optimal restraint-free functioning  Description: INTERVENTIONS:  - Assess the patient's behavior and symptoms that indicate continued need for restraint  - Identify and implement measures to help patient regain control  - Assess readiness for release of restraint   Outcome: Progressing     Problem: SAFETY ADULT  Goal: Patient will remain free of falls  Description: INTERVENTIONS:  - Educate patient/family on patient safety including physical limitations  - Instruct patient to call for assistance with activity   - Consult OT/PT to assist with strengthening/mobility   - Keep Call bell within reach  - Keep bed low and locked with side rails adjusted as appropriate  - Keep care items and personal belongings within reach  - Initiate and maintain comfort rounds  - Make Fall Risk Sign visible to staff  - Offer Toileting in advance of need  - Initiate/Maintain bed alarm  - Obtain necessary fall risk management equipment:   - Apply yellow socks and bracelet for high fall risk patients  - Consider moving patient to room near nurses station  Outcome: Progressing  Goal: Maintain or return to baseline ADL function  Description: INTERVENTIONS:  -  Assess patient's ability to carry out ADLs; assess patient's baseline for ADL function and identify physical deficits which impact ability to perform ADLs (bathing, care of mouth/teeth, toileting, grooming, dressing, etc )  - Assess/evaluate cause of self-care deficits   - Assess range of motion  - Assess patient's mobility; develop plan if impaired  - Assess patient's need for assistive devices and provide as appropriate  - Encourage maximum independence but intervene and supervise when necessary  - Involve family in performance of ADLs  - Assess for home care needs following discharge   - Consider OT consult to assist with ADL evaluation and planning for discharge  - Provide patient education as appropriate  Outcome: Progressing  Goal: Maintains/Returns to pre admission functional level  Description: INTERVENTIONS:  - Perform BMAT or MOVE assessment daily    - Set and communicate daily mobility goal to care team and patient/family/caregiver     - Collaborate with rehabilitation services on mobility goals if consulted  - Record patient progress and toleration of activity level   Outcome: Progressing

## 2022-12-20 VITALS
SYSTOLIC BLOOD PRESSURE: 124 MMHG | HEART RATE: 100 BPM | RESPIRATION RATE: 18 BRPM | BODY MASS INDEX: 19.28 KG/M2 | TEMPERATURE: 99 F | OXYGEN SATURATION: 98 % | DIASTOLIC BLOOD PRESSURE: 87 MMHG | HEIGHT: 65 IN | WEIGHT: 115.74 LBS

## 2022-12-20 LAB
BACTERIA BLD CULT: NORMAL
GLUCOSE SERPL-MCNC: 118 MG/DL (ref 65–140)
GLUCOSE SERPL-MCNC: 127 MG/DL (ref 65–140)

## 2022-12-20 PROCEDURE — 2W05X6Z CHANGE PRESSURE DRESSING ON BACK: ICD-10-PCS | Performed by: SURGERY

## 2022-12-20 RX ORDER — FAMOTIDINE 40 MG/5ML
20 POWDER, FOR SUSPENSION ORAL 2 TIMES DAILY
Refills: 0
Start: 2022-12-20

## 2022-12-20 RX ORDER — POLYETHYLENE GLYCOL 3350 17 G/17G
17 POWDER, FOR SOLUTION ORAL DAILY
Refills: 0
Start: 2022-12-21

## 2022-12-20 RX ORDER — CHLORHEXIDINE GLUCONATE 0.12 MG/ML
15 RINSE ORAL EVERY 12 HOURS SCHEDULED
Qty: 120 ML | Refills: 0
Start: 2022-12-20

## 2022-12-20 RX ORDER — METOPROLOL TARTRATE 50 MG/1
50 TABLET, FILM COATED ORAL EVERY 12 HOURS SCHEDULED
Refills: 0
Start: 2022-12-20

## 2022-12-20 RX ORDER — AMOXICILLIN 250 MG
1 CAPSULE ORAL 2 TIMES DAILY
Refills: 0
Start: 2022-12-20

## 2022-12-20 RX ADMIN — HYDROMORPHONE HYDROCHLORIDE 5 MG: 2 TABLET ORAL at 08:45

## 2022-12-20 RX ADMIN — CHLORHEXIDINE GLUCONATE 0.12% ORAL RINSE 15 ML: 1.2 LIQUID ORAL at 08:43

## 2022-12-20 RX ADMIN — APIXABAN 5 MG: 5 TABLET, FILM COATED ORAL at 08:43

## 2022-12-20 RX ADMIN — METOPROLOL TARTRATE 50 MG: 50 TABLET ORAL at 08:43

## 2022-12-20 RX ADMIN — HYDROMORPHONE HYDROCHLORIDE 5 MG: 2 TABLET ORAL at 02:13

## 2022-12-20 RX ADMIN — SENNOSIDES AND DOCUSATE SODIUM 1 TABLET: 50; 8.6 TABLET ORAL at 08:43

## 2022-12-20 RX ADMIN — POLYETHYLENE GLYCOL 3350 17 G: 17 POWDER, FOR SOLUTION ORAL at 08:43

## 2022-12-20 RX ADMIN — HYDROMORPHONE HYDROCHLORIDE 5 MG: 2 TABLET ORAL at 05:28

## 2022-12-20 RX ADMIN — FAMOTIDINE 20 MG: 40 POWDER, FOR SUSPENSION ORAL at 09:59

## 2022-12-20 RX ADMIN — ACETAMINOPHEN 975 MG: 325 TABLET ORAL at 05:28

## 2022-12-20 NOTE — CASE MANAGEMENT
Case Management Discharge Planning Note    Patient name Tiffany Patterson  Location [address] 216/-[id number] MRN [id number]  : 1955 Date 2022       Current Admission Date: 2022  Current Admission Diagnosis:Sepsis [facility])   Patient Active Problem List    Diagnosis Date Noted   • Tachycardia 2022   • Acute deep vein thrombosis (DVT) of right upper extremity ([address] ) 2022   • Gastrointestinal hemorrhage with hematemesis 2022   • Aspiration pneumonia ([address] ) 2022   • Chronic indwelling Marrufo catheter 2022   • MSSA bacteremia 2022   • Hypokalemia 2022   • Pressure ulcer of ischium, right, stage III ([address] ) 2022   • Sepsis ([address] ) 2022   • Developmental disability 2022   • Pressure injury of sacral region, stage 4 ([address] ) 10/26/2022   • Pressure ulcers of skin of multiple topographic sites 10/26/2022   • Pressure ulcer of right lower back, stage 3 ([address] ) 10/26/2022   • Pressure ulcer of left foot, stage 4 ([address] ) 10/26/2022   • Pressure injury of right upper back, stage 4 ([address] ) 10/26/2022   • Open wound of right hand without foreign body 10/26/2022   • Pressure injury of left heel, stage 4 ([address] ) 10/26/2022      LOS (days): 24  Geometric Mean LOS (GMLOS) (days):   Days to GMLOS:     OBJECTIVE:  Risk of Unplanned Readmission Score: 17 29         Current admission status: Inpatient   Preferred Pharmacy: No Pharmacies Listed  Primary Care Provider: Chaz Duarte MD    Primary Insurance[place] [employer]  Secondary Insurance:     DISCHARGE DETAILS:    Discharge planning discussed with[de-identified] cm placed a call to [facility]  at 8:01 am & 8:10 am & 8:36am  and called the facility again at 15:59 pm  robert was called at 16:06 am   and Ramandeep Mckinney was called at 18:39 and I made her aware of the d/c  for tomorrow 10 am  Freedom of Choice: Yes  Comments - Freedom of Choice: pt is a bedhold at [address] [place] [address] contacted family/caregiver?: Yes  Were Treatment Team discharge recommendations reviewed with patient/caregiver?: Yes  Did patient/caregiver verbalize understanding of patient care needs?: Yes  Were patient/caregiver advised of the risks associated with not following Treatment Team discharge recommendations?: Yes    Contacts  Patient Contacts:  Jordi Joseph  Relationship to Patient[de-identified] Other (Comment) (shelby)  Contact Method: Phone  Phone Number: [phone]  Reason/Outcome: Discharge Yazan Hernández         Is the patient interested in [facility] at discharge?: No    DME Referral Provided  Referral made for DME?:  (rx for new dressing order was faxed to [facility] [address])  DME Supplier Name[de-identified] Other (Add supplier name in comments) (wound vac was ordered for the snf)    Other Referral/Resources/Interventions Provided:  Interventions: DME, SNF  Referral Comments: wound vac ans upplies have been delivered to the snf- new rx was faxed to them and cm called Dora Packer at [facility] and I made her aware that the dressing orders have been changed, rx was sent to her and the rep that  services Lady will send the new  wound vac supplies, new dressing will be sent with [facility] pt- pt will be transported with a wet to dry drsssing- they need to measure the wounds when he arrives- covid was done, cm calld Lady at 15:59 pm to make them aware of the 10 am transport- she was made aware I cannot send the letters they are requesting to have the pt be a DNR- I made her aware the pt does have a guardian    Would you like to participate in our [address] service program?  : No - Declined    Treatment Team Recommendation: SNF ([facility]- 10 am bls)

## 2022-12-20 NOTE — DISCHARGE SUMMARY
[address]  Discharge- Barbara Landing 1955, 74 y o  male MRN: [id number]  Unit/Bed#: -01 Encounter: [id number]  Primary Care Provider: Barbra Mccullough MD   Date and time admitted to hospital: 11/25/2022  2:30 PM    * Sepsis [facility])  Assessment & Plan  · Initially presented with shock state  Now BP is stabilized  Sepsis, POA, as evidenced by tachycardia, leukocytosis, and fever with underlying multiple decubitus ulcers  · Status post initial treatment with IV vancomycin, cefepime, which was transitioned to cefazolin 2g Q8 through 12/11/2022  · 1/2 blood cultures from 11/25/2022 MSSA and CoNS (likely contaminant)  MSSA however felt to be due to infected sacral wound  Repeat cultures are negative to date  · Wound cultures- polymicrobial C koseri, Proteus, Pseudomonas and S aureus   · CT chest/a/p (12/12) : no evidence of acute or new infectious etiology  · ID/surgery/podiatry input appreciated   · Despite completion of antibiotics, he was having low grade fever  Leukocytosis persists but is stable  · Possible related to ongoing aspiration vs new VTE process (no erythema/swelling of extremities) vs central fever/CVA (no change in mental status/neuro status)  · No new/ongoing infectious etiology is suspected  Fever is improving  · Chronic osteomyelitis of left foot and heel ulcers without evidence of acute infection- no podiatric surgical procedure is recommended  · Surgery has placed VAC therapy on back and buttock wounds   Plan for another change prior to discharge  · Status post chemical and manual debridement   · COVID/RSV/flu (visitors from his facility)- negative   · One set of blood cultures (12/15) was able to obtained by IR with midline placement- NGTD  · Monitor off antibiotics for now   · Tylenol         Gastrointestinal hemorrhage with hematemesis  Assessment & Plan  · Originally admitted for shock, patient developed tachypnea, tachycardia, and hypotension and noted to be in respiratory distress possibly due to aspiration (patient has PEG tube, receives tube feeding)  · Patient was witnessed to have a large amount of coffee-ground emesis while being suctioned in preparation for intubation  Patient was intubated and upgraded to the ICU at that time  He was extubated on 12/03  · He received 3 units of PRBCs total and 1 unit of FFP  · GI recommendations appreciated   · Heparin SQ had been resumed on 12/01/2022  On 12/05, patient was noted to have a DVT of the right upper extremity and started on Eliquis   · Monitor for bleeding, monitor hemoglobin    · Trial pepcid instead of PPI due to on-going fever         Acute deep vein thrombosis (DVT) of right upper extremity (HCC)  Assessment & Plan  · Right arm swelling and tenderness noted  · Venous duplex of right upper extremity was positive for DVT  · Started on Eliquis          Pressure ulcers of skin of multiple topographic sites  Assessment & Plan  · Surgery/podiatry/wound care input appreciated    · Continue with local wound care, frequent turn and reposition          Tachycardia  Assessment & Plan  · Appears to be chronic  However could also be related to possible on-going infection/inflammatory process   · Does not appear to be volume depleted     · Started on low dose metoprolol increased to 50 mg BID   · Continue to monitor closely          Aspiration pneumonia ([address] )  Assessment & Plan  · With witnessed aspiration event during intubation  · Completed cefepime per infectious disease               MSSA bacteremia  Assessment & Plan  · Management as outlined above    · Echocardiogram without any evidence of vegetation        Developmental disability  Assessment & Plan  · Patient with profound development disability, nonverbal, bed bound  · Patient on tube feeds/strict NPO with 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           Discharging Physician / Practitioner: AVANI Pillai  PCP: Ruddy Allison MD  Admission Date:   Admission Orders (From admission, onward)     Ordered        11/25/22 [address]  Once                      Discharge Date: 12/20/22    Medical Problems     Resolved Problems  Date Reviewed: 12/18/2022   None         Consultations During Hospital Stay:  · Surgery, infectious disease, nephrology, gastroenterology, podiatry    Procedures Performed:   · Debridement, EGD     Significant Findings / Test Results:   · EGD : Ulcerated area with pigmented spot in the body of the stomach  Unclear if this was a source of bleeding or trauma from OG tube  A clip was placed to prevent further bleeding    Incidental Findings:   · None    Test Results Pending at Discharge (will require follow up): · None     Outpatient Tests Requested:  · None    Complications: GI bleed and suspected aspiration event    Reason for Admission: Worsening chronic wounds    Hospital Course:     Tish Pinto is a 74 y o  male patient with a history of developmental delay, functional quadraplegia, tube feeding via PEG tube, and chronic wounds who originally presented to the hospital on 11/25/2022 due to worsening chronic wounds  He met sepsis criteria, was started on antibiotics, and admitted to medicine  Several days later, he was noted to be in distress and an intubation attempt was initiated  During this, patient was noted to have significant upper GI bleed with large amounts of coffee-ground emesis  He was suspected to have an aspiration event  He was upgraded to critical care and treated for shock  He received blood transfusions  He had an EGD with a clip placed on an area of suspicion  He was seen by surgery, wound care, and podiatry for his wounds  He improved and was downgraded to medicine  He was noted to have right upper arm pain and extremity duplex revealed acute DVT  He was started on Eliquis  He was also persistently tachycardic in the 110s and started on a beta-blocker    He had his dressings and wound VAC changed on the day of discharge and was returned to his prior facility in stable condition  This is a brief discharge summary  Please see chart for additional details  Condition at Discharge: stable     Discharge Day Visit / Exam:     Subjective:  Unable to obtain ROS  Vitals: Blood Pressure: 124/87 (12/20/22 0653)  Pulse: 100 (12/20/22 0945)  Temperature: 99 °F (37 2 °C) (12/20/22 0653)  Temp Source: Oral (12/18/22 2217)  Respirations: 18 (12/20/22 0653)  Height: 5' 5" (165 1 cm) (11/28/22 1426)  Weight - Scale: 52 5 kg (115 lb 11 9 oz) (12/20/22 0550)  SpO2: 98 % (12/20/22 0945)    Exam:   Physical Exam  Vitals and nursing note reviewed  Constitutional:       General: He is not in acute distress  HENT:      Head: Normocephalic and atraumatic  Mouth/Throat:      Mouth: Mucous membranes are dry  Pharynx: Oropharynx is clear  Eyes:      Pupils: Pupils are equal, round, and reactive to light  Cardiovascular:      Rate and Rhythm: Tachycardia present  Comments:   Pulmonary:      Effort: Pulmonary effort is normal  No respiratory distress  Breath sounds: Normal breath sounds  Abdominal:      General: Bowel sounds are normal       Palpations: Abdomen is soft  Tenderness: There is no abdominal tenderness  Musculoskeletal:      Cervical back: Neck supple  Comments: Multiple contractures   Skin:     General: Skin is warm and dry  Capillary Refill: Capillary refill takes less than 2 seconds  Comments: Dressings dry and intact   Neurological:      General: No focal deficit present  Mental Status: He is alert and oriented to person, place, and time  Discussion with Family: Report given to facility    Discharge instructions/Information to patient and family:   See after visit summary for information provided to patient and family        Provisions for Follow-Up Care:  See after visit summary for information related to follow-up care and any pertinent home health orders  Disposition:     [facility] [place] [facility] Readmission: No     Discharge Statement:  I spent 45 minutes discharging the patient  This time was spent on the day of discharge  I had direct contact with the patient on the day of discharge  Greater than 50% of the total time was spent examining patient, answering all patient questions, arranging and discussing plan of care with patient as well as directly providing post-discharge instructions  Additional time then spent on discharge activities  Discharge Medications:  See after visit summary for reconciled discharge medications provided to patient and family        ** Please Note: This note has been constructed using a voice recognition system **

## 2022-12-20 NOTE — PROCEDURES
Acute Care Surgery  Bedside V A C  Procedure Note  A timeout was performed with the patient's nurse prior to beginning the dressing change  The nurse remained present to confirm the correct dressing counts on removal of the VAC dressing and was debriefed at the completion of the dressing change  Location of wound:   Upper back (Thoracic)  Sacrum  Right Ischium     Dressings and Foam removed:  3 black sponges, 1 each wound bed  3 bridging black sponges     Dimensions of wound:   Thoracic: 9 x 5 5 x 1 5 cm, 2 cm undermine  Right Ischium: 5 x 3 5 x 1 cm  Sacral: superior to skin flap: 4 x 2 5 x 1, inferior: 4 x 3 x 1, 6 cm undermining left lateral edge     Description of wound:   Majority of wound bed with granulation tissue, ischium wound with majority yellow slough, bedside sharp debridement with forceps and scissors performed, small amount of slough removed until sanguinous oozing reached  Mild drainage from all wounds  VAC settings:  125 mmHg  Continuous    Wound Images: Additional Notes: The [facility] sticker placed over the dressing per protocol  Hoa Dumont PA-C and patient's nurse were present for the dressing change  This dressing change took greater than 1 hour to complete      CORNELIUS Rivera  12/20/2022 9:21 AM

## 2022-12-20 NOTE — PLAN OF CARE
Problem: Prexisting or High Potential for Compromised Skin Integrity  Goal: Skin integrity is maintained or improved  Description: INTERVENTIONS:  - Identify patients at risk for skin breakdown  - Assess and monitor skin integrity  - Assess and monitor nutrition and hydration status  - Monitor labs   - Assess for incontinence   - Turn and reposition patient  - Assist with mobility/ambulation  - Relieve pressure over bony prominences  - Avoid friction and shearing  - Provide appropriate hygiene as needed including keeping skin clean and dry  - Evaluate need for skin moisturizer/barrier cream  - Collaborate with interdisciplinary team   - Patient/family teaching  - Consider wound care consult   Outcome: Progressing     Problem: Nutrition/Hydration-ADULT  Goal: Nutrient/Hydration intake appropriate for improving, restoring or maintaining nutritional needs  Description: Monitor and assess patient's nutrition/hydration status for malnutrition  Collaborate with interdisciplinary team and initiate plan and interventions as ordered  Monitor patient's weight and dietary intake as ordered or per policy  Utilize nutrition screening tool and intervene as necessary  Determine patient's food preferences and provide high-protein, high-caloric foods as appropriate       INTERVENTIONS:  - Monitor oral intake, urinary output, labs, and treatment plans  - Assess nutrition and hydration status and recommend course of action  - Evaluate amount of meals eaten  - Assist patient with eating if necessary   - Allow adequate time for meals  - Recommend/ encourage appropriate diets, oral nutritional supplements, and vitamin/mineral supplements  - Order, calculate, and assess calorie counts as needed  - Recommend, monitor, and adjust tube feedings and TPN/PPN based on assessed needs  - Assess need for intravenous fluids  - Provide specific nutrition/hydration education as appropriate  - Include patient/family/caregiver in decisions related to nutrition  Outcome: Progressing     Problem: PAIN - ADULT  Goal: Verbalizes/displays adequate comfort level or baseline comfort level  Description: Interventions:  - Encourage patient to monitor pain and request assistance  - Assess pain using appropriate pain scale  - Administer analgesics based on type and severity of pain and evaluate response  - Implement non-pharmacological measures as appropriate and evaluate response  - Consider cultural and social influences on pain and pain management  - Notify physician/advanced practitioner if interventions unsuccessful or patient reports new pain  Outcome: Progressing     Problem: INFECTION - ADULT  Goal: Absence or prevention of progression during hospitalization  Description: INTERVENTIONS:  - Assess and monitor for signs and symptoms of infection  - Monitor lab/diagnostic results  - Monitor all insertion sites, i e  indwelling lines, tubes, and drains  - Monitor endotracheal if appropriate and nasal secretions for changes in amount and color  - [place] appropriate cooling/warming therapies per order  - Administer medications as ordered  - Instruct and encourage patient and family to use good hand hygiene technique  - Identify and instruct in appropriate isolation precautions for identified infection/condition  Outcome: Progressing  Goal: Absence of fever/infection during neutropenic period  Description: INTERVENTIONS:  - Monitor WBC    Outcome: Progressing     Problem: DISCHARGE PLANNING  Goal: Discharge to home or other facility with appropriate resources  Description: INTERVENTIONS:  - Identify barriers to discharge w/patient and caregiver  - Arrange for needed discharge resources and transportation as appropriate  - Identify discharge learning needs (meds, wound care, etc )  - Arrange for interpretive services to assist at discharge as needed  - Refer to Case Management Department for coordinating discharge planning if the patient needs post-hospital services based on physician/advanced practitioner order or complex needs related to functional status, cognitive ability, or social support system  Outcome: Progressing     Problem: Knowledge Deficit  Goal: Patient/family/caregiver demonstrates understanding of disease process, treatment plan, medications, and discharge instructions  Description: Complete learning assessment and assess knowledge base    Interventions:  - Provide teaching at level of understanding  - Provide teaching via preferred learning methods  Outcome: Progressing     Problem: SAFETY,RESTRAINT: NV/NON-SELF DESTRUCTIVE BEHAVIOR  Goal: Remains free of harm/injury (restraint for non violent/non self-detsructive behavior)  Description: INTERVENTIONS:  - Instruct patient/family regarding restraint use   - Assess and monitor physiologic and psychological status   - Provide interventions and comfort measures to meet assessed patient needs   - Identify and implement measures to help patient regain control  - Assess readiness for release of restraint   Outcome: Progressing  Goal: Returns to optimal restraint-free functioning  Description: INTERVENTIONS:  - Assess the patient's behavior and symptoms that indicate continued need for restraint  - Identify and implement measures to help patient regain control  - Assess readiness for release of restraint   Outcome: Progressing     Problem: SAFETY ADULT  Goal: Patient will remain free of falls  Description: INTERVENTIONS:  - Educate patient/family on patient safety including physical limitations  - Instruct patient to call for assistance with activity   - Consult OT/PT to assist with strengthening/mobility   - Keep Call bell within reach  - Keep bed low and locked with side rails adjusted as appropriate  - Keep care items and personal belongings within reach  - Initiate and maintain comfort rounds  - Make Fall Risk Sign visible to staff  - Offer Toileting in advance of need  - Initiate/Maintain bed alarm  - Obtain necessary fall risk management equipment:   - Apply yellow socks and bracelet for high fall risk patients  - Consider moving patient to room near nurses station  Outcome: Progressing  Goal: Maintain or return to baseline ADL function  Description: INTERVENTIONS:  -  Assess patient's ability to carry out ADLs; assess patient's baseline for ADL function and identify physical deficits which impact ability to perform ADLs (bathing, care of mouth/teeth, toileting, grooming, dressing, etc )  - Assess/evaluate cause of self-care deficits   - Assess range of motion  - Assess patient's mobility; develop plan if impaired  - Assess patient's need for assistive devices and provide as appropriate  - Encourage maximum independence but intervene and supervise when necessary  - Involve family in performance of ADLs  - Assess for home care needs following discharge   - Consider OT consult to assist with ADL evaluation and planning for discharge  - Provide patient education as appropriate  Outcome: Progressing  Goal: Maintains/Returns to pre admission functional level  Description: INTERVENTIONS:  - Perform BMAT or MOVE assessment daily    - Set and communicate daily mobility goal to care team and patient/family/caregiver     - Collaborate with rehabilitation services on mobility goals if consulted  - Record patient progress and toleration of activity level   Outcome: Progressing

## 2022-12-20 NOTE — CASE MANAGEMENT
Case Management Discharge Planning Note    Patient name Tiera Alcantara  Location [address] 216/-01 MRN [id number]  : 1955 Date 2022       Current Admission Date: 2022  Current Admission Diagnosis:Sepsis [facility])   Patient Active Problem List    Diagnosis Date Noted   • Tachycardia 2022   • Acute deep vein thrombosis (DVT) of right upper extremity ([address] ) 2022   • Gastrointestinal hemorrhage with hematemesis 2022   • Aspiration pneumonia ([address] ) 2022   • Chronic indwelling Marrufo catheter 2022   • MSSA bacteremia 2022   • Hypokalemia 2022   • Pressure ulcer of ischium, right, stage III ([address] ) 2022   • Sepsis ([address] ) 2022   • Developmental disability 2022   • Pressure injury of sacral region, stage 4 ([address] ) 10/26/2022   • Pressure ulcers of skin of multiple topographic sites 10/26/2022   • Pressure ulcer of right lower back, stage 3 ([address] ) 10/26/2022   • Pressure ulcer of left foot, stage 4 ([address] ) 10/26/2022   • Pressure injury of right upper back, stage 4 ([address] ) 10/26/2022   • Open wound of right hand without foreign body 10/26/2022   • Pressure injury of left heel, stage 4 ([address] ) 10/26/2022      LOS (days): 25  Geometric Mean LOS (GMLOS) (days):   Days to GMLOS:     OBJECTIVE:  Risk of Unplanned Readmission Score: 17 45         Current admission status: Inpatient   Preferred Pharmacy: No Pharmacies Listed  Primary Care Provider: Singh Trammell MD    Primary Insurance: [place] [employer]  Secondary Insurance:     DISCHARGE DETAILS:    Discharge planning discussed with[de-identified] German Santos of Choice: Yes  Comments - Freedom of Choice: pt is a bedhold at [facility] contacted family/caregiver?: Yes  Were Treatment Team discharge recommendations reviewed with patient/caregiver?: Yes  Did patient/caregiver verbalize understanding of patient care needs?: Yes  Were patient/caregiver advised of the risks associated with not following Treatment Team discharge recommendations?: Yes    Contacts  Patient Contacts:  [place]  Relationship to Patient[de-identified] Other (Comment)  Contact Method: Phone  Phone Number: [phone]  Reason/Outcome: Discharge Yazan Hernández         Is the patient interested in [place] [address] at discharge?: No    DME Referral Provided  Referral made for DME?: No    Other Referral/Resources/Interventions Provided:  Interventions: SNF  Referral Comments: rx was sent to [facility]    wound vac dressing package was sent with the pt, rn was given the # for report   cm spoke with the nursing supervisor Mercedes Toledo at 7:56 am  to make her aware of the 10 am transport    Would you like to participate in our [address] service program?  : No - Declined    Treatment Team Recommendation: SNF ([facility]   10:00 am BLS)  Discharge Destination Plan[de-identified] SNF ([facility] pt is a bedhold)                                      Family notified[de-identified] Ricardo Nogueira was called

## 2022-12-20 NOTE — DISCHARGE INSTR - AVS FIRST PAGE
Wound Measurements taken 8:30 AM 12/20/22 during wound vac change:  Thoracic: 9 x 5 5 x 1 5 cm, 2 cm undermining  Ischium: 5 x 3 5 x 1 cm  Sacral: superior to skin flap: 4 x 2 5 x 1, inferior: 4 x 3 x 1, 6 cm undermining left lateral edge
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
## 2023-01-05 ENCOUNTER — TELEPHONE (OUTPATIENT)
Dept: WOUND CARE | Facility: CLINIC | Age: 68
End: 2023-01-05

## 2023-01-05 NOTE — TELEPHONE ENCOUNTER
Spoke with Roxana Cuevas, staff member for [facility] several times this afternoon  Reports they would need to cancel the patients appointment for tomorrow at the [facility] due to no transportation available  Also reported they are concerned as the patient is having increased green/gray drainage with a foul odor as well as a temp today of 100 8  Recommended the patient be seen today at the ER for evaluation  Roxana Cuevas reports that they prefer to have the patient seen at the hospital in this area  Discussed if they would call 911 they would most likely take the patient to the closest ER of his home  Roxana Cuevas verbalized understanding  Roxana Cuevas called back and reports they would like to now keep the patients appointment tomorrow and reports they have a PA in the facility that is ordering IV antibiotics for the patient until seen tomorrow at the [facility]  Again recommended the patient go to the ER today for evaluation verses waiting until tomorrow  Roxana Cuevas verbalized understanding and will try to set up transportation for his appt tomorrow

## 2023-01-06 ENCOUNTER — OFFICE VISIT (OUTPATIENT)
Dept: WOUND CARE | Facility: CLINIC | Age: 68
End: 2023-01-06

## 2023-01-06 VITALS — TEMPERATURE: 97.3 F | HEART RATE: 120 BPM | RESPIRATION RATE: 24 BRPM

## 2023-01-06 DIAGNOSIS — L89.810 PRESSURE INJURY OF HEAD, UNSTAGEABLE (HCC): ICD-10-CM

## 2023-01-06 DIAGNOSIS — L89.314 PRESSURE ULCER OF RIGHT BUTTOCK, STAGE 4 (HCC): ICD-10-CM

## 2023-01-06 DIAGNOSIS — L89.154 PRESSURE INJURY OF SACRAL REGION, STAGE 4 (HCC): Primary | ICD-10-CM

## 2023-01-06 DIAGNOSIS — L89.894 PRESSURE ULCER OF LEFT FOOT, STAGE 4 (HCC): ICD-10-CM

## 2023-01-06 DIAGNOSIS — L89.114 PRESSURE INJURY OF RIGHT UPPER BACK, STAGE 4 (HCC): ICD-10-CM

## 2023-01-06 DIAGNOSIS — L89.624 PRESSURE INJURY OF LEFT HEEL, STAGE 4 (HCC): ICD-10-CM

## 2023-01-06 RX ORDER — LIDOCAINE HYDROCHLORIDE 40 MG/ML
5 SOLUTION TOPICAL ONCE
Status: COMPLETED | OUTPATIENT
Start: 2023-01-06 | End: 2023-01-06

## 2023-01-06 RX ADMIN — LIDOCAINE HYDROCHLORIDE 5 ML: 40 SOLUTION TOPICAL at 10:16

## 2023-01-06 NOTE — PATIENT INSTRUCTIONS
Orders Placed This Encounter   Procedures    Wound cleansing and dressings     Wound cleansing and dressings       Wash your hands with soap and water  Remove old dressing, discard into plastic bag and place in trash  Cleanse the ALL wounds with normal saline prior to applying a clean dressing  Do not use tissue or cotton balls  Do not scrub the wound  Pat dry using gauze      Shower yes do not get dressing wet though  Plan for showers around the dressing changes       Left foot wounds  Clean with normal saline  Stop all current treatment at this time and apply bacetracin and the  just cover with area with a dry dressing and change three times a week or as needed for dislodgement or soiling    Left hip wound  Clean with normal saline  Apply bacetracin and continue with allevyn life bordered foam and change every three days or as needed for soiling or dislodgement    Right ear  Clean with normal saline   Apply betadine to the area and may leave open to air due to dressing not sticking  Apply betadine daily    Right hip area that is scarred and almost healed  Clean with normal saline and apply bacetracin and allevyn border foam and change every three days and as needed for soilage and dislodgement    Right hip, sacral and back area  Clean with normal saline  Continue with wound vac to all areas, apply santyl to the wound base before the black foam is applied, make sure that no black foam touches good skin  Bridge from bony prominences  Make sure that the suction is at 125mmhg  Change three times a week  IN the office today due to no vac present silver alginate applied to all the wound and covered with bordered foams    Apply the vac when patient returns to the facility    Any new small areas that occur you can apply bacetracin and cover with a dry dressin  Continue to protect any at risk areas with bordered foam        We recommend that the patient sees infectious diseases to see if he would benefit from long term antibiotics  Continue to turn and reposition like you have every hour  Continue with the offloading boots to both feet like you have been  May continue with the left foot just being elevated on a pillow to prevent pressure       Check air mattress in the facility to ensure that it is set correctly for the patient  If it is weight based make sure the weight is correctly set  If it just has a range from soft to firm set it at medium          Follow up at [address] [facility] in 6 weeks     Standing Status:   Future     Standing Expiration Date:   1/6/2024

## 2023-01-06 NOTE — PROGRESS NOTES
infectPatient ID: Deb Smart is a 74 y o  male Date of Birth 1955       Chief Complaint   Patient presents with   • Follow Up Wound Care Visit     Follow up for numerous wounds       Allergies: Albumen, egg - food allergy and Amoxicillin-pot clavulanate    Diagnosis:   Diagnosis ICD-10-CM Associated Orders   1  Pressure injury of sacral region, stage 4 ([facility])  L89 154 lidocaine (XYLOCAINE) 4 % topical solution 5 mL     Wound cleansing and dressings      2  Pressure ulcer of right buttock, stage 4 ([facility])  L89 314 lidocaine (XYLOCAINE) 4 % topical solution 5 mL     Wound cleansing and dressings      3  Pressure ulcer of left foot, stage 4 ([facility])  L89 894 lidocaine (XYLOCAINE) 4 % topical solution 5 mL     Wound cleansing and dressings      4  Pressure injury of right upper back, stage 4 ([facility])  L89 114 lidocaine (XYLOCAINE) 4 % topical solution 5 mL     Wound cleansing and dressings      5  Pressure injury of left heel, stage 4 ([facility] [address] )  L89 624       6  Pressure injury of head, unstageable ([facility] [address] )  L89 810            Assessment  & Plan:    • R ear unstageable PI  Dry eschar present  Paint wound with betadine  • R upper back PI  Larger in size  There is undermining present  Wound with mix of granulation and necrotic tissue    o Surgical debridement, as below  o Continue with wound VAC  • Sacral PI  Measuring the same in size  There is slough and granulation tissue present within wound bed    o Surgical debridement, as below  o Continue with wound VAC  • R buttock PI  Wound bed with slough and granulation tissue    o Surgical debridement, as below  o Continue with wound VAC  Per ID consult note on 12/19/22: chronic stage IV decubitus ulcers:"Although no OM noted on CT, with worsening wounds it is likely that patient will develop chronic infection   If unable to cover/secure wounds especially of sacrum and upper right back, OM not treatable long term  In addition, prolonged abx can lead to harmful antibiotic side effects such a C  Diff, nephrotoxicity, bacterial resistance, etc   -ongoing wound vac management per surgery -no further directed treatment required"  • PI L lateral foot  Granulation tissue is present  No undermining or tunneling  There is chronic osteomyelitis of L 5th metatarsal  Podiatry consulted while in hospital on 12/13/22  Surgery was not recommended at the time given then pts overall condition and inability to offload given contractures  Per ID consult note in hospital on 12/19/22: " XR shows absence of the 5th metatarsal head which likely represents chronic osteomyelitis  In the setting of chronic wound with exposed bone and no plan/indication for surgical intervention, patients osteomyelitis is not treatable long-term  Prolonged course of antibiotics would just lead to increased risk of C  Diff, nephrotoxicity and resistance without prevention of ongoing development of osteomyelitis  Likely to continue to have progression of wound despite local wound care given extensive contractures and inability to offload pressure  He remains at risk for recurrent infection, bacteremia, sepsis, and limb loss "   o Bacitracin and bandage to wound bed daily  • PI of L heel  Soft yellow necrotic tissue  Probes to bone    o Surgical debridement, as below  o Bacitracin and bandage  • L hip PI  Bacitracin and bandage  Offload with frequent weight repositioning  Facility unable to obtain Clinitron bed  • Overall pt is in poor health and healing is complicated by by contractures and inability to turn himself to reposition/offload as well as inability to communicate subsequent to intellectual disability  Healing is additionally complicated by iron deficiency anemia and protein malnutrition  Pt is on palliative wound care tract  Will f/u in 6 weeks       -Should pt develop fevers, chills, acute change in wounds including increase purulent drainage/malodor, surrounding redness report to ED  Subjective:   10/26/22: The patient is a 74-year-old black male with profound disabilities, bed ridden with contractures  Who has multiple pressure sores  He has a Marrufo catheter and diverting colostomy, he receives nutrition via indwelling gastrostomy tube  There chronic granulating decubitus ulcers of the right ischium, a healed decubitus ulcer of his right ear  He has a new necrotic ulcer overlying his left dorsal wrist and dorsal hand, apparently from infiltration of an IV while hospitalized for sepsis  Decubitus ulcers of the left heel and foot with evidence of ongoing pressure damage, as well as his mid back and sacrum  The patient is being treated with Hazle Landon solution to the mid back ulcer and apparently to the right ischium and sacrum as well  Surgical debridement was performed for his sacral ulcer, left lateral foot and the dorsum of his left wrist   Essentially selective debridement was performed in the which sites  Dakin solution can be discontinued, and Santyl ointment utilized for the left wrist, midback left lateral foot, and silver alginate for the remaining sites  A Clinitron bed would be appropriate given the multiple decubitus ulcers  The patient will continue to receive palliative care at his skilled nursing facility in follow-up in 6 weeks  11/25/22: Pt presents for continued care of multiple pressure injuries on sacrum, R buttock, L foot, R buttock, sacrum, R upper back  Currently Dakin wet-to-dry are being used on his R upper back wound and R buttock wounds  Other pressure injuries now with dried eschars present  Nurses present with pt who note that he is continuing with tube feedings and protein supplementation, however his albumin levels remain low and his wounds seem to be getting significantly worse within past week   There was a culture taken of wound which began to smell and demonstrated growth of Pseudomonas and Staph; his urine output is noted to be diminished recently  01/06/23: Pt presents for continued care of multiple pressure injuries  Was sent to the ED following previous visit, at that time he was septic and placed on IV abx (initial treatment with IV vancomycin, cefepime, which was transitioned to cefazolin 2g Q8 through 12/11/2022  1/2 blood cultures from 11/25/2022 MSSA MSSA however felt to be due to infected sacral wound)  Surgery team debrided  Upper back, sacrum, R ischial wounds and wound vac placed while in hospital and has been continued upon discharge  Podiatry was additionally consulted while in hospital d/t suspected chronic osteomyelitis- no surgical intervention was recommended during hospitalization d/t pts condition and inability to offload the area post-op  Discharge summary as follows; " Arin Marrero is a 74 y o  male patient with a history of developmental delay, functional quadraplegia, tube feeding via PEG tube, and chronic wounds who originally presented to the hospital on 11/25/2022 due to worsening chronic wounds  He met sepsis criteria, was started on antibiotics, and admitted to medicine  Several days later, he was noted to be in distress and an intubation attempt was initiated  During this, patient was noted to have significant upper GI bleed with large amounts of coffee-ground emesis  He was suspected to have an aspiration event  He was upgraded to critical care and treated for shock  He received blood transfusions  He had an EGD with a clip placed on an area of suspicion  He was seen by surgery, wound care, and podiatry for his wounds  He improved and was downgraded to medicine  He was noted to have right upper arm pain and extremity duplex revealed acute DVT  He was started on Eliquis  He was also persistently tachycardic in the 110s and started on a beta-blocker  He had his dressings and wound VAC changed on the day of discharge and was returned to his prior facility in stable condition  " Pt presents with nurse and CNA from [facility] today whom noted the pt is currently using a wound VAC per orders from hospital d/c on back, sacrum and R ischial wound  Santyl being used on the L heel PI  Allevyn foam being used to L lateral foot wound  Pt had low grade fever and was started on Levaquin at the facility; temperature has not been elevated since antibiotics were initiated  Pt continues to be consistently repositioned at the center  Using an air mattress currently-unable to obtain a Clinitron bed at the center  The following portions of the patient's history were reviewed and updated as appropriate:   Patient Active Problem List   Diagnosis   • Pressure injury of sacral region, stage 4 ([address] )   • Pressure ulcers of skin of multiple topographic sites   • Pressure ulcer of right lower back, stage 3 (HCC)   • Pressure ulcer of left foot, stage 4 (HCC)   • Pressure injury of right upper back, stage 4 (HCC)   • Open wound of right hand without foreign body   • Pressure injury of left heel, stage 4 (HCC)   • Sepsis ([facility] [address] )   • Developmental disability   • Hypokalemia   • Pressure ulcer of ischium, right, stage III (HCC)   • MSSA bacteremia   • Gastrointestinal hemorrhage with hematemesis   • Aspiration pneumonia (HCC)   • Chronic indwelling Marrufo catheter   • Acute deep vein thrombosis (DVT) of right upper extremity (HCC)   • Tachycardia     No past medical history on file  Past Surgical History:   Procedure Laterality Date   • IR MIDLINE PLACEMENT  12/15/2022     No family history on file    Social History     Socioeconomic History   • Marital status: Single     Spouse name: None   • Number of children: None   • Years of education: None   • Highest education level: None   Occupational History   • None   Tobacco Use   • Smoking status: Never   • Smokeless tobacco: Never   Vaping Use   • Vaping Use: Never used   Substance and Sexual Activity   • Alcohol use: Never   • Drug use: Never   • Sexual activity: Never   Other Topics Concern   • None   Social History Narrative   • None     Social Determinants of Health     Financial Resource Strain: Not on file   Food Insecurity: No Food Insecurity   • Worried About Running Out of Food in the Last Year: Never true   • Ran Out of Food in the Last Year: Never true   Transportation Needs: No Transportation Needs   • Lack of Transportation (Medical): No   • Lack of Transportation (Non-Medical): No   Physical Activity: Not on file   Stress: Not on file   Social Connections: Not on file   Intimate Partner Violence: Not on file   Housing Stability: Low Risk    • Unable to Pay for Housing in the Last Year: No   • Number of Places Lived in the Last Year: 1   • Unstable Housing in the Last Year: No       Current Outpatient Medications:   •  apixaban (ELIQUIS) 5 mg, Take 1 tablet (5 mg total) by mouth 2 (two) times a day, Disp: , Rfl: 0  •  bisacodyl (Dulcolax) 10 mg suppository, Insert 10 mg into the rectum as needed in the morning for constipation  Every three days as needed  , Disp: , Rfl:   •  calcium carbonate-vitamin D (OSCAL-D) 500 mg-200 units per tablet, Take 1 tablet by mouth daily with breakfast, Disp: , Rfl:   •  chlorhexidine (PERIDEX) 0 12 % solution, Apply 15 mL to the mouth or throat every 12 (twelve) hours, Disp: 120 mL, Rfl: 0  •  denosumab (Prolia) 60 mg/mL, Inject 60 mg under the skin once Every 6 months, Disp: , Rfl:   •  dorzolamide-timolol (COSOPT) 22 3-6 8 MG/ML ophthalmic solution, Administer 1 drop to both eyes in the morning and 1 drop in the evening , Disp: , Rfl:   •  ergocalciferol (ERGOCALCIFEROL) 1 25 MG (68497 UT) capsule, Take 50,000 Units by mouth once a week, Disp: , Rfl:   •  famotidine (PEPCID) 20 mg/2 5 mL oral suspension, Take 2 5 mL (20 mg total) by mouth 2 (two) times a day, Disp: , Rfl: 0  •  fluticasone (FLONASE) 50 mcg/act nasal spray, 1 spray into each nostril 2 (two) times a day, Disp: , Rfl:   •  Hydromorphone HCl (Dilaudid) 1 MG/ML LIQD, Take 5 mg by mouth 6 (six) times a day, Disp: , Rfl:   •  latanoprost (XALATAN) 0 005 % ophthalmic solution, 1 drop daily at bedtime, Disp: , Rfl:   •  loratadine (CLARITIN) 10 mg tablet, Take 10 mg by mouth in the morning , Disp: , Rfl:   •  magnesium hydroxide (MILK OF MAGNESIA) 400 mg/5 mL oral suspension, Take by mouth daily as needed for constipation, Disp: , Rfl:   •  metoprolol tartrate (LOPRESSOR) 50 mg tablet, 1 tablet (50 mg total) by Per G Tube route every 12 (twelve) hours, Disp: , Rfl: 0  •  Polyethyl Glycol-Propyl Glycol (Systane) 0 4-0 3 % GEL, Apply 0 25 inches to eye Each eye, Disp: , Rfl:   •  polyethylene glycol (MIRALAX) 17 g packet, Take 17 g by mouth daily Do not start before December 21, 2022 , Disp: , Rfl: 0  •  senna-docusate sodium (SENOKOT S) 8 6-50 mg per tablet, Take 1 tablet by mouth 2 (two) times a day, Disp: , Rfl: 0  •  sodium chloride (OCEAN) 0 65 % nasal spray, 1 spray into each nostril as needed in the morning for congestion  , Disp: , Rfl:   No current facility-administered medications for this visit  Review of Systems      Objective:  Pulse (!) 120   Temp (!) 97 3 °F (36 3 °C)   Resp (!) 24   Pain Score:  (unable to assess due to patient being nonverbal)     Physical Exam  Vitals ( ) and nursing note reviewed  Constitutional:       Appearance: He is cachectic  He is ill-appearing  Comments:      HENT:      Head: Normocephalic and atraumatic  Eyes:      General: Lids are normal    Cardiovascular:      Rate and Rhythm: Tachycardia present  Pulses:           Dorsalis pedis pulses are 2+ on the left side  Comments: Tachycardia is chronic    Pulmonary:      Breath sounds: Normal air entry  Abdominal:       Genitourinary:         Comments: Urine with muddy discoloration  Musculoskeletal:         General: Deformity (contracutres of RLE and LLE with significant muscle atrophy bilaterally) present  No swelling  Left lower leg: 3+ Pitting Edema present  Skin:     General: Skin is warm  Findings: Wound present  Comments: See wound assessment  Neurological:      Mental Status: He is alert  Motor: Weakness present  Gait: Gait abnormal    Psychiatric:         Cognition and Memory: Cognition is impaired (profound intelectual disability)  Comments: Unable to assess as the pt is nonverbal with intellectual disability  Wound 05/16/22 Pressure Injury Back Right;Upper (Active)   Wound Image       01/06/23 1016   Wound Description Epithelialization;Granulation tissue; Necrotic 01/06/23 0942   Pressure Injury Stage 4 10/26/22 0917   Sybil-wound Assessment Clean;Dry;Scar Tissue; Excoriated 01/06/23 0942   Wound Length (cm) 12 cm 01/06/23 0942   Wound Width (cm) 9 cm 01/06/23 0942   Wound Depth (cm) 2 cm 01/06/23 0942   Wound Surface Area (cm^2) 108 cm^2 01/06/23 0942   Wound Volume (cm^3) 216 cm^3 01/06/23 0942   Calculated Wound Volume (cm^3) 216 cm^3 01/06/23 0942   Change in Wound Size % -7197 3 01/06/23 0942   Undermining 2 8 01/06/23 0942   Undermining is depth extending from 12-12 01/06/23 0942   Drainage Amount Large 10/26/22 0917   Drainage Description Foul smelling;Moore;Sanguineous 01/06/23 0942   Non-staged Wound Description Full thickness 01/06/23 0942   Treatments Cleansed 01/06/23 0942   Patient Tolerance Tolerated well 10/26/22 0917       Wound 05/16/22 Pressure Injury Sacrum (Active)   Wound Image       01/06/23 1017   Wound Description Yellow;Pink;Pale;Exposed bone 01/06/23 0943   Pressure Injury Stage 4 01/06/23 0943   Sybil-wound Assessment Dry;Scar Tissue; Intact 01/06/23 0943   Wound Length (cm) 5 5 cm 01/06/23 0943   Wound Width (cm) 5 5 cm 01/06/23 0943   Wound Depth (cm) 2 5 cm 01/06/23 0943   Wound Surface Area (cm^2) 30 25 cm^2 01/06/23 0943   Wound Volume (cm^3) 75 625 cm^3 01/06/23 0943   Calculated Wound Volume (cm^3) 75 63 cm^3 01/06/23 0943   Change in Wound Size % -10 81 01/06/23 0943   Undermining 3 5 01/06/23 0943   Undermining is depth extending from 12-6 01/06/23 0943   Drainage Amount Moderate 01/06/23 0943   Drainage Description Tan;Foul smelling;Serosanguineous 01/06/23 0943   Non-staged Wound Description Full thickness 01/06/23 0943   Treatments Cleansed 01/06/23 0943   Patient Tolerance Tolerated well 10/26/22 0954       Wound 05/16/22 Pressure Injury Buttocks Right (Active)   Wound Image       01/06/23 1016   Wound Description Slough; Epithelialization;Granulation tissue 01/06/23 0947   Pressure Injury Stage 4 01/06/23 0947   Sybil-wound Assessment Scar Tissue;Dry; Intact; Excoriated 01/06/23 0947   Wound Length (cm) 4 cm 01/06/23 0947   Wound Width (cm) 4 cm 01/06/23 0947   Wound Depth (cm) 2 cm 01/06/23 0947   Wound Surface Area (cm^2) 16 cm^2 01/06/23 0947   Wound Volume (cm^3) 32 cm^3 01/06/23 0947   Calculated Wound Volume (cm^3) 32 cm^3 01/06/23 0947   Change in Wound Size % 47 09 01/06/23 0947   Undermining 2 6 08/29/22 1037   Undermining is depth extending from 11-4 08/29/22 1037   Drainage Amount Moderate 01/06/23 0947   Drainage Description Tan;Serosanguineous 01/06/23 0947   Non-staged Wound Description Full thickness 01/06/23 0947   Treatments Cleansed 01/06/23 0947   Patient Tolerance Tolerated well 10/26/22 0916       Wound 05/16/22 Pressure Injury Foot Left;Lateral (Active)   Wound Image   01/06/23 0926   Wound Description Beefy red 01/06/23 0942   Pressure Injury Stage 4 01/06/23 0942   Sybil-wound Assessment Dry; Intact;Scar Tissue 01/06/23 0942   Wound Length (cm) 2 6 cm 01/06/23 0942   Wound Width (cm) 2 cm 01/06/23 0942   Wound Depth (cm) 0 1 cm 01/06/23 0942   Wound Surface Area (cm^2) 5 2 cm^2 01/06/23 0942   Wound Volume (cm^3) 0 52 cm^3 01/06/23 0942   Calculated Wound Volume (cm^3) 0 52 cm^3 01/06/23 0942   Change in Wound Size % -79 31 01/06/23 0942   Drainage Amount Moderate 01/06/23 0942   Drainage Description Foul smelling;Serosanguineous; Tan 01/06/23 0942   Non-staged Wound Description Full thickness 01/06/23 0942   Treatments Cleansed 01/06/23 0942   Patient Tolerance Tolerated well 08/29/22 1120       Wound 08/29/22 Heel Left;Posterior (Active)   Wound Image       01/06/23 1017   Wound Description Necrotic;Pink;Yellow;Probes to bone 01/06/23 0944   Pressure Injury Stage 4 01/06/23 0944   Sybil-wound Assessment Dry;Brown 01/06/23 0944   Wound Length (cm) 1 5 cm 01/06/23 0944   Wound Width (cm) 1 5 cm 01/06/23 0944   Wound Depth (cm) 0 8 cm 01/06/23 0944   Wound Surface Area (cm^2) 2 25 cm^2 01/06/23 0944   Wound Volume (cm^3) 1 8 cm^3 01/06/23 0944   Calculated Wound Volume (cm^3) 1 8 cm^3 01/06/23 0944   Change in Wound Size % -146 58 01/06/23 0944   Drainage Amount Moderate 01/06/23 0944   Drainage Description Foul smelling;Tan;Serous 01/06/23 0944   Non-staged Wound Description Full thickness 01/06/23 0944   Treatments Cleansed 01/06/23 0944   Patient Tolerance Tolerated well 10/26/22 0955       Wound 11/30/22 Ear Right (Active)   Wound Image   01/06/23 0930   Wound Description Epithelialization;Granulation tissue;Slough 01/06/23 0945   Pressure Injury Stage 3 01/06/23 0945   Sybil-wound Assessment Dry; Intact 01/06/23 0945   Wound Length (cm) 3 cm 01/06/23 0945   Wound Width (cm) 1 5 cm 01/06/23 0945   Wound Depth (cm) 0 1 cm 01/06/23 0945   Wound Surface Area (cm^2) 4 5 cm^2 01/06/23 0945   Wound Volume (cm^3) 0 45 cm^3 01/06/23 0945   Calculated Wound Volume (cm^3) 0 45 cm^3 01/06/23 0945   Change in Wound Size % -125 01/06/23 0945   Drainage Amount Small 01/06/23 0945   Drainage Description Serous 01/06/23 0945   Non-staged Wound Description Full thickness 01/06/23 0945   Treatments Cleansed 01/06/23 0945       Wound 01/06/23 Pressure Injury Ankle Left;Lateral (Active)   Wound Image   01/06/23 0927   Wound Description Epithelialization;Granulation tissue;Slough 01/06/23 0945   Pressure Injury Stage 3 01/06/23 0945   Sybil-wound Assessment Dry; Intact 01/06/23 0945   Wound Length (cm) 0 5 cm 01/06/23 0945   Wound Width (cm) 0 5 cm 01/06/23 0945   Wound Depth (cm) 0 4 cm 01/06/23 0945   Wound Surface Area (cm^2) 0 25 cm^2 01/06/23 0945   Wound Volume (cm^3) 0 1 cm^3 01/06/23 0945   Calculated Wound Volume (cm^3) 0 1 cm^3 01/06/23 0945   Drainage Amount Small 01/06/23 0945   Drainage Description Serous 01/06/23 0945   Non-staged Wound Description Full thickness 01/06/23 0945   Treatments Cleansed 01/06/23 0945       Wound 01/06/23 Pressure Injury Hip Left (Active)   Wound Image   01/06/23 0929   Wound Description Slough;Granulation tissue 01/06/23 0946   Pressure Injury Stage 3 01/06/23 0946   Sybil-wound Assessment Scar Tissue 01/06/23 0946   Wound Length (cm) 2 cm 01/06/23 0946   Wound Width (cm) 3 cm 01/06/23 0946   Wound Depth (cm) 0 1 cm 01/06/23 0946   Wound Surface Area (cm^2) 6 cm^2 01/06/23 0946   Wound Volume (cm^3) 0 6 cm^3 01/06/23 0946   Calculated Wound Volume (cm^3) 0 6 cm^3 01/06/23 0946   Drainage Amount Small 01/06/23 0946   Drainage Description Serous; Tan 01/06/23 0946   Non-staged Wound Description Full thickness 01/06/23 0946   Treatments Cleansed 01/06/23 0946                Debridement   Wound 05/16/22 Pressure Injury Back Right;Upper    Universal Protocol:  Consent: Verbal consent obtained  Consent given by: patient  Time out: Immediately prior to procedure a "time out" was called to verify the correct patient, procedure, equipment, support staff and site/side marked as required    Patient understanding: patient states understanding of the procedure being performed  Patient identity confirmed: verbally with patient      Performed by: PA  Debridement type: surgical  Level of debridement: subcutaneous tissue  Pain control: lidocaine 4%  Post-debridement measurements  Length (cm): 12  Width (cm): 9  Depth (cm): 2 1  Percent debrided: 20%  Surface Area (cm^2): 108  Area debrided (cm^2): 21 6  Volume (cm^3): 226 8  Tissue and other material debrided: dermis and subcutaneous tissue  Devitalized tissue debrided: necrotic debris  Instrument(s) utilized: curette  Bleeding: small  Hemostasis obtained with: pressure  Procedural pain (0-10): 0  Post-procedural pain: 0   Response to treatment: procedure was tolerated well    Debridement   Wound 05/16/22 Pressure Injury Buttocks Right    Universal Protocol:  Consent: Verbal consent obtained  Consent given by: patient  Time out: Immediately prior to procedure a "time out" was called to verify the correct patient, procedure, equipment, support staff and site/side marked as required  Patient understanding: patient states understanding of the procedure being performed  Patient identity confirmed: verbally with patient      Performed by: PA  Debridement type: surgical  Level of debridement: subcutaneous tissue  Pain control: lidocaine 4%  Post-debridement measurements  Length (cm): 4  Width (cm): 4  Depth (cm): 2 1  Percent debrided: 50%  Surface Area (cm^2): 16  Area debrided (cm^2): 8  Volume (cm^3): 33 6  Tissue and other material debrided: dermis and subcutaneous tissue  Devitalized tissue debrided: necrotic debris  Instrument(s) utilized: curette, forceps and blade  Bleeding: small  Hemostasis obtained with: pressure  Procedural pain (0-10): 0  Post-procedural pain: 0   Response to treatment: procedure was tolerated well    Debridement   Wound 05/16/22 Pressure Injury Sacrum    Universal Protocol:  Consent: Verbal consent obtained  Consent given by: patient  Time out: Immediately prior to procedure a "time out" was called to verify the correct patient, procedure, equipment, support staff and site/side marked as required    Patient understanding: patient states understanding of the procedure being performed  Patient identity confirmed: verbally with patient      Performed by: PA  Debridement type: surgical  Level of debridement: subcutaneous tissue  Pain control: lidocaine 4%  Post-debridement measurements  Length (cm): 5 5  Width (cm): 5 5  Depth (cm): 2 6  Percent debrided: 50%  Surface Area (cm^2): 30 25  Area debrided (cm^2): 15 13  Volume (cm^3): 78 65  Tissue and other material debrided: dermis and subcutaneous tissue  Devitalized tissue debrided: necrotic debris  Instrument(s) utilized: curette  Bleeding: small  Hemostasis obtained with: pressure  Procedural pain (0-10): 0  Post-procedural pain: 0   Response to treatment: procedure was tolerated well    Debridement   Wound 08/29/22 Heel Left;Posterior    Universal Protocol:  Consent: Verbal consent obtained  Consent given by: patient  Time out: Immediately prior to procedure a "time out" was called to verify the correct patient, procedure, equipment, support staff and site/side marked as required    Patient understanding: patient states understanding of the procedure being performed  Patient identity confirmed: verbally with patient      Performed by: PA  Debridement type: surgical  Level of debridement: subcutaneous tissue  Pain control: lidocaine 4%  Post-debridement measurements  Length (cm): 1 5  Width (cm): 1 5  Depth (cm): 0 9  Percent debrided: 100%  Surface Area (cm^2): 2 25  Area debrided (cm^2): 2 25  Volume (cm^3): 2 03  Tissue and other material debrided: dermis and subcutaneous tissue  Devitalized tissue debrided: necrotic debris  Instrument(s) utilized: curette  Bleeding: small  Hemostasis obtained with: pressure  Procedural pain (0-10): 0  Post-procedural pain: 0   Response to treatment: procedure was tolerated well                           Results from last 6 Months   Lab Units 11/26/22  0913   WOUND CULTURE  2+ Growth of Pseudomonas aeruginosa*  2+ Growth of Proteus mirabilis*  2+ Growth of Staphylococcus aureus*  3+ Growth of  2+ Growth of Citrobacter koseri*  2+ Growth of Proteus mirabilis*  2+ Growth of           Wound Instructions:  Orders Placed This Encounter   Procedures   • Wound cleansing and dressings     Wound cleansing and dressings       Wash your hands with soap and water  Remove old dressing, discard into plastic bag and place in trash  Cleanse the ALL wounds with normal saline prior to applying a clean dressing  Do not use tissue or cotton balls  Do not scrub the wound  Pat dry using gauze      Shower yes do not get dressing wet though   Plan for showers around the dressing changes       Left foot wounds  Clean with normal saline  Stop all current treatment at this time and apply bacetracin and the  just cover with area with a dry dressing and change three times a week or as needed for dislodgement or soiling    Left hip wound  Clean with normal saline  Apply bacetracin and continue with allevyn life bordered foam and change every three days or as needed for soiling or dislodgement    Right ear  Clean with normal saline   Apply betadine to the area and may leave open to air due to dressing not sticking  Apply betadine daily    Right hip area that is scarred and almost healed  Clean with normal saline and apply bacetracin and allevyn border foam and change every three days and as needed for soilage and dislodgement    Right hip, sacral and back area  Clean with normal saline  Continue with wound vac to all areas, apply santyl to the wound base before the black foam is applied, make sure that no black foam touches good skin  Bridge from bony prominences  Make sure that the suction is at 125mmhg  Change three times a week  IN the office today due to no vac present silver alginate applied to all the wound and covered with bordered foams    Apply the vac when patient returns to the facility    Any new small areas that occur you can apply bacetracin and cover with a dry dressin  Continue to protect any at risk areas with bordered foam        We recommend that the patient sees infectious diseases to see if he would benefit from long term antibiotics        Continue to turn and reposition like you have every hour   Continue with the offloading boots to both feet like you have been   May continue with the left foot just being elevated on a pillow to prevent pressure       Check air mattress in the facility to ensure that it is set correctly for the patient   If it is weight based make sure the weight is correctly set   If it just has a range from soft to firm set it at medium         Follow up at [address] [facility],[address] in 6 weeks     Standing Status:   Future     Standing Expiration Date:   1/6/2024       Antonio Canela PA-C      Portions of the record may have been created with voice recognition software  Occasional wrong word or "sound alike" substitutions may have occurred due to the inherent limitations of voice recognition software  Read the chart carefully and recognize, using context, where substitutions have occurred

## 2023-01-09 ENCOUNTER — TELEPHONE (OUTPATIENT)
Dept: INFECTIOUS DISEASES | Facility: CLINIC | Age: 68
End: 2023-01-09

## 2023-01-09 NOTE — TELEPHONE ENCOUNTER
Radha from [facility] calls office today regarding scheduling a consult appt  Per Geovani pt is currently being followed by wound care and they are requesting pt to be seen in our office for a consult  Informed Radha we would need the referral to be faxed to our office for review  Faxed was received  After looking into pts chart, pt was seen by our service while in patient at [facility] in December 2022

## 2023-01-10 ENCOUNTER — TELEPHONE (OUTPATIENT)
Dept: WOUND CARE | Facility: CLINIC | Age: 68
End: 2023-01-10

## 2023-01-10 ENCOUNTER — TELEPHONE (OUTPATIENT)
Dept: INFECTIOUS DISEASES | Facility: CLINIC | Age: 68
End: 2023-01-10

## 2023-01-10 NOTE — TELEPHONE ENCOUNTER
Radha from [facility] calls office back today after receiving message from office yesterday  Informed Radha I reached out to provider who saw pt while he was inpatient in December 2022  As this time per Sonali Lemos there would be no long term antibiotics for this pt and there is no necessary follow up needed  Radha verbalizes understanding at this time  Informed Leona Marc if there are any other further questions or concerns to call the office

## 2023-01-10 NOTE — TELEPHONE ENCOUNTER
Radha from [address] calls office today and states she needs something in writing stating there is no f/u necessary for pt and why     Cb:[phone]

## 2023-01-10 NOTE — TELEPHONE ENCOUNTER
Attempted to call [facility] to discuss that outpatient ID consult will not be of benefit for the patient based on previous ID consult in hospital  Was unable to get in touch with anyone from center, will call again later

## 2023-01-11 NOTE — TELEPHONE ENCOUNTER
Radha from [facility] calls office today regarding pts referral      Geovani ask if there is any way the office would be able to send a letter to the facility as to why the pt would not need follow up or a consult so the facility PA can see it  Asked Geovani if the facility would like our PA call their PA and have a conversation  Geovani states a provider to provider conversation would be better       Facility PA: Marge Zimmerman [phone] (office)  [phone] (nurse office)

## 2023-01-13 ENCOUNTER — TELEPHONE (OUTPATIENT)
Dept: INFECTIOUS DISEASES | Facility: CLINIC | Age: 68
End: 2023-01-13

## 2023-01-13 NOTE — TELEPHONE ENCOUNTER
Provider at facility caring for Mr Velazquez requesting call back to discuss patient's ongoing plan of care for chronic decubitus wounds  Patient was evaluated most recently on 1/6 by outpatient wound care who recommended infectious disease consultation as outpatient  To review, patient is known to the inpatient infectious disease team at [facility] as he was admitted from 11/25 to 12/20/2022 with sepsis due to polymicrobial bacteremia from chronic and infected stage IV decubitus ulcers that were present on admission  Patient has remote history of developmental disability and functional quadriplegia, is nonverbal, bedbound and at baseline is on tube feeds with chronic indwelling Marrufo catheter and colostomy  Unfortunately due to the patient's severe contractures it is very difficult to reposition the patient and offload pressure to attempt to prevent wounds  More specifically the patient's right upper back and sacral wounds were noted with increasing wound depth and necrotic tissue burden with evidence of purulence and malodor  Surgery was following and patient underwent chemical and manual debridements  Patient was also evaluated by podiatry regarding a left lateral foot stage IV pressure ulcer with chronic osteomyelitis  In the setting of a chronic wound with exposed bone and no plan or indication for surgical intervention, the patient's osteo was not treatable long-term  He is likely to continue to have progression of foot wound despite local wound care given extensive contractures and again, inability to offload pressure  Patient completed a 14-day course of IV antibiotics for bacteremia though he continued to spike fevers  He was subsequently found to have an acute DVT and started on anticoagulation  Surgery was following and ultimately placed wound vacs to decubitus wounds and fevers resolved, and patient was therefore discharged back to facility in stable condition        Prior to discharge, it was discussed that unfortunately the patient remains at risk for recurrent infection, bacteremia sepsis and even limb loss  Patient's decubitus ulcers are unlikely to heal and are at risk for reinfection due to multiple comorbidities including protein calorie malnutrition, extensive contractures, inability to offload pressure, and inability to achieve adequate closure  Although no osteomyelitis was noted on imaging of his decubitus wounds, with worsening wounds it is likely that the patient will develop chronic infection  If unable to cover or secure his wounds especially of the sacrum and the upper right back, in the event that he does eventually develop osteomyelitis, it could not be treated long-term  In addition, prolonged antibiotics can lead to harmful antibiotic side effects including but not limited to C  difficile infection, nephrotoxicity, and bacterial resistance  Therefore, infectious disease consultation/follow-up in office is not indicated  If plan were to change and patient undergo plastic surgery procedures to cover and secure wounds, would indeed consider antibiotic management if acute infection were to arise  Discussed the above with the patient's facility provider and she expressed understanding

## 2023-01-13 NOTE — TELEPHONE ENCOUNTER
Kristina Carreon from [facility] calls office today regarding pt  Kristina Carreon is requesting a call back from Kristina Lemos from our office regarding pt      Clayburn Klinefelter, [address]: [phone] (cell)

## 2023-01-28 PROBLEM — J69.0 ASPIRATION PNEUMONIA (HCC): Status: RESOLVED | Noted: 2022-11-29 | Resolved: 2023-01-28

## 2023-02-12 PROBLEM — A41.9 SEPSIS (HCC): Status: RESOLVED | Noted: 2022-11-25 | Resolved: 2023-02-12

## 2023-04-26 NOTE — PROGRESS NOTES
Luzmaria Smith is a 61 y o  male who is currently ordered Vancomycin IV with management by the Pharmacy Consult service  Relevant clinical data and objective / subjective history reviewed  Vancomycin Assessment:  Indication and Goal AUC/Trough: Bacteremia (goal -600, trough >10), -600, trough >10  Clinical Status: stable  Micro:   Preliminary results from wound culture, GPC, GNR  Renal Function:  SCr: 0 29 mg/dL  CrCl:197 6 mL/min  Renal replacement: Not on dialysis  Days of Therapy: 1  Current Dose: 750 mg IV q12h  Vancomycin Plan:  New Dosinmg Iv load followed by 750 mg IV q8h  Estimated AUC: 418 mcg*hr/mL  Estimated Trough: 12 1 mcg/mL  Next Level:  @0600(with am labs)  Renal Function Monitoring: Daily BMP and UOP  Pharmacy will continue to follow closely for s/sx of nephrotoxicity, infusion reactions and appropriateness of therapy  BMP and CBC will be ordered per protocol  We will continue to follow the patient’s culture results and clinical progress daily      Michael Wakefield, Pharmacist Routine Reassessment